# Patient Record
Sex: MALE | Race: BLACK OR AFRICAN AMERICAN | Employment: OTHER | URBAN - METROPOLITAN AREA
[De-identification: names, ages, dates, MRNs, and addresses within clinical notes are randomized per-mention and may not be internally consistent; named-entity substitution may affect disease eponyms.]

---

## 2017-06-14 PROBLEM — G89.4 CHRONIC PAIN SYNDROME: Status: ACTIVE | Noted: 2017-06-14

## 2018-07-11 ENCOUNTER — OFFICE VISIT (OUTPATIENT)
Dept: PULMONOLOGY | Age: 53
End: 2018-07-11
Payer: COMMERCIAL

## 2018-07-11 VITALS
HEIGHT: 73 IN | WEIGHT: 212.8 LBS | SYSTOLIC BLOOD PRESSURE: 142 MMHG | BODY MASS INDEX: 28.2 KG/M2 | TEMPERATURE: 96.7 F | DIASTOLIC BLOOD PRESSURE: 80 MMHG | HEART RATE: 64 BPM | OXYGEN SATURATION: 98 %

## 2018-07-11 DIAGNOSIS — R60.0 LOWER EXTREMITY EDEMA: ICD-10-CM

## 2018-07-11 DIAGNOSIS — R05.9 COUGH: ICD-10-CM

## 2018-07-11 DIAGNOSIS — G47.33 OSA ON CPAP: Primary | ICD-10-CM

## 2018-07-11 DIAGNOSIS — F17.200 SMOKING: ICD-10-CM

## 2018-07-11 DIAGNOSIS — Z99.89 OSA ON CPAP: Primary | ICD-10-CM

## 2018-07-11 PROCEDURE — 4004F PT TOBACCO SCREEN RCVD TLK: CPT | Performed by: INTERNAL MEDICINE

## 2018-07-11 PROCEDURE — 3017F COLORECTAL CA SCREEN DOC REV: CPT | Performed by: INTERNAL MEDICINE

## 2018-07-11 PROCEDURE — G8427 DOCREV CUR MEDS BY ELIG CLIN: HCPCS | Performed by: INTERNAL MEDICINE

## 2018-07-11 PROCEDURE — 99214 OFFICE O/P EST MOD 30 MIN: CPT | Performed by: INTERNAL MEDICINE

## 2018-07-11 PROCEDURE — G8419 CALC BMI OUT NRM PARAM NOF/U: HCPCS | Performed by: INTERNAL MEDICINE

## 2018-07-11 RX ORDER — LOSARTAN POTASSIUM AND HYDROCHLOROTHIAZIDE 25; 100 MG/1; MG/1
TABLET ORAL DAILY
COMMUNITY

## 2018-07-11 NOTE — PROGRESS NOTES
Subjective:     Yessenia Be is a 47 y.o. male who complains today of:     Chief Complaint   Patient presents with    Follow-up     Est care former pt or Dr Esperanza Rubio       HPI  Patient presents for follow up   1- REGINA: supposed to be on CPAP 16 cm H2o on he have not been using it for long time for 3 months, machine is old , noisy and he is unable to use. The mask is not working well and is bothering him   When he uses his CPAP it helps him with improved energy and activity   2- Cough with yellow phlegm for 6 month, no hemoptysis, rare chest pain comes and goes in different spots, none recent , continue to smoke less than a pack per day  Since age 20. + feet edema sometimes   3- GERD no symptoms now and he is on PPI     4- Sickle cell disease no flares fore  3 years or more     Allergies:  Patient has no known allergies. Past Medical History:   Diagnosis Date    Bronchitis     Chronic back pain     Hypertension     Osteoarthritis     Pneumonia      Past Surgical History:   Procedure Laterality Date    BLADDER SURGERY  7/10/2015    Patient states he was urininating blood and had a procedure done.  COLONOSCOPY  3/14/14    JARM    LUMBAR FUSION      UPPER GASTROINTESTINAL ENDOSCOPY  3/14/14    BIOPSY JARM    VAGAL NERVE STIMULATION       Family History   Problem Relation Age of Onset    Cancer Mother         Throat    Other Father         Natural causes    Diabetes Sister     COPD Sister      Social History     Social History    Marital status: Legally      Spouse name: N/A    Number of children: N/A    Years of education: N/A     Occupational History    Not on file. Social History Main Topics    Smoking status: Current Every Day Smoker     Packs/day: 1.00     Types: Cigarettes    Smokeless tobacco: Never Used      Comment: Getting closer to wanting to quit.     Alcohol use No    Drug use: No    Sexual activity: Not on file     Other Topics Concern    Not on file     Social History Narrative    No narrative on file         Review of Systems      ROS: 10 organs review of system is done including general, psychological, ENT, hematological, endocrine, respiratory, cardiovascular, gastrointestinal, musculoskeletal, neurological,  allergy and Immunology is done and is otherwise negative. Current Outpatient Prescriptions   Medication Sig Dispense Refill    losartan-hydrochlorothiazide (HYZAAR) 100-25 MG per tablet Take 1 tablet by mouth      HYDROcodone-acetaminophen (NORCO)  MG per tablet Take 1 tablet by mouth 3 times daily as needed for Pain for up to 30 days. [facility] [place]: [place]18 90 tablet 0    pregabalin (LYRICA) 150 MG capsule Take 1 capsule by mouth three times daily for 30 days. . 90 capsule 0    DULoxetine (CYMBALTA) 60 MG extended release capsule take 1 capsule by mouth once daily 30 capsule 3    naproxen (NAPROXEN) 500 MG EC tablet take 1 tablet by mouth twice a day 60 tablet 1    metFORMIN (GLUCOPHAGE) 500 MG tablet Take 500 mg by mouth 2 times daily (with meals)      DULoxetine (CYMBALTA) 30 MG extended release capsule take 1 capsule by mouth once daily 30 capsule 5    atenolol (TENORMIN) 50 MG tablet Take 50 mg by mouth daily      fenofibrate 160 MG tablet Take by mouth      folic acid (FOLVITE) 1 MG tablet Take 1 mg by mouth      amLODIPine-benazepril (LOTREL) 5-10 MG per capsule       omeprazole (PRILOSEC) 20 MG capsule        No current facility-administered medications for this visit. Objective:     Vitals:    07/11/18 0851   BP: (!) 142/80   Pulse: 64   Temp: 96.7 °F (35.9 °C)   TempSrc: Tympanic   SpO2: 98%   Weight: 212 lb 12.8 oz (96.5 kg)   Height: 6' 1\" (1.854 m)         Physical Exam   Constitutional: He is oriented to person, place, and time. He appears well-developed and well-nourished. HENT:   Head: Normocephalic and atraumatic. Eyes: Conjunctivae are normal. Pupils are equal, round, and reactive to light.  Left eye exhibits no discharge. Neck: Normal range of motion. Neck supple. No JVD present. Cardiovascular: Normal rate, regular rhythm and normal heart sounds. Exam reveals no gallop and no friction rub. No murmur heard. Pulmonary/Chest: Effort normal. No respiratory distress. He has no wheezes. He has rales. He exhibits no tenderness. Abdominal: Soft. Bowel sounds are normal.   Musculoskeletal: He exhibits edema (1+ ). He exhibits no deformity. Neurological: He is alert and oriented to person, place, and time. Skin: Skin is warm and dry. Psychiatric: He has a normal mood and affect. Judgment normal.     Imaging studies reviewed by me CT chest 2014 shows centrilobular emphysema, with dependent atelectasis no mass or nodules  Lab results reviewed in chart  PFT 2016  FEV1 3.20 L 86%, FEV1/FVC 0.87, total lung capacity 73%, and normal diffusion capacity. ECHO: Echo 2014 ejection fraction 65%     Assessment and Plan       Diagnosis Orders   1. REGINA on CPAP  DME Order for CPAP as OP   2. Cough  XR CHEST STANDARD (2 VW)    Spirometry without bronchodilator    tiotropium (SPIRIVA) 18 MCG inhalation capsule    Respiratory Therapy Supplies (FLUTTER) PANCHO   3. Smoking  Spirometry without bronchodilator   4. Lower extremity edema       · Obstructive sleep apnea currently noncompliant due to machine being old and noisy, new CPAP machine is ordered, and patient to adhere.   · Cough likely chronic bronchitis, will obtain chest x-ray, will obtain spirometry, and start Spiriva also patient to start on flutter valve  · Smoking cessation counseling more than 10 minutes is done  · Lower extremity edema is likely due to obstructive sleep apnea with possible underlying diastolic dysfunction expect improvement with adherence to CPAP      Orders Placed This Encounter   Procedures    XR CHEST STANDARD (2 VW)     Standing Status:   Future     Standing Expiration Date:   7/11/2019     Order Specific Question:   Reason for exam:     Answer:

## 2018-07-13 ENCOUNTER — HOSPITAL ENCOUNTER (OUTPATIENT)
Dept: GENERAL RADIOLOGY | Age: 53
Discharge: HOME | End: 2018-07-15
Payer: COMMERCIAL

## 2018-07-13 ENCOUNTER — HOSPITAL ENCOUNTER (OUTPATIENT)
Dept: PULMONOLOGY | Age: 53
Discharge: HOME | End: 2018-07-13
Payer: COMMERCIAL

## 2018-07-13 DIAGNOSIS — F17.200 SMOKING: ICD-10-CM

## 2018-07-13 DIAGNOSIS — R05.9 COUGH: ICD-10-CM

## 2018-07-13 PROCEDURE — 94010 BREATHING CAPACITY TEST: CPT | Performed by: INTERNAL MEDICINE

## 2018-07-13 PROCEDURE — 71046 X-RAY EXAM CHEST 2 VIEWS: CPT

## 2018-07-13 PROCEDURE — 94010 BREATHING CAPACITY TEST: CPT

## 2018-07-23 ENCOUNTER — TELEPHONE (OUTPATIENT)
Dept: PULMONOLOGY | Age: 53
End: 2018-07-23

## 2018-07-23 NOTE — TELEPHONE ENCOUNTER
Pt states the incruse that was sent in for him was too expensive. Pharmacist states that Nada Josely would be the less expensive for him. Can you please prescribe the Tudorza?

## 2018-09-11 ENCOUNTER — OFFICE VISIT (OUTPATIENT)
Dept: PULMONOLOGY | Age: 53
End: 2018-09-11
Payer: COMMERCIAL

## 2018-09-11 VITALS
TEMPERATURE: 97.3 F | DIASTOLIC BLOOD PRESSURE: 82 MMHG | WEIGHT: 219 LBS | BODY MASS INDEX: 29.03 KG/M2 | RESPIRATION RATE: 18 BRPM | OXYGEN SATURATION: 98 % | HEART RATE: 60 BPM | SYSTOLIC BLOOD PRESSURE: 138 MMHG | HEIGHT: 73 IN

## 2018-09-11 DIAGNOSIS — R60.0 EDEMA EXTREMITIES: ICD-10-CM

## 2018-09-11 DIAGNOSIS — R05.9 COUGH: ICD-10-CM

## 2018-09-11 DIAGNOSIS — Z99.89 OSA ON CPAP: Primary | ICD-10-CM

## 2018-09-11 DIAGNOSIS — F17.200 SMOKING: ICD-10-CM

## 2018-09-11 DIAGNOSIS — G47.33 OSA ON CPAP: Primary | ICD-10-CM

## 2018-09-11 PROCEDURE — 99213 OFFICE O/P EST LOW 20 MIN: CPT | Performed by: INTERNAL MEDICINE

## 2018-09-11 PROCEDURE — 4004F PT TOBACCO SCREEN RCVD TLK: CPT | Performed by: INTERNAL MEDICINE

## 2018-09-11 PROCEDURE — G8427 DOCREV CUR MEDS BY ELIG CLIN: HCPCS | Performed by: INTERNAL MEDICINE

## 2018-09-11 PROCEDURE — 3017F COLORECTAL CA SCREEN DOC REV: CPT | Performed by: INTERNAL MEDICINE

## 2018-09-11 PROCEDURE — G8419 CALC BMI OUT NRM PARAM NOF/U: HCPCS | Performed by: INTERNAL MEDICINE

## 2018-09-11 RX ORDER — MEDICAL SUPPLY, MISCELLANEOUS
1 EACH MISCELLANEOUS DAILY
Qty: 1 EACH | Refills: 0 | Status: SHIPPED | OUTPATIENT
Start: 2018-09-11

## 2018-09-11 NOTE — PROGRESS NOTES
negative. Current Outpatient Prescriptions   Medication Sig Dispense Refill    HYDROcodone-acetaminophen (NORCO)  MG per tablet Take 1 tablet by mouth 3 times daily as needed for Pain for up to 30 days. [facility] Date: 9/3/18 90 tablet 0    pregabalin (LYRICA) 150 MG capsule Take 1 capsule by mouth three times daily for 30 days. . 90 capsule 0    NAPROXEN 500 MG EC tablet take 1 tablet by mouth twice a day 60 tablet 1    DULoxetine (CYMBALTA) 30 MG extended release capsule take 1 capsule by mouth once daily 30 capsule 5    aclidinium (TUDORZA PRESSAIR) 400 MCG/ACT AEPB inhaler Inhale 1 puff into the lungs 2 times daily 1 each 5    losartan-hydrochlorothiazide (HYZAAR) 100-25 MG per tablet Take 1 tablet by mouth      Respiratory Therapy Supplies (FLUTTER) PANCHO 1 Device by Does not apply route 4 times daily 1 Device 0    DULoxetine (CYMBALTA) 60 MG extended release capsule take 1 capsule by mouth once daily 30 capsule 3    metFORMIN (GLUCOPHAGE) 500 MG tablet Take 500 mg by mouth 2 times daily (with meals)      atenolol (TENORMIN) 50 MG tablet Take 50 mg by mouth daily      fenofibrate 160 MG tablet Take by mouth      folic acid (FOLVITE) 1 MG tablet Take 1 mg by mouth      amLODIPine-benazepril (LOTREL) 5-10 MG per capsule       omeprazole (PRILOSEC) 20 MG capsule        No current facility-administered medications for this visit. Objective:     Vitals:    09/11/18 1502   BP: 138/82   Site: Right Upper Arm   Position: Sitting   Cuff Size: Medium Adult   Pulse: 60   Resp: 18   Temp: 97.3 °F (36.3 °C)   TempSrc: Tympanic   SpO2: 98%   Weight: 219 lb (99.3 kg)   Height: 6' 1\" (1.854 m)         Physical Exam   Constitutional: He is oriented to person, place, and time. He appears well-developed and well-nourished. HENT:   Head: Normocephalic and atraumatic. Eyes: Pupils are equal, round, and reactive to light. Conjunctivae are normal. Left eye exhibits no discharge.    Neck: Normal range of motion. Neck supple. No JVD present. Cardiovascular: Normal rate, regular rhythm and normal heart sounds. Exam reveals no gallop and no friction rub. No murmur heard. Pulmonary/Chest: Effort normal and breath sounds normal. No respiratory distress. He has no wheezes. He has no rales. He exhibits no tenderness. Abdominal: Soft. Bowel sounds are normal. He exhibits no distension. There is no tenderness. Musculoskeletal: He exhibits edema (1+). He exhibits no deformity. Neurological: He is alert and oriented to person, place, and time. Skin: Skin is warm and dry. Psychiatric: He has a normal mood and affect. Judgment normal.     Imaging studies reviewed by me CXR unremarkable    Lab results reviewed in chart  Spirometry normal   ECHO:  EF 60%      Assessment and Plan       Diagnosis Orders   1. REGINA on CPAP     2. Smoking     3. Cough     4. Edema extremities  Elastic Bandages & Supports (T.E.D. BELOW KNEE/L-REGULAR) MISC     · Patient will start CPAP when able to afford  · Smoking cessation counseling 3-5 minutes done patient is going to try, he does not wish to try Chantix at this time due to suicidal ideation side effect. · Cough is likely chronic bronchitis and improved on current treatment  · Bilateral lower extremity edema likely related to untreated sleep apnea expect improvement with CPAP treatment start will use ZACK hose stocking      No orders of the defined types were placed in this encounter.     Orders Placed This Encounter   Medications    Elastic Bandages & Supports (T.E.D. BELOW KNEE/L-REGULAR) MISC     Si applicator by Does not apply route daily     Dispense:  1 each     Refill:  0     Discussed with patient the importance of exercise and weight control and  overall health and well-being.     Reviewed with the patient: current clinical status, medications, activities and diet.       treatment plan and result expectations have been discussed with the patient who expresses understanding and desires to proceed.           Return in about 4 months (around 1/11/2019).       Reyes Engel MD

## 2019-02-11 PROBLEM — D57.20 SICKLE-CELL/HB-C DISEASE WITHOUT CRISIS (HCC): Status: ACTIVE | Noted: 2019-02-11

## 2019-03-08 PROBLEM — D57.20 SICKLE-CELL/HB-C DISEASE WITHOUT CRISIS (HCC): Status: ACTIVE | Noted: 2019-03-08

## 2019-03-11 DIAGNOSIS — D57.20 SICKLE-CELL/HB-C DISEASE WITHOUT CRISIS (HCC): ICD-10-CM

## 2019-03-11 LAB
ALBUMIN SERPL-MCNC: 4.6 G/DL (ref 3.5–4.6)
ALP BLD-CCNC: 39 U/L (ref 35–104)
ALT SERPL-CCNC: 16 U/L (ref 0–41)
ANION GAP SERPL CALCULATED.3IONS-SCNC: 17 MEQ/L (ref 9–15)
AST SERPL-CCNC: 32 U/L (ref 0–40)
BILIRUB SERPL-MCNC: 0.8 MG/DL (ref 0.2–0.7)
BUN BLDV-MCNC: 20 MG/DL (ref 6–20)
CALCIUM SERPL-MCNC: 9.8 MG/DL (ref 8.5–9.9)
CHLORIDE BLD-SCNC: 99 MEQ/L (ref 95–107)
CO2: 24 MEQ/L (ref 20–31)
CREAT SERPL-MCNC: 1.48 MG/DL (ref 0.7–1.2)
GFR AFRICAN AMERICAN: 59.8
GFR NON-AFRICAN AMERICAN: 49.4
GLOBULIN: 3 G/DL (ref 2.3–3.5)
GLUCOSE BLD-MCNC: 141 MG/DL (ref 70–99)
HCT VFR BLD CALC: 36.9 % (ref 42–52)
POTASSIUM SERPL-SCNC: 4.1 MEQ/L (ref 3.4–4.9)
RETICULOCYTE ABSOLUTE COUNT: 0.13 M/CUMM (ref 0.02–0.11)
RETICULOCYTE COUNT PCT: 2.6 % (ref 0.6–2.2)
SODIUM BLD-SCNC: 140 MEQ/L (ref 135–144)
TOTAL PROTEIN: 7.6 G/DL (ref 6.3–8)

## 2019-03-12 LAB — LACTATE DEHYDROGENASE: 379 U/L (ref 135–225)

## 2019-03-29 ENCOUNTER — INITIAL CONSULT (OUTPATIENT)
Dept: PODIATRY | Facility: CLINIC | Age: 54
End: 2019-03-29

## 2019-03-29 VITALS — TEMPERATURE: 97.1 F | BODY MASS INDEX: 26.82 KG/M2 | HEIGHT: 74 IN | WEIGHT: 209 LBS

## 2019-03-29 DIAGNOSIS — L85.3 XEROSIS OF SKIN: ICD-10-CM

## 2019-03-29 DIAGNOSIS — E11.42 DIABETIC POLYNEUROPATHY ASSOCIATED WITH TYPE 2 DIABETES MELLITUS (HCC): ICD-10-CM

## 2019-03-29 DIAGNOSIS — M21.621 TAILOR'S BUNIONETTE, BILATERAL: ICD-10-CM

## 2019-03-29 DIAGNOSIS — M21.622 TAILOR'S BUNIONETTE, BILATERAL: ICD-10-CM

## 2019-03-29 DIAGNOSIS — M79.671 PAIN IN BOTH FEET: Primary | ICD-10-CM

## 2019-03-29 DIAGNOSIS — M79.672 PAIN IN BOTH FEET: Primary | ICD-10-CM

## 2019-03-29 DIAGNOSIS — B35.1 DERMATOPHYTOSIS OF NAIL: ICD-10-CM

## 2019-03-29 DIAGNOSIS — M20.12 HALLUX ABDUCTO VALGUS, BILATERAL: ICD-10-CM

## 2019-03-29 DIAGNOSIS — M20.11 HALLUX ABDUCTO VALGUS, BILATERAL: ICD-10-CM

## 2019-03-29 RX ORDER — AMMONIUM LACTATE 12 G/100G
CREAM TOPICAL
Qty: 1 BOTTLE | Refills: 5 | Status: SHIPPED | OUTPATIENT
Start: 2019-03-29 | End: 2019-04-28

## 2019-03-29 ASSESSMENT — ENCOUNTER SYMPTOMS
SHORTNESS OF BREATH: 0
DIARRHEA: 0
BACK PAIN: 1
NAUSEA: 0
ROS SKIN COMMENTS: DRY SKIN
CONSTIPATION: 0

## 2019-04-09 ENCOUNTER — OFFICE VISIT (OUTPATIENT)
Dept: PULMONOLOGY | Age: 54
End: 2019-04-09
Payer: COMMERCIAL

## 2019-04-09 VITALS
TEMPERATURE: 97.4 F | BODY MASS INDEX: 26.99 KG/M2 | OXYGEN SATURATION: 99 % | SYSTOLIC BLOOD PRESSURE: 138 MMHG | HEART RATE: 61 BPM | DIASTOLIC BLOOD PRESSURE: 84 MMHG | WEIGHT: 210.2 LBS

## 2019-04-09 DIAGNOSIS — Z99.89 OSA ON CPAP: Primary | ICD-10-CM

## 2019-04-09 DIAGNOSIS — R05.9 COUGH: ICD-10-CM

## 2019-04-09 DIAGNOSIS — F17.200 SMOKING: ICD-10-CM

## 2019-04-09 DIAGNOSIS — G47.33 OSA ON CPAP: Primary | ICD-10-CM

## 2019-04-09 PROCEDURE — 4004F PT TOBACCO SCREEN RCVD TLK: CPT | Performed by: INTERNAL MEDICINE

## 2019-04-09 PROCEDURE — 99213 OFFICE O/P EST LOW 20 MIN: CPT | Performed by: INTERNAL MEDICINE

## 2019-04-09 PROCEDURE — G8419 CALC BMI OUT NRM PARAM NOF/U: HCPCS | Performed by: INTERNAL MEDICINE

## 2019-04-09 PROCEDURE — 3017F COLORECTAL CA SCREEN DOC REV: CPT | Performed by: INTERNAL MEDICINE

## 2019-04-09 PROCEDURE — G8427 DOCREV CUR MEDS BY ELIG CLIN: HCPCS | Performed by: INTERNAL MEDICINE

## 2019-04-09 NOTE — PROGRESS NOTES
Subjective:     Shashi Mobley is a 46 y.o. male whocomplains today of:     Chief Complaint   Patient presents with    Follow-up     [place]       HPI  Patient presents for REGINA       - Cant afford his CPAP co pay of 60 dollars a month, he is using his old machine but having hard time using it because of frequent urination, and poor compliant, no SOB, no chest pain. + cough fells like throat full of phlegm/someting stock there, no nasal congestion, no GERD     Smoker 3/4  Pack per day for almost , used to smoke 1 PPD total 10 years     Allergies:  Patient has no known allergies. Past Medical History:   Diagnosis Date    Bronchitis     Chronic back pain     Diabetes [facility] [address]     Dyslipidemia     Hypertension     Neuropathy in diabetes ([facility] [address]     Osteoarthritis     Pneumonia     Sickle cell anemia (HCC)     Sleep apnea, obstructive      Past Surgical History:   Procedure Laterality Date    BLADDER SURGERY  7/10/2015    Patient states he was urininating blood and had a procedure done.     COLONOSCOPY  3/14/14    JARM    LUMBAR FUSION      OTHER SURGICAL HISTORY Right     tendon repair in (R) forearm because of previous injury    UPPER GASTROINTESTINAL ENDOSCOPY  3/14/14    BIOPSY JARM    VAGAL NERVE STIMULATION       Family History   Problem Relation Age of Onset    Cancer Mother         Throat    Anemia Mother     Other Father         Natural causes    Diabetes Sister     COPD Sister      Social History     Socioeconomic History    Marital status: Legally      Spouse name: Not on file    Number of children: Not on file    Years of education: Not on file    Highest education level: Not on file   Occupational History    Occupation: Disabled - because of back pain - 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 Substance and Sexual Activity    Alcohol use: No     Alcohol/week: 0.0 oz    Drug use: No    Sexual activity: Not on file   Lifestyle    Physical activity:     Days per week: Not on file     Minutes per session: Not on file    Stress: Not on file   Relationships    Social connections:     Talks on phone: Not on file     Gets together: Not on file     Attends Anabaptism service: Not on file     Active member of club or organization: Not on file     Attends meetings of clubs or organizations: Not on file     Relationship status: Not on file    Intimate partner violence:     Fear of current or ex partner: Not on file     Emotionally abused: Not on file     Physically abused: Not on file     Forced sexual activity: Not on file   Other Topics Concern    Not on file   Social History Narrative    Not on file         Review of Systems      ROS: 10 organs review of system is done including general, psychological, ENT, hematological, endocrine, respiratory, cardiovascular, gastrointestinal,musculoskeletal, neurological,  allergy and Immunology is done and is otherwise negative. Current Outpatient Medications   Medication Sig Dispense Refill    ammonium lactate (LAC-HYDRIN) 12 % cream Apply topically twice daily to areas of dry skin, do not apply between the toes. 1 Bottle 5    ciclopirox (PENLAC) 8 % solution Apply topically to the toenails nightly. 1 Bottle 5    HYDROcodone-acetaminophen (NORCO)  MG per tablet Take 1 tablet by mouth 3 times daily as needed for Pain for up to 30 days. 90 tablet 0    pregabalin (LYRICA) 150 MG capsule Take 1 capsule by mouth three times daily for 30 days.  Fill date 1/11/19 90 capsule 0    naproxen (NAPROXEN) 500 MG EC tablet take 1 tablet by mouth twice a day 60 tablet 1    DULoxetine (CYMBALTA) 60 MG extended release capsule Take 1 capsule by mouth daily Takes 90mg total 30 capsule 2    DULoxetine (CYMBALTA) 30 MG extended release capsule Take 1 capsule by mouth daily Take 90mg total 30 capsule 2    Elastic Bandages & Supports (T.E.D. BELOW KNEE/L-REGULAR) MISC 1 applicator by Does not apply route daily 1 each 0    aclidinium (TUDORZA PRESSAIR) 400 MCG/ACT AEPB inhaler Inhale 1 puff into the lungs 2 times daily 1 each 5    losartan-hydrochlorothiazide (HYZAAR) 100-25 MG per tablet Take 1 tablet by mouth      Respiratory Therapy Supplies (FLUTTER) PANCHO 1 Device by Does not apply route 4 times daily 1 Device 0    metFORMIN (GLUCOPHAGE) 500 MG tablet Take 500 mg by mouth 2 times daily (with meals)      atenolol (TENORMIN) 50 MG tablet Take 50 mg by mouth daily      fenofibrate 160 MG tablet Take by mouth      folic acid (FOLVITE) 1 MG tablet Take 1 mg by mouth      amLODIPine-benazepril (LOTREL) 5-10 MG per capsule       omeprazole (PRILOSEC) 20 MG capsule        No current facility-administered medications for this visit. Objective:     Vitals:    04/09/19 1515   BP: 138/84   Site: Right Upper Arm   Pulse: 61   Temp: 97.4 °F (36.3 °C)   TempSrc: Tympanic   SpO2: 99%   Weight: 210 lb 3.2 oz (95.3 kg)         Physical Exam   Constitutional: He is oriented to person, place, and time. He appears well-developed and well-nourished. HENT:   Head: Normocephalic and atraumatic. Eyes: Pupils are equal, round, and reactive to light. Conjunctivae are normal. Left eye exhibits no discharge. Neck: Normal range of motion. Neck supple. No JVD present. Cardiovascular: Normal rate, regular rhythm and normal heart sounds. Exam reveals no gallop and no friction rub. No murmur heard. Pulmonary/Chest: Effort normal and breath sounds normal. No respiratory distress. He has no wheezes. He has no rales.  He exhibits no tenderness. Abdominal: Soft. Bowel sounds are normal.   Musculoskeletal: He exhibits no edema or deformity. Neurological: He is alert and oriented to person, place, and time. Skin: Skin is warm and dry. Psychiatric: He has a normal mood and affect. Judgment normal.       Imaging studies reviewed by me  chest x-rays negative   Lab results reviewed in chart  PFT FEV1 82%    Assessment and Plan       Diagnosis Orders   1. REGINA on CPAP     2. Smoking     3. Cough  Referral To External ENT - Jeanette Marroquin MD     · Patient encouraged to use CPAP daily, he probably would be able to void CPAP machine in the next couple month is. · Smoking cessation counseling done again 5-10 minutes. · Cough with abnormal feeling of phlegm versus tissue stuck to his throat, will refer patient to ENT. Orders Placed This Encounter   Procedures    Referral To External ENT Sarah Marroquin MD     Referral Priority:   Routine     Referral Type:   Eval and Treat     Referral Reason:   Specialty Services Required     Referred to Provider:   Ursula Hdez MD     Requested Specialty:   Otolaryngology     Number of Visits Requested:   1     No orders of the defined types were placed in this encounter. Return in about 6 months (around 10/9/2019).       Jhony Mast MD

## 2019-04-24 ENCOUNTER — HOSPITAL ENCOUNTER (OUTPATIENT)
Dept: CT IMAGING | Age: 54
Discharge: HOME | End: 2019-04-26
Payer: COMMERCIAL

## 2019-04-24 VITALS
WEIGHT: 213 LBS | DIASTOLIC BLOOD PRESSURE: 87 MMHG | HEIGHT: 73 IN | BODY MASS INDEX: 28.23 KG/M2 | SYSTOLIC BLOOD PRESSURE: 138 MMHG

## 2019-04-24 DIAGNOSIS — J38.7 LARYNGEAL MASS: ICD-10-CM

## 2019-04-24 PROCEDURE — 6360000004 HC RX CONTRAST MEDICATION: Performed by: PHYSICIAN ASSISTANT

## 2019-04-24 PROCEDURE — 70491 CT SOFT TISSUE NECK W/DYE: CPT

## 2019-04-24 PROCEDURE — 2580000003 HC RX 258: Performed by: PHYSICIAN ASSISTANT

## 2019-04-24 RX ORDER — SODIUM CHLORIDE 0.9 % (FLUSH) 0.9 %
10 SYRINGE (ML) INJECTION ONCE
Status: COMPLETED | OUTPATIENT
Start: 2019-04-24 | End: 2019-04-24

## 2019-04-24 RX ADMIN — Medication 10 ML: at 15:39

## 2019-04-24 RX ADMIN — IOPAMIDOL 75 ML: 612 INJECTION, SOLUTION INTRAVENOUS at 15:39

## 2019-11-15 ENCOUNTER — HOSPITAL ENCOUNTER (OUTPATIENT)
Dept: ORTHOPEDIC SURGERY | Age: 54
Discharge: HOME | End: 2019-11-17
Payer: COMMERCIAL

## 2019-11-15 DIAGNOSIS — M16.9 ARTHROSIS OF HIP: ICD-10-CM

## 2019-11-15 PROCEDURE — 73502 X-RAY EXAM HIP UNI 2-3 VIEWS: CPT

## 2019-12-26 ENCOUNTER — OFFICE VISIT (OUTPATIENT)
Dept: PULMONOLOGY | Age: 54
End: 2019-12-26
Payer: COMMERCIAL

## 2019-12-26 VITALS
WEIGHT: 208 LBS | TEMPERATURE: 97 F | OXYGEN SATURATION: 98 % | SYSTOLIC BLOOD PRESSURE: 136 MMHG | BODY MASS INDEX: 26.69 KG/M2 | HEIGHT: 74 IN | HEART RATE: 63 BPM | RESPIRATION RATE: 18 BRPM | DIASTOLIC BLOOD PRESSURE: 84 MMHG

## 2019-12-26 DIAGNOSIS — Z99.89 OSA ON CPAP: ICD-10-CM

## 2019-12-26 DIAGNOSIS — R05.9 COUGH: ICD-10-CM

## 2019-12-26 DIAGNOSIS — F17.200 SMOKING: ICD-10-CM

## 2019-12-26 DIAGNOSIS — G47.33 OSA ON CPAP: ICD-10-CM

## 2019-12-26 DIAGNOSIS — F17.218 CIGARETTE NICOTINE DEPENDENCE WITH OTHER NICOTINE-INDUCED DISORDER: Primary | ICD-10-CM

## 2019-12-26 PROCEDURE — G8484 FLU IMMUNIZE NO ADMIN: HCPCS | Performed by: INTERNAL MEDICINE

## 2019-12-26 PROCEDURE — 99214 OFFICE O/P EST MOD 30 MIN: CPT | Performed by: INTERNAL MEDICINE

## 2019-12-26 PROCEDURE — G8419 CALC BMI OUT NRM PARAM NOF/U: HCPCS | Performed by: INTERNAL MEDICINE

## 2019-12-26 PROCEDURE — 4004F PT TOBACCO SCREEN RCVD TLK: CPT | Performed by: INTERNAL MEDICINE

## 2019-12-26 PROCEDURE — 3017F COLORECTAL CA SCREEN DOC REV: CPT | Performed by: INTERNAL MEDICINE

## 2019-12-26 PROCEDURE — G8427 DOCREV CUR MEDS BY ELIG CLIN: HCPCS | Performed by: INTERNAL MEDICINE

## 2019-12-26 RX ORDER — NICOTINE 21 MG/24HR
1 PATCH, TRANSDERMAL 24 HOURS TRANSDERMAL DAILY
Qty: 42 PATCH | Refills: 0 | Status: SHIPPED | OUTPATIENT
Start: 2019-12-26 | End: 2020-02-06

## 2020-01-02 ENCOUNTER — TELEPHONE (OUTPATIENT)
Dept: CASE MANAGEMENT | Age: 55
End: 2020-01-02

## 2020-01-06 ENCOUNTER — HOSPITAL ENCOUNTER (OUTPATIENT)
Dept: CT IMAGING | Age: 55
Discharge: HOME | End: 2020-01-08
Payer: COMMERCIAL

## 2020-01-06 PROCEDURE — G0297 LDCT FOR LUNG CA SCREEN: HCPCS

## 2020-01-07 ENCOUNTER — HOSPITAL ENCOUNTER (OUTPATIENT)
Dept: PREADMISSION TESTING | Age: 55
Discharge: HOME | End: 2020-01-11
Payer: COMMERCIAL

## 2020-01-07 VITALS
BODY MASS INDEX: 27.04 KG/M2 | HEIGHT: 73 IN | HEART RATE: 69 BPM | RESPIRATION RATE: 18 BRPM | SYSTOLIC BLOOD PRESSURE: 159 MMHG | DIASTOLIC BLOOD PRESSURE: 71 MMHG | OXYGEN SATURATION: 98 % | WEIGHT: 204 LBS | TEMPERATURE: 96.8 F

## 2020-01-07 PROBLEM — F17.200 TOBACCO USE DISORDER: Chronic | Status: ACTIVE | Noted: 2020-01-07

## 2020-01-07 PROBLEM — Z96.89 S/P INSERTION OF SPINAL CORD STIMULATOR: Chronic | Status: ACTIVE | Noted: 2020-01-07

## 2020-01-07 PROBLEM — M16.12 DEGENERATIVE JOINT DISEASE OF LEFT HIP: Status: ACTIVE | Noted: 2020-01-07

## 2020-01-07 PROBLEM — G47.33 OSA (OBSTRUCTIVE SLEEP APNEA): Chronic | Status: ACTIVE | Noted: 2020-01-07

## 2020-01-07 LAB
ABO/RH: NORMAL
ANION GAP SERPL CALCULATED.3IONS-SCNC: 11 MEQ/L (ref 9–15)
ANISOCYTOSIS: ABNORMAL
ANTIBODY SCREEN: NORMAL
ATYPICAL LYMPHOCYTE RELATIVE PERCENT: 5 %
BASOPHILS ABSOLUTE: 0.3 K/UL (ref 0–0.2)
BASOPHILS RELATIVE PERCENT: 3 %
BILIRUBIN URINE: NEGATIVE
BLOOD, URINE: NEGATIVE
BUN BLDV-MCNC: 27 MG/DL (ref 6–20)
CALCIUM SERPL-MCNC: 9.4 MG/DL (ref 8.5–9.9)
CHLORIDE BLD-SCNC: 103 MEQ/L (ref 95–107)
CLARITY: CLEAR
CO2: 28 MEQ/L (ref 20–31)
COLOR: YELLOW
CREAT SERPL-MCNC: 1.34 MG/DL (ref 0.7–1.2)
EKG ATRIAL RATE: 59 BPM
EKG P AXIS: 56 DEGREES
EKG P-R INTERVAL: 164 MS
EKG Q-T INTERVAL: 432 MS
EKG QRS DURATION: 94 MS
EKG QTC CALCULATION (BAZETT): 427 MS
EKG R AXIS: -15 DEGREES
EKG T AXIS: 71 DEGREES
EKG VENTRICULAR RATE: 59 BPM
EOSINOPHILS ABSOLUTE: 0.4 K/UL (ref 0–0.7)
EOSINOPHILS RELATIVE PERCENT: 5 %
GFR AFRICAN AMERICAN: >60
GFR NON-AFRICAN AMERICAN: 55.3
GLUCOSE BLD-MCNC: 136 MG/DL (ref 70–99)
GLUCOSE URINE: NEGATIVE MG/DL
HBA1C MFR BLD: 4.3 % (ref 4.8–5.9)
HCT VFR BLD CALC: 35.6 % (ref 42–52)
HEMOGLOBIN: 12 G/DL (ref 14–18)
INR BLD: 1.1
KETONES, URINE: NEGATIVE MG/DL
LEUKOCYTE ESTERASE, URINE: NEGATIVE
LYMPHOCYTES ABSOLUTE: 2.7 K/UL (ref 1–4.8)
LYMPHOCYTES RELATIVE PERCENT: 27 %
MCH RBC QN AUTO: 26.5 PG (ref 27–31.3)
MCHC RBC AUTO-ENTMCNC: 33.6 % (ref 33–37)
MCV RBC AUTO: 79 FL (ref 80–100)
MONOCYTES ABSOLUTE: 0.5 K/UL (ref 0.2–0.8)
MONOCYTES RELATIVE PERCENT: 6.4 %
NEUTROPHILS ABSOLUTE: 4.6 K/UL (ref 1.4–6.5)
NEUTROPHILS RELATIVE PERCENT: 55 %
NITRITE, URINE: NEGATIVE
PDW BLD-RTO: 22 % (ref 11.5–14.5)
PH UA: 5.5 (ref 5–9)
PLATELET # BLD: 152 K/UL (ref 130–400)
PLATELET SLIDE REVIEW: NORMAL
POTASSIUM SERPL-SCNC: 3.4 MEQ/L (ref 3.4–4.9)
PROTEIN UA: NEGATIVE MG/DL
PROTHROMBIN TIME: 15.1 SEC (ref 12.3–14.9)
RBC # BLD: 4.51 M/UL (ref 4.7–6.1)
SLIDE REVIEW: ABNORMAL
SODIUM BLD-SCNC: 142 MEQ/L (ref 135–144)
SPECIFIC GRAVITY UA: 1.01 (ref 1–1.03)
TARGET CELLS: ABNORMAL
URINE REFLEX TO CULTURE: NORMAL
UROBILINOGEN, URINE: 1 E.U./DL
WBC # BLD: 8.4 K/UL (ref 4.8–10.8)

## 2020-01-07 PROCEDURE — 93005 ELECTROCARDIOGRAM TRACING: CPT | Performed by: ORTHOPAEDIC SURGERY

## 2020-01-07 PROCEDURE — 85025 COMPLETE CBC W/AUTO DIFF WBC: CPT

## 2020-01-07 PROCEDURE — 85610 PROTHROMBIN TIME: CPT

## 2020-01-07 PROCEDURE — 81003 URINALYSIS AUTO W/O SCOPE: CPT

## 2020-01-07 PROCEDURE — 86850 RBC ANTIBODY SCREEN: CPT

## 2020-01-07 PROCEDURE — 83036 HEMOGLOBIN GLYCOSYLATED A1C: CPT

## 2020-01-07 PROCEDURE — 86900 BLOOD TYPING SEROLOGIC ABO: CPT

## 2020-01-07 PROCEDURE — 80048 BASIC METABOLIC PNL TOTAL CA: CPT

## 2020-01-07 PROCEDURE — 86901 BLOOD TYPING SEROLOGIC RH(D): CPT

## 2020-01-07 RX ORDER — SODIUM CHLORIDE 0.9 % (FLUSH) 0.9 %
10 SYRINGE (ML) INJECTION EVERY 12 HOURS SCHEDULED
Status: CANCELLED | OUTPATIENT
Start: 2020-01-07

## 2020-01-07 RX ORDER — SODIUM CHLORIDE 0.9 % (FLUSH) 0.9 %
10 SYRINGE (ML) INJECTION PRN
Status: CANCELLED | OUTPATIENT
Start: 2020-01-07

## 2020-01-07 RX ORDER — ATENOLOL AND CHLORTHALIDONE TABLET 50; 25 MG/1; MG/1
TABLET ORAL DAILY
Refills: 0 | COMMUNITY
Start: 2019-11-06

## 2020-01-07 RX ORDER — AMLODIPINE BESYLATE 10 MG/1
TABLET ORAL DAILY
Refills: 0 | COMMUNITY
Start: 2019-11-06 | End: 2020-01-07

## 2020-01-07 RX ORDER — SODIUM CHLORIDE, SODIUM LACTATE, POTASSIUM CHLORIDE, CALCIUM CHLORIDE 600; 310; 30; 20 MG/100ML; MG/100ML; MG/100ML; MG/100ML
INJECTION, SOLUTION INTRAVENOUS CONTINUOUS
Status: CANCELLED | OUTPATIENT
Start: 2020-01-07

## 2020-01-07 RX ORDER — CEFAZOLIN SODIUM 2 G/50ML
2 SOLUTION INTRAVENOUS ONCE
Status: CANCELLED | OUTPATIENT
Start: 2020-01-23

## 2020-01-07 RX ORDER — LIDOCAINE HYDROCHLORIDE 10 MG/ML
1 INJECTION, SOLUTION EPIDURAL; INFILTRATION; INTRACAUDAL; PERINEURAL
Status: CANCELLED | OUTPATIENT
Start: 2020-01-07 | End: 2020-01-07

## 2020-01-07 NOTE — PROGRESS NOTES
Spinal cord stimulator / [employer] / placed 6/24/2013 / [facility] / patient to turn off pre-op. Last pulmonary appt dated 12/26/2019 & on [facility]. Last cardiology appt 2014. CT scan lung dated 1/6/2020 & on [email or web address].

## 2020-01-10 PROCEDURE — 93010 ELECTROCARDIOGRAM REPORT: CPT | Performed by: INTERNAL MEDICINE

## 2020-01-22 ENCOUNTER — ANESTHESIA EVENT (OUTPATIENT)
Dept: OPERATING ROOM | Age: 55
DRG: 470 | End: 2020-01-22
Payer: COMMERCIAL

## 2020-01-22 ASSESSMENT — LIFESTYLE VARIABLES: SMOKING_STATUS: 1

## 2020-01-23 ENCOUNTER — HOSPITAL ENCOUNTER (INPATIENT)
Age: 55
LOS: 2 days | Discharge: HOME HEALTH | DRG: 470 | End: 2020-01-25
Attending: ORTHOPAEDIC SURGERY | Admitting: ORTHOPAEDIC SURGERY
Payer: COMMERCIAL

## 2020-01-23 ENCOUNTER — ANESTHESIA (OUTPATIENT)
Dept: OPERATING ROOM | Age: 55
DRG: 470 | End: 2020-01-23
Payer: COMMERCIAL

## 2020-01-23 ENCOUNTER — APPOINTMENT (OUTPATIENT)
Dept: GENERAL RADIOLOGY | Age: 55
DRG: 470 | End: 2020-01-23
Attending: ORTHOPAEDIC SURGERY
Payer: COMMERCIAL

## 2020-01-23 VITALS — DIASTOLIC BLOOD PRESSURE: 60 MMHG | OXYGEN SATURATION: 99 % | SYSTOLIC BLOOD PRESSURE: 128 MMHG

## 2020-01-23 PROBLEM — Z96.642 STATUS POST TOTAL REPLACEMENT OF LEFT HIP: Status: ACTIVE | Noted: 2020-01-23

## 2020-01-23 LAB
ABO/RH: NORMAL
ANTIBODY SCREEN: NORMAL
GLUCOSE BLD-MCNC: 100 MG/DL (ref 60–115)
GLUCOSE BLD-MCNC: 105 MG/DL (ref 60–115)
GLUCOSE BLD-MCNC: 241 MG/DL (ref 60–115)
PERFORMED ON: ABNORMAL
PERFORMED ON: NORMAL
PERFORMED ON: NORMAL

## 2020-01-23 PROCEDURE — G0378 HOSPITAL OBSERVATION PER HR: HCPCS

## 2020-01-23 PROCEDURE — 3600000014 HC SURGERY LEVEL 4 ADDTL 15MIN: Performed by: ORTHOPAEDIC SURGERY

## 2020-01-23 PROCEDURE — 6370000000 HC RX 637 (ALT 250 FOR IP): Performed by: ORTHOPAEDIC SURGERY

## 2020-01-23 PROCEDURE — 6370000000 HC RX 637 (ALT 250 FOR IP): Performed by: NURSE PRACTITIONER

## 2020-01-23 PROCEDURE — 6360000002 HC RX W HCPCS: Performed by: STUDENT IN AN ORGANIZED HEALTH CARE EDUCATION/TRAINING PROGRAM

## 2020-01-23 PROCEDURE — 73501 X-RAY EXAM HIP UNI 1 VIEW: CPT

## 2020-01-23 PROCEDURE — 2709999900 HC NON-CHARGEABLE SUPPLY: Performed by: ORTHOPAEDIC SURGERY

## 2020-01-23 PROCEDURE — 7100000001 HC PACU RECOVERY - ADDTL 15 MIN: Performed by: ORTHOPAEDIC SURGERY

## 2020-01-23 PROCEDURE — C1776 JOINT DEVICE (IMPLANTABLE): HCPCS | Performed by: ORTHOPAEDIC SURGERY

## 2020-01-23 PROCEDURE — 0SRB04A REPLACEMENT OF LEFT HIP JOINT WITH CERAMIC ON POLYETHYLENE SYNTHETIC SUBSTITUTE, UNCEMENTED, OPEN APPROACH: ICD-10-PCS | Performed by: ORTHOPAEDIC SURGERY

## 2020-01-23 PROCEDURE — 97162 PT EVAL MOD COMPLEX 30 MIN: CPT

## 2020-01-23 PROCEDURE — 96376 TX/PRO/DX INJ SAME DRUG ADON: CPT

## 2020-01-23 PROCEDURE — 97166 OT EVAL MOD COMPLEX 45 MIN: CPT

## 2020-01-23 PROCEDURE — 3600000004 HC SURGERY LEVEL 4 BASE: Performed by: ORTHOPAEDIC SURGERY

## 2020-01-23 PROCEDURE — 2580000003 HC RX 258: Performed by: NURSE PRACTITIONER

## 2020-01-23 PROCEDURE — 2500000003 HC RX 250 WO HCPCS: Performed by: ORTHOPAEDIC SURGERY

## 2020-01-23 PROCEDURE — 6360000002 HC RX W HCPCS: Performed by: ORTHOPAEDIC SURGERY

## 2020-01-23 PROCEDURE — 6360000002 HC RX W HCPCS: Performed by: NURSE PRACTITIONER

## 2020-01-23 PROCEDURE — 86900 BLOOD TYPING SEROLOGIC ABO: CPT

## 2020-01-23 PROCEDURE — 2580000003 HC RX 258: Performed by: ORTHOPAEDIC SURGERY

## 2020-01-23 PROCEDURE — 96375 TX/PRO/DX INJ NEW DRUG ADDON: CPT

## 2020-01-23 PROCEDURE — 3700000001 HC ADD 15 MINUTES (ANESTHESIA): Performed by: ORTHOPAEDIC SURGERY

## 2020-01-23 PROCEDURE — 64450 NJX AA&/STRD OTHER PN/BRANCH: CPT | Performed by: STUDENT IN AN ORGANIZED HEALTH CARE EDUCATION/TRAINING PROGRAM

## 2020-01-23 PROCEDURE — 96365 THER/PROPH/DIAG IV INF INIT: CPT

## 2020-01-23 PROCEDURE — 2580000003 HC RX 258: Performed by: NURSE ANESTHETIST, CERTIFIED REGISTERED

## 2020-01-23 PROCEDURE — 3700000000 HC ANESTHESIA ATTENDED CARE: Performed by: ORTHOPAEDIC SURGERY

## 2020-01-23 PROCEDURE — 2500000003 HC RX 250 WO HCPCS: Performed by: STUDENT IN AN ORGANIZED HEALTH CARE EDUCATION/TRAINING PROGRAM

## 2020-01-23 PROCEDURE — 7100000000 HC PACU RECOVERY - FIRST 15 MIN: Performed by: ORTHOPAEDIC SURGERY

## 2020-01-23 PROCEDURE — 3E0T3BZ INTRODUCTION OF ANESTHETIC AGENT INTO PERIPHERAL NERVES AND PLEXI, PERCUTANEOUS APPROACH: ICD-10-PCS | Performed by: ORTHOPAEDIC SURGERY

## 2020-01-23 PROCEDURE — 1210000000 HC MED SURG R&B

## 2020-01-23 PROCEDURE — 6360000002 HC RX W HCPCS: Performed by: NURSE ANESTHETIST, CERTIFIED REGISTERED

## 2020-01-23 PROCEDURE — 86901 BLOOD TYPING SEROLOGIC RH(D): CPT

## 2020-01-23 PROCEDURE — 86850 RBC ANTIBODY SCREEN: CPT

## 2020-01-23 DEVICE — COMPONENT TOT HIP CAPPED ADV STRYHIPA] STRYKER CORP]: Type: IMPLANTABLE DEVICE | Status: FUNCTIONAL

## 2020-01-23 DEVICE — STEM FEM SZ 4 L105MM NK L35MM 42MM OFFSET 127DEG HIP TI: Type: IMPLANTABLE DEVICE | Site: HIP | Status: FUNCTIONAL

## 2020-01-23 DEVICE — CABLE SURG DIA1.7MM S STL HA CERCLAGE W/ CRMP 29880101S] DEPUY SYNTHES USA]: Type: IMPLANTABLE DEVICE | Site: HIP | Status: FUNCTIONAL

## 2020-01-23 DEVICE — HEAD FEM DIA28MM +0MM OFFSET HIP BIOLOX DELT CERAMIC TAPR: Type: IMPLANTABLE DEVICE | Site: HIP | Status: FUNCTIONAL

## 2020-01-23 DEVICE — SHELL TRIDENT II CLUSTERHOLE HA ACET 52E: Type: IMPLANTABLE DEVICE | Site: HIP | Status: FUNCTIONAL

## 2020-01-23 DEVICE — LINER ACET SZ E ID42MM HIP X3 CO CHROM CEMENTLESS MOD 2: Type: IMPLANTABLE DEVICE | Site: HIP | Status: FUNCTIONAL

## 2020-01-23 DEVICE — INSERT ACET OD48MM ID28MM X3 MOD 2 MOBILITY MDM: Type: IMPLANTABLE DEVICE | Site: HIP | Status: FUNCTIONAL

## 2020-01-23 RX ORDER — CELECOXIB 200 MG/1
400 CAPSULE ORAL ONCE
Status: COMPLETED | OUTPATIENT
Start: 2020-01-23 | End: 2020-01-23

## 2020-01-23 RX ORDER — LISINOPRIL 10 MG/1
10 TABLET ORAL DAILY
Status: DISCONTINUED | OUTPATIENT
Start: 2020-01-24 | End: 2020-01-25 | Stop reason: HOSPADM

## 2020-01-23 RX ORDER — FENOFIBRATE 160 MG/1
160 TABLET ORAL DAILY
Status: DISCONTINUED | OUTPATIENT
Start: 2020-01-23 | End: 2020-01-25 | Stop reason: HOSPADM

## 2020-01-23 RX ORDER — OXYCODONE HCL 10 MG/1
10 TABLET, FILM COATED, EXTENDED RELEASE ORAL ONCE
Status: COMPLETED | OUTPATIENT
Start: 2020-01-23 | End: 2020-01-23

## 2020-01-23 RX ORDER — ROPIVACAINE HYDROCHLORIDE 5 MG/ML
INJECTION, SOLUTION EPIDURAL; INFILTRATION; PERINEURAL
Status: COMPLETED | OUTPATIENT
Start: 2020-01-23 | End: 2020-01-23

## 2020-01-23 RX ORDER — HYDRALAZINE HYDROCHLORIDE 20 MG/ML
10 INJECTION INTRAMUSCULAR; INTRAVENOUS EVERY 4 HOURS PRN
Status: DISCONTINUED | OUTPATIENT
Start: 2020-01-23 | End: 2020-01-25 | Stop reason: HOSPADM

## 2020-01-23 RX ORDER — DEXTROSE MONOHYDRATE 50 MG/ML
100 INJECTION, SOLUTION INTRAVENOUS PRN
Status: DISCONTINUED | OUTPATIENT
Start: 2020-01-23 | End: 2020-01-25 | Stop reason: HOSPADM

## 2020-01-23 RX ORDER — KETOROLAC TROMETHAMINE 15 MG/ML
15 INJECTION, SOLUTION INTRAMUSCULAR; INTRAVENOUS EVERY 6 HOURS
Status: COMPLETED | OUTPATIENT
Start: 2020-01-23 | End: 2020-01-23

## 2020-01-23 RX ORDER — LIDOCAINE HYDROCHLORIDE 10 MG/ML
1 INJECTION, SOLUTION EPIDURAL; INFILTRATION; INTRACAUDAL; PERINEURAL
Status: DISCONTINUED | OUTPATIENT
Start: 2020-01-23 | End: 2020-01-23 | Stop reason: HOSPADM

## 2020-01-23 RX ORDER — FOLIC ACID 1 MG/1
1 TABLET ORAL DAILY
Status: DISCONTINUED | OUTPATIENT
Start: 2020-01-23 | End: 2020-01-25 | Stop reason: HOSPADM

## 2020-01-23 RX ORDER — SODIUM CHLORIDE 0.9 % (FLUSH) 0.9 %
10 SYRINGE (ML) INJECTION EVERY 12 HOURS SCHEDULED
Status: DISCONTINUED | OUTPATIENT
Start: 2020-01-23 | End: 2020-01-23 | Stop reason: HOSPADM

## 2020-01-23 RX ORDER — PROPOFOL 10 MG/ML
INJECTION, EMULSION INTRAVENOUS CONTINUOUS PRN
Status: DISCONTINUED | OUTPATIENT
Start: 2020-01-23 | End: 2020-01-23 | Stop reason: SDUPTHER

## 2020-01-23 RX ORDER — ASPIRIN 81 MG/1
81 TABLET ORAL 2 TIMES DAILY
Status: DISCONTINUED | OUTPATIENT
Start: 2020-01-23 | End: 2020-01-25 | Stop reason: HOSPADM

## 2020-01-23 RX ORDER — SODIUM CHLORIDE 0.9 % (FLUSH) 0.9 %
10 SYRINGE (ML) INJECTION PRN
Status: DISCONTINUED | OUTPATIENT
Start: 2020-01-23 | End: 2020-01-25 | Stop reason: HOSPADM

## 2020-01-23 RX ORDER — SODIUM CHLORIDE, SODIUM LACTATE, POTASSIUM CHLORIDE, CALCIUM CHLORIDE 600; 310; 30; 20 MG/100ML; MG/100ML; MG/100ML; MG/100ML
INJECTION, SOLUTION INTRAVENOUS CONTINUOUS PRN
Status: DISCONTINUED | OUTPATIENT
Start: 2020-01-23 | End: 2020-01-23 | Stop reason: SDUPTHER

## 2020-01-23 RX ORDER — OXYCODONE HYDROCHLORIDE 5 MG/1
5 TABLET ORAL EVERY 4 HOURS PRN
Qty: 50 TABLET | Refills: 0 | Status: SHIPPED | OUTPATIENT
Start: 2020-01-23 | End: 2020-01-30

## 2020-01-23 RX ORDER — DEXTROSE MONOHYDRATE 25 G/50ML
12.5 INJECTION, SOLUTION INTRAVENOUS PRN
Status: DISCONTINUED | OUTPATIENT
Start: 2020-01-23 | End: 2020-01-25 | Stop reason: HOSPADM

## 2020-01-23 RX ORDER — ONDANSETRON 2 MG/ML
4 INJECTION INTRAMUSCULAR; INTRAVENOUS
Status: DISCONTINUED | OUTPATIENT
Start: 2020-01-23 | End: 2020-01-23 | Stop reason: HOSPADM

## 2020-01-23 RX ORDER — AMLODIPINE BESYLATE 5 MG/1
5 TABLET ORAL DAILY
Status: DISCONTINUED | OUTPATIENT
Start: 2020-01-24 | End: 2020-01-25 | Stop reason: HOSPADM

## 2020-01-23 RX ORDER — MORPHINE SULFATE 2 MG/ML
2 INJECTION, SOLUTION INTRAMUSCULAR; INTRAVENOUS
Status: DISCONTINUED | OUTPATIENT
Start: 2020-01-23 | End: 2020-01-25 | Stop reason: HOSPADM

## 2020-01-23 RX ORDER — METOCLOPRAMIDE HYDROCHLORIDE 5 MG/ML
10 INJECTION INTRAMUSCULAR; INTRAVENOUS
Status: DISCONTINUED | OUTPATIENT
Start: 2020-01-23 | End: 2020-01-23 | Stop reason: HOSPADM

## 2020-01-23 RX ORDER — SODIUM CHLORIDE 9 MG/ML
INJECTION, SOLUTION INTRAVENOUS CONTINUOUS
Status: DISCONTINUED | OUTPATIENT
Start: 2020-01-23 | End: 2020-01-25 | Stop reason: HOSPADM

## 2020-01-23 RX ORDER — MORPHINE SULFATE 4 MG/ML
4 INJECTION, SOLUTION INTRAMUSCULAR; INTRAVENOUS
Status: DISCONTINUED | OUTPATIENT
Start: 2020-01-23 | End: 2020-01-25 | Stop reason: HOSPADM

## 2020-01-23 RX ORDER — AMLODIPINE BESYLATE AND BENAZEPRIL HYDROCHLORIDE 5; 10 MG/1; MG/1
1 CAPSULE ORAL DAILY
Status: DISCONTINUED | OUTPATIENT
Start: 2020-01-24 | End: 2020-01-23 | Stop reason: CLARIF

## 2020-01-23 RX ORDER — BUPIVACAINE HYDROCHLORIDE 5 MG/ML
INJECTION, SOLUTION EPIDURAL; INTRACAUDAL PRN
Status: DISCONTINUED | OUTPATIENT
Start: 2020-01-23 | End: 2020-01-23 | Stop reason: SDUPTHER

## 2020-01-23 RX ORDER — NICOTINE POLACRILEX 4 MG
15 LOZENGE BUCCAL PRN
Status: DISCONTINUED | OUTPATIENT
Start: 2020-01-23 | End: 2020-01-25 | Stop reason: HOSPADM

## 2020-01-23 RX ORDER — SODIUM CHLORIDE, SODIUM LACTATE, POTASSIUM CHLORIDE, CALCIUM CHLORIDE 600; 310; 30; 20 MG/100ML; MG/100ML; MG/100ML; MG/100ML
INJECTION, SOLUTION INTRAVENOUS CONTINUOUS
Status: DISCONTINUED | OUTPATIENT
Start: 2020-01-23 | End: 2020-01-23

## 2020-01-23 RX ORDER — FENTANYL CITRATE 50 UG/ML
50 INJECTION, SOLUTION INTRAMUSCULAR; INTRAVENOUS EVERY 10 MIN PRN
Status: DISCONTINUED | OUTPATIENT
Start: 2020-01-23 | End: 2020-01-23 | Stop reason: HOSPADM

## 2020-01-23 RX ORDER — ACETAMINOPHEN 325 MG/1
650 TABLET ORAL EVERY 6 HOURS
Status: DISCONTINUED | OUTPATIENT
Start: 2020-01-23 | End: 2020-01-25 | Stop reason: HOSPADM

## 2020-01-23 RX ORDER — CEFAZOLIN SODIUM 2 G/50ML
2 SOLUTION INTRAVENOUS ONCE
Status: COMPLETED | OUTPATIENT
Start: 2020-01-23 | End: 2020-01-23

## 2020-01-23 RX ORDER — ONDANSETRON 2 MG/ML
4 INJECTION INTRAMUSCULAR; INTRAVENOUS EVERY 6 HOURS PRN
Status: DISCONTINUED | OUTPATIENT
Start: 2020-01-23 | End: 2020-01-25 | Stop reason: HOSPADM

## 2020-01-23 RX ORDER — CEFAZOLIN SODIUM 2 G/50ML
2 SOLUTION INTRAVENOUS EVERY 8 HOURS
Status: COMPLETED | OUTPATIENT
Start: 2020-01-23 | End: 2020-01-24

## 2020-01-23 RX ORDER — TRANEXAMIC ACID 650 1/1
1950 TABLET ORAL ONCE
Status: COMPLETED | OUTPATIENT
Start: 2020-01-23 | End: 2020-01-24

## 2020-01-23 RX ORDER — SENNA AND DOCUSATE SODIUM 50; 8.6 MG/1; MG/1
1 TABLET, FILM COATED ORAL 2 TIMES DAILY
Status: DISCONTINUED | OUTPATIENT
Start: 2020-01-23 | End: 2020-01-25 | Stop reason: HOSPADM

## 2020-01-23 RX ORDER — SODIUM CHLORIDE 0.9 % (FLUSH) 0.9 %
10 SYRINGE (ML) INJECTION PRN
Status: DISCONTINUED | OUTPATIENT
Start: 2020-01-23 | End: 2020-01-23 | Stop reason: HOSPADM

## 2020-01-23 RX ORDER — DIPHENHYDRAMINE HYDROCHLORIDE 50 MG/ML
12.5 INJECTION INTRAMUSCULAR; INTRAVENOUS
Status: DISCONTINUED | OUTPATIENT
Start: 2020-01-23 | End: 2020-01-23 | Stop reason: HOSPADM

## 2020-01-23 RX ORDER — DOCUSATE SODIUM 100 MG/1
100 CAPSULE, LIQUID FILLED ORAL 2 TIMES DAILY
Status: DISCONTINUED | OUTPATIENT
Start: 2020-01-23 | End: 2020-01-25 | Stop reason: HOSPADM

## 2020-01-23 RX ORDER — OXYCODONE HYDROCHLORIDE 5 MG/1
5 TABLET ORAL EVERY 4 HOURS PRN
Status: DISCONTINUED | OUTPATIENT
Start: 2020-01-23 | End: 2020-01-24

## 2020-01-23 RX ORDER — TRANEXAMIC ACID 650 1/1
1950 TABLET ORAL
Status: DISCONTINUED | OUTPATIENT
Start: 2020-01-23 | End: 2020-01-23 | Stop reason: HOSPADM

## 2020-01-23 RX ORDER — LOSARTAN POTASSIUM AND HYDROCHLOROTHIAZIDE 12.5; 5 MG/1; MG/1
2 TABLET ORAL DAILY
Status: DISCONTINUED | OUTPATIENT
Start: 2020-01-23 | End: 2020-01-23

## 2020-01-23 RX ORDER — ACETAMINOPHEN 500 MG
1000 TABLET ORAL ONCE
Status: COMPLETED | OUTPATIENT
Start: 2020-01-23 | End: 2020-01-23

## 2020-01-23 RX ORDER — ASPIRIN 81 MG/1
81 TABLET ORAL 2 TIMES DAILY
Qty: 30 TABLET | Refills: 0 | Status: SHIPPED | OUTPATIENT
Start: 2020-01-23 | End: 2020-02-22

## 2020-01-23 RX ORDER — LOSARTAN POTASSIUM AND HYDROCHLOROTHIAZIDE 12.5; 5 MG/1; MG/1
1 TABLET ORAL DAILY
Status: DISCONTINUED | OUTPATIENT
Start: 2020-01-24 | End: 2020-01-25 | Stop reason: HOSPADM

## 2020-01-23 RX ORDER — HYDROCODONE BITARTRATE AND ACETAMINOPHEN 5; 325 MG/1; MG/1
1 TABLET ORAL PRN
Status: DISCONTINUED | OUTPATIENT
Start: 2020-01-23 | End: 2020-01-23 | Stop reason: HOSPADM

## 2020-01-23 RX ORDER — SENNA AND DOCUSATE SODIUM 50; 8.6 MG/1; MG/1
1 TABLET, FILM COATED ORAL 2 TIMES DAILY
Qty: 60 TABLET | Refills: 0 | Status: SHIPPED | OUTPATIENT
Start: 2020-01-23 | End: 2020-02-22

## 2020-01-23 RX ORDER — HYDROCODONE BITARTRATE AND ACETAMINOPHEN 5; 325 MG/1; MG/1
2 TABLET ORAL PRN
Status: DISCONTINUED | OUTPATIENT
Start: 2020-01-23 | End: 2020-01-23 | Stop reason: HOSPADM

## 2020-01-23 RX ORDER — MEPERIDINE HYDROCHLORIDE 25 MG/ML
12.5 INJECTION INTRAMUSCULAR; INTRAVENOUS; SUBCUTANEOUS EVERY 5 MIN PRN
Status: DISCONTINUED | OUTPATIENT
Start: 2020-01-23 | End: 2020-01-23 | Stop reason: HOSPADM

## 2020-01-23 RX ORDER — DULOXETIN HYDROCHLORIDE 60 MG/1
60 CAPSULE, DELAYED RELEASE ORAL DAILY
Status: DISCONTINUED | OUTPATIENT
Start: 2020-01-23 | End: 2020-01-25 | Stop reason: HOSPADM

## 2020-01-23 RX ORDER — SODIUM CHLORIDE 0.9 % (FLUSH) 0.9 %
10 SYRINGE (ML) INJECTION EVERY 12 HOURS SCHEDULED
Status: DISCONTINUED | OUTPATIENT
Start: 2020-01-23 | End: 2020-01-25 | Stop reason: HOSPADM

## 2020-01-23 RX ORDER — MAGNESIUM HYDROXIDE 1200 MG/15ML
LIQUID ORAL PRN
Status: DISCONTINUED | OUTPATIENT
Start: 2020-01-23 | End: 2020-01-23 | Stop reason: ALTCHOICE

## 2020-01-23 RX ORDER — TRANEXAMIC ACID 650 1/1
1950 TABLET ORAL EVERY 8 HOURS
Status: COMPLETED | OUTPATIENT
Start: 2020-01-23 | End: 2020-01-23

## 2020-01-23 RX ADMIN — ACETAMINOPHEN 650 MG: 325 TABLET ORAL at 17:13

## 2020-01-23 RX ADMIN — INSULIN LISPRO 1 UNITS: 100 INJECTION, SOLUTION INTRAVENOUS; SUBCUTANEOUS at 22:32

## 2020-01-23 RX ADMIN — DOCUSATE SODIUM 100 MG: 100 CAPSULE, LIQUID FILLED ORAL at 21:04

## 2020-01-23 RX ADMIN — PROPOFOL 100 MCG/KG/MIN: 10 INJECTION, EMULSION INTRAVENOUS at 08:20

## 2020-01-23 RX ADMIN — KETOROLAC TROMETHAMINE 15 MG: 15 INJECTION, SOLUTION INTRAMUSCULAR; INTRAVENOUS at 17:13

## 2020-01-23 RX ADMIN — SODIUM CHLORIDE, POTASSIUM CHLORIDE, SODIUM LACTATE AND CALCIUM CHLORIDE: 600; 310; 30; 20 INJECTION, SOLUTION INTRAVENOUS at 09:42

## 2020-01-23 RX ADMIN — CEFAZOLIN SODIUM 2 G: 2 SOLUTION INTRAVENOUS at 16:59

## 2020-01-23 RX ADMIN — ONDANSETRON 4 MG: 2 INJECTION INTRAMUSCULAR; INTRAVENOUS at 12:17

## 2020-01-23 RX ADMIN — BUPIVACAINE HYDROCHLORIDE 10 MG: 5 INJECTION, SOLUTION EPIDURAL; INTRACAUDAL at 08:16

## 2020-01-23 RX ADMIN — CEFAZOLIN SODIUM 2 G: 2 SOLUTION INTRAVENOUS at 08:20

## 2020-01-23 RX ADMIN — SENNOSIDES AND DOCUSATE SODIUM 1 TABLET: 8.6; 5 TABLET ORAL at 21:04

## 2020-01-23 RX ADMIN — OXYCODONE HYDROCHLORIDE 5 MG: 5 TABLET ORAL at 12:15

## 2020-01-23 RX ADMIN — HYDROMORPHONE HYDROCHLORIDE 0.5 MG: 1 INJECTION, SOLUTION INTRAMUSCULAR; INTRAVENOUS; SUBCUTANEOUS at 10:31

## 2020-01-23 RX ADMIN — SODIUM CHLORIDE: 9 INJECTION, SOLUTION INTRAVENOUS at 22:31

## 2020-01-23 RX ADMIN — DULOXETINE HYDROCHLORIDE 60 MG: 60 CAPSULE, DELAYED RELEASE ORAL at 21:09

## 2020-01-23 RX ADMIN — ACETAMINOPHEN 650 MG: 325 TABLET ORAL at 12:14

## 2020-01-23 RX ADMIN — SODIUM CHLORIDE, POTASSIUM CHLORIDE, SODIUM LACTATE AND CALCIUM CHLORIDE: 600; 310; 30; 20 INJECTION, SOLUTION INTRAVENOUS at 08:09

## 2020-01-23 RX ADMIN — ROPIVACAINE HYDROCHLORIDE 15 ML: 5 INJECTION, SOLUTION EPIDURAL; INFILTRATION; PERINEURAL at 08:23

## 2020-01-23 RX ADMIN — HYDROMORPHONE HYDROCHLORIDE 0.5 MG: 1 INJECTION, SOLUTION INTRAMUSCULAR; INTRAVENOUS; SUBCUTANEOUS at 10:47

## 2020-01-23 RX ADMIN — TRANEXAMIC ACID 1950 MG: 650 TABLET ORAL at 06:41

## 2020-01-23 RX ADMIN — ASPIRIN 81 MG: 81 TABLET, COATED ORAL at 21:04

## 2020-01-23 RX ADMIN — KETOROLAC TROMETHAMINE 15 MG: 15 INJECTION, SOLUTION INTRAMUSCULAR; INTRAVENOUS at 12:15

## 2020-01-23 RX ADMIN — OXYCODONE HYDROCHLORIDE 10 MG: 10 TABLET, FILM COATED, EXTENDED RELEASE ORAL at 06:40

## 2020-01-23 RX ADMIN — METFORMIN HYDROCHLORIDE 500 MG: 500 TABLET ORAL at 17:13

## 2020-01-23 RX ADMIN — FOLIC ACID 1 MG: 1 TABLET ORAL at 21:06

## 2020-01-23 RX ADMIN — SODIUM CHLORIDE, POTASSIUM CHLORIDE, SODIUM LACTATE AND CALCIUM CHLORIDE: 600; 310; 30; 20 INJECTION, SOLUTION INTRAVENOUS at 06:52

## 2020-01-23 RX ADMIN — FENOFIBRATE 160 MG: 160 TABLET ORAL at 21:03

## 2020-01-23 RX ADMIN — TRANEXAMIC ACID 1950 MG: 650 TABLET ORAL at 17:00

## 2020-01-23 RX ADMIN — OXYCODONE HYDROCHLORIDE 5 MG: 5 TABLET ORAL at 21:04

## 2020-01-23 RX ADMIN — ACETAMINOPHEN 1000 MG: 500 TABLET ORAL at 06:39

## 2020-01-23 RX ADMIN — CELECOXIB 400 MG: 200 CAPSULE ORAL at 06:40

## 2020-01-23 ASSESSMENT — PULMONARY FUNCTION TESTS
PIF_VALUE: 0
PIF_VALUE: 1
PIF_VALUE: 0

## 2020-01-23 ASSESSMENT — PAIN SCALES - GENERAL
PAINLEVEL_OUTOF10: 4
PAINLEVEL_OUTOF10: 4
PAINLEVEL_OUTOF10: 5
PAINLEVEL_OUTOF10: 5
PAINLEVEL_OUTOF10: 4
PAINLEVEL_OUTOF10: 5
PAINLEVEL_OUTOF10: 4
PAINLEVEL_OUTOF10: 6

## 2020-01-23 ASSESSMENT — PAIN DESCRIPTION - LOCATION: LOCATION: HIP

## 2020-01-23 ASSESSMENT — PAIN DESCRIPTION - ORIENTATION: ORIENTATION: LEFT

## 2020-01-23 ASSESSMENT — PAIN DESCRIPTION - DESCRIPTORS: DESCRIPTORS: ACHING

## 2020-01-23 ASSESSMENT — PAIN DESCRIPTION - PAIN TYPE: TYPE: SURGICAL PAIN

## 2020-01-23 NOTE — ANESTHESIA PROCEDURE NOTES
Peripheral Block    Patient location during procedure: pre-op  Start time: 1/23/2020 8:11 AM  End time: 1/23/2020 8:21 AM  Staffing  Anesthesiologist: Guerda Rodriguez DO  Performed: anesthesiologist   Preanesthetic Checklist  Completed: patient identified, site marked, surgical consent, pre-op evaluation, timeout performed, IV checked, risks and benefits discussed, monitors and equipment checked, anesthesia consent given, oxygen available and patient being monitored  Peripheral Block  Patient position: supine  Prep: ChloraPrep  Patient monitoring: cardiac monitor, continuous pulse ox, frequent blood pressure checks and IV access  Block type: Femoral lateral cutaneous (+ PENG)  Laterality: left  Injection technique: single-shot  Procedures: ultrasound guided  Local infiltration: ropivacaine  Infiltration strength: 0.5 %  Dose: 15 mL  Provider prep: mask and sterile gloves (Sterile probe cover)  Local infiltration: ropivacaine  Needle  Needle type: combined needle/nerve stimulator   Needle gauge: 22 G  Needle length: 10 cm  Needle localization: anatomical landmarks and ultrasound guidance  Assessment  Injection assessment: negative aspiration for heme, no paresthesia on injection and local visualized surrounding nerve on ultrasound  Paresthesia pain: immediately resolved  Slow fractionated injection: yes  Hemodynamics: stable  Additional Notes  Ultrasound image printed and saved in patient chart.     Sterile probe cover used      Ropivacaine 0.5% 15 ml + lidocaine 2% with epi 1:100K 5 ml    PENG 15 ml    Lateral femoral cutaneous 5 ml  Reason for block: post-op pain management and at surgeon's request

## 2020-01-23 NOTE — DISCHARGE INSTR - COC
Continuity of Care Form    Patient Name: Rosana Uribe   :  1965  MRN:  [id number]    Admit date:  2020  Discharge date:  20      Code Status Order: Full Code   Advance Directives:   [facility] Documentation     Date/Time Healthcare Directive Type of Healthcare Directive Copy in [address] Agent's Name Healthcare Agent's Phone Number    [phone]  No, patient does not have an advance directive for healthcare treatment -- -- -- -- --          Admitting Physician:  Camilo Sacks, MD  PCP: Kaitlynn Cabral MD    Discharging Nurse: [facility] Unit/Room#: N221/N221-01  Discharging Unit Phone Number: [phone]    Emergency Contact:   Extended Emergency Contact Information  Primary Emergency Contact: Lisa Hand  Address: PO  [facility], [address] [place] [address] [address] [facility] Phone: [phone]  Mobile Phone: [phone]  Relation: Other    Past Surgical History:  Past Surgical History:   Procedure Laterality Date    BACK SURGERY  2008    Lumbar disc OR. [facility], [address]  7/10/2015    Patient states he was urininating blood and had a procedure done.  COLONOSCOPY  3/14/14    RADHA    ENDOSCOPY, COLON, DIAGNOSTIC      LUMBAR FUSION      OTHER SURGICAL HISTORY Right     tendon repair in (R) forearm because of previous injury as child    TOTAL HIP ARTHROPLASTY Left 2020    LEFT HIP TOTAL HIP ARTHROPLASTY, RICARDO performed by Camilo Sacks, MD at [facility]  3/14/14    BIOPSY JARM   [address]      Dr. De Los Santos [place] office       Immunization History: There is no immunization history on file for this patient.     Active Problems:  Patient Active Problem List   Diagnosis Code    Postlaminectomy syndrome, lumbar region M96.1    L4-5 HERNAITED DISC M51.26    Chronic pain syndrome G89.4    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 ([facility] [address] D57.20    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 ([facility] [address] D57.20    Diabetic polyneuropathy associated with type 2 diabetes mellitus (HCC) E11.42    Dermatophytosis of nail B35.1    Hallux abducto valgus, bilateral M20.11, M20.12    Tailor's bunionette, bilateral M21.621, M21.622    Degenerative joint disease of left hip M16.12    Tobacco use disorder F17.200    REGINA (obstructive sleep apnea) G47.33    S/P insertion of spinal cord stimulator Z98.890    Status post total replacement of left hip Z96.642       Isolation/Infection:   Isolation          No Isolation        Patient Infection Status     None to display          Nurse Assessment:  Last Vital Signs: BP (!) 150/90   Pulse (!) 45   Temp 97.1 °F (36.2 °C) (Temporal)   Resp 13   Ht 6' 0.08\" (1.831 m)   Wt 204 lb (92.5 kg)   SpO2 98%   BMI 27.60 kg/m²     Last documented pain score (0-10 scale): Pain Level: 4  Last Weight:   Wt Readings from Last 1 Encounters:   01/23/20 204 lb (92.5 kg)     Mental Status:  oriented and alert    IV Access:  - None    Nursing Mobility/ADLs:  Walking   Independent  Transfer  Independent  Bathing  Independent  Dressing  Independent  Toileting  Independent  Feeding  [address]  Independent  Med Delivery   whole    Wound Care Documentation and Therapy:        Elimination:  Continence:   · Bowel: Yes  · Bladder: Yes  Urinary Catheter: None   Colostomy/Ileostomy/Ileal Conduit: No       Date of Last BM:     Intake/Output Summary (Last 24 hours) at 1/23/2020 1433  Last data filed at 1/23/2020 1113  Gross per 24 hour   Intake 1200 ml   Output --   Net 1200 ml     No intake/output data recorded. Safety Concerns:     None    Impairments/Disabilities:      None    Nutrition Therapy:  Current Nutrition Therapy:   - Oral Diet:  General    Routes of Feeding: Oral  Liquids:  Thin Liquids  Daily Fluid Restriction: no  Last Modified Barium Swallow with Video (Video Swallowing Test): not done    Treatments at the Time of Hospital Discharge:

## 2020-01-23 NOTE — OP NOTE
below the lesser troches with excellent fixation. It was tensioned and crimped and cut without complication. The head and insert were applied and the hip was again reduced. Stability was assessed and felt to be satisfactory. Joint was irrigated with a copious amount of pulsatile irrigation. The joint capsule and piriformis tendon were repaired using #2 Ethibond through drill holes in the piriformis fossa. The fascia was closed using running locked #1 Vicryl. Orthomix injection was performed along the fascia and soft tissue. Deeper subcutaneous tissues were closed using #1 Vicryl as well. 3-0 Monocryl was used for the underlying subcutaneous tissue and 4-0 Monocryl was used for the skin. A dry sterile bulky dressing was applied and the patient was taken to postanesthesia care unit in good condition without complication. Erika Chanel MD    [facility]  [phone]      Created with [id number], some didactic areas may be identified due to [address] this note has not been proofread.

## 2020-01-23 NOTE — ANESTHESIA POSTPROCEDURE EVALUATION
Department of Anesthesiology  Postprocedure Note    Patient: Eamon Toth  MRN: [id number]  YOB: 1965  Date of evaluation: 1/23/2020  Time:  9:52 AM     Procedure Summary     Date:  01/23/20 Room / Location:  [address]    Anesthesia Start:  7776 Anesthesia Stop:  7106    Procedure:  LEFT HIP TOTAL HIP ARTHROPLASTY, RICARDO (Left ) Diagnosis:  (LEFT HIP DEGENERATIVE JOINT DISEASE)    Surgeon:  Amber Martin MD Responsible Provider:  Ligia Edmondson DO    Anesthesia Type:  spinal, regional ASA Status:  3          Anesthesia Type: spinal, regional    Ivan Phase I: Ivan Score: 10    Ivan Phase II:      Last vitals: Reviewed and per EMR flowsheets.        Anesthesia Post Evaluation    Patient location during evaluation: bedside  Patient participation: complete - patient participated  Level of consciousness: awake and awake and alert  Pain score: 0  Airway patency: patent  Nausea & Vomiting: no nausea and no vomiting  Complications: no  Cardiovascular status: blood pressure returned to baseline and hemodynamically stable  Respiratory status: acceptable  Hydration status: euvolemic

## 2020-01-23 NOTE — PROGRESS NOTES
[facility]ALUATION - ACUTE     NAME: Sarah Easton  : 1965 (53 y.o.)  MRN: [id number]  CODE STATUS: Full Code  Room: [place] [address]    Date of Service: 2020    Patient Diagnosis(es): Status post total replacement of left hip [Z96.642]   No chief complaint on file. 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 ([facility] 2019    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 ([facility] 2019    Chronic pain syndrome 2017    Postlaminectomy syndrome, lumbar region 2015    L4-5 HERNAITED DISC 2015        Past Medical History:   Diagnosis Date    Bronchitis     CAD (coronary artery disease)     past angioplasty > 10 yrs ago    Chronic back pain     Diabetes ([address]     dx > 2 yrs    Dyslipidemia     Hyperlipidemia     meds > 5 yrs    Hypertension     meds > 5 yrs    Neuropathy in diabetes ([address]     Osteoarthritis     Pneumonia     Sickle cell anemia (HCC)     Sleep apnea, obstructive      Past Surgical History:   Procedure Laterality Date    BACK SURGERY      Lumbar disc OR. [address] [facility],  [address]  7/10/2015    Patient states he was urininating blood and had a procedure done.     COLONOSCOPY  3/14/14    [facility]    ENDOSCOPY, COLON, DIAGNOSTIC      LUMBAR FUSION      OTHER SURGICAL HISTORY Right     tendon repair in (R) forearm because of previous injury as child    TOTAL HIP ARTHROPLASTY Left 2020    LEFT HIP TOTAL HIP ARTHROPLASTY, RICARDO performed by Amber Martin MD at [address]  3/14/14    [address]  2013    Dr. Marilyn Echols office        Restrictions  Restrictions/Precautions: Weight Bearing, Fall Risk  Lower Extremity Weight Bearing Restrictions  Left Lower Extremity Weight Bearing: Weight Bearing As Tolerated  Position Activity Restriction  Other position/activity restrictions: no hip precautions     Safety Devices: Safety Devices  Safety Devices in place: Yes    Subjective  Pre Treatment Pain Screening  Pain at present: 4  Scale Used: Numeric Score  Intervention List: Patient able to continue with treatment, Patient declined any intervention  Comments / Details: Provided with ice pack    Pain Reassessment:   Pain Assessment  Patient Currently in Pain: Yes  Pain Assessment: 0-10  Pain Level: 4  Pain Type: Surgical pain  Pain Location: Hip  Pain Orientation: Left  Pain Descriptors: Aching       Prior Level of Function:  Social/Functional History  Lives With: Significant other  Type of Home: House  Home Layout: One level  Home Access: Stairs to enter without rails  Entrance Stairs - Number of Steps: 1 step  Bathroom Shower/Tub: Walk-in shower  ADL Assistance: Independent  Homemaking Assistance: Independent  Ambulation Assistance: Independent(cane most of the time)  Transfer Assistance: Independent  Active : Yes  Additional Comments: S/O home during the day if needed    OBJECTIVE:     Orientation Status:  Orientation  Overall Orientation Status: Within Functional Limits    Observation:  Observation/Palpation  Posture: Good  Observation: Pt. alert and attentive, hip abductor pillow in place.       Cognition Status:  Cognition  Overall Cognitive Status: WFL    Perception Status:  Perception  Overall Perceptual Status: WFL    Sensation Status:  Sensation  Overall Sensation Status: WFL    Vision and Hearing Status:  Vision  Vision: Within Functional Limits  Hearing  Hearing: Within functional limits     ROM:   LUE AROM (degrees)  LUE AROM : WFL  Left Hand AROM (degrees)  Left Hand AROM: WFL  RUE AROM (degrees)  RUE AROM : WFL  Right Hand AROM (degrees)  Right Hand AROM: WFL    Strength:  LUE Strength  Gross LUE Strength: WFL  L Hand General: 4-/5  LUE Strength Comment: 4-/5 all planes  RUE Strength  Gross RUE Strength: WFL  R Hand General: 4-/5  RUE Strength Comment: 4-/5 all planes    Coordination, Tone, Quality of Movement: Tone RUE  RUE Tone: Normotonic  Tone LUE  LUE Tone: Normotonic  Coordination  Movements Are Fluid And Coordinated: Yes    Hand Dominance:  Hand Dominance  Hand Dominance: Right    ADL Status:  ADL  Feeding: Independent  Grooming: Supervision  UE Bathing: Supervision  LE Bathing: Minimal assistance  UE Dressing: Setup  LE Dressing: Moderate assistance  Toileting: Contact guard assistance  Additional Comments: Simulated ADLs as above. Pt. ambulated to bathroom to toilet; does require increased time for mobility and transfers during ADL simulations.    Toilet Transfers  Toilet - Technique: Ambulating  Equipment Used: Grab bars  Toilet Transfer: Contact guard assistance  Toilet Transfers Comments: increased effort; difficulty due to toilet low       Functional Mobility:  Functional Mobility  Functional - Mobility Device: Rolling Walker  Assist Level: Contact guard assistance  Functional Mobility Comments: Increased time for ambulation with verbal cues initially for safety  Transfers  Sit to stand: Contact guard assistance  Stand to sit: Contact guard assistance  Transfer Comments: Increased time and effort    Bed Mobility  Bed mobility  Sit to Supine: Supervision  Scooting: Supervision  Comment: HOB elevated; min increased effort noted    Seated and Standing Balance:  Balance  Sitting Balance: Supervision  Standing Balance: Contact guard assistance    Functional Endurance:  Activity Tolerance  Activity Tolerance: Patient Tolerated treatment

## 2020-01-23 NOTE — PROGRESS NOTES
strength >/= 3+/5  Strength LLE  Strength LLE: WFL    Neuro:  Balance  Posture: Fair  Sitting - Static: Fair  Sitting - Dynamic: Fair  Standing - Static: Fair  Standing - Dynamic: Fair        Sensation  Overall Sensation Status: WFL    Bed mobility  Supine to Sit: Supervision  Sit to Supine: Supervision  Scooting: Supervision    Transfers  Sit to Stand: Stand by assistance  Stand to sit: Stand by assistance  Bed to Chair: Stand by assistance  Comment: Pt able to safely transfer sit>stand with [employer] with supervision keeping L leg extended in front for comfort. Ambulation  Ambulation?: Yes  Ambulation 1  Surface: level tile  Device: Rolling Walker  Assistance: Contact guard assistance  Quality of Gait: Pt ambulates with antalgic step-to pattern on L, decreased step length and speed and requires use of [employer]. Distance: 30ft x2  Comments: Pt able to ambulate 30ft x2 (to bathroom & back) with [employer] and contact guard assist as a precaution for residual anesthesia effects. Activity Tolerance  Activity Tolerance: Patient Tolerated treatment well;Patient limited by pain  Activity Tolerance: Pt seems fatigued throughout treatment. PT Equipment Recommendations  Equipment Needed: Yes  Walker: Rolling          PT Education  PT Education: Weight-bearing Education; Injury Prevention;PT Role;General Safety;Plan of Care;Precautions; Equipment; Functional Mobility Training;Transfer Training    ASSESSMENT:   Body structures, Functions, Activity limitations: Decreased functional mobility ; Decreased posture;Decreased ADL status; Decreased endurance;Decreased strength;Decreased balance; Increased pain  History: High  Exam: High  Clinical Presentation: Medium    Prognosis: Good    DISCHARGE RECOMMENDATIONS:  Discharge Recommendations: Patient would benefit from continued therapy after discharge    Assessment: Pt able to get himself up and ambulate without physical assistance besides pushing IV pole.  Pt still seems to be affected by anesthesia (fatigue/drowsy). Pt will benefit from continued therapy. REQUIRES PT FOLLOW UP: Yes      PLAN OF CARE:  Plan  Times per week: 5-7  Times per day: Twice a day  Current Treatment Recommendations: Strengthening, ADL/Self-care Training, Gait Training, Patient/Caregiver Education & Training, Stair training, Equipment Evaluation, Education, & procurement, Balance Training, Neuromuscular Re-education, Pain Management, Functional Mobility Training, Endurance Training, Home Exercise Program, Positioning, Transfer Training, Safety Education & Training    Goals:  Patient goals : Pt wants to go home. Short term goals  Short term goal 1: Pt able to transfersit>stand independently. Short term goal 2: Pt able to ambulate independently 50ft. Short term goal 3: Pt able to stand independently for 5min without increased Sx. Short term goal 4: Pt able to reciprocally ambulate 15 stair steps independently.      AMPAC (6 CLICK) BASIC MOBILITY  AM-PAC Inpatient Mobility Raw Score : 17     Therapy Time:   Individual   Time In 1340   Time Out 1404   Minutes 24        This therapy session was supervised by Isaías Rowan, PT      Jory Ibrahim, 01/23/20 at 3:00 PM

## 2020-01-23 NOTE — ANESTHESIA PRE PROCEDURE
(LOTREL) 5-10 MG per capsule Take 1 capsule by mouth daily  3/6/14  Yes Historical Provider, MD   naproxen (NAPROXEN) 500 MG EC tablet take 1 tablet by mouth twice a day 12/24/19   Manuelito Nair DO       Current medications:    Current Facility-Administered Medications   Medication Dose Route Frequency Provider Last Rate Last Dose    tranexamic acid (LYSTEDA) tablet 1,950 mg  1,950 mg Oral Once Bertrand Cosmo, APRN - CNP        tranexamic acid (LYSTEDA) tablet 1,950 mg  1,950 mg Oral Q8H Bertrand Crook, APRN - CNP        tranexamic acid (LYSTEDA) tablet 1,950 mg  1,950 mg Oral 90 Min Pre-Op Bertrand Crook, APRN - CNP   1,950 mg at 01/23/20 0641    ceFAZolin (ANCEF) 2 g in dextrose 3 % 50 mL IVPB (duplex)  2 g Intravenous Once Valiant MD Jemima        lactated ringers infusion   Intravenous Continuous Lloyd Dilling, APRN -  mL/hr at 01/23/20 9787      lidocaine PF 1 % injection 1 mL  1 mL Intradermal Once PRN Lloyd Dilling, APRN - CNP        sodium chloride flush 0.9 % injection 10 mL  10 mL Intravenous 2 times per day Lloyd Dilling, APRN - CNP        sodium chloride flush 0.9 % injection 10 mL  10 mL Intravenous PRN Lloyd Dilling, APRN - CNP           Allergies:  No Known Allergies    Problem List:    Patient Active Problem List   Diagnosis Code    Postlaminectomy syndrome, lumbar region M96.1    L4-5 HERNAITED DISC M51.26    Chronic pain syndrome G89.4    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

## 2020-01-23 NOTE — CONSULTS
Consult Note    Reason for Consult: Medical management    Requesting Physician:  Rosemary Felipe MD    HISTORY OF PRESENT ILLNESS:    The patient is a 53 y.o. male primary medical history of coronary artery disease, diabetes,  Hyperlipidemia, possible anemia, obstructive sleep apnea, chronic back pain with nerve stimulator in place admitted to rehab following left hip total arthroplasty for degenerative joint disease. Patient stated he has been having pain in left hip for a while, however more recently pain is worsened and interfered with his ambulation and doing day-to-day activities like sitting, and bending down to  things or wearing shoes. Patient states he managed pain with Vicodin, Lyrica, Cymbalta and naproxen  which he also takes  for his back pain however pain was unimproved and surgery was advised. Patient states surgery was uneventful, he denies any nausea, vomiting, numbness, tingling or decreased sensation in extremities. Past Medical History:   Diagnosis Date    Bronchitis     CAD (coronary artery disease)     past angioplasty > 10 yrs ago    Chronic back pain     Diabetes ([facility]     dx > 2 yrs    Dyslipidemia     Hyperlipidemia     meds > 5 yrs    Hypertension     meds > 5 yrs    Neuropathy in diabetes ([facility] [address] [facility]     Osteoarthritis     Pneumonia     Sickle cell anemia (HCC)     Sleep apnea, obstructive        Past Surgical History:   Procedure Laterality Date    BACK SURGERY  2008    Lumbar disc OR. [address] [facility],  [address]  7/10/2015    Patient states he was urininating blood and had a procedure done.     [facility]  3/14/14    [facility]    ENDOSCOPY, COLON, DIAGNOSTIC      LUMBAR FUSION  2008    OTHER SURGICAL HISTORY Right     tendon repair in (R) forearm because of previous injury as child    TOTAL HIP ARTHROPLASTY Left 1/23/2020    LEFT HIP TOTAL HIP ARTHROPLASTY, RICARDO performed by Rosemary Felipe MD at [address] [facility]  3/14/14    BIOPSY 1 capsule by mouth daily  3/6/14  Yes Historical Provider, MD   naproxen (NAPROXEN) 500 MG EC tablet take 1 tablet by mouth twice a day 12/24/19   Katlin Villanueva DO       Scheduled Meds:   tranexamic acid  1,950 mg Oral Once    tranexamic acid  1,950 mg Oral Q8H    sodium chloride flush  10 mL Intravenous 2 times per day    ceFAZolin (ANCEF) IVPB  2 g Intravenous Q8H    acetaminophen  650 mg Oral Q6H    docusate sodium  100 mg Oral BID    sennosides-docusate sodium  1 tablet Oral BID    aspirin  81 mg Oral BID    ketorolac  15 mg Intravenous Q6H     Continuous Infusions:   sodium chloride 100 mL/hr at 01/23/20 1215     PRN Meds:sodium chloride flush, oxyCODONE, morphine **OR** morphine, magnesium hydroxide, ondansetron    No Known Allergies    Social History     Socioeconomic History    Marital status: Single     Spouse name: Not on file    Number of children: Not on file    Years of education: Not on file    Highest education level: Not on file   Occupational History    Occupation: Disabled - because of back pain - 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    Substance and Sexual Activity    Alcohol use: No     Alcohol/week: 0.0 standard drinks    Drug use: No    Sexual activity: Not on file   Lifestyle    Physical activity:     Days per week: Not on file     Minutes per session: Not on file    Stress: Not on file   Relationships    Social connections:     Talks on phone: Not on file     Gets together: Not on file     Attends Faith service: Not on file     Active member of club or organization: Not on file     Attends meetings of clubs or organizations: Not on file     Relationship status: Not on file    Intimate partner violence:     Fear of current or ex partner: Not on file     Emotionally abused: Not on file     Physically abused: Not on file     Forced sexual activity: Not on file   Other Topics Concern    Not on file   Social History Narrative    Not on file       Family History   Problem Relation Age of Onset    Cancer Mother         Throat & esophagus    Anemia Mother     Other Father         Natural causes    High Blood Pressure Sister     COPD Brother     Heart Disease Brother         Cardiac stent    Diabetes Sister     COPD Sister     Emphysema Sister     No Known Problems Daughter        Review Of Systems:   Constitutional: No fever, chills, weakness, otherwise negative  Eyes: No eye pain or redness, otherwise negative  Ears, nose, mouth, throat, and face: No ear ache, no nose bleed no sore throat otherwise negative  Respiratory: No cough, sob, hemoptysis, otherwise  negative  Cardiovascular: No chest pain, palpitation, otherwise negative  Gastrointestinal: No nausea, vomiting diarrhea otherwise negative  Genitourinary:No hematuria, no dysuria, no frequency otherwise negative  Integument/breast: No pain, discomfort, redness otherwise negative  Hematologic/lymphatic: No bleed, lymph node swelling, petechia otherwise negative  Musculoskeletal: Left hip pain  Neurological: No headache, seizure, loss of consciousness otherwise negative  Behavioral/Psych: No depression, suicidal or homicidal ideation otherwise negative  Endocrine: No thyroid pain, warmth or tenderness.  No wt loss otherwise negative  Allergic/Immunologic: No sneezing, itching or rash otherwise negative    Physical Exam:  Vitals:    01/23/20 1055 01/23/20 1100 01/23/20 1110 01/23/20 1113   BP: (!) 147/85 (!) 150/90     Pulse: 55 (!) 47 (!) 46 (!) post angioplasty patient is symptomatic, will monitor      Diet: DIET GENERAL;    Code Status: Full Code    DVT prophylaxis: Aspirin and ZACK hose per orthopedic surgery    Additional work up or/and treatment plan may be added today or then after based on clinical progression. I am managing a portion of pt care. Some medical issues are handled by other specialists. Additional work up and treatment should be done in out pt setting by pt PCP and other out pt providers. In addition to examining and evaluating pt, I spent additional time explaining care, normal and abnormal findings, and treatment plan. All of pt questions were answered. Counseling, diet and education were  provided. Case will be discussed with nursing staff when appropriate. Family will be updated if and when appropriate.         Electronically signed by DARLENE Conway CNP on 1/23/2020 at 1:28 PM

## 2020-01-23 NOTE — ANESTHESIA PROCEDURE NOTES
Spinal Block    Patient location during procedure: pre-op  Start time: 1/23/2020 8:10 AM  End time: 1/23/2020 8:21 AM  Reason for block: primary anesthetic  Staffing  Anesthesiologist: Harpreet Dempsey DO  Performed: anesthesiologist   Preanesthetic Checklist  Completed: patient identified, site marked, surgical consent, pre-op evaluation, timeout performed, IV checked, risks and benefits discussed, monitors and equipment checked, anesthesia consent given, oxygen available and patient being monitored  Spinal Block  Patient position: sitting  Prep: Betadine  Patient monitoring: cardiac monitor, continuous pulse ox and frequent blood pressure checks  Approach: midline  Location: L4/L5  Provider prep: mask and sterile gloves  Local infiltration: lidocaine  Agent: bupivacaine (Isobaric bupivicain 0.5%)  Dose: 2  Dose: 2  Needle  Needle type: Pencan   Needle gauge: 22 G  Needle length: 3.5 in  Assessment  Sensory level: T6  Events: cerebrospinal fluid  Lysis level: CSF aspirated. CSF: clear  Attempts: 1  Hemodynamics: stable  Additional Notes  Free flowing CSF. Negative heme. Negative paresthesia. Raphael Anderson

## 2020-01-23 NOTE — BRIEF OP NOTE
Brief Postoperative Note  ______________________________________________________________    Patient: Sarah Easton  YOB: 1965  MRN: [id number]  Date of Procedure: 1/23/2020    Pre-Op Diagnosis: LEFT HIP DEGENERATIVE JOINT DISEASE    Post-Op Diagnosis: Same       Procedure(s):  LEFT HIP TOTAL HIP ARTHROPLASTY, RICARDO    Anesthesia: Regional, Spinal    Surgeon(s): Vale Ho MD    Assistant: Prachi Isbell    Estimated Blood Loss (mL): 332     Complications: None    Specimens:   * No specimens in log *    Implants:  Implant Name Type Inv.  Item Serial No.  Lot No. LRB No. Used   IMPL HIP TRIDENT II CLUSTERHOLE HA Hip IMPL HIP TRIDENT II CLUSTERHOLE HA  RICARDO: ORTHOPAEDICS [id number] Left 1   IMPL HIP FEM LINER MDM 42MM Hip IMPL HIP FEM LINER MDM 42MM  RICARDO: ORTHOPAEDICS [id number] Left 1   IMPL HIP STEM ACCOLADE II 127DEG Hip IMPL HIP STEM ACCOLADE II 127DEG  RICARDO: ORTHOPAEDICS [id number] Left 1   IMPL HIP FEM HEAD NECK ALUMINA 0MM Hip IMPL HIP FEM HEAD NECK ALUMINA 0MM  RICARDO: ORTHOPAEDICS [id number] Left 1   IMPL HIP INSRT RESTORATION ADMX3 ID [id number] Hip IMPL HIP INSRT RESTORATION ADMX3 ID [id number]  RICARDO: ORTHOPAEDICS [id number] Left 1   IMPL HIP FEM HEAD NECK ALUMINA 0MM Hip IMPL HIP FEM HEAD NECK ALUMINA 0MM  RICARDO: ORTHOPAEDICS [id number] Left 1   IMPL HIP STEM ACCOLADE II 127DEG Hip IMPL HIP STEM ACCOLADE II 127DEG  RICARDO: ORTHOPAEDICS [id number] Left 1   IMPL HIP CABLE W/CRIMP [id number] SS ST Hip IMPL HIP CABLE W/CRIMP [id number] SS ST  SYNTHES [id number] Left 1         Drains: * No LDAs found *    Findings: 1 cable on the greater troch    Vale Ho MD  Date: 1/23/2020  Time: 9:28 AM

## 2020-01-24 LAB
ANION GAP SERPL CALCULATED.3IONS-SCNC: 9 MEQ/L (ref 9–15)
BUN BLDV-MCNC: 18 MG/DL (ref 6–20)
CALCIUM SERPL-MCNC: 8.6 MG/DL (ref 8.5–9.9)
CHLORIDE BLD-SCNC: 105 MEQ/L (ref 95–107)
CO2: 29 MEQ/L (ref 20–31)
CREAT SERPL-MCNC: 1.22 MG/DL (ref 0.7–1.2)
GFR AFRICAN AMERICAN: >60
GFR NON-AFRICAN AMERICAN: >60
GLUCOSE BLD-MCNC: 107 MG/DL (ref 60–115)
GLUCOSE BLD-MCNC: 127 MG/DL (ref 60–115)
GLUCOSE BLD-MCNC: 228 MG/DL (ref 70–99)
GLUCOSE BLD-MCNC: 248 MG/DL (ref 60–115)
HCT VFR BLD CALC: 31.5 % (ref 42–52)
HEMOGLOBIN: 10.5 G/DL (ref 14–18)
MCH RBC QN AUTO: 26.5 PG (ref 27–31.3)
MCHC RBC AUTO-ENTMCNC: 33.2 % (ref 33–37)
MCV RBC AUTO: 79.6 FL (ref 80–100)
PDW BLD-RTO: 21.3 % (ref 11.5–14.5)
PERFORMED ON: ABNORMAL
PERFORMED ON: ABNORMAL
PERFORMED ON: NORMAL
PLATELET # BLD: 131 K/UL (ref 130–400)
PLATELET SLIDE REVIEW: ADEQUATE
POTASSIUM REFLEX MAGNESIUM: 3.8 MEQ/L (ref 3.4–4.9)
RBC # BLD: 3.96 M/UL (ref 4.7–6.1)
SODIUM BLD-SCNC: 143 MEQ/L (ref 135–144)
WBC # BLD: 12.6 K/UL (ref 4.8–10.8)

## 2020-01-24 PROCEDURE — 6370000000 HC RX 637 (ALT 250 FOR IP): Performed by: ORTHOPAEDIC SURGERY

## 2020-01-24 PROCEDURE — 97110 THERAPEUTIC EXERCISES: CPT

## 2020-01-24 PROCEDURE — 96365 THER/PROPH/DIAG IV INF INIT: CPT

## 2020-01-24 PROCEDURE — 96374 THER/PROPH/DIAG INJ IV PUSH: CPT

## 2020-01-24 PROCEDURE — 2580000003 HC RX 258: Performed by: NURSE PRACTITIONER

## 2020-01-24 PROCEDURE — 6370000000 HC RX 637 (ALT 250 FOR IP): Performed by: NURSE PRACTITIONER

## 2020-01-24 PROCEDURE — 2580000003 HC RX 258: Performed by: ORTHOPAEDIC SURGERY

## 2020-01-24 PROCEDURE — 97116 GAIT TRAINING THERAPY: CPT

## 2020-01-24 PROCEDURE — 6360000002 HC RX W HCPCS: Performed by: NURSE PRACTITIONER

## 2020-01-24 PROCEDURE — G0378 HOSPITAL OBSERVATION PER HR: HCPCS

## 2020-01-24 PROCEDURE — 96361 HYDRATE IV INFUSION ADD-ON: CPT

## 2020-01-24 PROCEDURE — 96366 THER/PROPH/DIAG IV INF ADDON: CPT

## 2020-01-24 PROCEDURE — 6370000000 HC RX 637 (ALT 250 FOR IP): Performed by: INTERNAL MEDICINE

## 2020-01-24 PROCEDURE — 97535 SELF CARE MNGMENT TRAINING: CPT

## 2020-01-24 PROCEDURE — 96376 TX/PRO/DX INJ SAME DRUG ADON: CPT

## 2020-01-24 PROCEDURE — 6360000002 HC RX W HCPCS: Performed by: ORTHOPAEDIC SURGERY

## 2020-01-24 PROCEDURE — 1210000000 HC MED SURG R&B

## 2020-01-24 PROCEDURE — 36415 COLL VENOUS BLD VENIPUNCTURE: CPT

## 2020-01-24 PROCEDURE — 96375 TX/PRO/DX INJ NEW DRUG ADDON: CPT

## 2020-01-24 PROCEDURE — 80048 BASIC METABOLIC PNL TOTAL CA: CPT

## 2020-01-24 PROCEDURE — 85027 COMPLETE CBC AUTOMATED: CPT

## 2020-01-24 RX ORDER — DIAZEPAM 5 MG/1
5 TABLET ORAL EVERY 6 HOURS PRN
Status: DISCONTINUED | OUTPATIENT
Start: 2020-01-24 | End: 2020-01-25 | Stop reason: HOSPADM

## 2020-01-24 RX ORDER — KETOROLAC TROMETHAMINE 15 MG/ML
15 INJECTION, SOLUTION INTRAMUSCULAR; INTRAVENOUS ONCE
Status: COMPLETED | OUTPATIENT
Start: 2020-01-24 | End: 2020-01-24

## 2020-01-24 RX ORDER — OXYCODONE HYDROCHLORIDE 5 MG/1
10 TABLET ORAL EVERY 4 HOURS PRN
Status: DISCONTINUED | OUTPATIENT
Start: 2020-01-24 | End: 2020-01-25 | Stop reason: HOSPADM

## 2020-01-24 RX ORDER — DIAZEPAM 5 MG/1
5 TABLET ORAL EVERY 6 HOURS PRN
Qty: 28 TABLET | Refills: 0 | Status: SHIPPED | OUTPATIENT
Start: 2020-01-24 | End: 2020-01-31

## 2020-01-24 RX ORDER — OXYCODONE HYDROCHLORIDE 5 MG/1
5 TABLET ORAL EVERY 4 HOURS PRN
Status: DISCONTINUED | OUTPATIENT
Start: 2020-01-24 | End: 2020-01-25 | Stop reason: HOSPADM

## 2020-01-24 RX ADMIN — OXYCODONE HYDROCHLORIDE 5 MG: 5 TABLET ORAL at 10:23

## 2020-01-24 RX ADMIN — ASPIRIN 81 MG: 81 TABLET, COATED ORAL at 20:15

## 2020-01-24 RX ADMIN — TRANEXAMIC ACID 1950 MG: 650 TABLET ORAL at 06:14

## 2020-01-24 RX ADMIN — OXYCODONE HYDROCHLORIDE 5 MG: 5 TABLET ORAL at 00:59

## 2020-01-24 RX ADMIN — ACETAMINOPHEN 650 MG: 325 TABLET ORAL at 12:25

## 2020-01-24 RX ADMIN — Medication 10 ML: at 20:17

## 2020-01-24 RX ADMIN — OXYCODONE HYDROCHLORIDE 10 MG: 5 TABLET ORAL at 22:40

## 2020-01-24 RX ADMIN — Medication 10 ML: at 10:23

## 2020-01-24 RX ADMIN — CEFAZOLIN SODIUM 2 G: 2 SOLUTION INTRAVENOUS at 00:58

## 2020-01-24 RX ADMIN — OXYCODONE HYDROCHLORIDE 10 MG: 5 TABLET ORAL at 18:37

## 2020-01-24 RX ADMIN — DOCUSATE SODIUM 100 MG: 100 CAPSULE, LIQUID FILLED ORAL at 08:30

## 2020-01-24 RX ADMIN — DIAZEPAM 5 MG: 5 TABLET ORAL at 14:06

## 2020-01-24 RX ADMIN — ACETAMINOPHEN 650 MG: 325 TABLET ORAL at 00:58

## 2020-01-24 RX ADMIN — FOLIC ACID 1 MG: 1 TABLET ORAL at 08:31

## 2020-01-24 RX ADMIN — ASPIRIN 81 MG: 81 TABLET, COATED ORAL at 08:31

## 2020-01-24 RX ADMIN — LISINOPRIL 10 MG: 10 TABLET ORAL at 08:31

## 2020-01-24 RX ADMIN — OXYCODONE HYDROCHLORIDE 5 MG: 5 TABLET ORAL at 06:14

## 2020-01-24 RX ADMIN — LOSARTAN POTASSIUM AND HYDROCHLOROTHIAZIDE 1 TABLET: 50; 12.5 TABLET, FILM COATED ORAL at 08:29

## 2020-01-24 RX ADMIN — SODIUM CHLORIDE 100 ML/HR: 9 INJECTION, SOLUTION INTRAVENOUS at 10:31

## 2020-01-24 RX ADMIN — OXYCODONE HYDROCHLORIDE 10 MG: 5 TABLET ORAL at 14:40

## 2020-01-24 RX ADMIN — METFORMIN HYDROCHLORIDE 500 MG: 500 TABLET ORAL at 19:02

## 2020-01-24 RX ADMIN — FENOFIBRATE 160 MG: 160 TABLET ORAL at 20:16

## 2020-01-24 RX ADMIN — DOCUSATE SODIUM 100 MG: 100 CAPSULE, LIQUID FILLED ORAL at 20:16

## 2020-01-24 RX ADMIN — SENNOSIDES AND DOCUSATE SODIUM 1 TABLET: 8.6; 5 TABLET ORAL at 20:16

## 2020-01-24 RX ADMIN — AMLODIPINE BESYLATE 5 MG: 5 TABLET ORAL at 08:31

## 2020-01-24 RX ADMIN — ACETAMINOPHEN 650 MG: 325 TABLET ORAL at 18:37

## 2020-01-24 RX ADMIN — DIAZEPAM 5 MG: 5 TABLET ORAL at 20:15

## 2020-01-24 RX ADMIN — ACETAMINOPHEN 650 MG: 325 TABLET ORAL at 06:14

## 2020-01-24 RX ADMIN — OXYCODONE HYDROCHLORIDE 5 MG: 5 TABLET ORAL at 10:35

## 2020-01-24 RX ADMIN — METFORMIN HYDROCHLORIDE 500 MG: 500 TABLET ORAL at 08:30

## 2020-01-24 RX ADMIN — KETOROLAC TROMETHAMINE 15 MG: 15 INJECTION, SOLUTION INTRAMUSCULAR; INTRAVENOUS at 14:06

## 2020-01-24 RX ADMIN — SENNOSIDES AND DOCUSATE SODIUM 1 TABLET: 8.6; 5 TABLET ORAL at 08:30

## 2020-01-24 RX ADMIN — DULOXETINE HYDROCHLORIDE 60 MG: 60 CAPSULE, DELAYED RELEASE ORAL at 08:30

## 2020-01-24 ASSESSMENT — PAIN DESCRIPTION - PAIN TYPE
TYPE: SURGICAL PAIN;ACUTE PAIN
TYPE: SURGICAL PAIN
TYPE: ACUTE PAIN;SURGICAL PAIN
TYPE: SURGICAL PAIN

## 2020-01-24 ASSESSMENT — PAIN DESCRIPTION - ORIENTATION
ORIENTATION: LEFT

## 2020-01-24 ASSESSMENT — PAIN SCALES - GENERAL
PAINLEVEL_OUTOF10: 7
PAINLEVEL_OUTOF10: 4
PAINLEVEL_OUTOF10: 7
PAINLEVEL_OUTOF10: 9
PAINLEVEL_OUTOF10: 6
PAINLEVEL_OUTOF10: 7
PAINLEVEL_OUTOF10: 9
PAINLEVEL_OUTOF10: 0
PAINLEVEL_OUTOF10: 9
PAINLEVEL_OUTOF10: 8
PAINLEVEL_OUTOF10: 9
PAINLEVEL_OUTOF10: 8
PAINLEVEL_OUTOF10: 5
PAINLEVEL_OUTOF10: 8
PAINLEVEL_OUTOF10: 9

## 2020-01-24 ASSESSMENT — PAIN DESCRIPTION - LOCATION
LOCATION: HIP

## 2020-01-24 ASSESSMENT — PAIN DESCRIPTION - FREQUENCY: FREQUENCY: CONTINUOUS

## 2020-01-24 ASSESSMENT — PAIN DESCRIPTION - PROGRESSION: CLINICAL_PROGRESSION: GRADUALLY WORSENING

## 2020-01-24 ASSESSMENT — PAIN DESCRIPTION - DESCRIPTORS: DESCRIPTORS: ACHING;SORE

## 2020-01-24 ASSESSMENT — PAIN DESCRIPTION - ONSET: ONSET: ON-GOING

## 2020-01-24 NOTE — PROGRESS NOTES
Hip surgery    Progress Note    Subjective:     Post-Operative Day: 1 Status Post left Hip Arthroplasty  Systemic or Specific Complaints:No Complaints    Objective:     CURRENT VITALS:  BP (!) 151/75   Pulse 69   Temp 97.3 °F (36.3 °C) (Oral)   Resp 18   Ht 6' 0.08\" (1.831 m)   Wt 204 lb (92.5 kg)   SpO2 94%   BMI 27.60 kg/m²     General: alert, appears stated age and cooperative   Wound: Wound clean and dry no evidence of infection. Motion: Painful range of Motion   DVT Exam: No evidence of DVT seen on physical exam.       NVI in lower extremity. Thigh swollen but soft. Moving foot and ankle. Data Review  Recent Labs     01/24/20  0501   WBC 12.6*   RBC 3.96*   HGB 10.5*   HCT 31.5*   MCV 79.6*   MCH 26.5*   MCHC 33.2   RDW 21.3*          Assessment:     Status Post left Hip Arthroplasty. Doing well postoperatively.  Plan is that if pt progresses through recovery and his pain remains well controlled- since he was on pain meds pre op- to go home with [facility]   rx for walker and pain meds rx on chart    Plan:      1: Continues current post-op course, most likely d/c later today :  2:  Continue Deep venous thrombosis prophylaxis  3:  Continue physical therapy  4:  Continue Pain Control

## 2020-01-24 NOTE — PROGRESS NOTES
Physical Therapy Med Surg Daily Treatment Note  [facility]  Room: 21/N221-       NAME: Victor Hugo Koehler  : 1965 (53 y.o.)  MRN: [id number]  CODE STATUS: Full Code    Date of Service: 2020    Patient Diagnosis(es): Status post total replacement of left hip [Z96.642]   No chief complaint on file. 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 ([address] 2019    Chronic pain syndrome 2017    Postlaminectomy syndrome, lumbar region 2015    L4-5 HERNAITED DISC 2015        Past Medical History:   Diagnosis Date    Bronchitis     CAD (coronary artery disease)     past angioplasty > 10 yrs ago    Chronic back pain     Diabetes ([address]     dx > 2 yrs    Dyslipidemia     Hyperlipidemia     meds > 5 yrs    Hypertension     meds > 5 yrs    Neuropathy in diabetes ([address]     Osteoarthritis     Pneumonia     Sickle cell anemia (HCC)     Sleep apnea, obstructive      Past Surgical History:   Procedure Laterality Date    BACK SURGERY      Lumbar disc OR. [facility],  [address]  7/10/2015    Patient states he was urininating blood and had a procedure done.     [facility]14    [facility]    ENDOSCOPY, COLON, DIAGNOSTIC      LUMBAR FUSION      OTHER SURGICAL HISTORY Right     tendon repair in (R) forearm because of previous injury as child    TOTAL HIP ARTHROPLASTY Left 2020    LEFT

## 2020-01-24 NOTE — CARE COORDINATION
wants to go home    Initial Discharge Plan? (Note: please see concurrent daily documentation for any updates after initial note). From home with girlfriend and he wants to go home with [facility]. Pt will need a walker so one was ordered from [address] and family will  today.       The Patient and/or patient representative: pt was provided with choice of any post-acute providers for care and equipment and agrees with discharge plan  Yes    Electronically signed by ANTONINO Moss on 1/24/2020 at 9:40 AM

## 2020-01-24 NOTE — PROGRESS NOTES
[facility] OCCUPATIONAL THERAPY ORTHOPEDIC TREATMENT NOTE     Date: 2020  Patient Name: Deb Machuca        MRN: [id number]  Account: [de-identified]   : 1965  (53 y.o.)  Room: [place] [address]    Chart Review:  Diagnosis:  The primary encounter diagnosis was Chronic pain syndrome. Diagnoses of Status post total replacement of left hip and Post-op pain were also pertinent to this visit. Restrictions:    Restrictions/Precautions  Restrictions/Precautions: Weight Bearing, Fall Risk  Position Activity Restriction  Other position/activity restrictions: no hip precautions    Subjective:  Patient states: \"It's a little sore. \"    Pain:  Start of tx:  Pre Treatment Pain Screening  Pain at present: 5  Scale Used: Numeric Score  Intervention List: Patient able to continue with treatment, Patient declined any intervention  Comments / Details: Provided with ice pack    End of tx:  Pain Assessment  Patient Currently in Pain: Yes  Pain Assessment: 0-10  Pain Level: 7  Pain Type: Surgical pain  Pain Location: Hip  Pain Orientation: Left  Pain Descriptors: Aching, Sore  Pain Frequency: Continuous  Clinical Progression: Gradually worsening  Response to Pain Intervention: Patient Satisfied    Objective: ADL shower session completed as follows. ADL  ADL  Equipment Provided: Sock aid, Dressing stick  Grooming: Supervision (To perform oral care and hair care while standing at sink )  UE Bathing: Supervision  LE Bathing: Supervision (for safety in standing)  UE Dressing: Unable to assess ([address] only)  LE Dressing: Setup, Supervision, Verbal cueing (Educated pt in use of AE to don/doff bilateral socks. Pt able to return demonstration with Min difficulty and VCs for techniques.)    Shower Transfers  Shower - Transfer From: Mandy May - Transfer Type: To and From  Shower - Transfer To:  Shower seat with back  Shower - Technique: Ambulating  Shower Transfers: Supervision  Shower Transfers Comments: Pt used grab bars ZACK Hose donned:  [x]  Yes  []  No       Cognition:  Cognition  Overall Cognitive Status: WFL    Treatment consisted of:   [x] ADL Training  [] Strengthening   [x] Transfer Training    [] DME Education  [] HEP   [] Manual Therapy   [x] Patient Education  [] Other:      Assessment/Discharge Disposition: Pt tolerated treatment well.    Performance deficits / Impairments: Decreased functional mobility , Decreased ADL status, Decreased balance, Decreased endurance, Decreased strength  Prognosis: Good  Discharge Recommendations: Continue to assess pending progress  History: Pt's medical history is moderately complex  Exam: Pt. has 5 performance deficits  Assistance / Modification: Pt. requires min A    [employer]  AM-PAC Daily Activity Inpatient   How much help for putting on and taking off regular lower body clothing?: A Little  How much help for Bathing?: A Little  How much help for Toileting?: A Little  How much help for putting on and taking off regular upper body clothing?: None  How much help for taking care of personal grooming?: None  How much help for eating meals?: None  AM-[facility] Inpatient Daily Activity Raw Score: 21  AM-PAC Inpatient ADL T-Scale Score : 44.27  ADL Inpatient CMS 0-100% Score: 32.79  ADL Inpatient CMS G-Code Modifier : CJ    Plan:  [x] Continue OT per POC  [] D/C OT  [] Other:     Goals/Plan of care addressed during this session:   [] Improve balance  [] Improve Strength   [x] Improve [place] with functional transfers   [x]  Improve [place] with ADLs     Minutes:  OT Individual Minutes  Time In: 6482  Time Out: 0908  Minutes: 45    ADL trainin minutes    Electronically signed by:    ELI Samuel    2020, 9:15 AM

## 2020-01-24 NOTE — PROGRESS NOTES
equal bilaterally     Labs:   Recent Labs     01/24/20  0501   WBC 12.6*   HGB 10.5*   HCT 31.5*        Recent Labs     01/24/20  0501      K 3.8      CO2 29   BUN 18   CREATININE 1.22*   CALCIUM 8.6     No results for input(s): AST, ALT, BILIDIR, BILITOT, ALKPHOS in the last 72 hours. No results for input(s): INR in the last 72 hours. No results for input(s): Erven Sell in the last 72 hours. Urinalysis:      Lab Results   Component Value Date    NITRU Negative 01/07/2020    BLOODU Negative 01/07/2020    SPECGRAV 1.014 01/07/2020    GLUCOSEU Negative 01/07/2020       Radiology:  XR HIP LEFT (1 VIEW)   Final Result   Impression:  Postoperative left hip as described above without unexpected postoperative complicating features. Assessment/Plan:    #S/p Total hip    - Per primary team    #HTN/HLD/CAD     - Continue home meds    Active Hospital Problems    Diagnosis Date Noted    Status post total replacement of left hip [Z96.642] 01/23/2020      Additional work up or/and treatment plan may be added today or then after based on clinical progression. I am managing a portion of pt care. Some medical issues are handled by other specialists. Additional work up and treatment should be done in out pt setting by pt PCP and other out pt providers. In addition to examining and evaluating pt, I spent additional time explaining care, normal and abnormal findings, and treatment plan. All of pt questions were answered. Counseling, diet and education were  provided. Case will be discussed with nursing staff when appropriate. Family will be updated if and when appropriate.       Diet: DIET GENERAL;    Code Status: Full Code          Electronically signed by Monie Luna MD on 1/24/2020 at 2:59 PM

## 2020-01-24 NOTE — PROGRESS NOTES
Physical Therapy Med Surg Daily Treatment Note  Facility/Department: [facility]  Room: 21N221-01       NAME: Eamon Toth  : 1965 (53 y.o.)  MRN: [id number]  CODE STATUS: Full Code    Date of Service: 2020    Patient Diagnosis(es): Status post total replacement of left hip [Z96.642]   No chief complaint on file. 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 ([facility] [address] 2019    Chronic pain syndrome 2017    Postlaminectomy syndrome, lumbar region 2015    L4-5 HERNAITED DISC 2015        Past Medical History:   Diagnosis Date    Bronchitis     CAD (coronary artery disease)     past angioplasty > 10 yrs ago    Chronic back pain     Diabetes ([address]     dx > 2 yrs    Dyslipidemia     Hyperlipidemia     meds > 5 yrs    Hypertension     meds > 5 yrs    Neuropathy in diabetes ([address]     Osteoarthritis     Pneumonia     Sickle cell anemia (HCC)     Sleep apnea, obstructive      Past Surgical History:   Procedure Laterality Date    BACK SURGERY      Lumbar disc OR. [facility],  [address]  7/10/2015    Patient states he was urininating blood and had a procedure done.     [facility]  [facility]14/14    [facility]    ENDOSCOPY, COLON, DIAGNOSTIC      LUMBAR FUSION      OTHER SURGICAL HISTORY Right     tendon repair in (R) forearm because of previous injury as child    TOTAL HIP ARTHROPLASTY Left 2020    LEFT HIP TOTAL HIP ARTHROPLASTY, RICARDO performed by Melvin Acosta MD at [facility]  3/14/14    BIOPSY [facility]  2013    Dr. Naidu Pump office         Restrictions  Restrictions/Precautions: Weight Bearing; Fall Risk  Lower Extremity Weight Bearing Restrictions  Left Lower Extremity Weight Bearing: Weight Bearing As Tolerated  Position Activity Restriction  Other position/activity restrictions: no hip precautions    SUBJECTIVE   General  Chart Reviewed: Yes  Family / Caregiver Present: No  Subjective  Subjective: \"It it hurting a lot, I did the shower and then got to the bathroom and now it's hurting\"     Pre-Session Pain Report  Pre Treatment Pain Screening  Pain at present: 9  Scale Used: Numeric Score  Intervention List: Patient able to continue with treatment  Pain Screening  Patient Currently in Pain: Yes       Post-Session Pain Report  Pain Assessment  Pain Assessment: 0-10  Pain Level: 9  Patient's Stated Pain Goal: 9  Pain Type: Surgical pain  Pain Location: Hip  Pain Orientation: Left         OBJECTIVE        Bed mobility  Comment: declines. Exercises  Quad Sets: x 10  Heelslides: x 10  Gluteal Sets: x 10  Hip Abduction: x 10  Ankle Pumps: x 10  Comments: pt given written HEP for supine/seated therex, instruction given on technique dosage and frequency of all exercises, pt verbalizes understanding. Activity Tolerance  Activity Tolerance: Patient limited by pain  Activity Tolerance: pt declines out of bed activity d/t just getting back to bed and having increased pain. ASSESSMENT   Assessment: pt limited by pain, declines out of bed activity at this time, agreeable to attempt later. Discharge Recommendations:  Patient would benefit from continued therapy after discharge    Goals  Short term goals  Short term goal 1: Pt able to transfersit>stand independently.    Short term goal 2: Pt able

## 2020-01-25 VITALS
TEMPERATURE: 98.1 F | HEART RATE: 83 BPM | WEIGHT: 204 LBS | RESPIRATION RATE: 16 BRPM | SYSTOLIC BLOOD PRESSURE: 167 MMHG | HEIGHT: 72 IN | BODY MASS INDEX: 27.63 KG/M2 | DIASTOLIC BLOOD PRESSURE: 84 MMHG | OXYGEN SATURATION: 96 %

## 2020-01-25 LAB
GLUCOSE BLD-MCNC: 130 MG/DL (ref 60–115)
GLUCOSE BLD-MCNC: 147 MG/DL (ref 60–115)
HCT VFR BLD CALC: 32.8 % (ref 42–52)
HEMOGLOBIN: 11.2 G/DL (ref 14–18)
MCH RBC QN AUTO: 26.8 PG (ref 27–31.3)
MCHC RBC AUTO-ENTMCNC: 34.1 % (ref 33–37)
MCV RBC AUTO: 78.6 FL (ref 80–100)
PDW BLD-RTO: 21.4 % (ref 11.5–14.5)
PERFORMED ON: ABNORMAL
PERFORMED ON: ABNORMAL
PLATELET # BLD: 125 K/UL (ref 130–400)
PLATELET SLIDE REVIEW: ADEQUATE
RBC # BLD: 4.18 M/UL (ref 4.7–6.1)
WBC # BLD: 16.8 K/UL (ref 4.8–10.8)

## 2020-01-25 PROCEDURE — 97116 GAIT TRAINING THERAPY: CPT

## 2020-01-25 PROCEDURE — G0378 HOSPITAL OBSERVATION PER HR: HCPCS

## 2020-01-25 PROCEDURE — 36415 COLL VENOUS BLD VENIPUNCTURE: CPT

## 2020-01-25 PROCEDURE — 6370000000 HC RX 637 (ALT 250 FOR IP): Performed by: ORTHOPAEDIC SURGERY

## 2020-01-25 PROCEDURE — 85027 COMPLETE CBC AUTOMATED: CPT

## 2020-01-25 PROCEDURE — 97535 SELF CARE MNGMENT TRAINING: CPT

## 2020-01-25 PROCEDURE — 96375 TX/PRO/DX INJ NEW DRUG ADDON: CPT

## 2020-01-25 PROCEDURE — 6370000000 HC RX 637 (ALT 250 FOR IP): Performed by: NURSE PRACTITIONER

## 2020-01-25 PROCEDURE — 6370000000 HC RX 637 (ALT 250 FOR IP): Performed by: INTERNAL MEDICINE

## 2020-01-25 PROCEDURE — 6360000002 HC RX W HCPCS: Performed by: NURSE PRACTITIONER

## 2020-01-25 RX ADMIN — DULOXETINE HYDROCHLORIDE 60 MG: 60 CAPSULE, DELAYED RELEASE ORAL at 08:48

## 2020-01-25 RX ADMIN — DOCUSATE SODIUM 100 MG: 100 CAPSULE, LIQUID FILLED ORAL at 08:48

## 2020-01-25 RX ADMIN — DIAZEPAM 5 MG: 5 TABLET ORAL at 08:48

## 2020-01-25 RX ADMIN — FOLIC ACID 1 MG: 1 TABLET ORAL at 08:48

## 2020-01-25 RX ADMIN — LOSARTAN POTASSIUM AND HYDROCHLOROTHIAZIDE 1 TABLET: 50; 12.5 TABLET, FILM COATED ORAL at 08:48

## 2020-01-25 RX ADMIN — METFORMIN HYDROCHLORIDE 500 MG: 500 TABLET ORAL at 08:48

## 2020-01-25 RX ADMIN — ACETAMINOPHEN 650 MG: 325 TABLET ORAL at 05:28

## 2020-01-25 RX ADMIN — OXYCODONE HYDROCHLORIDE 10 MG: 5 TABLET ORAL at 05:29

## 2020-01-25 RX ADMIN — OXYCODONE HYDROCHLORIDE 10 MG: 5 TABLET ORAL at 10:35

## 2020-01-25 RX ADMIN — ACETAMINOPHEN 650 MG: 325 TABLET ORAL at 00:18

## 2020-01-25 RX ADMIN — LISINOPRIL 10 MG: 10 TABLET ORAL at 08:48

## 2020-01-25 RX ADMIN — AMLODIPINE BESYLATE 5 MG: 5 TABLET ORAL at 08:48

## 2020-01-25 RX ADMIN — ASPIRIN 81 MG: 81 TABLET, COATED ORAL at 08:48

## 2020-01-25 RX ADMIN — SENNOSIDES AND DOCUSATE SODIUM 1 TABLET: 8.6; 5 TABLET ORAL at 08:49

## 2020-01-25 RX ADMIN — HYDRALAZINE HYDROCHLORIDE 10 MG: 20 INJECTION INTRAMUSCULAR; INTRAVENOUS at 00:23

## 2020-01-25 ASSESSMENT — PAIN DESCRIPTION - ORIENTATION
ORIENTATION: LEFT
ORIENTATION: LEFT

## 2020-01-25 ASSESSMENT — PAIN DESCRIPTION - LOCATION
LOCATION: HIP
LOCATION: HIP

## 2020-01-25 ASSESSMENT — PAIN DESCRIPTION - FREQUENCY: FREQUENCY: CONTINUOUS

## 2020-01-25 ASSESSMENT — PAIN SCALES - GENERAL
PAINLEVEL_OUTOF10: 4
PAINLEVEL_OUTOF10: 9
PAINLEVEL_OUTOF10: 8
PAINLEVEL_OUTOF10: 8
PAINLEVEL_OUTOF10: 4

## 2020-01-25 ASSESSMENT — PAIN DESCRIPTION - PAIN TYPE: TYPE: ACUTE PAIN;SURGICAL PAIN

## 2020-01-25 ASSESSMENT — PAIN DESCRIPTION - DESCRIPTORS: DESCRIPTORS: ACHING

## 2020-01-25 NOTE — PROGRESS NOTES
Hospitalist Progress Note      PCP: Joan Schultz MD    Date of Admission: 1/23/2020    Chief Complaint:    No chief complaint on file. Subjective:  Patient denies fevers, chills, sweats, CP, SOB. 12 point ROS negative other than mentioned above     Medications:  Reviewed    Infusion Medications    sodium chloride Stopped (01/24/20 1400)    dextrose       Scheduled Medications    sodium chloride flush  10 mL Intravenous 2 times per day    acetaminophen  650 mg Oral Q6H    docusate sodium  100 mg Oral BID    sennosides-docusate sodium  1 tablet Oral BID    aspirin  81 mg Oral BID    DULoxetine  60 mg Oral Daily    fenofibrate  160 mg Oral Daily    folic acid  1 mg Oral Daily    amLODIPine  5 mg Oral Daily    And    lisinopril  10 mg Oral Daily    metFORMIN  500 mg Oral BID WC    losartan-hydrochlorothiazide  1 tablet Oral Daily    insulin lispro  0-6 Units Subcutaneous TID WC    insulin lispro  0-3 Units Subcutaneous Nightly     PRN Meds: oxyCODONE **OR** oxyCODONE, diazepam, sodium chloride flush, morphine **OR** morphine, magnesium hydroxide, ondansetron, hydrALAZINE, glucose, dextrose, glucagon (rDNA), dextrose      Intake/Output Summary (Last 24 hours) at 1/25/2020 1205  Last data filed at 1/25/2020 1049  Gross per 24 hour   Intake 3200 ml   Output 3800 ml   Net -600 ml       Exam:    BP (!) 167/84 Comment: RN AWARE  Pulse 83   Temp 98.1 °F (36.7 °C) (Oral)   Resp 16   Ht 6' 0.08\" (1.831 m)   Wt 204 lb (92.5 kg)   SpO2 96%   BMI 27.60 kg/m²     General appearance: No apparent distress, appears stated age and cooperative. HEENT: Conjunctivae/corneas clear. Neck: Trachea midline. Respiratory:  Normal respiratory effort. Clear to auscultation  Cardiovascular: Regular rate and rhythm   Abdomen: Soft, non-tender, non-distended with normal bowel sounds. Musculoskeletal: No clubbing, cyanosis or edema bilaterally.     Neuro: Non Focal.  Capillary Refill: Brisk,< 3 seconds   Peripheral Pulses: +2 palpable, equal bilaterally     Labs:   Recent Labs     01/24/20  0501 01/25/20  0516   WBC 12.6* 16.8*   HGB 10.5* 11.2*   HCT 31.5* 32.8*    125*     Recent Labs     01/24/20  0501      K 3.8      CO2 29   BUN 18   CREATININE 1.22*   CALCIUM 8.6     No results for input(s): AST, ALT, BILIDIR, BILITOT, ALKPHOS in the last 72 hours. No results for input(s): INR in the last 72 hours. No results for input(s): Radha Dapper in the last 72 hours. Urinalysis:      Lab Results   Component Value Date    NITRU Negative 01/07/2020    BLOODU Negative 01/07/2020    SPECGRAV 1.014 01/07/2020    GLUCOSEU Negative 01/07/2020     Radiology:  XR HIP LEFT (1 VIEW)   Final Result   Impression:  Postoperative left hip as described above without unexpected postoperative complicating features. Assessment/Plan:    #S/p Total hip    - Per primary team    #HTN/HLD/CAD     - Continue home meds    #Leucocytosis     - Likely reactive    Active Hospital Problems    Diagnosis Date Noted    Status post total replacement of left hip [Z96.642] 01/23/2020      Additional work up or/and treatment plan may be added today or then after based on clinical progression. I am managing a portion of pt care. Some medical issues are handled by other specialists. Additional work up and treatment should be done in out pt setting by pt PCP and other out pt providers. In addition to examining and evaluating pt, I spent additional time explaining care, normal and abnormal findings, and treatment plan. All of pt questions were answered. Counseling, diet and education were  provided. Case will be discussed with nursing staff when appropriate. Family will be updated if and when appropriate.       Diet: DIET GENERAL;    Code Status: Full Code    Electronically signed by Marlin Schroeder MD on 1/25/2020 at 12:05 PM

## 2020-01-25 NOTE — PROGRESS NOTES
Physical Therapy Med Surg Daily Treatment Note  [facility]Department[facility]  Room: BannerN221-       NAME: Savannah Carson  : 1965 (53 y.o.)  MRN: [id number]  CODE STATUS: Full Code    Date of Service: 2020    Patient Diagnosis(es): Status post total replacement of left hip [Z96.642]   No chief complaint on file. 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 ([address] 2019    Chronic pain syndrome 2017    Postlaminectomy syndrome, lumbar region 2015    L4-5 HERNAITED DISC 2015        Past Medical History:   Diagnosis Date    Bronchitis     CAD (coronary artery disease)     past angioplasty > 10 yrs ago    Chronic back pain     Diabetes ([address]     dx > 2 yrs    Dyslipidemia     Hyperlipidemia     meds > 5 yrs    Hypertension     meds > 5 yrs    Neuropathy in diabetes ([address]     Osteoarthritis     Pneumonia     Sickle cell anemia (HCC)     Sleep apnea, obstructive      Past Surgical History:   Procedure Laterality Date    BACK SURGERY      Lumbar disc OR. [address] [facility],  [address]  7/10/2015    Patient states he was urininating blood and had a procedure done.     [facility]    ENDOSCOPY, COLON, DIAGNOSTIC      LUMBAR FUSION      OTHER SURGICAL HISTORY Right     tendon repair in (R) forearm because of previous injury as child    TOTAL HIP ARTHROPLASTY Left 2020    LEFT HIP TOTAL HIP ARTHROPLASTY, RICARDO performed by Radha Dalal MD at [address]  3/14/14    [facility] [address] [facility]  2013    Dr. Lubin Leader office         Restrictions  Restrictions/Precautions: Weight Bearing; Fall Risk  Lower Extremity Weight Bearing Restrictions  Left Lower Extremity Weight Bearing: Weight Bearing As Tolerated  Position Activity Restriction  Other position/activity restrictions: no hip precautions    SUBJECTIVE   General  Chart Reviewed: Yes  Family / Caregiver Present: No  Subjective  Subjective: \"It still hurts but I'm okay. \"   General Comment  Comments: pt very slow moving d/t pain. Pre-Session Pain Report  Pre Treatment Pain Screening  Pain at present: 5  Scale Used: Numeric Score  Intervention List: Patient able to continue with treatment  Pain Screening  Patient Currently in Pain: Yes       Post-Session Pain Report  Pain Assessment  Pain Level: 9  Pain Type: Acute pain;Surgical pain  Pain Location: Hip  Pain Orientation: Left         OBJECTIVE        Bed mobility  Supine to Sit: Stand by assistance  Sit to Supine: Contact guard assistance  Comment: vc's for improved technique and efficiency, increased time and effort to complete. Transfers  Sit to Stand: Stand by assistance  Stand to sit: Stand by assistance  Car Transfer: Stand by assistance  Comment: vc's for hand placement and safety, good carryover. Ambulation  Ambulation?: Yes  Ambulation 1  Surface: level tile  Device: Rolling Walker  Assistance: Stand by assistance  Quality of Gait: Pt ambulates with antalgic step-to pattern on L, decreased step length  Distance: 35' x2     Stairs/Curb  Stairs?: Yes  Stairs  Curbs: 6\"  Device: Rolling walker  Assistance: Stand by assistance  Comment: vc's for sequencing, pt performs well.                 Exercises  Quad Sets: x 10  Gluteal Sets: x 10  Ankle Pumps: x 10  Comments: reviewed written HEP with pt, pt verbalizes understanding of all. Activity Tolerance  Activity Tolerance: Patient Tolerated treatment well          ASSESSMENT   Assessment: pt safe on curb step and car. Discharge Recommendations:  Patient would benefit from continued therapy after discharge    Goals  Short term goals  Short term goal 1: Pt able to transfersit>stand independently. Short term goal 2: Pt able to ambulate independently 50ft. Short term goal 3: Pt able to stand independently for 5min without increased Sx. Short term goal 4: Pt able to reciprocally ambulate 15 stair steps independently. Patient Goals   Patient goals : Pt wants to go home. PLAN    Safety Devices  Type of devices: Call light within reach; Bed alarm in place; All fall risk precautions in place; Left in bed     [facility] (6 CLICK) [address] Inpatient Mobility Raw Score : 18      Therapy Time   Individual   Time In 0837   Time Out 0917   Minutes 40      gait: 23  BM/Trsf: 12  Therex: 5        Farhana Antoine PTA, 01/25/20 at 9:29 AM

## 2020-01-27 NOTE — DISCHARGE SUMMARY
Discharge summary  This patient Ranjith Holland was admitted to the hospital on 1/23/2020  after undergoing Procedure(s) (LRB):  LEFT HIP TOTAL HIP ARTHROPLASTY, RICARDO (Left) without complications that morning. During the postoperative period,while in hospital, patient was medically managed by the hospitalist. Please see medial notes and H&P for patients additional diagnoses. Ortho agrees with all medical diagnoses and treatments while patient in hospital.  No significant or unexpected findings or abnormal ortho imaging were noted during the hospital stay    Hospital course  Patient tolerated surgical procedure well and there was no complications. Patient progressed adequtly through their recovery during hospital stay including PT and rehabilitation. DVT prophylaxis was implemented POD#1  Patient was then D/C on 1/25/2020 to Home  in stable condition. Patient was instructed on the use of pain medications, the signs and symptoms of infection, and was given our number to call should they have any questions or concerns following discharge.

## 2020-01-29 RX ORDER — IBUPROFEN 800 MG/1
400 TABLET ORAL EVERY 6 HOURS PRN
Qty: 20 TABLET | Refills: 0 | Status: SHIPPED | OUTPATIENT
Start: 2020-01-29 | End: 2020-06-19 | Stop reason: SDUPTHER

## 2020-01-29 RX ORDER — BACLOFEN 10 MG/1
10 TABLET ORAL 3 TIMES DAILY PRN
Qty: 20 TABLET | Refills: 0 | Status: SHIPPED | OUTPATIENT
Start: 2020-01-29 | End: 2020-02-08

## 2020-03-25 PROBLEM — D57.20 SICKLE-CELL/HB-C DISEASE WITHOUT CRISIS (HCC): Status: RESOLVED | Noted: 2019-02-11 | Resolved: 2020-03-24

## 2021-06-21 ENCOUNTER — TELEPHONE (OUTPATIENT)
Dept: PAIN MANAGEMENT | Age: 56
End: 2021-06-21

## 2021-06-21 NOTE — TELEPHONE ENCOUNTER
Received call from Matthieu at  [facility]. He states the patient was sent a prescription for oxycodone 5mg 1 ever 4 hours for 3 days #18. It was sent from the hospital. They are asking for the okay to fill this?

## 2021-06-23 ENCOUNTER — OFFICE VISIT (OUTPATIENT)
Dept: PAIN MANAGEMENT | Age: 56
End: 2021-06-23
Payer: COMMERCIAL

## 2021-06-23 VITALS
DIASTOLIC BLOOD PRESSURE: 66 MMHG | BODY MASS INDEX: 27.57 KG/M2 | SYSTOLIC BLOOD PRESSURE: 115 MMHG | TEMPERATURE: 97.3 F | WEIGHT: 208 LBS | HEART RATE: 60 BPM | HEIGHT: 73 IN

## 2021-06-23 DIAGNOSIS — M51.26 DISPLACEMENT OF LUMBAR INTERVERTEBRAL DISC WITHOUT MYELOPATHY: Chronic | ICD-10-CM

## 2021-06-23 DIAGNOSIS — M96.1 POSTLAMINECTOMY SYNDROME, LUMBAR REGION: Chronic | ICD-10-CM

## 2021-06-23 PROBLEM — D57.00: Status: ACTIVE | Noted: 2021-06-14

## 2021-06-23 PROBLEM — J38.7 DISEASE OF LARYNX: Status: ACTIVE | Noted: 2021-06-23

## 2021-06-23 PROCEDURE — 99213 OFFICE O/P EST LOW 20 MIN: CPT | Performed by: NURSE PRACTITIONER

## 2021-06-23 RX ORDER — ATENOLOL 50 MG/1
TABLET ORAL
COMMUNITY

## 2021-06-23 RX ORDER — BACLOFEN 10 MG/1
TABLET ORAL
COMMUNITY
Start: 2021-06-20 | End: 2021-09-14

## 2021-06-23 RX ORDER — AMLODIPINE BESYLATE 10 MG/1
TABLET ORAL
COMMUNITY
Start: 2021-04-14

## 2021-06-23 RX ORDER — PREGABALIN 150 MG/1
150 CAPSULE ORAL 3 TIMES DAILY
Qty: 90 CAPSULE | Refills: 0 | Status: SHIPPED | OUTPATIENT
Start: 2021-06-23 | End: 2021-07-21 | Stop reason: SDUPTHER

## 2021-06-23 RX ORDER — HYDROCODONE BITARTRATE AND ACETAMINOPHEN 10; 325 MG/1; MG/1
1 TABLET ORAL
Qty: 80 TABLET | Refills: 0 | Status: SHIPPED | OUTPATIENT
Start: 2021-06-23 | End: 2021-07-21 | Stop reason: SDUPTHER

## 2021-06-23 ASSESSMENT — ENCOUNTER SYMPTOMS
BACK PAIN: 1
SHORTNESS OF BREATH: 0
NAUSEA: 0
EYES NEGATIVE: 1
CONSTIPATION: 0
DIARRHEA: 0
COUGH: 0
GASTROINTESTINAL NEGATIVE: 1

## 2021-06-23 NOTE — PROGRESS NOTES
Matt Carey  (8/03/1931)    6/23/2021    Subjective:     Matt Carey is 63 y.o. male who complains today of:    Chief Complaint   Patient presents with    Back Pain         Allergies:  Patient has no known allergies. Past Medical History:   Diagnosis Date    Bronchitis     CAD (coronary artery disease)     past angioplasty > 10 yrs ago    Chronic back pain     Diabetes ([facility]     dx > 2 yrs    Dyslipidemia     Hyperlipidemia     meds > 5 yrs    Hypertension     meds > 5 yrs    Neuropathy in diabetes ([facility]     Osteoarthritis     Pneumonia     Sickle cell anemia (HCC)     Sleep apnea, obstructive      Past Surgical History:   Procedure Laterality Date    BACK SURGERY  2008    Lumbar disc OR. [address] [facility] [address]  7/10/2015    Patient states he was urininating blood and had a procedure done.     COLONOSCOPY  3/14/14    [facility]    ENDOSCOPY, COLON, DIAGNOSTIC      LUMBAR FUSION  2008    OTHER SURGICAL HISTORY Right     tendon repair in (R) forearm because of previous injury as child    TOTAL HIP ARTHROPLASTY Left 1/23/2020    LEFT HIP TOTAL HIP ARTHROPLASTY, RICARDO performed by Mahamed Carey MD at [facility] ENDOSCOPY  3/14/14    Arjun Imtiaz  2013    [facility]     Family History   Problem Relation Age of Onset    Cancer Mother         Throat & esophagus    Anemia Mother     Other Father         Natural causes    High Blood Pressure Sister     COPD Brother     Heart Disease Brother         Cardiac stent    Diabetes Sister     COPD Sister     Emphysema Sister     No Known Problems Daughter      Social History     Socioeconomic History    Marital status: Legally      Spouse name: Not on file    Number of children: Not on file    Years of education: Not on file    Highest education level: Not on file   Occupational History    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 Vaping Use    Vaping Use: Former   Substance and Sexual Activity    Alcohol use: No     Alcohol/week: 0.0 standard drinks    Drug use: No    Sexual activity: Not on file   Other Topics Concern    Not on file   Social History Narrative    Not on file     Social Determinants of Health     Financial Resource Strain:     Difficulty of Paying Living Expenses:    Food Insecurity:     Worried About Running Out of Food in the Last Year:     [address] in the Last Year:    Transportation Needs:     Lack of Transportation (Medical):      Lack of Transportation (Non-Medical):    Physical Activity:     Days of Exercise per Week:     Minutes of Exercise per Session:    Stress:     Feeling of Stress :    Social Connections:     Frequency of Communication with Friends and Family:     Frequency of Social Gatherings with Friends and Family:     Attends Islam Services:     Active Member of Clubs or Organizations:     Attends Club or Organization Meetings:     Marital Status:    Intimate Partner Violence:     Fear of Current or Ex-Partner:     Emotionally Abused:     Physically Abused:     Sexually Abused:        Current Outpatient Medications on File Prior to Visit   Medication Sig Dispense Refill    DULoxetine (CYMBALTA) 60 MG extended release capsule Take 1 capsule by mouth daily 30 capsule 2    atenolol-chlorthalidone (TENORETIC) 50-25 MG per tablet daily  0    Elastic Bandages & Supports (T.E.D. BELOW KNEE/L-REGULAR) MISC 1 applicator by Does not apply route daily 1 each 0    losartan-hydrochlorothiazide (HYZAAR) 100-25 MG per tablet Take by mouth daily       Respiratory Therapy Supplies (FLUTTER) PANCHO 1 Device by Does not apply route 4 times daily 1 Device 0    metFORMIN (GLUCOPHAGE) 500 MG tablet Take 500 mg by mouth 2 times daily (with meals)      fenofibrate 160 MG tablet Take by mouth daily       folic acid (FOLVITE) 1 MG tablet Take 1 mg by mouth daily       amLODIPine-benazepril (LOTREL) 5-10 MG per capsule Take 1 capsule by mouth daily       amLODIPine (NORVASC) 10 MG tablet take 1 tablet by mouth once daily      atenolol (TENORMIN) 50 MG tablet Take by mouth      baclofen (LIORESAL) 10 MG tablet       ibuprofen (ADVIL;MOTRIN) 400 MG tablet Take 1 tablet by mouth 2 times daily as needed for Pain 60 tablet 0    aspirin 81 MG EC tablet Take 1 tablet by mouth 2 times daily 30 tablet 0    nicotine (NICODERM CQ) 21 MG/24HR Place 1 patch onto the skin daily 42 patch 0    ciclopirox (PENLAC) 8 % solution Apply topically to the toenails nightly. (Patient not taking: Reported on 6/23/2021) 1 Bottle 5     No current facility-administered medications on file prior to visit. Pt presents today for a f/u of his pain. PCP is Dr. Anali Melendez. Pt last saw this NP for his work related back pain and LE pain. Reduced his norco last OV to 2-3 per day #80 tabs given which was tough as he also had a sickle cell crisis . Recently seen in hospital and given a prescription for 5 mg oxycodone on 6/21/2021 per [facility] by a Dr. Fartun Gresham d/t sickle cell crisis which started in lower back and working up towards chest he reports. Went to [facility] ER where he was admitted from 6/13-6/16/21. Had Covid vaccines. Lt hip replaced on January 23rd with Dr. Bola Simms. He has a history of lumbar fusion, spinal cord stimulator. Had a f/u with hematology and ibuprofen is fine as long as he takes prilosec every day and prescribed it for him. He has sickle cell dz. He goes into crisis if he has stress and high BP. He says he has had some cramping in Lt LE, but says this comes and goes. Denies swelling, temperature change. He tries to stay active and walk. He says he gets pain in his low back, Lt hip and Lt leg. He uses his SCS constantly. History of mild bilateral CTS, DM, sickle cell anema. He quit smoking, but recently restarted. He is not sure how well his cymbalta is working. Denies SI or HI.         He continues to use his Lyrica 150mg TID and Norco 10mg 2-3 per day PRN pain and Cymbalta 60mg daily and baclofen 10mg BID PRN spasm and Ibuprofen PRN. Pt feels pain level with his medication is 4/10, and 6+/10 without medication. Pt feels that recent [address], \"doing things\"/activity makes the pain worse, and medication, rest, SCS makes the pain better. Pt feels his medication helps   him function and improve his quality of life, specifically says allows to be active with less pain. Pt denies change with Lt LE radiating numbness and tingling. Denies recent falls, injuries or trauma. Pt denies new weakness. Pt reports PT has been done in the past.         Review of Systems   Constitutional: Negative for fever. HENT: Negative. Eyes: Negative. Respiratory: Negative for cough and shortness of breath. Cardiovascular: Positive for chest pain. Gastrointestinal: Negative. Negative for constipation, diarrhea and nausea. Endocrine: Negative. Genitourinary: Negative. Musculoskeletal: Positive for arthralgias, back pain and neck pain. Skin: Negative. Neurological: Positive for numbness. Negative for dizziness and weakness. Hematological:        Recent SSC and was admitted - see HPI   Psychiatric/Behavioral: Negative. Objective:     Vitals:  /66 (Site: Right Upper Arm, Position: Sitting, Cuff Size: Large Adult)   Pulse 60   Temp 97.3 °F (36.3 °C) (Infrared)   Ht 6' 1\" (1.854 m)   Wt 208 lb (94.3 kg)   BMI 27.44 kg/m² Pain Score:   6      Physical Exam  Vitals and nursing note reviewed. This is a pleasant male who answers questions appropriately and follows commands.  Pt is alert and oriented x 3.  Recent and remote memory is intact. Appears tired today. Mood and affect, judgement and insight are normal. No signs of distress, no dyspnea or SOB noted. HEENT: PERRL. Neck is supple, trachea midline. No lymphadenopathy noted. Decreased ROM with flexion and extension of low back. Tender with palpation to lumbar spine on Lt. SCS battery pack palpated on Lt low back. Tightness appreciated over Lt lower lumbar paraspinal muscles.  Lt AFO in place. Negative SLR but reproduces low back pain.  Tightness in both hamstrings noted. Cranial nerves II-XII are intact. Assessment:      Diagnosis Orders   1. Postlaminectomy syndrome, lumbar region  HYDROcodone-acetaminophen (NORCO)  MG per tablet    pregabalin (LYRICA) 150 MG capsule   2. L4-5 HERNAITED DISC  HYDROcodone-acetaminophen (NORCO)  MG per tablet    pregabalin (LYRICA) 150 MG capsule       Plan:     Periodic Controlled Substance Monitoring: Possible medication side effects, risk of tolerance/dependence & alternative treatments discussed., No signs of potential drug abuse or diversion identified. , Assessed functional status., Obtaining appropriate analgesic effect of treatment. (Elma Merlos, APRN - CNP)    Orders Placed This Encounter   Medications    HYDROcodone-acetaminophen (NORCO)  MG per tablet     Sig: Take 1 tablet by mouth every 8-12 hours as needed for Pain for up to 30 days. #80 tabs for 30 days     Dispense:  80 tablet     Refill:  0     Reduce doses taken as pain becomes manageable    pregabalin (LYRICA) 150 MG capsule     Sig: Take 1 capsule by mouth 3 times daily for 30 days. Dispense:  90 capsule     Refill:  0       No orders of the defined types were placed in this encounter. Discussed options with the patient today. Anatomic model pathology was shown and reviewed with pt. F/U with PCP d/t recent [address]. He needs to f/u with pulmonology, hematology as well. Will continue current dose of norco and lyrica. All questions were answered.

## 2021-07-21 ENCOUNTER — OFFICE VISIT (OUTPATIENT)
Dept: PAIN MANAGEMENT | Age: 56
End: 2021-07-21
Payer: COMMERCIAL

## 2021-07-21 VITALS
HEIGHT: 73 IN | TEMPERATURE: 96.9 F | SYSTOLIC BLOOD PRESSURE: 126 MMHG | DIASTOLIC BLOOD PRESSURE: 76 MMHG | BODY MASS INDEX: 27.7 KG/M2 | WEIGHT: 209 LBS

## 2021-07-21 DIAGNOSIS — M96.1 POSTLAMINECTOMY SYNDROME, LUMBAR REGION: Chronic | ICD-10-CM

## 2021-07-21 DIAGNOSIS — M51.26 DISPLACEMENT OF LUMBAR INTERVERTEBRAL DISC WITHOUT MYELOPATHY: Chronic | ICD-10-CM

## 2021-07-21 PROCEDURE — 99213 OFFICE O/P EST LOW 20 MIN: CPT | Performed by: PAIN MEDICINE

## 2021-07-21 RX ORDER — PREGABALIN 150 MG/1
150 CAPSULE ORAL 3 TIMES DAILY
Qty: 90 CAPSULE | Refills: 0 | Status: SHIPPED | OUTPATIENT
Start: 2021-07-21 | End: 2021-08-19 | Stop reason: SDUPTHER

## 2021-07-21 RX ORDER — HYDROCODONE BITARTRATE AND ACETAMINOPHEN 10; 325 MG/1; MG/1
1 TABLET ORAL
Qty: 80 TABLET | Refills: 0 | Status: SHIPPED | OUTPATIENT
Start: 2021-07-21 | End: 2021-08-19 | Stop reason: SDUPTHER

## 2021-07-21 ASSESSMENT — ENCOUNTER SYMPTOMS
BACK PAIN: 0
CONSTIPATION: 0
NAUSEA: 0
DIARRHEA: 0
SHORTNESS OF BREATH: 0

## 2021-07-21 NOTE — PROGRESS NOTES
[facility] ([facility]) Physicians  Neurosurgery and Pain [facility]  [address] [place] [address].., [address] [facility], [address]: [phone]  F: [phone]        Jahaira Choudhury  (1965)    7/21/2021    Subjective:     Jahaira Choudhury is 63 y.o. male who complains today of:     Chief Complaint   Patient presents with    Back Pain     Patient is here today for follow-up. He has chronic pain, history of lumbar fusion, work-related injury. Pain in low back worse with bending, activity. He is trying to walk a lot. Pain medicines help him function, do his activity living, chores around the house. No aberrant behavior, no side effects. Pain can be achy sharp and varies. Sleep can be impaired. No problems with his stomach. Rest helps, overdoing it bothers him. Plan: I renewed his [address] for his nerve pain. Continue home excise program.  He does not need injections. See him 1 month. Chart was reviewed, questions answered. Allergies:  Patient has no known allergies. Past Medical History:   Diagnosis Date    Bronchitis     CAD (coronary artery disease)     past angioplasty > 10 yrs ago    Chronic back pain     Diabetes ([address]     dx > 2 yrs    Dyslipidemia     Hyperlipidemia     meds > 5 yrs    Hypertension     meds > 5 yrs    Neuropathy in diabetes ([address]     Osteoarthritis     Pneumonia     Sickle cell anemia (HCC)     Sleep apnea, obstructive      Past Surgical History:   Procedure Laterality Date    BACK SURGERY  2008    Lumbar disc OR. [address] [facility]  [address]  7/10/2015    Patient states he was urininating blood and had a procedure done.     [facility]  [facility]    ENDOSCOPY, COLON, DIAGNOSTIC      LUMBAR FUSION  2008    OTHER SURGICAL HISTORY Right     tendon repair in (R) forearm because of previous injury as child    TOTAL HIP ARTHROPLASTY Left 1/23/2020    LEFT HIP TOTAL HIP ARTHROPLASTY, RICARDO performed by Breanne Walker MD at [address] [place] [address] UPPER GASTROINTESTINAL ENDOSCOPY  3/14/14    BIOPSY JARM    VAGAL NERVE STIMULATION  2013    Dr. Sonia Arceo office     Family History   Problem Relation Age of Onset    Cancer Mother         Throat & esophagus    Anemia Mother     Other Father         Natural causes    High Blood Pressure Sister     COPD Brother     Heart Disease Brother         Cardiac stent    Diabetes Sister     COPD Sister     Emphysema Sister     No Known Problems Daughter      Social History     Socioeconomic History    Marital status: Legally      Spouse name: Not on file    Number of children: Not on file    Years of education: Not on file    Highest education level: Not on file   Occupational History    Occupation: Disabled - because of back pain - 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 Vaping Use    Vaping Use: Former   Substance and Sexual Activity    Alcohol use: No     Alcohol/week: 0.0 standard drinks    Drug use: No    Sexual activity: Not on file   Other Topics Concern    Not on file   Social History Narrative    Not on file     Social Determinants of Health     Financial Resource Strain:     Difficulty of Paying Living Expenses:    Food Insecurity:     Worried About Running Out of Food in the Last Year:     [address] in the Last Year:    Transportation Needs:     Lack of Transportation (Medical):      Lack of Transportation (Non-Medical):    Physical Activity:     Days of Exercise per Week:     Minutes of Exercise per Session:    Stress:     Feeling of Stress :    Social Connections:     Frequency of Communication with Friends and Family:     Frequency of Social Gatherings with Friends and Family:     Attends Advent Services:     Active Member of Clubs or Organizations:     Attends Club or Organization Meetings:     Marital Status:    Intimate Partner Violence:     Fear of Current or Ex-Partner:     Emotionally Abused:     Physically Abused:     Sexually Abused:        Current Outpatient Medications on File Prior to Visit   Medication Sig Dispense Refill    amLODIPine (NORVASC) 10 MG tablet take 1 tablet by mouth once daily      atenolol (TENORMIN) 50 MG tablet Take by mouth      baclofen (LIORESAL) 10 MG tablet       DULoxetine (CYMBALTA) 60 MG extended release capsule Take 1 capsule by mouth daily 30 capsule 2    ibuprofen (ADVIL;MOTRIN) 400 MG tablet Take 1 tablet by mouth 2 times daily as needed for Pain 60 tablet 0    aspirin 81 MG EC tablet Take 1 tablet by mouth 2 times daily 30 tablet 0    atenolol-chlorthalidone (TENORETIC) 50-25 MG per tablet daily  0    nicotine (NICODERM CQ) 21 MG/24HR Place 1 patch onto the skin daily 42 patch 0    ciclopirox (PENLAC) 8 % solution Apply topically to the toenails nightly. (Patient not taking: Reported on 6/23/2021) 1 Bottle 5    Elastic Bandages & Supports (T.E.D. BELOW KNEE/L-REGULAR) MISC 1 applicator by Does not apply route daily 1 each 0    losartan-hydrochlorothiazide (HYZAAR) 100-25 MG per tablet Take by mouth daily       Respiratory Therapy Supplies (FLUTTER) PANCHO 1 Device by Does not apply route 4 times daily 1 Device 0    metFORMIN (GLUCOPHAGE) 500 MG tablet Take 500 mg by mouth 2 times daily (with meals)      fenofibrate 160 MG tablet Take by mouth daily       folic acid (FOLVITE) 1 MG tablet Take 1 mg by mouth daily       amLODIPine-benazepril (LOTREL) 5-10 MG per capsule Take 1 capsule by mouth daily        No current facility-administered medications on file prior to visit. HPI    Review of Systems   Constitutional: Negative for fever. HENT: Negative for hearing loss. Respiratory: Negative for shortness of breath. Gastrointestinal: Negative for constipation, diarrhea and nausea. Genitourinary: Negative for difficulty urinating. Musculoskeletal: Negative for back pain and neck pain. Skin: Negative for rash. Neurological: Negative for headaches. Hematological: Does not bruise/bleed easily. Psychiatric/Behavioral: Negative for sleep disturbance. Objective:     Vitals:  /76   Temp 96.9 °F (36.1 °C)   Ht 6' 1\" (1.854 m)   Wt 209 lb (94.8 kg)   BMI 27.57 kg/m² Pain Score:   4      Physical Exam  Patient alert and oriented times three, recent and remote memory intact, mood and affect normal, judgement and insight normal. Strength functional for ambulation. Balance and coordinational functional. Visualized skin intact. No visualized cyanosis, pulses intact. Cranial nerves 2-12 grossly intact. No obvious upper motor neuron signs seen. Sensation intact to light touch. Lumbar scar, decreased range of motion lumbar spine. Assessment:      Diagnosis Orders   1. Postlaminectomy syndrome, lumbar region  HYDROcodone-acetaminophen (NORCO)  MG per tablet    pregabalin (LYRICA) 150 MG capsule   2.  L4-5 HERNAITED DISC  HYDROcodone-acetaminophen (NORCO)  MG per tablet    pregabalin (LYRICA) 150 MG capsule       Plan:

## 2021-08-19 ENCOUNTER — OFFICE VISIT (OUTPATIENT)
Dept: PAIN MANAGEMENT | Age: 56
End: 2021-08-19
Payer: COMMERCIAL

## 2021-08-19 VITALS
SYSTOLIC BLOOD PRESSURE: 156 MMHG | DIASTOLIC BLOOD PRESSURE: 83 MMHG | BODY MASS INDEX: 28.1 KG/M2 | WEIGHT: 212 LBS | TEMPERATURE: 98.6 F | HEIGHT: 73 IN

## 2021-08-19 DIAGNOSIS — M51.26 DISPLACEMENT OF LUMBAR INTERVERTEBRAL DISC WITHOUT MYELOPATHY: Chronic | ICD-10-CM

## 2021-08-19 DIAGNOSIS — M96.1 POSTLAMINECTOMY SYNDROME, LUMBAR REGION: Chronic | ICD-10-CM

## 2021-08-19 PROCEDURE — 99214 OFFICE O/P EST MOD 30 MIN: CPT | Performed by: NURSE PRACTITIONER

## 2021-08-19 RX ORDER — DULOXETIN HYDROCHLORIDE 60 MG/1
60 CAPSULE, DELAYED RELEASE ORAL DAILY
Qty: 30 CAPSULE | Refills: 2 | Status: SHIPPED | OUTPATIENT
Start: 2021-08-19 | End: 2021-11-16 | Stop reason: SDUPTHER

## 2021-08-19 RX ORDER — PREGABALIN 150 MG/1
150 CAPSULE ORAL 3 TIMES DAILY
Qty: 90 CAPSULE | Refills: 0 | Status: SHIPPED | OUTPATIENT
Start: 2021-08-22 | End: 2021-09-14 | Stop reason: SDUPTHER

## 2021-08-19 RX ORDER — HYDROCODONE BITARTRATE AND ACETAMINOPHEN 10; 325 MG/1; MG/1
1 TABLET ORAL
Qty: 80 TABLET | Refills: 0 | Status: SHIPPED | OUTPATIENT
Start: 2021-08-22 | End: 2021-09-14 | Stop reason: SDUPTHER

## 2021-08-19 ASSESSMENT — ENCOUNTER SYMPTOMS
BACK PAIN: 1
SHORTNESS OF BREATH: 0
BLOOD IN STOOL: 0

## 2021-09-14 ENCOUNTER — OFFICE VISIT (OUTPATIENT)
Dept: PAIN MANAGEMENT | Age: 56
End: 2021-09-14
Payer: COMMERCIAL

## 2021-09-14 VITALS — WEIGHT: 210 LBS | TEMPERATURE: 96 F | HEIGHT: 73 IN | BODY MASS INDEX: 27.83 KG/M2

## 2021-09-14 DIAGNOSIS — M51.26 DISPLACEMENT OF LUMBAR INTERVERTEBRAL DISC WITHOUT MYELOPATHY: Chronic | ICD-10-CM

## 2021-09-14 DIAGNOSIS — M96.1 POSTLAMINECTOMY SYNDROME, LUMBAR REGION: Chronic | ICD-10-CM

## 2021-09-14 PROCEDURE — 99214 OFFICE O/P EST MOD 30 MIN: CPT | Performed by: NURSE PRACTITIONER

## 2021-09-14 RX ORDER — BACLOFEN 10 MG/1
10 TABLET ORAL DAILY PRN
Qty: 60 TABLET | Refills: 0 | Status: SHIPPED | OUTPATIENT
Start: 2021-09-14 | End: 2021-11-16 | Stop reason: SDUPTHER

## 2021-09-14 RX ORDER — HYDROCODONE BITARTRATE AND ACETAMINOPHEN 10; 325 MG/1; MG/1
1 TABLET ORAL
Qty: 80 TABLET | Refills: 0 | Status: SHIPPED | OUTPATIENT
Start: 2021-09-21 | End: 2021-10-14 | Stop reason: SDUPTHER

## 2021-09-14 RX ORDER — IBUPROFEN 400 MG/1
400 TABLET ORAL DAILY PRN
Qty: 60 TABLET | Refills: 0 | Status: SHIPPED | OUTPATIENT
Start: 2021-09-14 | End: 2021-11-16 | Stop reason: SDUPTHER

## 2021-09-14 RX ORDER — PREGABALIN 150 MG/1
150 CAPSULE ORAL 3 TIMES DAILY
Qty: 90 CAPSULE | Refills: 0 | Status: SHIPPED | OUTPATIENT
Start: 2021-09-21 | End: 2021-10-14 | Stop reason: SDUPTHER

## 2021-09-14 ASSESSMENT — ENCOUNTER SYMPTOMS
SHORTNESS OF BREATH: 0
BLOOD IN STOOL: 0
BACK PAIN: 1

## 2021-09-14 NOTE — PROGRESS NOTES
Todd Whitten  ([birth year])    9/14/2021    Subjective:     Todd Whitten is 61 y.o. male who complains today of:    Chief Complaint   Patient presents with    Back Pain     lower         Allergies:  Patient has no known allergies. Past Medical History:   Diagnosis Date    Bronchitis     CAD (coronary artery disease)     past angioplasty > 10 yrs ago    Chronic back pain     Diabetes ([facility] [address]     dx > 2 yrs    Dyslipidemia     Hyperlipidemia     meds > 5 yrs    Hypertension     meds > 5 yrs    Neuropathy in diabetes ([facility] [address]     Osteoarthritis     Pneumonia     Sickle cell anemia (HCC)     Sleep apnea, obstructive      Past Surgical History:   Procedure Laterality Date    BACK SURGERY  2008    Lumbar disc OR. [facility],  [address]  7/10/2015    Patient states he was urininating blood and had a procedure done.     COLONOSCOPY  3/14/14    [facility]    ENDOSCOPY, COLON, DIAGNOSTIC      LUMBAR FUSION  2008    OTHER SURGICAL HISTORY Right     tendon repair in (R) forearm because of previous injury as child    TOTAL HIP ARTHROPLASTY Left 1/23/2020    LEFT HIP TOTAL HIP ARTHROPLASTY, RICARDO performed by Bernard Mckenzie MD at [facility] ENDOSCOPY  3/14/14    Smitha Lopez  2013    Dr. Danielle Clark office     Family History   Problem Relation Age of Onset    Cancer Mother         Throat & esophagus    Anemia Mother     Other Father         Natural causes    High Blood Pressure Sister     COPD Brother     Heart Disease Brother         Cardiac stent    Diabetes Sister     COPD Sister     Emphysema Sister     No Known Problems Daughter      Social History     Socioeconomic History    Marital status: Legally      Spouse name: Not on file    Number of children: Not on file    Years of education: Not on file    Highest education level: Not on file   Occupational History    Occupation: Disabled - because of back pain - 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 Vaping Use    Vaping Use: Former   Substance and Sexual Activity    Alcohol use: No     Alcohol/week: 0.0 standard drinks    Drug use: No    Sexual activity: Not on file   Other Topics Concern    Not on file   Social History Narrative    Not on file     Social Determinants of Health     Financial Resource Strain:     Difficulty of Paying Living Expenses:    Food Insecurity:     Worried About Running Out of Food in the Last Year:     [address] in the Last Year:    Transportation Needs:     Lack of Transportation (Medical):      Lack of Transportation (Non-Medical):    Physical Activity:     Days of Exercise per Week:     Minutes of Exercise per Session:    Stress:     Feeling of Stress :    Social Connections:     Frequency of Communication with Friends and Family:     Frequency of Social Gatherings with Friends and Family:     Attends Baptism Services:     Active Member of Clubs or Organizations:     Attends Club or Organization Meetings:     Marital Status:    Intimate Partner Violence:     Fear of Current or Ex-Partner:     Emotionally Abused:     Physically Abused:     Sexually Abused:        Current Outpatient Medications on File Prior to Visit   Medication Sig Dispense Refill    DULoxetine (CYMBALTA) 60 MG extended release capsule Take 1 capsule by mouth daily 30 capsule 2    amLODIPine (NORVASC) 10 MG tablet take 1 tablet by mouth once daily      atenolol (TENORMIN) 50 MG tablet Take by mouth      aspirin 81 MG EC tablet Take 1 tablet by mouth 2 times daily 30 tablet 0    atenolol-chlorthalidone (TENORETIC) 50-25 MG per tablet daily  0    nicotine (NICODERM CQ) 21 MG/24HR Place 1 patch onto the skin daily 42 patch 0    ciclopirox (PENLAC) 8 % solution Apply topically to the toenails nightly. (Patient not taking: Reported on 6/23/2021) 1 Bottle 5    Elastic Bandages & Supports (T.E.D. BELOW KNEE/L-REGULAR) MISC 1 applicator by Does not apply route daily 1 each 0    losartan-hydrochlorothiazide (HYZAAR) 100-25 MG per tablet Take by mouth daily       Respiratory Therapy Supplies (FLUTTER) PANCHO 1 Device by Does not apply route 4 times daily 1 Device 0    metFORMIN (GLUCOPHAGE) 500 MG tablet Take 500 mg by mouth 2 times daily (with meals)      fenofibrate 160 MG tablet Take by mouth daily       folic acid (FOLVITE) 1 MG tablet Take 1 mg by mouth daily       amLODIPine-benazepril (LOTREL) 5-10 MG per capsule Take 1 capsule by mouth daily        No current facility-administered medications on file prior to visit. Pt presents today for a f/u of chronic low back pain from [facility] in early 2000s pain. Pt feels pain level and functioning improves with prescribed medications and is able to perform ADLs. Pt feels that prolonged sitting aggravates the pain. Pt denies radiating numbness and tingling. History of mild bilateral CTS, DM, sickle cell. Last flare of sickle cell crisis in June. He was told by sickle cell hematologist to take multivitamin. He has a history of lumbar fusion, spinal cord stimulator, L hip replacement 1/23/21 with Dr. Mary Has. He goes into crisis if he has stress and high BP. He uses his SCS constantly.  Francis Esquivel continues to use his Lyrica 150mg TID and Norco 10mg 2-3 per day PRN pain and Cymbalta 60mg daily and baclofen 10mg BID PRN spasm and Ibuprofen PRN.            Back Pain  Pertinent negatives include no fever or headaches. Review of Systems   Constitutional: Negative for appetite change and fever. HENT: Negative for hearing loss. Eyes: Negative for visual disturbance. Respiratory: Negative for shortness of breath.     Gastrointestinal: Negative for blood in stool.   Genitourinary: Negative for difficulty urinating and hematuria. Musculoskeletal: Positive for arthralgias and back pain. Skin: Negative for rash. Neurological: Negative for facial asymmetry and headaches. Hematological: Negative for adenopathy. Psychiatric/Behavioral: Negative for self-injury. All other systems reviewed and are negative. Objective:     Vitals:  Temp 96 °F (35.6 °C)   Ht 6' 1\" (1.854 m)   Wt 210 lb (95.3 kg)   BMI 27.71 kg/m² Pain Score:   4      Physical Exam  Vitals and nursing note reviewed. Pt is alert and oriented x 3. Recent and remote memory is intact. Mood, judgement and affect are normal.  No signs of distress or SOB noted. Visualized skin intact. Sensation intact to light touch. Decreased ROM with flexion and extension of low back. Tender with palpation to bilateral lumbar spine with positive provacative maneuvers noted. Negative SLR. Pt is able to briefly heel walk and toe walk. Strength, balance, and coordination are functional for ambulation. Left lumbar SCS. Assessment:      Diagnosis Orders   1. Postlaminectomy syndrome, lumbar region  HYDROcodone-acetaminophen (NORCO)  MG per tablet    pregabalin (LYRICA) 150 MG capsule    baclofen (LIORESAL) 10 MG tablet    ibuprofen (ADVIL;MOTRIN) 400 MG tablet   2. L4-5 HERNAITED DISC  HYDROcodone-acetaminophen (NORCO)  MG per tablet    pregabalin (LYRICA) 150 MG capsule    baclofen (LIORESAL) 10 MG tablet    ibuprofen (ADVIL;MOTRIN) 400 MG tablet       Plan:     Periodic Controlled Substance Monitoring: Possible medication side effects, risk of tolerance/dependence & alternative treatments discussed., No signs of potential drug abuse or diversion identified. , Assessed functional status., Obtaining appropriate analgesic effect of treatment.  Viktoria Castro, APRN - CNP)    Orders Placed This Encounter   Medications    HYDROcodone-acetaminophen (NORCO)  MG per tablet     Sig: Take 1 tablet by mouth every 8-12 hours as needed for Pain for up to 30 days. #80 tabs for 30 days     Dispense:  80 tablet     Refill:  0     Reduce doses taken as pain becomes manageable    pregabalin (LYRICA) 150 MG capsule     Sig: Take 1 capsule by mouth 3 times daily for 30 days. Fill 9/21/21     Dispense:  90 capsule     Refill:  0    baclofen (LIORESAL) 10 MG tablet     Sig: Take 1 tablet by mouth daily as needed (pain)     Dispense:  60 tablet     Refill:  0    ibuprofen (ADVIL;MOTRIN) 400 MG tablet     Sig: Take 1 tablet by mouth daily as needed for Pain     Dispense:  60 tablet     Refill:  0       No orders of the defined types were placed in this encounter. Discussed options with the patient today. Continue Cymbalta, Lyrica and Norco. Will refill baclofen and Ibuprofen and reduce both to daily PRN per patient reported use. No changes of chronic pain. No recent flare of sickle cell crisis. Continue to follow-up with other providers. SCS continues working well and helping. All questions were answered. Pt verbalized understanding and agrees with above plan. Utox reviewed and appropriate from 8/19/21 (+ Norco, - Lyrica, consider retest). Narcan sent 4/21/2021.     Will continue medications for chronic pain as they help pt function with ADL and improve quality of life.  Discussed possible risks of opiate medication with pt, including but not limited to, constipation, nausea or vomiting, sedation, urinary retention, dependence and possible addiction. Pt agrees to use medication as directed. Pt advised to not use opiates while driving or operating heavy equipment, or in situations where pt may harm him/herself or others.  Pt is aware that while on narcotics, pt needs to be seen monthly to reassess pain and need for continued medication. NDP reviewed. OARRS was reviewed. This NP saw pt under direct supervision of Dr. Armen Muñoz.      Follow up:  Return in about 4 weeks (around 10/12/2021) for review meds and reassess pain.     Ortega Pichardo, APRN - CNP

## 2021-10-14 ENCOUNTER — OFFICE VISIT (OUTPATIENT)
Dept: PAIN MANAGEMENT | Age: 56
End: 2021-10-14
Payer: COMMERCIAL

## 2021-10-14 VITALS — HEIGHT: 73 IN | BODY MASS INDEX: 26.51 KG/M2 | WEIGHT: 200 LBS | TEMPERATURE: 93.7 F

## 2021-10-14 DIAGNOSIS — M96.1 POSTLAMINECTOMY SYNDROME, LUMBAR REGION: Primary | Chronic | ICD-10-CM

## 2021-10-14 DIAGNOSIS — M51.26 DISPLACEMENT OF LUMBAR INTERVERTEBRAL DISC WITHOUT MYELOPATHY: Chronic | ICD-10-CM

## 2021-10-14 PROCEDURE — 99213 OFFICE O/P EST LOW 20 MIN: CPT | Performed by: NURSE PRACTITIONER

## 2021-10-14 RX ORDER — HYDROCODONE BITARTRATE AND ACETAMINOPHEN 10; 325 MG/1; MG/1
1 TABLET ORAL
Qty: 80 TABLET | Refills: 0 | Status: SHIPPED | OUTPATIENT
Start: 2021-10-21 | End: 2021-11-16 | Stop reason: SDUPTHER

## 2021-10-14 RX ORDER — PREGABALIN 150 MG/1
150 CAPSULE ORAL 3 TIMES DAILY
Qty: 90 CAPSULE | Refills: 0 | Status: SHIPPED | OUTPATIENT
Start: 2021-10-21 | End: 2021-11-16 | Stop reason: SDUPTHER

## 2021-10-14 ASSESSMENT — ENCOUNTER SYMPTOMS
SHORTNESS OF BREATH: 0
BLOOD IN STOOL: 0
BACK PAIN: 1

## 2021-10-14 NOTE — PROGRESS NOTES
Mitesh Padilla  ([birth year])    10/14/2021    Subjective:     Mitesh Padilla is 61 y.o. male who complains today of:    Chief Complaint   Patient presents with    Back Pain         Allergies:  Patient has no known allergies. Past Medical History:   Diagnosis Date    Bronchitis     CAD (coronary artery disease)     past angioplasty > 10 yrs ago    Chronic back pain     Diabetes ([facility] [address]     dx > 2 yrs    Dyslipidemia     Hyperlipidemia     meds > 5 yrs    Hypertension     meds > 5 yrs    Neuropathy in diabetes ([facility] [address]     Osteoarthritis     Pneumonia     Sickle cell anemia (HCC)     Sleep apnea, obstructive      Past Surgical History:   Procedure Laterality Date    BACK SURGERY  2008    Lumbar disc OR. [address] [facility],  [address]  7/10/2015    Patient states he was urininating blood and had a procedure done.     [facility]  3/14/14    [facility]    ENDOSCOPY, COLON, DIAGNOSTIC      LUMBAR FUSION  2008    OTHER SURGICAL HISTORY Right     tendon repair in (R) forearm because of previous injury as child    TOTAL HIP ARTHROPLASTY Left 1/23/2020    LEFT HIP TOTAL HIP ARTHROPLASTY, RICARDO performed by Hermes Bryant MD at [facility] ENDOSCOPY  3/14/14    Chloe Sender  2013    Dr. Norah Ellis office     Family History   Problem Relation Age of Onset    Cancer Mother         Throat & esophagus    Anemia Mother     Other Father         Natural causes    High Blood Pressure Sister     COPD Brother     Heart Disease Brother         Cardiac stent    Diabetes Sister     COPD Sister     Emphysema Sister     No Known Problems Daughter      Social History     Socioeconomic History    Marital status: Legally      Spouse name: Not on file    Number of children: Not on file    Years of education: Not on file    Highest education level: Not on file   Occupational History    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 Vaping Use    Vaping Use: Former   Substance and Sexual Activity    Alcohol use: No     Alcohol/week: 0.0 standard drinks    Drug use: No    Sexual activity: Not on file   Other Topics Concern    Not on file   Social History Narrative    Not on file     Social Determinants of Health     Financial Resource Strain:     Difficulty of Paying Living Expenses:    Food Insecurity:     Worried About Running Out of Food in the Last Year:     [address] in the Last Year:    Transportation Needs:     Lack of Transportation (Medical):      Lack of Transportation (Non-Medical):    Physical Activity:     Days of Exercise per Week:     Minutes of Exercise per Session:    Stress:     Feeling of Stress :    Social Connections:     Frequency of Communication with Friends and Family:     Frequency of Social Gatherings with Friends and Family:     Attends Advent Services:     Active Member of Clubs or Organizations:     Attends Club or Organization Meetings:     Marital Status:    Intimate Partner Violence:     Fear of Current or Ex-Partner:     Emotionally Abused:     Physically Abused:     Sexually Abused:        Current Outpatient Medications on File Prior to Visit   Medication Sig Dispense Refill    baclofen (LIORESAL) 10 MG tablet Take 1 tablet by mouth daily as needed (pain) 60 tablet 0    ibuprofen (ADVIL;MOTRIN) 400 MG tablet Take 1 tablet by mouth daily as needed for Pain 60 tablet 0    DULoxetine (CYMBALTA) 60 MG extended release capsule Take 1 capsule by mouth daily 30 capsule 2    amLODIPine (NORVASC) 10 MG tablet take 1 tablet by mouth once daily      atenolol (TENORMIN) 50 MG tablet Take by mouth      aspirin 81 MG EC tablet Take 1 tablet by mouth 2 times daily 30 tablet 0    atenolol-chlorthalidone (TENORETIC) 50-25 MG per tablet daily  0    nicotine (NICODERM CQ) 21 MG/24HR Place 1 patch onto the skin daily 42 patch 0    ciclopirox (PENLAC) 8 % solution Apply topically to the toenails nightly. (Patient not taking: Reported on 6/23/2021) 1 Bottle 5    Elastic Bandages & Supports (T.E.D. BELOW KNEE/L-REGULAR) MISC 1 applicator by Does not apply route daily 1 each 0    losartan-hydrochlorothiazide (HYZAAR) 100-25 MG per tablet Take by mouth daily       Respiratory Therapy Supplies (FLUTTER) PANCHO 1 Device by Does not apply route 4 times daily 1 Device 0    metFORMIN (GLUCOPHAGE) 500 MG tablet Take 500 mg by mouth 2 times daily (with meals)      fenofibrate 160 MG tablet Take by mouth daily       folic acid (FOLVITE) 1 MG tablet Take 1 mg by mouth daily       amLODIPine-benazepril (LOTREL) 5-10 MG per capsule Take 1 capsule by mouth daily        No current facility-administered medications on file prior to visit. Pt presents today for a f/u of chronic low back pain from [facility] in early 2000s pain. He will be having some upcoming dental work. Pt feels pain level and functioning improves with prescribed medications and is able to perform ADLs. Pt feels that prolonged sitting aggravates the pain. Pt denies radiating numbness and tingling. History of mild bilateral CTS, DM, sickle cell. Last flare of sickle cell crisis in June. He was told by sickle cell hematologist to take multivitamin. He has a history of lumbar fusion, spinal cord stimulator, L hip replacement 1/23/21 with Dr. Jerome Boyd. He goes into crisis if he has stress and high BP. He uses his SCS constantly.  Zadie Bernheim continues to use his Lyrica 150mg TID and Norco 10mg 2-3 per day PRN pain and Cymbalta 60mg daily and baclofen 10mg BID PRN spasm and Ibuprofen PRN.            Back Pain  Pertinent negatives include no fever or headaches.        Review of Systems   Constitutional: Negative for appetite change and fever. HENT: Negative for hearing loss. Eyes: Negative for visual disturbance. Respiratory: Negative for shortness of breath. Gastrointestinal: Negative for blood in stool. Genitourinary: Negative for difficulty urinating and hematuria. Musculoskeletal: Positive for arthralgias and back pain. Skin: Negative for rash. Neurological: Negative for facial asymmetry and headaches. Hematological: Negative for adenopathy. Psychiatric/Behavioral: Negative for self-injury. All other systems reviewed and are negative. Objective:     Vitals:  Temp 93.7 °F (34.3 °C)   Ht 6' 1\" (1.854 m)   Wt 200 lb (90.7 kg)   BMI 26.39 kg/m² Pain Score:   5      Physical Exam  Vitals and nursing note reviewed. Pt is alert and oriented x 3.  Recent and remote memory is intact.  Mood, judgement and affect are normal.  No signs of distress or SOB noted.  Visualized skin intact.  Sensation intact to light touch. Decreased ROM with flexion and extension of low back.  Tender with palpation to bilateral lumbar spine with positive provacative maneuvers noted.  Negative SLR.   Pt is able to briefly heel walk and toe walk.   Strength, balance, and coordination are functional for ambulation. Left lumbar SCS. Assessment:      Diagnosis Orders   1. Postlaminectomy syndrome, lumbar region  HYDROcodone-acetaminophen (NORCO)  MG per tablet    pregabalin (LYRICA) 150 MG capsule   2. L4-5 HERNAITED DISC  HYDROcodone-acetaminophen (NORCO)  MG per tablet    pregabalin (LYRICA) 150 MG capsule       Plan:     Periodic Controlled Substance Monitoring: Possible medication side effects, risk of tolerance/dependence & alternative treatments discussed., No signs of potential drug abuse or diversion identified. , Assessed functional status., Obtaining appropriate analgesic effect of treatment.  (Jacey Cordova, APRN - CNP)    Orders Placed This Encounter Medications    HYDROcodone-acetaminophen (NORCO)  MG per tablet     Sig: Take 1 tablet by mouth every 8-12 hours as needed for Pain for up to 30 days. #80 tabs for 30 days     Dispense:  80 tablet     Refill:  0     Reduce doses taken as pain becomes manageable    pregabalin (LYRICA) 150 MG capsule     Sig: Take 1 capsule by mouth 3 times daily for 30 days. Fill 10/21/21     Dispense:  90 capsule     Refill:  0       No orders of the defined types were placed in this encounter. Discussed options with the patient today. Continue Cymbalta, Lyrica and Norco. Has enough Baclofen and Ibuprofen. No changes of chronic pain.  No recent flare of sickle cell crisis.  Continue to follow-up with other providers.  SCS continues working well and helping.  All questions were answered.  Pt verbalized understanding and agrees with above plan. Utox reviewed and appropriate from 8/19/21 (+ Norco, - Lyrica, consider retest). Narcan sent 4/21/2021.     Will continue medications for chronic pain as they help pt function with ADL and improve quality of life.  Discussed possible risks of opiate medication with pt, including but not limited to, constipation, nausea or vomiting, sedation, urinary retention, dependence and possible addiction. Pt agrees to use medication as directed. Pt advised to not use opiates while driving or operating heavy equipment, or in situations where pt may harm him/herself or others.  Pt is aware that while on narcotics, pt needs to be seen monthly to reassess pain and need for continued medication.  NDP reviewed. Bhardwaj Loss was reviewed.  This NP saw pt under direct supervision of Dr. Kessler Favor    Follow up:  Return in about 5 weeks (around 11/18/2021) for review meds and reassess pain.     Daralyn Aase, APRN - CNP

## 2021-11-16 ENCOUNTER — OFFICE VISIT (OUTPATIENT)
Dept: PAIN MANAGEMENT | Age: 56
End: 2021-11-16
Payer: COMMERCIAL

## 2021-11-16 VITALS
BODY MASS INDEX: 28.23 KG/M2 | SYSTOLIC BLOOD PRESSURE: 148 MMHG | HEIGHT: 73 IN | DIASTOLIC BLOOD PRESSURE: 76 MMHG | TEMPERATURE: 96.8 F | WEIGHT: 213 LBS

## 2021-11-16 DIAGNOSIS — M96.1 POSTLAMINECTOMY SYNDROME, LUMBAR REGION: Chronic | ICD-10-CM

## 2021-11-16 DIAGNOSIS — M51.26 DISPLACEMENT OF LUMBAR INTERVERTEBRAL DISC WITHOUT MYELOPATHY: Chronic | ICD-10-CM

## 2021-11-16 PROCEDURE — 99213 OFFICE O/P EST LOW 20 MIN: CPT | Performed by: NURSE PRACTITIONER

## 2021-11-16 RX ORDER — HYDROCODONE BITARTRATE AND ACETAMINOPHEN 10; 325 MG/1; MG/1
1 TABLET ORAL
Qty: 80 TABLET | Refills: 0 | Status: SHIPPED | OUTPATIENT
Start: 2021-11-20 | End: 2021-12-16 | Stop reason: SDUPTHER

## 2021-11-16 RX ORDER — BACLOFEN 10 MG/1
10 TABLET ORAL DAILY PRN
Qty: 30 TABLET | Refills: 0 | Status: SHIPPED | OUTPATIENT
Start: 2021-11-16 | End: 2022-02-14 | Stop reason: SDUPTHER

## 2021-11-16 RX ORDER — IBUPROFEN 400 MG/1
400 TABLET ORAL DAILY PRN
Qty: 30 TABLET | Refills: 0 | Status: SHIPPED | OUTPATIENT
Start: 2021-11-16 | End: 2022-02-14 | Stop reason: SDUPTHER

## 2021-11-16 RX ORDER — DULOXETIN HYDROCHLORIDE 60 MG/1
60 CAPSULE, DELAYED RELEASE ORAL DAILY
Qty: 30 CAPSULE | Refills: 0 | Status: SHIPPED | OUTPATIENT
Start: 2021-11-16 | End: 2021-12-16 | Stop reason: SDUPTHER

## 2021-11-16 RX ORDER — PREGABALIN 150 MG/1
150 CAPSULE ORAL 3 TIMES DAILY
Qty: 90 CAPSULE | Refills: 0 | Status: SHIPPED | OUTPATIENT
Start: 2021-11-20 | End: 2021-12-16 | Stop reason: SDUPTHER

## 2021-11-16 ASSESSMENT — ENCOUNTER SYMPTOMS
SHORTNESS OF BREATH: 0
BACK PAIN: 1
BLOOD IN STOOL: 0

## 2021-11-16 NOTE — PROGRESS NOTES
Zuri Guy  ([birth year])    11/16/2021    Subjective:     Zuri Guy is 61 y.o. male who complains today of:    Chief Complaint   Patient presents with    Back Pain         Allergies:  Patient has no known allergies. Past Medical History:   Diagnosis Date    Bronchitis     CAD (coronary artery disease)     past angioplasty > 10 yrs ago    Chronic back pain     Diabetes ([facility]     dx > 2 yrs    Dyslipidemia     Hyperlipidemia     meds > 5 yrs    Hypertension     meds > 5 yrs    Neuropathy in diabetes ([facility]     Osteoarthritis     Pneumonia     Sickle cell anemia (HCC)     Sleep apnea, obstructive      Past Surgical History:   Procedure Laterality Date    BACK SURGERY  2008    Lumbar disc OR. [address] [facility] [address]  7/10/2015    Patient states he was urininating blood and had a procedure done.     COL[facility]  3/14/14    [facility]    ENDOSCOPY, COLON, DIAGNOSTIC      LUMBAR FUSION  2008    OTHER SURGICAL HISTORY Right     tendon repair in (R) forearm because of previous injury as child    TOTAL HIP ARTHROPLASTY Left 1/23/2020    LEFT HIP TOTAL HIP ARTHROPLASTY, RICARDO performed by Marta Armando MD at [facility] ENDOSCOPY  3/14/14    Alton Johnson  2013    Dr. Hannah Allan office     Family History   Problem Relation Age of Onset    Cancer Mother         Throat & esophagus    Anemia Mother     Other Father         Natural causes    High Blood Pressure Sister     COPD Brother     Heart Disease Brother         Cardiac stent    Diabetes Sister     COPD Sister     Emphysema Sister     No Known Problems Daughter      Social History     Socioeconomic History    Marital status: Legally      Spouse name: Not on file    Number of children: Not on file    Years of education: Not on file    Highest education level: Not on file   Occupational History    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 (Patient not taking: Reported on 6/23/2021) 1 Bottle 5    Elastic Bandages & Supports (T.E.D. BELOW KNEE/L-REGULAR) MISC 1 applicator by Does not apply route daily 1 each 0    losartan-hydrochlorothiazide (HYZAAR) 100-25 MG per tablet Take by mouth daily       Respiratory Therapy Supplies (FLUTTER) PANCHO 1 Device by Does not apply route 4 times daily 1 Device 0    metFORMIN (GLUCOPHAGE) 500 MG tablet Take 500 mg by mouth 2 times daily (with meals)      fenofibrate 160 MG tablet Take by mouth daily       folic acid (FOLVITE) 1 MG tablet Take 1 mg by mouth daily       amLODIPine-benazepril (LOTREL) 5-10 MG per capsule Take 1 capsule by mouth daily        No current facility-administered medications on file prior to visit. Pt presents today for a f/u of chronic low back pain from [facility] in early 2000s pain. He saw opthalmology for DM routine exam.  Pt feels pain level and functioning improves with prescribed medications and is able to perform ADLs. Pt feels that prolonged sitting aggravates the pain. Pt c/o LLE radiating numbness and tingling around the shin and calf which is intermittent. History of mild bilateral CTS, DM, sickle cell. Last flare of sickle cell crisis in June. He had check up with hemetology last month. He has a history of lumbar fusion, spinal cord stimulator, L hip replacement 1/23/21 with Dr. Jerome Boyd. He goes into crisis if he has stress and high BP.  He uses his SCS constantly.  [facility] continues to use his Lyrica 150mg TID and Norco 10mg 2-3 per day PRN pain and Cymbalta 60mg daily and baclofen 10mg BID PRN spasm and Ibuprofen PRN.            Back Pain  Pertinent negatives include no fever or headaches. Review of Systems   Constitutional: Negative for appetite change and fever. HENT: Negative for hearing loss. Eyes: Negative for visual disturbance. Respiratory: Negative for shortness of breath. Gastrointestinal: Negative for blood in stool. Genitourinary: Negative for difficulty urinating and hematuria. Musculoskeletal: Positive for arthralgias and back pain. Skin: Negative for rash. Neurological: Negative for facial asymmetry and headaches. Hematological: Negative for adenopathy. Psychiatric/Behavioral: Negative for self-injury. All other systems reviewed and are negative. Objective:     Vitals:  BP (!) 148/76   Temp 96.8 °F (36 °C)   Ht 6' 1\" (1.854 m)   Wt 213 lb (96.6 kg)   BMI 28.10 kg/m² Pain Score:   5      Physical Exam  Vitals and nursing note reviewed. Pt is alert and oriented x 3.  Recent and remote memory is intact.  Mood, judgement and affect are normal.  No signs of distress or SOB noted.  Visualized skin intact.  Sensation intact to light touch. Decreased ROM with flexion and extension of low back.  Tender with palpation to bilateral lumbar spine with positive provacative maneuvers noted.  Negative SLR.   Pt is able to briefly heel walk and toe walk.   Strength, balance, and coordination are functional for ambulation. Left lumbar SCS. Assessment:      Diagnosis Orders   1. Postlaminectomy syndrome, lumbar region  HYDROcodone-acetaminophen (NORCO)  MG per tablet    pregabalin (LYRICA) 150 MG capsule    ibuprofen (ADVIL;MOTRIN) 400 MG tablet    DULoxetine (CYMBALTA) 60 MG extended release capsule    baclofen (LIORESAL) 10 MG tablet   2.  L4-5 HERNAITED DISC  HYDROcodone-acetaminophen (NORCO)  MG per tablet    pregabalin (LYRICA) 150 MG capsule    ibuprofen (ADVIL;MOTRIN) 400 MG tablet    DULoxetine (CYMBALTA) 60 MG extended release capsule baclofen (LIORESAL) 10 MG tablet       Plan:     Periodic Controlled Substance Monitoring: Possible medication side effects, risk of tolerance/dependence & alternative treatments discussed., No signs of potential drug abuse or diversion identified. , Assessed functional status., Obtaining appropriate analgesic effect of treatment. Breonna Castro, APRN - CNP)    Orders Placed This Encounter   Medications    HYDROcodone-acetaminophen (NORCO)  MG per tablet     Sig: Take 1 tablet by mouth every 8-12 hours as needed for Pain for up to 30 days. #80 tabs for 30 days     Dispense:  80 tablet     Refill:  0     Reduce doses taken as pain becomes manageable    pregabalin (LYRICA) 150 MG capsule     Sig: Take 1 capsule by mouth 3 times daily for 30 days. Fill 11/20/21     Dispense:  90 capsule     Refill:  0    ibuprofen (ADVIL;MOTRIN) 400 MG tablet     Sig: Take 1 tablet by mouth daily as needed for Pain     Dispense:  30 tablet     Refill:  0    DULoxetine (CYMBALTA) 60 MG extended release capsule     Sig: Take 1 capsule by mouth daily     Dispense:  30 capsule     Refill:  0    baclofen (LIORESAL) 10 MG tablet     Sig: Take 1 tablet by mouth daily as needed (pain)     Dispense:  30 tablet     Refill:  0       No orders of the defined types were placed in this encounter. Discussed options with the patient today. Continue Cymbalta, Lyrica and Norco, Baclofen and Ibuprofen. No changes of chronic pain. Does not want 90 day RXs.  No recent flare of sickle cell crisis.  Continue to follow-up with other providers.  SCS continues working well and helping.  All questions were answered.  Pt verbalized understanding and agrees with above plan.  Utox reviewed and appropriate from 8/19/21 (+ Norco, - Lyrica, consider retest). Narcan sent 4/21/2021.     Will continue medications for chronic pain as they help pt function with ADL and improve quality of life.  Discussed possible risks of opiate medication with pt, including

## 2021-12-16 ENCOUNTER — OFFICE VISIT (OUTPATIENT)
Dept: PAIN MANAGEMENT | Age: 56
End: 2021-12-16
Payer: COMMERCIAL

## 2021-12-16 VITALS — WEIGHT: 214 LBS | TEMPERATURE: 97.3 F | BODY MASS INDEX: 28.36 KG/M2 | HEIGHT: 73 IN

## 2021-12-16 DIAGNOSIS — M51.26 DISPLACEMENT OF LUMBAR INTERVERTEBRAL DISC WITHOUT MYELOPATHY: Chronic | ICD-10-CM

## 2021-12-16 DIAGNOSIS — M96.1 POSTLAMINECTOMY SYNDROME, LUMBAR REGION: Chronic | ICD-10-CM

## 2021-12-16 PROCEDURE — 99213 OFFICE O/P EST LOW 20 MIN: CPT | Performed by: NURSE PRACTITIONER

## 2021-12-16 RX ORDER — DULOXETIN HYDROCHLORIDE 60 MG/1
60 CAPSULE, DELAYED RELEASE ORAL DAILY
Qty: 30 CAPSULE | Refills: 0 | Status: SHIPPED | OUTPATIENT
Start: 2021-12-16 | End: 2022-01-13

## 2021-12-16 RX ORDER — PREGABALIN 150 MG/1
150 CAPSULE ORAL 3 TIMES DAILY
Qty: 90 CAPSULE | Refills: 0 | Status: SHIPPED | OUTPATIENT
Start: 2021-12-20 | End: 2022-02-14 | Stop reason: SDUPTHER

## 2021-12-16 RX ORDER — HYDROCODONE BITARTRATE AND ACETAMINOPHEN 10; 325 MG/1; MG/1
1 TABLET ORAL
Qty: 80 TABLET | Refills: 0 | Status: SHIPPED | OUTPATIENT
Start: 2021-12-20 | End: 2022-01-13 | Stop reason: SDUPTHER

## 2021-12-16 RX ORDER — OLMESARTAN MEDOXOMIL 40 MG/1
TABLET ORAL
COMMUNITY
Start: 2021-11-08

## 2021-12-16 ASSESSMENT — ENCOUNTER SYMPTOMS
BACK PAIN: 1
BLOOD IN STOOL: 0
SHORTNESS OF BREATH: 0

## 2021-12-16 NOTE — PROGRESS NOTES
Celestine Clinton  ([birth year])    12/16/2021    Subjective:     Celestine Clinton is 61 y.o. male who complains today of:    Chief Complaint   Patient presents with    Back Pain         Allergies:  Patient has no known allergies. Past Medical History:   Diagnosis Date    Bronchitis     CAD (coronary artery disease)     past angioplasty > 10 yrs ago    Chronic back pain     Diabetes ([facility] [address]     dx > 2 yrs    Dyslipidemia     Hyperlipidemia     meds > 5 yrs    Hypertension     meds > 5 yrs    Neuropathy in diabetes ([facility] [address]     Osteoarthritis     Pneumonia     Sickle cell anemia (HCC)     Sleep apnea, obstructive      Past Surgical History:   Procedure Laterality Date    BACK SURGERY  2008    Lumbar disc OR. [address] [facility]  [address]  7/10/2015    Patient states he was urininating blood and had a procedure done.     COLON[facility]  3/14/14    [facility]    ENDOSCOPY, COLON, DIAGNOSTIC      LUMBAR FUSION  2008    OTHER SURGICAL HISTORY Right     tendon repair in (R) forearm because of previous injury as child    TOTAL HIP ARTHROPLASTY Left 1/23/2020    LEFT HIP TOTAL HIP ARTHROPLASTY, RICARDO performed by Xi Erickson MD at [facility] ENDOSCOPY  3/14/14    Pixley Akosua  2013    Dr. Cesar Bull office     Family History   Problem Relation Age of Onset    Cancer Mother         Throat & esophagus    Anemia Mother     Other Father         Natural causes    High Blood Pressure Sister     COPD Brother     Heart Disease Brother         Cardiac stent    Diabetes Sister     COPD Sister     Emphysema Sister     No Known Problems Daughter      Social History     Socioeconomic History    Marital status: Legally      Spouse name: Not on file    Number of children: Not on file    Years of education: Not on file    Highest education level: Not on file   Occupational History    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      ibuprofen (ADVIL;MOTRIN) 400 MG tablet Take 1 tablet by mouth daily as needed for Pain 30 tablet 0    baclofen (LIORESAL) 10 MG tablet Take 1 tablet by mouth daily as needed (pain) 30 tablet 0    amLODIPine (NORVASC) 10 MG tablet take 1 tablet by mouth once daily      atenolol (TENORMIN) 50 MG tablet Take by mouth      aspirin 81 MG EC tablet Take 1 tablet by mouth 2 times daily 30 tablet 0    atenolol-chlorthalidone (TENORETIC) 50-25 MG per tablet daily  0    nicotine (NICODERM CQ) 21 MG/24HR Place 1 patch onto the skin daily 42 patch 0    ciclopirox (PENLAC) 8 % solution Apply topically to the toenails nightly. (Patient not taking: Reported on 6/23/2021) 1 Bottle 5    Elastic Bandages & Supports (T.E.D. BELOW KNEE/L-REGULAR) MISC 1 applicator by Does not apply route daily 1 each 0    losartan-hydrochlorothiazide (HYZAAR) 100-25 MG per tablet Take by mouth daily       Respiratory Therapy Supplies (FLUTTER) PANCHO 1 Device by Does not apply route 4 times daily 1 Device 0    metFORMIN (GLUCOPHAGE) 500 MG tablet Take 500 mg by mouth 2 times daily (with meals)      fenofibrate 160 MG tablet Take by mouth daily       folic acid (FOLVITE) 1 MG tablet Take 1 mg by mouth daily       amLODIPine-benazepril (LOTREL) 5-10 MG per capsule Take 1 capsule by mouth daily        No current facility-administered medications on file prior to visit. Pt presents today for a f/u of chronic low back pain from [facility] in early 2000s pain. Blood pressure medication was recently increased by PCP. Pt feels pain level and functioning improves with prescribed medications and is able to perform ADLs. Pt feels that prolonged sitting aggravates the pain. Pt c/o LLE radiating numbness and tingling around the shin and calf which is intermittent. History of mild bilateral CTS, DM, sickle cell. Last flare of sickle cell crisis in June.  He has a history of lumbar fusion, spinal cord stimulator, L hip replacement 1/23/21 with Dr. Vasquez Perera. He goes into crisis if he has stress and high BP. He uses his SCS constantly.  Angeles Carvajal continues to use his Lyrica 150mg TID and Norco 10mg 2-3 per day PRN pain and Cymbalta 60mg daily and baclofen 10mg BID PRN spasm and Ibuprofen PRN. He is on disability.         Back Pain  Pertinent negatives include no fever or headaches. Review of Systems   Constitutional: Negative for appetite change and fever. HENT: Negative for hearing loss. Eyes: Negative for visual disturbance. Respiratory: Negative for shortness of breath. Gastrointestinal: Negative for blood in stool. Genitourinary: Negative for difficulty urinating and hematuria. Musculoskeletal: Positive for arthralgias and back pain. Skin: Negative for rash. Neurological: Negative for facial asymmetry and headaches. Hematological: Negative for adenopathy. Psychiatric/Behavioral: Negative for self-injury. All other systems reviewed and are negative. Objective:     Vitals:  Temp 97.3 °F (36.3 °C) (Infrared)   Ht 6' 1\" (1.854 m)   Wt 214 lb (97.1 kg)   BMI 28.23 kg/m²        Physical Exam  Vitals and nursing note reviewed. Pt is alert and oriented x 3.  Recent and remote memory is intact.  Mood, judgement and affect are normal.  No signs of distress or SOB noted.  Visualized skin intact.  Sensation intact to light touch. Decreased ROM with flexion and extension of low back.  Tender with palpation to bilateral lumbar spine with positive provacative maneuvers noted.  Negative SLR.   Pt is able to briefly heel walk and toe walk.   Strength, balance, and coordination are functional for ambulation. Left lumbar SCS. Assessment:      Diagnosis Orders   1. Postlaminectomy syndrome, lumbar region  DULoxetine (CYMBALTA) 60 MG extended release capsule    HYDROcodone-acetaminophen (NORCO)  MG per tablet    pregabalin (LYRICA) 150 MG capsule   2.  L4-5 HERNAITED DISC  DULoxetine (CYMBALTA) 60 MG extended release capsule    HYDROcodone-acetaminophen (NORCO)  MG per tablet    pregabalin (LYRICA) 150 MG capsule       Plan:     Periodic Controlled Substance Monitoring: Possible medication side effects, risk of tolerance/dependence & alternative treatments discussed., No signs of potential drug abuse or diversion identified. , Assessed functional status., Obtaining appropriate analgesic effect of treatment. Nitojoselito Castro, APRN - CNP)    Orders Placed This Encounter   Medications    DULoxetine (CYMBALTA) 60 MG extended release capsule     Sig: Take 1 capsule by mouth daily     Dispense:  30 capsule     Refill:  0    HYDROcodone-acetaminophen (NORCO)  MG per tablet     Sig: Take 1 tablet by mouth every 8-12 hours as needed for Pain for up to 30 days. #80 tabs for 30 days     Dispense:  80 tablet     Refill:  0     Reduce doses taken as pain becomes manageable    pregabalin (LYRICA) 150 MG capsule     Sig: Take 1 capsule by mouth 3 times daily for 30 days. Fill 12/20/21     Dispense:  90 capsule     Refill:  0       No orders of the defined types were placed in this encounter. Discussed options with the patient today. Continue Cymbalta, Lyrica and Norco, Baclofen and Ibuprofen. He has to switch insurance and not sure what he'll have next month. No changes of chronic pain. Does not want 90 day RXs.  No recent flare of sickle cell crisis.  Continue to follow-up with other providers.  SCS continues working well and helping.  All questions were answered.  Pt verbalized understanding and agrees with above plan.  Utox reviewed and appropriate from 8/19/21 (+ Norco, - Lyrica, consider retest). Narcan sent 4/21/2021.     Will continue medications for chronic pain as they help pt function with ADL and improve quality of life.  Discussed possible risks of opiate medication with pt, including but not limited to, constipation, nausea or vomiting, sedation, urinary retention, dependence and possible addiction. Pt agrees to use medication as directed. Pt advised to not use opiates while driving or operating heavy equipment, or in situations where pt may harm him/herself or others.  Pt is aware that while on narcotics, pt needs to be seen monthly to reassess pain and need for continued medication.  NDP reviewed. Yury Santacruz was reviewed.  This NP saw pt under direct supervision of Dr. Jaime Elliott    Follow up:  Return in about 4 weeks (around 1/13/2022) for review meds and reassess pain.     Yemi Silverman, APRN - CNP

## 2022-01-13 ENCOUNTER — OFFICE VISIT (OUTPATIENT)
Dept: PAIN MANAGEMENT | Age: 57
End: 2022-01-13
Payer: COMMERCIAL

## 2022-01-13 VITALS
TEMPERATURE: 96.5 F | HEIGHT: 73 IN | DIASTOLIC BLOOD PRESSURE: 76 MMHG | SYSTOLIC BLOOD PRESSURE: 126 MMHG | BODY MASS INDEX: 27.7 KG/M2 | WEIGHT: 209 LBS

## 2022-01-13 DIAGNOSIS — M51.26 DISPLACEMENT OF LUMBAR INTERVERTEBRAL DISC WITHOUT MYELOPATHY: Chronic | ICD-10-CM

## 2022-01-13 DIAGNOSIS — M51.26 DISPLACEMENT OF LUMBAR INTERVERTEBRAL DISC WITHOUT MYELOPATHY: Primary | ICD-10-CM

## 2022-01-13 DIAGNOSIS — G89.4 CHRONIC PAIN SYNDROME: ICD-10-CM

## 2022-01-13 DIAGNOSIS — M96.1 POSTLAMINECTOMY SYNDROME, LUMBAR REGION: ICD-10-CM

## 2022-01-13 DIAGNOSIS — M96.1 POSTLAMINECTOMY SYNDROME, LUMBAR REGION: Chronic | ICD-10-CM

## 2022-01-13 PROCEDURE — 99213 OFFICE O/P EST LOW 20 MIN: CPT | Performed by: PAIN MEDICINE

## 2022-01-13 RX ORDER — HYDROCODONE BITARTRATE AND ACETAMINOPHEN 10; 325 MG/1; MG/1
1 TABLET ORAL
Qty: 80 TABLET | Refills: 0 | Status: SHIPPED | OUTPATIENT
Start: 2022-01-13 | End: 2022-02-14 | Stop reason: SDUPTHER

## 2022-01-13 RX ORDER — DULOXETIN HYDROCHLORIDE 60 MG/1
CAPSULE, DELAYED RELEASE ORAL
Qty: 30 CAPSULE | Refills: 0 | Status: SHIPPED | OUTPATIENT
Start: 2022-01-13 | End: 2022-02-14 | Stop reason: SDUPTHER

## 2022-01-13 ASSESSMENT — ENCOUNTER SYMPTOMS
DIARRHEA: 0
NAUSEA: 0
SHORTNESS OF BREATH: 0
CONSTIPATION: 0
BACK PAIN: 0

## 2022-01-13 NOTE — PROGRESS NOTES
[facility] ([facility]) Physicians  Neurosurgery and Pain [facility]  [address]: [phone]  F: [phone]        Zenobia Rodriguez  (1965)    1/13/2022    Subjective:     Zenobia Rodriguez is 61 y.o. male who complains today of:     Chief Complaint   Patient presents with    Back Pain     Patient seen today in follow-up. He has a history of chronic pain, work-related injury, lumbar surgery, spinal cord stimulator. Pain worse with standing walking. Pain is low back down his left leg. The stimulator helps. [address] helps him function, do his activities of daily living, chores around the house. No side effects no aberrant behavior. Rest helps. Plan: Continue Norco for his chronic pain. Continue home excise program as tolerated. He does not need injections. We will see him in 1 month for follow-up. Questions answered chart was reviewed. Allergies:  Patient has no known allergies. Past Medical History:   Diagnosis Date    Bronchitis     CAD (coronary artery disease)     past angioplasty > 10 yrs ago    Chronic back pain     Diabetes ([address]     dx > 2 yrs    Dyslipidemia     Hyperlipidemia     meds > 5 yrs    Hypertension     meds > 5 yrs    Neuropathy in diabetes ([address]     Osteoarthritis     Pneumonia     Sickle cell anemia (HCC)     Sleep apnea, obstructive      Past Surgical History:   Procedure Laterality Date    BACK SURGERY  2008    Lumbar disc OR. [address] [facility] [address] [facility]  7/10/2015    Patient states he was urininating blood and had a procedure done.     [facility]14[facility]14    [facility]    ENDOSCOPY, COLON, DIAGNOSTIC      LUMBAR FUSION  2008    OTHER SURGICAL HISTORY Right     tendon repair in (R) forearm because of previous injury as child    TOTAL HIP ARTHROPLASTY Left 1/23/2020    LEFT HIP TOTAL HIP ARTHROPLASTY, RICARDO performed by Sera Bacon MD at [address] [facility]  3/14/14    BIOPSY Kristy Razo  2013    Dr. Justo Norman office     Family History   Problem Relation Age of Onset    Cancer Mother         Throat & esophagus    Anemia Mother     Other Father         Natural causes    High Blood Pressure Sister     COPD Brother     Heart Disease Brother         Cardiac stent    Diabetes Sister     COPD Sister     Emphysema Sister     No Known Problems Daughter      Social History     Socioeconomic History    Marital status: Legally      Spouse name: Not on file    Number of children: Not on file    Years of education: Not on file    Highest education level: Not on file   Occupational History    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 Vaping Use    Vaping Use: Former   Substance and Sexual Activity    Alcohol use: No     Alcohol/week: 0.0 standard drinks    Drug use: No    Sexual activity: Not on file   Other Topics Concern    Not on file   Social History Narrative    Not on file     Social Determinants of Health     Financial Resource Strain:     Difficulty of Paying Living Expenses: Not on file   Food Insecurity:     Worried About Running Out of Food in the Last Year: Not on file    Saad of Food in the Last Year: Not on file   Transportation Needs:     Lack of Transportation (Medical): Not on file    Lack of Transportation (Non-Medical):  Not on file   Physical Activity:     Days of Exercise per Week: Not on file    Minutes of Exercise per Session: Not on file   Stress:     Feeling of Stress : Not on file   Social Connections:     Frequency of Communication with Friends and Family: Not on file    Frequency of Social Gatherings times daily (with meals)      fenofibrate 160 MG tablet Take by mouth daily       folic acid (FOLVITE) 1 MG tablet Take 1 mg by mouth daily       amLODIPine-benazepril (LOTREL) 5-10 MG per capsule Take 1 capsule by mouth daily        No current facility-administered medications on file prior to visit. HPI    Review of Systems   Constitutional: Negative for fever. HENT: Negative for hearing loss. Respiratory: Negative for shortness of breath. Gastrointestinal: Negative for constipation, diarrhea and nausea. Genitourinary: Negative for difficulty urinating. Musculoskeletal: Negative for back pain and neck pain. Skin: Negative for rash. Neurological: Negative for headaches. Hematological: Does not bruise/bleed easily. Psychiatric/Behavioral: Negative for sleep disturbance. Objective:     Vitals:  /76   Temp 96.5 °F (35.8 °C)   Ht 6' 1\" (1.854 m)   Wt 209 lb (94.8 kg)   BMI 27.57 kg/m² Pain Score:   4      Physical Exam  Patient alert and oriented times three, recent and remote memory intact, mood and affect normal, judgement and insight normal. Strength functional for ambulation. Balance and coordinational functional. Visualized skin intact. No visualized cyanosis, pulses intact. Cranial nerves 2-12 grossly intact. No obvious upper motor neuron signs seen. Sensation intact to light touch. Lumbar scar decreased range of motion lumbar spine, antalgic gait. Assessment:      Diagnosis Orders   1. L4-5 HERNAITED DISC  HYDROcodone-acetaminophen (NORCO)  MG per tablet   2. Postlaminectomy syndrome, lumbar region  HYDROcodone-acetaminophen (NORCO)  MG per tablet   3.  Chronic pain syndrome         Plan:

## 2022-01-19 ENCOUNTER — TELEPHONE (OUTPATIENT)
Dept: PAIN MANAGEMENT | Age: 57
End: 2022-01-19

## 2022-01-19 ENCOUNTER — TELEPHONE (OUTPATIENT)
Dept: PULMONOLOGY | Age: 57
End: 2022-01-19

## 2022-01-19 NOTE — TELEPHONE ENCOUNTER
Patient called office stating he was summoned for jury duty and is requesting a letter from Dr. Ashley Treviño stating why patient would be unable to sit for extended period of time.

## 2022-01-19 NOTE — TELEPHONE ENCOUNTER
We received fax from [facility] stating Hydrocodone (Norco) 10-325mg tablet needs prior authorization. I submitted auth request online ([employer]) to [address] [place] [address], message came back stating the authorization request needs to be sent to [address] [place] [address]. Hydrocodone (Norco) 10-325mg tablet prior authorization request submitted online ([email or web address]), auth pending.        Key: [id number]

## 2022-01-19 NOTE — TELEPHONE ENCOUNTER
Pt called stating he was called to do jury duty and will need a letter from Dr. Omar Knight stating why he is unable to sit for it. He say he is unable due to health issues and unable to stay awake long enough to participate. He will need this letter no later than 1/24/2022.  Thanks    12/26/2019  FU  2/23/2022

## 2022-01-20 NOTE — TELEPHONE ENCOUNTER
LVM for patient to call his PCP to write letter for Cornelio Haddad Duty due to patient hasn't been seen in a couple of years

## 2022-02-14 ENCOUNTER — OFFICE VISIT (OUTPATIENT)
Dept: PAIN MANAGEMENT | Age: 57
End: 2022-02-14
Payer: COMMERCIAL

## 2022-02-14 VITALS
SYSTOLIC BLOOD PRESSURE: 128 MMHG | BODY MASS INDEX: 28.36 KG/M2 | TEMPERATURE: 97.6 F | HEIGHT: 73 IN | DIASTOLIC BLOOD PRESSURE: 74 MMHG | WEIGHT: 214 LBS

## 2022-02-14 DIAGNOSIS — M96.1 POSTLAMINECTOMY SYNDROME, LUMBAR REGION: Chronic | ICD-10-CM

## 2022-02-14 DIAGNOSIS — M51.26 DISPLACEMENT OF LUMBAR INTERVERTEBRAL DISC WITHOUT MYELOPATHY: Chronic | ICD-10-CM

## 2022-02-14 PROCEDURE — 99213 OFFICE O/P EST LOW 20 MIN: CPT | Performed by: NURSE PRACTITIONER

## 2022-02-14 RX ORDER — BACLOFEN 10 MG/1
TABLET ORAL
COMMUNITY
End: 2022-02-14

## 2022-02-14 RX ORDER — PREGABALIN 150 MG/1
150 CAPSULE ORAL 3 TIMES DAILY
Qty: 90 CAPSULE | Refills: 0 | Status: SHIPPED | OUTPATIENT
Start: 2022-02-14 | End: 2022-03-14 | Stop reason: SDUPTHER

## 2022-02-14 RX ORDER — IBUPROFEN 400 MG/1
400 TABLET ORAL DAILY PRN
Qty: 30 TABLET | Refills: 0 | Status: SHIPPED | OUTPATIENT
Start: 2022-02-14 | End: 2022-06-23 | Stop reason: SDUPTHER

## 2022-02-14 RX ORDER — BACLOFEN 10 MG/1
10 TABLET ORAL DAILY PRN
Qty: 30 TABLET | Refills: 0 | Status: SHIPPED | OUTPATIENT
Start: 2022-02-14 | End: 2022-03-14 | Stop reason: SDUPTHER

## 2022-02-14 RX ORDER — DULOXETIN HYDROCHLORIDE 60 MG/1
60 CAPSULE, DELAYED RELEASE ORAL DAILY
Qty: 30 CAPSULE | Refills: 0 | Status: SHIPPED | OUTPATIENT
Start: 2022-02-14 | End: 2022-03-14 | Stop reason: SDUPTHER

## 2022-02-14 RX ORDER — HYDROCODONE BITARTRATE AND ACETAMINOPHEN 10; 325 MG/1; MG/1
1 TABLET ORAL
Qty: 80 TABLET | Refills: 0 | Status: SHIPPED | OUTPATIENT
Start: 2022-02-18 | End: 2022-03-14 | Stop reason: SDUPTHER

## 2022-02-14 ASSESSMENT — ENCOUNTER SYMPTOMS
COUGH: 0
SHORTNESS OF BREATH: 0
DIARRHEA: 0
CONSTIPATION: 0
GASTROINTESTINAL NEGATIVE: 1
BACK PAIN: 1
NAUSEA: 0
EYES NEGATIVE: 1

## 2022-02-14 NOTE — PROGRESS NOTES
Laura Mistry  ([birth year])    2/14/2022    Subjective:     Laura Mistry is 61 y.o. male who complains today of:    Chief Complaint   Patient presents with    Back Pain         Allergies:  Patient has no known allergies. Past Medical History:   Diagnosis Date    Bronchitis     CAD (coronary artery disease)     past angioplasty > 10 yrs ago    Chronic back pain     Diabetes ([facility]     dx > 2 yrs    Dyslipidemia     Hyperlipidemia     meds > 5 yrs    Hypertension     meds > 5 yrs    Neuropathy in diabetes ([facility]     Osteoarthritis     Pneumonia     Sickle cell anemia (HCC)     Sleep apnea, obstructive      Past Surgical History:   Procedure Laterality Date    BACK SURGERY  2008    Lumbar disc OR. [address] [facility] [address]  7/10/2015    Patient states he was urininating blood and had a procedure done.     COLONOS[facility]  3/14/14    [facility]    ENDOSCOPY, COLON, DIAGNOSTIC      LUMBAR FUSION  2008    OTHER SURGICAL HISTORY Right     tendon repair in (R) forearm because of previous injury as child    TOTAL HIP ARTHROPLASTY Left 1/23/2020    LEFT HIP TOTAL HIP ARTHROPLASTY, RICARDO performed by Radha Huerta MD at [facility] ENDOSCOPY  3/14/14    Jamaal Cote  2013    Dr. Mabel Jamison office     Family History   Problem Relation Age of Onset    Cancer Mother         Throat & esophagus    Anemia Mother     Other Father         Natural causes    High Blood Pressure Sister     COPD Brother     Heart Disease Brother         Cardiac stent    Diabetes Sister     COPD Sister     Emphysema Sister     No Known Problems Daughter      Social History     Socioeconomic History    Marital status: Legally      Spouse name: Not on file    Number of children: Not on file    Years of education: Not on file    Highest education level: Not on file   Occupational History    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 Vaping Use    Vaping Use: Former   Substance and Sexual Activity    Alcohol use: No     Alcohol/week: 0.0 standard drinks    Drug use: No    Sexual activity: Not on file   Other Topics Concern    Not on file   Social History Narrative    Not on file     Social Determinants of Health     Financial Resource Strain:     Difficulty of Paying Living Expenses: Not on file   Food Insecurity:     Worried About Running Out of Food in the Last Year: Not on file    Saad of Food in the Last Year: Not on file   Transportation Needs:     Lack of Transportation (Medical): Not on file    Lack of Transportation (Non-Medical):  Not on file   Physical Activity:     Days of Exercise per Week: Not on file    Minutes of Exercise per Session: Not on file   Stress:     Feeling of Stress : Not on file   Social Connections:     Frequency of Communication with Friends and Family: Not on file    Frequency of Social Gatherings with Friends and Family: Not on file    Attends Jain Services: Not on file    Active Member of [address] [place] [address] [employer] or Organizations: Not on file    Attends Club or Organization Meetings: Not on file    Marital Status: Not on file   Intimate Partner Violence:     Fear of Current or Ex-Partner: Not on file    Emotionally Abused: Not on file    Physically Abused: Not on file    Sexually Abused: Not on file   Housing Stability:     Unable to Pay for Housing in the Last Year: Not on file    Number of [place] in the Last Year: Not on file    Unstable Housing in the Last Year: Not on file       Current Outpatient Medications on File Prior to Visit   Medication Sig Dispense Refill    olmesartan (BENICAR) 40 MG tablet take 1 tablet by medication is 4/10, and 7/10 without medication. Pt feels that bending, too much activity makes the pain worse, and SCS, medication, change of position makes the pain better. Pt feels his medication helps   him function and improve his quality of life, specifically says allows to do things around the house. Pt denies change with Lt LE radiating numbness and tingling. Denies recent falls, injuries or trauma. Pt denies new weakness. Pt reports PT has been done in the past.         Review of Systems   Constitutional: Negative for fever. HENT: Negative. Eyes: Negative. Respiratory: Negative for cough and shortness of breath. Cardiovascular: Negative for chest pain. Gastrointestinal: Negative. Negative for constipation, diarrhea and nausea. Endocrine: Negative. Genitourinary: Negative. Musculoskeletal: Positive for arthralgias and back pain. Negative for neck pain. Skin: Negative. Neurological: Negative for dizziness and weakness. Psychiatric/Behavioral: Negative. Objective:     Vitals:  /74   Temp 97.6 °F (36.4 °C) (Infrared)   Ht 6' 1\" (1.854 m)   Wt 214 lb (97.1 kg)   BMI 28.23 kg/m² Pain Score:   4      Physical Exam  Vitals and nursing note reviewed. This is a pleasant male who answers questions appropriately and follows commands. Wearing glasses today.  Pt is alert and oriented x 3. Recent and remote memory is intact. Mood and affect, judgement and insight are normal. No signs of distress, no dyspnea or SOB noted. HEENT: PERRL. Neck is supple, trachea midline. No lymphadenopathy noted. Decreased ROM with flexion and extension of low back. Mild tenderness with palpation to lumbar spine on Lt. SCS battery pack palpated on Lt low back. Tightness appreciated over Lt lower lumbar paraspinal muscles.   Negative SLR but reproduces low back pain.  Tightness in both hamstrings noted. Cranial nerves II-XII are intact. Assessment:      Diagnosis Orders   1. Postlaminectomy syndrome, lumbar region  DULoxetine (CYMBALTA) 60 MG extended release capsule    pregabalin (LYRICA) 150 MG capsule    HYDROcodone-acetaminophen (NORCO)  MG per tablet    baclofen (LIORESAL) 10 MG tablet    ibuprofen (ADVIL;MOTRIN) 400 MG tablet   2. L4-5 HERNAITED DISC  DULoxetine (CYMBALTA) 60 MG extended release capsule    pregabalin (LYRICA) 150 MG capsule    HYDROcodone-acetaminophen (NORCO)  MG per tablet    baclofen (LIORESAL) 10 MG tablet    ibuprofen (ADVIL;MOTRIN) 400 MG tablet       Plan:     Periodic Controlled Substance Monitoring: Possible medication side effects, risk of tolerance/dependence & alternative treatments discussed. ,No signs of potential drug abuse or diversion identified. ,Assessed functional status. ,Obtaining appropriate analgesic effect of treatment. (Elma Merlos, APRN - CNP)    Orders Placed This Encounter   Medications    DULoxetine (CYMBALTA) 60 MG extended release capsule     Sig: Take 1 capsule by mouth daily     Dispense:  30 capsule     Refill:  0    pregabalin (LYRICA) 150 MG capsule     Sig: Take 1 capsule by mouth 3 times daily for 30 days. Fill 12/20/21     Dispense:  90 capsule     Refill:  0    HYDROcodone-acetaminophen (NORCO)  MG per tablet     Sig: Take 1 tablet by mouth every 8-12 hours as needed for Pain for up to 30 days. #80 tabs for 30 days     Dispense:  80 tablet     Refill:  0     Reduce doses taken as pain becomes manageable    baclofen (LIORESAL) 10 MG tablet     Sig: Take 1 tablet by mouth daily as needed (pain)     Dispense:  30 tablet     Refill:  0    ibuprofen (ADVIL;MOTRIN) 400 MG tablet     Sig: Take 1 tablet by mouth daily as needed for Pain     Dispense:  30 tablet     Refill:  0       No orders of the defined types were placed in this encounter. Discussed options with the patient today. Anatomic model pathology was shown and reviewed with pt. Advised patient to discuss ibuprofen with PCP.   We will refill this today he uses it very sparingly last time was refilled was in November. Continue baclofen 10 mg as needed spasms again he also uses this sparingly. He does state he is using his Lyrica 150 mg 3 a day, Cymbalta 60 mg daily and will continue Norco 10 mg 2-3 a day as needed pain # 80 tablets for 30 days as it does help him function. Hold injections. Encouraged him to follow-up with PCP to also discussed depression and possibly change Cymbalta to a different medication. We will continue Cymbalta the current dose at this time as discussed. Hold injections. All questions were answered. Discussed home exercise program and  smoking cessation. Relevant imaging and pain generators reviewed. Pt verbalized understanding and agrees with above plan. Pt has chronic pain. Utox reviewed and appropriate from August 2021 - Positive norco, but negative Lyrica at that time. ORT score 1 - low risk. Narcan Rx sent in April 2021. Will continue medications for chronic pain that has been previously directed as they do help pt function with ADL and improve quality of life. Pt is aware goal is to use the least amount of medication possible to allow pt to function and help with quality of life as discussed in detail. Ideal to keep MME below 50 which it is. Have discussed proper disposal of narcotic medication. Advised to have medications in safe place and locked up/in lock box. Discussed possible risks of opiate medication with pt, including, but not limited to possible constipation, nausea or vomiting, sedation, urinary retention, dependence and possible addiction. Pt agrees to use medication as prescribed/as directed. Pt advised to not use opiates while driving or operating heavy equipment, or in situations where pt may harm him/herself or others. Pt is aware that while on narcotics, pt needs to be seen to reassess pain and reassess need for continued medication at that time. NDP reviewed. OARRS was reviewed.   This NP saw pt under direct supervision of Dr. Jackson Sender. Follow up:  Return in about 4 weeks (around 3/14/2022) for review meds and reassess pain.     Millie Ness, DARLENE - CNP

## 2022-03-09 DIAGNOSIS — M96.1 POSTLAMINECTOMY SYNDROME, LUMBAR REGION: Chronic | ICD-10-CM

## 2022-03-09 DIAGNOSIS — M51.26 DISPLACEMENT OF LUMBAR INTERVERTEBRAL DISC WITHOUT MYELOPATHY: Chronic | ICD-10-CM

## 2022-03-09 RX ORDER — DULOXETIN HYDROCHLORIDE 60 MG/1
CAPSULE, DELAYED RELEASE ORAL
Qty: 30 CAPSULE | Refills: 0 | OUTPATIENT
Start: 2022-03-09

## 2022-03-14 ENCOUNTER — OFFICE VISIT (OUTPATIENT)
Dept: PAIN MANAGEMENT | Age: 57
End: 2022-03-14
Payer: COMMERCIAL

## 2022-03-14 VITALS
WEIGHT: 217 LBS | DIASTOLIC BLOOD PRESSURE: 84 MMHG | BODY MASS INDEX: 28.76 KG/M2 | SYSTOLIC BLOOD PRESSURE: 138 MMHG | HEIGHT: 73 IN | TEMPERATURE: 97.1 F

## 2022-03-14 DIAGNOSIS — M51.26 DISPLACEMENT OF LUMBAR INTERVERTEBRAL DISC WITHOUT MYELOPATHY: Chronic | ICD-10-CM

## 2022-03-14 DIAGNOSIS — M96.1 POSTLAMINECTOMY SYNDROME, LUMBAR REGION: Chronic | ICD-10-CM

## 2022-03-14 PROCEDURE — 99213 OFFICE O/P EST LOW 20 MIN: CPT | Performed by: NURSE PRACTITIONER

## 2022-03-14 RX ORDER — BACLOFEN 10 MG/1
10 TABLET ORAL DAILY PRN
Qty: 30 TABLET | Refills: 2 | Status: SHIPPED | OUTPATIENT
Start: 2022-03-16 | End: 2022-06-23 | Stop reason: SDUPTHER

## 2022-03-14 RX ORDER — HYDROCODONE BITARTRATE AND ACETAMINOPHEN 10; 325 MG/1; MG/1
1 TABLET ORAL
Qty: 80 TABLET | Refills: 0 | Status: SHIPPED | OUTPATIENT
Start: 2022-03-20 | End: 2022-04-14 | Stop reason: SDUPTHER

## 2022-03-14 RX ORDER — DULOXETIN HYDROCHLORIDE 60 MG/1
60 CAPSULE, DELAYED RELEASE ORAL DAILY
Qty: 30 CAPSULE | Refills: 2 | Status: SHIPPED | OUTPATIENT
Start: 2022-03-14 | End: 2022-06-23 | Stop reason: SDUPTHER

## 2022-03-14 RX ORDER — PREGABALIN 150 MG/1
150 CAPSULE ORAL 3 TIMES DAILY
Qty: 90 CAPSULE | Refills: 2 | Status: SHIPPED | OUTPATIENT
Start: 2022-03-16 | End: 2022-06-23 | Stop reason: SDUPTHER

## 2022-03-14 ASSESSMENT — ENCOUNTER SYMPTOMS
NAUSEA: 0
DIARRHEA: 0
SHORTNESS OF BREATH: 0
CONSTIPATION: 0
BACK PAIN: 1
EYES NEGATIVE: 1
GASTROINTESTINAL NEGATIVE: 1
COUGH: 0

## 2022-03-14 NOTE — PROGRESS NOTES
Deedee Olivia  ([birth year])    3/14/2022    Subjective:     Deedee Olivia is 61 y.o. male who complains today of:    Chief Complaint   Patient presents with    Back Pain         Allergies:  Patient has no known allergies. Past Medical History:   Diagnosis Date    Bronchitis     CAD (coronary artery disease)     past angioplasty > 10 yrs ago    Chronic back pain     Diabetes ([facility]     dx > 2 yrs    Dyslipidemia     Hyperlipidemia     meds > 5 yrs    Hypertension     meds > 5 yrs    Neuropathy in diabetes ([facility]     Osteoarthritis     Pneumonia     Sickle cell anemia (HCC)     Sleep apnea, obstructive      Past Surgical History:   Procedure Laterality Date    BACK SURGERY  2008    Lumbar disc OR. [address] [facility] [address]  7/10/2015    Patient states he was urininating blood and had a procedure done.     [facility]14/[facility]    ENDOSCOPY, COLON, DIAGNOSTIC      LUMBAR FUSION  2008    OTHER SURGICAL HISTORY Right     tendon repair in (R) forearm because of previous injury as child    TOTAL HIP ARTHROPLASTY Left 1/23/2020    LEFT HIP TOTAL HIP ARTHROPLASTY, RICARDO performed by Hodan Garcia MD at [facility] ENDOSCOPY  3/14/14    [place]  2013    Dr. Marinelli Plaster office     Family History   Problem Relation Age of Onset    Cancer Mother         Throat & esophagus    Anemia Mother     Other Father         Natural causes    High Blood Pressure Sister     COPD Brother     Heart Disease Brother         Cardiac stent    Diabetes Sister     COPD Sister     Emphysema Sister     No Known Problems Daughter      Social History     Socioeconomic History    Marital status: Legally      Spouse name: Not on file    Number of children: Not on file    Years of education: Not on file    Highest education level: Not on file   Occupational History    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 Vaping Use    Vaping Use: Former   Substance and Sexual Activity    Alcohol use: No     Alcohol/week: 0.0 standard drinks    Drug use: No    Sexual activity: Not on file   Other Topics Concern    Not on file   Social History Narrative    Not on file     Social Determinants of Health     Financial Resource Strain:     Difficulty of Paying Living Expenses: Not on file   Food Insecurity:     Worried About Running Out of Food in the Last Year: Not on file    Saad of Food in the Last Year: Not on file   Transportation Needs:     Lack of Transportation (Medical): Not on file    Lack of Transportation (Non-Medical):  Not on file   Physical Activity:     Days of Exercise per Week: Not on file    Minutes of Exercise per Session: Not on file   Stress:     Feeling of Stress : Not on file   Social Connections:     Frequency of Communication with Friends and Family: Not on file    Frequency of Social Gatherings with Friends and Family: Not on file    Attends Judaism Services: Not on file    Active Member of [address] [place] [address] [employer] or Organizations: Not on file    Attends Club or Organization Meetings: Not on file    Marital Status: Not on file   Intimate Partner Violence:     Fear of Current or Ex-Partner: Not on file    Emotionally Abused: Not on file    Physically Abused: Not on file    Sexually Abused: Not on file   Housing Stability:     Unable to Pay for Housing in the Last Year: Not on file    Number of [place] in the Last Year: Not on file    Unstable Housing in the Last Year: Not on file       Current Outpatient Medications on File Prior to Visit   Medication Sig Dispense Refill    DULoxetine (CYMBALTA) 60 MG extended release capsule Take 1 capsule by mouth daily 30 capsule 0    pregabalin (LYRICA) 150 MG capsule Take 1 capsule by mouth 3 times daily for 30 days. Fill 12/20/21 90 capsule 0    HYDROcodone-acetaminophen (NORCO)  MG per tablet Take 1 tablet by mouth every 8-12 hours as needed for Pain for up to 30 days. #80 tabs for 30 days 80 tablet 0    baclofen (LIORESAL) 10 MG tablet Take 1 tablet by mouth daily as needed (pain) 30 tablet 0    ibuprofen (ADVIL;MOTRIN) 400 MG tablet Take 1 tablet by mouth daily as needed for Pain 30 tablet 0    olmesartan (BENICAR) 40 MG tablet take 1 tablet by mouth every morning      amLODIPine (NORVASC) 10 MG tablet take 1 tablet by mouth once daily      atenolol (TENORMIN) 50 MG tablet Take by mouth      atenolol-chlorthalidone (TENORETIC) 50-25 MG per tablet daily  0    Elastic Bandages & Supports (T.E.D. BELOW KNEE/L-REGULAR) MISC 1 applicator by Does not apply route daily 1 each 0    losartan-hydrochlorothiazide (HYZAAR) 100-25 MG per tablet Take by mouth daily       Respiratory Therapy Supplies (FLUTTER) PANCHO 1 Device by Does not apply route 4 times daily 1 Device 0    metFORMIN (GLUCOPHAGE) 500 MG tablet Take 500 mg by mouth 2 times daily (with meals)      fenofibrate 160 MG tablet Take by mouth daily       folic acid (FOLVITE) 1 MG tablet Take 1 mg by mouth daily       amLODIPine-benazepril (LOTREL) 5-10 MG per capsule Take 1 capsule by mouth daily       aspirin 81 MG EC tablet Take 1 tablet by mouth 2 times daily 30 tablet 0    nicotine (NICODERM CQ) 21 MG/24HR Place 1 patch onto the skin daily 42 patch 0    ciclopirox (PENLAC) 8 % solution Apply topically to the toenails nightly. (Patient not taking: Reported on 6/23/2021) 1 Bottle 5     No current facility-administered medications on file prior to visit. Pt presents today for a f/u of his pain. PCP is Dr. Ana Blankenship. Pt last saw Dr. Cata Rosario for his chronic low back pain from work related injury in early 2000s pain.  He has yet to see PCP to discuss ibuprofen. Says uses this fairly. He says he needs colonoscopy. He continues to have LT LE pain that he uses his SCS. He says he is trying to limit his stress. History of mild bilateral CTS, DM, sickle cell. Last flare of sickle cell crisis in June. He has a history of lumbar fusion, spinal cord stimulator, Lt hip replacement 1/23/21 with Dr. Rohit Vance. He goes into crisis if he has stress and high BP. He uses his SCS constantly.        He continues to use his Lyrica 150mg TID and Norco 10mg 2-3 per day PRN pain and Cymbalta 60mg daily and baclofen 10mg BID PRN spasm - says took cymbalta, baclofen, norco and lyrica today and Ibuprofen PRN. He is on disability. Pt feels pain level with his medication is 4/10, and 8/10 without medication. Pt feels that activity, \"move\", being on feet makes the pain worse, and laying around, medication, laying down, SCS makes the pain better. Pt feels his medication helps   him function and improve his quality of life, specifically says allows to do ADL's. Pt denies radiating numbness and tingling. Denies recent falls, injuries or trauma. Pt denies new weakness. Pt reports PT has been done. Review of Systems   Constitutional: Negative for fever. HENT: Negative. Eyes: Negative. Respiratory: Negative for cough and shortness of breath. Cardiovascular: Negative for chest pain. Gastrointestinal: Negative. Negative for constipation, diarrhea and nausea. Endocrine: Negative. Genitourinary: Negative. Musculoskeletal: Positive for arthralgias, back pain and neck pain. Skin: Negative. Neurological: Negative for dizziness and weakness. Psychiatric/Behavioral: Negative.           Objective:     Vitals:  /84 (Site: Left Upper Arm, Position: Sitting, Cuff Size: Large Adult)   Temp 97.1 °F (36.2 °C) (Infrared)   Ht 6' 1\" (1.854 m)   Wt 217 lb (98.4 kg)   BMI 28.63 kg/m² Pain Score:   4      Physical Exam  Vitals and nursing note reviewed. This is a pleasant male who answers questions appropriately and follows commands. Wearing glasses.  Pt is alert and oriented x 3. Recent and remote memory is intact.  Mood and affect, judgement and insight are normal. No signs of distress, no dyspnea or SOB noted. HEENT: PERRL. Neck is supple, trachea midline. No lymphadenopathy noted. Decreased ROM with flexion and extension of low back. Mild tenderness with palpation to lumbar spine on Lt. SCS battery pack palpated on Lt low back. Tightness appreciated over Lt lower lumbar paraspinal muscles.   Negative SLR but reproduces low back pain.  Tightness in both hamstrings noted. Cranial nerves II-XII are intact. Assessment:      Diagnosis Orders   1. Postlaminectomy syndrome, lumbar region     2. L4-5 HERNAITED DISC         Plan:     Periodic Controlled Substance Monitoring: Possible medication side effects, risk of tolerance/dependence & alternative treatments discussed. ,No signs of potential drug abuse or diversion identified. ,Assessed functional status. ,Obtaining appropriate analgesic effect of treatment. (Elma Merlos, APRN - CNP)    No orders of the defined types were placed in this encounter. No orders of the defined types were placed in this encounter. Discussed options with the patient today. Anatomic model pathology was shown and reviewed with pt. Continue baclofen 10 mg as needed spasms with refill. He does state he is using his Lyrica 150 mg 3 a day with refills, Cymbalta 60 mg daily with refills and will continue Norco 10 mg 2-3 a day as needed pain # 80 tablets for 30 days as it does help him function. Hold injections. Encouraged him to follow-up with PCP. We will continue Cymbalta the current dose at this time as discussed. Hold injections. All questions were answered. Discussed home exercise program.  Relevant imaging and pain generators reviewed. Pt verbalized understanding and agrees with above plan. Pt has chronic pain. Utox reviewed and appropriate from August 2021 - Positive norco, but negative Lyrica at that time. Will check Utox and f/u with report - last took norco and lyrica took today. ORT score 1 - low risk. Narcan Rx sent in April 2021. Will continue medications for chronic pain that has been previously directed as they do help pt function with ADL and improve quality of life. Pt is aware goal is to use the least amount of medication possible to allow pt to function and help with quality of life as discussed in detail. Ideal to keep MME below 50 which it is. Have discussed proper disposal of narcotic medication. Advised to have medications in safe place and locked up/in lock box. Discussed possible risks of opiate medication with pt, including, but not limited to possible constipation, nausea or vomiting, sedation, urinary retention, dependence and possible addiction. Pt agrees to use medication as prescribed/as directed. Pt advised to not use opiates while driving or operating heavy equipment, or in situations where pt may harm him/herself or others. Pt is aware that while on narcotics, pt needs to be seen to reassess pain and reassess need for continued medication at that time. NDP reviewed. OARRS was reviewed. This NP saw pt under direct supervision of Dr. Myra Kaba. Follow up:  Return in about 5 weeks (around 4/18/2022) for review meds and reassess pain.     Emile Ness, DARLENE - CNP

## 2022-03-16 LAB
6-ACETYLMORPHINE, UR: NORMAL
AMPHETAMINE SCREEN, URINE: NORMAL
BARBITURATE SCREEN, URINE: NORMAL
BENZODIAZEPINE SCREEN, URINE: NORMAL
CANNABINOID SCREEN URINE: NORMAL
COCAINE METABOLITE, URINE: NORMAL
CREATININE URINE: NORMAL
EDDP, URINE: NORMAL
ETHANOL URINE: NORMAL
MDMA URINE: NORMAL
METHADONE SCREEN, URINE: NORMAL
METHAMPHETAMINE, URINE: NORMAL
OPIATES, URINE: NORMAL
OXYCODONE: NORMAL
PCP: NORMAL
PH, URINE: NORMAL
PHENCYCLIDINE, URINE: NORMAL
PROPOXYPHENE, URINE: NORMAL
TRICYCLIC ANTIDEPRESSANTS, UR: NORMAL

## 2022-04-14 ENCOUNTER — OFFICE VISIT (OUTPATIENT)
Dept: PAIN MANAGEMENT | Age: 57
End: 2022-04-14
Payer: COMMERCIAL

## 2022-04-14 VITALS — TEMPERATURE: 97.5 F | HEIGHT: 73 IN | BODY MASS INDEX: 28.89 KG/M2 | WEIGHT: 218 LBS

## 2022-04-14 DIAGNOSIS — M96.1 POSTLAMINECTOMY SYNDROME, LUMBAR REGION: Chronic | ICD-10-CM

## 2022-04-14 DIAGNOSIS — M51.26 DISPLACEMENT OF LUMBAR INTERVERTEBRAL DISC WITHOUT MYELOPATHY: Chronic | ICD-10-CM

## 2022-04-14 PROCEDURE — 99214 OFFICE O/P EST MOD 30 MIN: CPT | Performed by: NURSE PRACTITIONER

## 2022-04-14 RX ORDER — HYDROCODONE BITARTRATE AND ACETAMINOPHEN 10; 325 MG/1; MG/1
1 TABLET ORAL
Qty: 80 TABLET | Refills: 0 | Status: SHIPPED | OUTPATIENT
Start: 2022-04-19 | End: 2022-05-17 | Stop reason: SDUPTHER

## 2022-04-14 ASSESSMENT — ENCOUNTER SYMPTOMS
BLOOD IN STOOL: 0
SHORTNESS OF BREATH: 0
BACK PAIN: 1

## 2022-04-14 NOTE — PROGRESS NOTES
Jaret Savage  ([birth year])    4/14/2022    Subjective:     Jaret Savage is 61 y.o. male who complains today of:    Chief Complaint   Patient presents with    Back Pain         Allergies:  Patient has no known allergies. Past Medical History:   Diagnosis Date    Bronchitis     CAD (coronary artery disease)     past angioplasty > 10 yrs ago    Chronic back pain     Diabetes ([facility] [address]     dx > 2 yrs    Dyslipidemia     Hyperlipidemia     meds > 5 yrs    Hypertension     meds > 5 yrs    Neuropathy in diabetes ([facility] [address]     Osteoarthritis     Pneumonia     Sickle cell anemia (HCC)     Sleep apnea, obstructive      Past Surgical History:   Procedure Laterality Date    BACK SURGERY  2008    Lumbar disc OR. [address] [facility],  [address]  7/10/2015    Patient states he was urininating blood and had a procedure done.     COLONOS[facility]  3/14/14    [facility]    ENDOSCOPY, COLON, DIAGNOSTIC      LUMBAR FUSION  2008    OTHER SURGICAL HISTORY Right     tendon repair in (R) forearm because of previous injury as child    TOTAL HIP ARTHROPLASTY Left 1/23/2020    LEFT HIP TOTAL HIP ARTHROPLASTY, RICARDO performed by Ashley Gant MD at [facility] ENDOSCOPY  3/14/14    Maria C Quick  2013    Dr. Angella Peña office     Family History   Problem Relation Age of Onset    Cancer Mother         Throat & esophagus    Anemia Mother     Other Father         Natural causes    High Blood Pressure Sister     COPD Brother     Heart Disease Brother         Cardiac stent    Diabetes Sister     COPD Sister     Emphysema Sister     No Known Problems Daughter      Social History     Socioeconomic History    Marital status: Legally      Spouse name: Not on file    Number of children: Not on file    Years of education: Not on file    Highest education level: Not on file   Occupational History    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 Vaping Use    Vaping Use: Former   Substance and Sexual Activity    Alcohol use: No     Alcohol/week: 0.0 standard drinks    Drug use: No    Sexual activity: Not on file   Other Topics Concern    Not on file   Social History Narrative    Not on file     Social Determinants of Health     Financial Resource Strain:     Difficulty of Paying Living Expenses: Not on file   Food Insecurity:     Worried About Running Out of Food in the Last Year: Not on file    Saad of Food in the Last Year: Not on file   Transportation Needs:     Lack of Transportation (Medical): Not on file    Lack of Transportation (Non-Medical):  Not on file   Physical Activity:     Days of Exercise per Week: Not on file    Minutes of Exercise per Session: Not on file   Stress:     Feeling of Stress : Not on file   Social Connections:     Frequency of Communication with Friends and Family: Not on file    Frequency of Social Gatherings with Friends and Family: Not on file    Attends Mandaeism Services: Not on file    Active Member of [address] [place] [address] [employer] or Organizations: Not on file    Attends Club or Organization Meetings: Not on file    Marital Status: Not on file   Intimate Partner Violence:     Fear of Current or Ex-Partner: Not on file    Emotionally Abused: Not on file    Physically Abused: Not on file    Sexually Abused: Not on file   Housing Stability:     Unable to Pay for Housing in the Last Year: Not on file    Number of [place] in the Last Year: Not on file    Unstable Housing in the Last Year: Not on file       Current Outpatient Medications on File Prior to Visit   Medication Sig Dispense Refill    DULoxetine (CYMBALTA) 60 MG extended release capsule Take 1 capsule by mouth daily 30 capsule 2    pregabalin (LYRICA) 150 MG capsule Take 1 capsule by mouth 3 times daily for 90 days. Fill date 3/16/22 90 capsule 2    baclofen (LIORESAL) 10 MG tablet Take 1 tablet by mouth daily as needed (pain) 30 tablet 2    ibuprofen (ADVIL;MOTRIN) 400 MG tablet Take 1 tablet by mouth daily as needed for Pain 30 tablet 0    olmesartan (BENICAR) 40 MG tablet take 1 tablet by mouth every morning      amLODIPine (NORVASC) 10 MG tablet take 1 tablet by mouth once daily      atenolol (TENORMIN) 50 MG tablet Take by mouth      aspirin 81 MG EC tablet Take 1 tablet by mouth 2 times daily 30 tablet 0    atenolol-chlorthalidone (TENORETIC) 50-25 MG per tablet daily  0    nicotine (NICODERM CQ) 21 MG/24HR Place 1 patch onto the skin daily 42 patch 0    ciclopirox (PENLAC) 8 % solution Apply topically to the toenails nightly. (Patient not taking: Reported on 6/23/2021) 1 Bottle 5    Elastic Bandages & Supports (T.E.D. BELOW KNEE/L-REGULAR) MISC 1 applicator by Does not apply route daily 1 each 0    losartan-hydrochlorothiazide (HYZAAR) 100-25 MG per tablet Take by mouth daily       Respiratory Therapy Supplies (FLUTTER) PANCHO 1 Device by Does not apply route 4 times daily 1 Device 0    metFORMIN (GLUCOPHAGE) 500 MG tablet Take 500 mg by mouth 2 times daily (with meals)      fenofibrate 160 MG tablet Take by mouth daily       folic acid (FOLVITE) 1 MG tablet Take 1 mg by mouth daily       amLODIPine-benazepril (LOTREL) 5-10 MG per capsule Take 1 capsule by mouth daily        No current facility-administered medications on file prior to visit. Pt presents today for a f/u of chronic low back pain from [facility] in early 2000s pain. No changes in chronic pain. Pt feels pain level and functioning improves with prescribed medications and is able to perform ADLs. Pt feels that prolonged sitting aggravates the pain.   Pt c/o LLE radiating numbness and tingling around the shin and calf which is intermittent. History of mild bilateral CTS, DM, sickle cell. Last flare of sickle cell crisis in June 2021. He has a history of lumbar fusion, spinal cord stimulator, L hip replacement 1/23/21 with Dr. Tima Ghosh. He goes into crisis if he has stress and high BP. He uses his SCS constantly.  Salud Solorio continues to use his Lyrica 150mg TID and Norco 10mg 2-3 per day PRN pain and Cymbalta 60mg daily and baclofen 10mg BID PRN spasm and Ibuprofen PRN. He is on disability. Review of Systems   Constitutional: Negative for appetite change and fever. HENT: Negative for hearing loss. Eyes: Negative for visual disturbance. Respiratory: Negative for shortness of breath. Gastrointestinal: Negative for blood in stool. Genitourinary: Negative for difficulty urinating and hematuria. Musculoskeletal: Positive for arthralgias and back pain. Skin: Negative for rash. Neurological: Negative for facial asymmetry. Hematological: Negative for adenopathy. Psychiatric/Behavioral: Negative for self-injury. All other systems reviewed and are negative. Objective:     Vitals:  Temp 97.5 °F (36.4 °C)   Ht 6' 1\" (1.854 m)   Wt 218 lb (98.9 kg)   BMI 28.76 kg/m² Pain Score:   4      Physical Exam  Vitals and nursing note reviewed. Pt is alert and oriented x 3.  Recent and remote memory is intact.  Mood, judgement and affect are normal.  No signs of distress or SOB noted.  Visualized skin intact.  Sensation intact to light touch. Decreased ROM with flexion and extension of low back.  Tender with palpation to bilateral lumbar spine with positive provacative maneuvers noted.  Negative SLR.   Pt is able to briefly heel walk and toe walk.   Strength, balance, and coordination are functional for ambulation. Left lumbar SCS. Assessment:      Diagnosis Orders   1. Postlaminectomy syndrome, lumbar region  HYDROcodone-acetaminophen (NORCO)  MG per tablet   2.  L4-5 HERNAITED DISC  HYDROcodone-acetaminophen (NORCO)  MG per tablet       Plan:     Periodic Controlled Substance Monitoring: Possible medication side effects, risk of tolerance/dependence & alternative treatments discussed. ,No signs of potential drug abuse or diversion identified. ,Assessed functional status. ,Obtaining appropriate analgesic effect of treatment. DARLENE Main - CNP)    Orders Placed This Encounter   Medications    HYDROcodone-acetaminophen (NORCO)  MG per tablet     Sig: Take 1 tablet by mouth every 8-12 hours as needed for Pain for up to 30 days. #80 tabs for 30 days     Dispense:  80 tablet     Refill:  0     Reduce doses taken as pain becomes manageable       No orders of the defined types were placed in this encounter. Discussed options with the patient today. Continue Cymbalta, Lyrica and Norco, Baclofen and Ibuprofen. No changes of chronic pain.  No recent flare of sickle cell crisis.  Continue to follow-up with other providers.  SCS continues working well and helping.  All questions were answered.  Pt verbalized understanding and agrees with above plan. Utox reviewed and appropriate from 8/19/21 (+ Norco, - Lyrica, consider retest). Utox 3/9/22 appropriately positive for medication. Also positive for alcohol. Advised patient to avoid alcohol use. Narcan sent 4/21/2021.       Will continue medications for chronic pain as they help pt function with ADL and improve quality of life.  Discussed possible risks of opiate medication with pt, including but not limited to, constipation, nausea or vomiting, sedation, urinary retention, dependence and possible addiction. Pt agrees to use medication as directed. Pt advised to not use opiates while driving or operating heavy equipment, or in situations where pt may harm him/herself or others.  Pt is aware that while on narcotics, pt needs to be seen monthly to reassess pain and need for continued medication.  NDP reviewed. Karma Donohue was reviewed.  This NP saw pt under direct supervision of Dr. Rohit Perla    Follow up:  Return in about 4 weeks (around 5/12/2022) for review meds and reassess pain.     Joseph Fonseca, DARLENE - CNP

## 2022-05-17 ENCOUNTER — OFFICE VISIT (OUTPATIENT)
Dept: PAIN MANAGEMENT | Age: 57
End: 2022-05-17
Payer: COMMERCIAL

## 2022-05-17 VITALS — TEMPERATURE: 97.7 F | HEIGHT: 73 IN | WEIGHT: 216 LBS | BODY MASS INDEX: 28.63 KG/M2

## 2022-05-17 DIAGNOSIS — M96.1 POSTLAMINECTOMY SYNDROME, LUMBAR REGION: Chronic | ICD-10-CM

## 2022-05-17 DIAGNOSIS — M51.26 DISPLACEMENT OF LUMBAR INTERVERTEBRAL DISC WITHOUT MYELOPATHY: Chronic | ICD-10-CM

## 2022-05-17 PROCEDURE — 99214 OFFICE O/P EST MOD 30 MIN: CPT | Performed by: NURSE PRACTITIONER

## 2022-05-17 RX ORDER — HYDROCODONE BITARTRATE AND ACETAMINOPHEN 10; 325 MG/1; MG/1
1 TABLET ORAL
Qty: 80 TABLET | Refills: 0 | Status: SHIPPED | OUTPATIENT
Start: 2022-05-19 | End: 2022-06-23 | Stop reason: SDUPTHER

## 2022-05-17 ASSESSMENT — ENCOUNTER SYMPTOMS
BACK PAIN: 1
SHORTNESS OF BREATH: 0
BLOOD IN STOOL: 0

## 2022-05-17 NOTE — PROGRESS NOTES
Todd Whitten  ([birth year])    5/17/2022    Subjective:     Todd Whitten is 61 y.o. male who complains today of:    Chief Complaint   Patient presents with    Back Pain         Allergies:  Patient has no known allergies. Past Medical History:   Diagnosis Date    Bronchitis     CAD (coronary artery disease)     past angioplasty > 10 yrs ago    Chronic back pain     Diabetes ([facility] [address]     dx > 2 yrs    Dyslipidemia     Hyperlipidemia     meds > 5 yrs    Hypertension     meds > 5 yrs    Neuropathy in diabetes ([facility] [address]     Osteoarthritis     Pneumonia     Sickle cell anemia (HCC)     Sleep apnea, obstructive      Past Surgical History:   Procedure Laterality Date    BACK SURGERY  2008    Lumbar disc OR. [address] [facility],  [address]  7/10/2015    Patient states he was urininating blood and had a procedure done.     [facility]  [facility]/14/14    [facility]    ENDOSCOPY, COLON, DIAGNOSTIC      LUMBAR FUSION  2008    OTHER SURGICAL HISTORY Right     tendon repair in (R) forearm because of previous injury as child    TOTAL HIP ARTHROPLASTY Left 1/23/2020    LEFT HIP TOTAL HIP ARTHROPLASTY, RICARDO performed by Bernard Mckenzie MD at [facility] ENDOSCOPY  3/14/14    Smitha Lopez  2013    Dr. Danielle Clark office     Family History   Problem Relation Age of Onset    Cancer Mother         Throat & esophagus    Anemia Mother     Other Father         Natural causes    High Blood Pressure Sister     COPD Brother     Heart Disease Brother         Cardiac stent    Diabetes Sister     COPD Sister     Emphysema Sister     No Known Problems Daughter      Social History     Socioeconomic History    Marital status: Legally      Spouse name: Not on file    Number of children: Not on file    Years of education: Not on file    Highest education level: Not on file   Occupational History    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 Vaping Use    Vaping Use: Former   Substance and Sexual Activity    Alcohol use: No     Alcohol/week: 0.0 standard drinks    Drug use: No    Sexual activity: Not on file   Other Topics Concern    Not on file   Social History Narrative    Not on file     Social Determinants of Health     Financial Resource Strain:     Difficulty of Paying Living Expenses: Not on file   Food Insecurity:     Worried About Running Out of Food in the Last Year: Not on file    Saad of Food in the Last Year: Not on file   Transportation Needs:     Lack of Transportation (Medical): Not on file    Lack of Transportation (Non-Medical):  Not on file   Physical Activity:     Days of Exercise per Week: Not on file    Minutes of Exercise per Session: Not on file   Stress:     Feeling of Stress : Not on file   Social Connections:     Frequency of Communication with Friends and Family: Not on file    Frequency of Social Gatherings with Friends and Family: Not on file    Attends Mor[employer] Services: Not on file    Active Member of [address] [place] [address] [employer] or Organizations: Not on file    Attends Club or Organization Meetings: Not on file    Marital Status: Not on file   Intimate Partner Violence:     Fear of Current or Ex-Partner: Not on file    Emotionally Abused: Not on file    Physically Abused: Not on file    Sexually Abused: Not on file   Housing Stability:     Unable to Pay for Housing in the Last Year: Not on file    Number of [place] in the Last Year: Not on file    Unstable Housing in the Last Year: Not on file       Current Outpatient Medications on File Prior to Visit   Medication Sig Dispense Refill    DULoxetine (CYMBALTA) 60 MG extended release capsule Take 1 capsule by mouth daily 30 capsule 2    pregabalin (LYRICA) 150 MG capsule Take 1 capsule by mouth 3 times daily for 90 days. Fill date 3/16/22 90 capsule 2    baclofen (LIORESAL) 10 MG tablet Take 1 tablet by mouth daily as needed (pain) 30 tablet 2    ibuprofen (ADVIL;MOTRIN) 400 MG tablet Take 1 tablet by mouth daily as needed for Pain 30 tablet 0    olmesartan (BENICAR) 40 MG tablet take 1 tablet by mouth every morning      amLODIPine (NORVASC) 10 MG tablet take 1 tablet by mouth once daily      atenolol (TENORMIN) 50 MG tablet Take by mouth      aspirin 81 MG EC tablet Take 1 tablet by mouth 2 times daily 30 tablet 0    atenolol-chlorthalidone (TENORETIC) 50-25 MG per tablet daily  0    nicotine (NICODERM CQ) 21 MG/24HR Place 1 patch onto the skin daily 42 patch 0    ciclopirox (PENLAC) 8 % solution Apply topically to the toenails nightly. (Patient not taking: Reported on 6/23/2021) 1 Bottle 5    Elastic Bandages & Supports (T.E.D. BELOW KNEE/L-REGULAR) MISC 1 applicator by Does not apply route daily 1 each 0    losartan-hydrochlorothiazide (HYZAAR) 100-25 MG per tablet Take by mouth daily       Respiratory Therapy Supplies (FLUTTER) PANCHO 1 Device by Does not apply route 4 times daily 1 Device 0    metFORMIN (GLUCOPHAGE) 500 MG tablet Take 500 mg by mouth 2 times daily (with meals)      fenofibrate 160 MG tablet Take by mouth daily       folic acid (FOLVITE) 1 MG tablet Take 1 mg by mouth daily       amLODIPine-benazepril (LOTREL) 5-10 MG per capsule Take 1 capsule by mouth daily        No current facility-administered medications on file prior to visit. Pt presents today for a f/u of chronic low back pain from [facility] in early 2000s pain. Trying to do more around the house with the warm weather which has increased his pain. Saw PCP, reports no changes. Pt feels pain level and functioning improves with prescribed medications and is able to perform ADLs.  Pt feels that prolonged sitting aggravates the pain. Pt c/o LLE radiating numbness and tingling around the shin and calf which is intermittent. History of mild bilateral CTS, DM, sickle cell. Last flare of sickle cell crisis in June 2021. He has a history of lumbar fusion, spinal cord stimulator, L hip replacement 1/23/21 with Dr. Addison Barbosa. He goes into crisis if he has stress and high BP. He uses his SCS constantly.  Chichi Simons continues to use his Lyrica 150mg TID and Norco 10mg 2-3 per day PRN pain and Cymbalta 60mg daily and baclofen 10mg BID PRN spasm and Ibuprofen PRN. He is on disability. Review of Systems   Constitutional: Negative for appetite change and fever. HENT: Negative for hearing loss. Eyes: Negative for visual disturbance. Respiratory: Negative for shortness of breath. Gastrointestinal: Negative for blood in stool. Genitourinary: Negative for difficulty urinating and hematuria. Musculoskeletal: Positive for arthralgias and back pain. Skin: Negative for rash. Neurological: Negative for facial asymmetry. Hematological: Negative for adenopathy. Psychiatric/Behavioral: Negative for self-injury. All other systems reviewed and are negative. Objective:     Vitals:  Temp 97.7 °F (36.5 °C)   Ht 6' 1\" (1.854 m)   Wt 216 lb (98 kg)   BMI 28.50 kg/m² Pain Score:   4      Physical Exam  Vitals and nursing note reviewed. Pt is alert and oriented x 3.  Recent and remote memory is intact.  Mood, judgement and affect are normal.  No signs of distress or SOB noted.  Visualized skin intact.  Sensation intact to light touch. Decreased ROM with flexion and extension of low back.  Tender with palpation to bilateral lumbar spine with positive provacative maneuvers noted.  Negative SLR.   Pt is able to briefly heel walk and toe walk.   Strength, balance, and coordination are functional for ambulation. Left lumbar SCS. Assessment:      Diagnosis Orders   1.  Postlaminectomy syndrome, lumbar region  HYDROcodone-acetaminophen (NORCO)  MG per tablet   2. L4-5 HERNAITED DISC  HYDROcodone-acetaminophen (NORCO)  MG per tablet       Plan:     Periodic Controlled Substance Monitoring: Possible medication side effects, risk of tolerance/dependence & alternative treatments discussed. ,No signs of potential drug abuse or diversion identified. ,Assessed functional status. ,Obtaining appropriate analgesic effect of treatment. Shraddha Castro, APRN - CNP)    Orders Placed This Encounter   Medications    HYDROcodone-acetaminophen (NORCO)  MG per tablet     Sig: Take 1 tablet by mouth every 8-12 hours as needed for Pain for up to 30 days. #80 tabs for 30 days     Dispense:  80 tablet     Refill:  0     Reduce doses taken as pain becomes manageable       No orders of the defined types were placed in this encounter.       Discussed options with the patient today. Continue Cymbalta, Lyrica and Norco, Baclofen and Ibuprofen. No changes of chronic pain.  No recent flare of sickle cell crisis. He doesn't feel the Cymbalta is working for his depression anymore, strongly encouraged to discuss with PCP.  Continue to follow-up with other providers.  SCS continues working well and helping. Onel [place] questions were answered.  Pt verbalized understanding and agrees with above plan. Utox reviewed and appropriate from 8/19/21 (+ Norco, - Lyrica, consider retest). Utox 3/9/22 appropriately positive for medication. Also positive for alcohol. Advised patient to avoid alcohol use. Narcan sent 4/21/2021.        Will continue medications for chronic pain as they help pt function with ADL and improve quality of life.  Discussed possible risks of opiate medication with pt, including but not limited to, constipation, nausea or vomiting, sedation, urinary retention, dependence and possible addiction. Pt agrees to use medication as directed.  Pt advised to not use opiates while driving or operating heavy equipment, or in situations where pt may harm him/herself or others.  Pt is aware that while on narcotics, pt needs to be seen monthly to reassess pain and need for continued medication.  NDP reviewed. Lisa Metzger was reviewed.  This NP saw pt under direct supervision of Dr. Vashti Cheng    Follow up:  Return in about 4 weeks (around 6/14/2022) for review meds and reassess pain.     Yang Christianson, DARLENE - CNP

## 2022-06-07 DIAGNOSIS — M96.1 POSTLAMINECTOMY SYNDROME, LUMBAR REGION: Chronic | ICD-10-CM

## 2022-06-07 DIAGNOSIS — M51.26 DISPLACEMENT OF LUMBAR INTERVERTEBRAL DISC WITHOUT MYELOPATHY: Chronic | ICD-10-CM

## 2022-06-07 RX ORDER — DULOXETIN HYDROCHLORIDE 60 MG/1
CAPSULE, DELAYED RELEASE ORAL
Qty: 90 CAPSULE | OUTPATIENT
Start: 2022-06-07

## 2022-06-23 ENCOUNTER — OFFICE VISIT (OUTPATIENT)
Dept: PAIN MANAGEMENT | Age: 57
End: 2022-06-23
Payer: COMMERCIAL

## 2022-06-23 VITALS — WEIGHT: 213 LBS | TEMPERATURE: 97.5 F | BODY MASS INDEX: 28.23 KG/M2 | HEIGHT: 73 IN

## 2022-06-23 DIAGNOSIS — M51.26 DISPLACEMENT OF LUMBAR INTERVERTEBRAL DISC WITHOUT MYELOPATHY: Chronic | ICD-10-CM

## 2022-06-23 DIAGNOSIS — M96.1 POSTLAMINECTOMY SYNDROME, LUMBAR REGION: Chronic | ICD-10-CM

## 2022-06-23 PROCEDURE — 99214 OFFICE O/P EST MOD 30 MIN: CPT | Performed by: NURSE PRACTITIONER

## 2022-06-23 RX ORDER — DULOXETIN HYDROCHLORIDE 60 MG/1
60 CAPSULE, DELAYED RELEASE ORAL DAILY
Qty: 30 CAPSULE | Refills: 2 | Status: SHIPPED | OUTPATIENT
Start: 2022-06-23 | End: 2022-09-15 | Stop reason: SDUPTHER

## 2022-06-23 RX ORDER — PREGABALIN 150 MG/1
150 CAPSULE ORAL 3 TIMES DAILY
Qty: 90 CAPSULE | Refills: 0 | Status: SHIPPED | OUTPATIENT
Start: 2022-06-23 | End: 2022-07-20 | Stop reason: SDUPTHER

## 2022-06-23 RX ORDER — BACLOFEN 10 MG/1
10 TABLET ORAL DAILY PRN
Qty: 30 TABLET | Refills: 2 | Status: SHIPPED | OUTPATIENT
Start: 2022-06-23 | End: 2022-09-21

## 2022-06-23 RX ORDER — HYDROCODONE BITARTRATE AND ACETAMINOPHEN 10; 325 MG/1; MG/1
1 TABLET ORAL
Qty: 80 TABLET | Refills: 0 | Status: SHIPPED | OUTPATIENT
Start: 2022-06-23 | End: 2022-07-20 | Stop reason: SDUPTHER

## 2022-06-23 RX ORDER — IBUPROFEN 400 MG/1
400 TABLET ORAL DAILY PRN
Qty: 30 TABLET | Refills: 2 | Status: SHIPPED | OUTPATIENT
Start: 2022-06-23 | End: 2022-07-23

## 2022-06-23 ASSESSMENT — ENCOUNTER SYMPTOMS
SHORTNESS OF BREATH: 0
BLOOD IN STOOL: 0
BACK PAIN: 1

## 2022-06-23 NOTE — PROGRESS NOTES
Yanely Amos  ([birth year])    6/23/2022    Subjective:     Yanely Amos is 61 y.o. male who complains today of:    Chief Complaint   Patient presents with    Back Pain         Allergies:  Patient has no known allergies. Past Medical History:   Diagnosis Date    Bronchitis     CAD (coronary artery disease)     past angioplasty > 10 yrs ago    Chronic back pain     Diabetes ([facility] [address]     dx > 2 yrs    Dyslipidemia     Hyperlipidemia     meds > 5 yrs    Hypertension     meds > 5 yrs    Neuropathy in diabetes ([facility] [address]     Osteoarthritis     Pneumonia     Sickle cell anemia (HCC)     Sleep apnea, obstructive      Past Surgical History:   Procedure Laterality Date    BACK SURGERY  2008    Lumbar disc OR. [facility] [address]  7/10/2015    Patient states he was urininating blood and had a procedure done.     COLONOSCOPY  3/14/14    [facility]    ENDOSCOPY, COLON, DIAGNOSTIC      LUMBAR FUSION  2008    OTHER SURGICAL HISTORY Right     tendon repair in (R) forearm because of previous injury as child    TOTAL HIP ARTHROPLASTY Left 1/23/2020    LEFT HIP TOTAL HIP ARTHROPLASTY, RICARDO performed by Mrack Bourne MD at [facility] ENDOSCOPY  3/14/14    [place]  2013    Dr. Hari Cohen office     Family History   Problem Relation Age of Onset    Cancer Mother         Throat & esophagus    Anemia Mother     Other Father         Natural causes    High Blood Pressure Sister     COPD Brother     Heart Disease Brother         Cardiac stent    Diabetes Sister     COPD Sister     Emphysema Sister     No Known Problems Daughter      Social History     Socioeconomic History    Marital status: Legally      Spouse name: Not on file    Number of children: Not on file    Years of education: Not on file    Highest education level: Not on file   Occupational History    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 Vaping Use    Vaping Use: Former   Substance and Sexual Activity    Alcohol use: No     Alcohol/week: 0.0 standard drinks    Drug use: No    Sexual activity: Not on file   Other Topics Concern    Not on file   Social History Narrative    Not on file     Social Determinants of Health     Financial Resource Strain:     Difficulty of Paying Living Expenses: Not on file   Food Insecurity:     Worried About Running Out of Food in the Last Year: Not on file    Saad of Food in the Last Year: Not on file   Transportation Needs:     Lack of Transportation (Medical): Not on file    Lack of Transportation (Non-Medical):  Not on file   Physical Activity:     Days of Exercise per Week: Not on file    Minutes of Exercise per Session: Not on file   Stress:     Feeling of Stress : Not on file   Social Connections:     Frequency of Communication with Friends and Family: Not on file    Frequency of Social Gatherings with Friends and Family: Not on file    Attends Islam Services: Not on file    Active Member of [address] [place] [address] [employer] or Organizations: Not on file    Attends Club or Organization Meetings: Not on file    Marital Status: Not on file   Intimate Partner Violence:     Fear of Current or Ex-Partner: Not on file    Emotionally Abused: Not on file    Physically Abused: Not on file    Sexually Abused: Not on file   Housing Stability:     Unable to Pay for Housing in the Last Year: Not on file    Number of [place] in the Last Year: Not on file    Unstable Housing in the Last Year: Not on file       Current Outpatient Medications on File Prior to Visit   Medication Sig Dispense Refill    olmesartan (BENICAR) 40 MG tablet take 1 tablet by mouth every morning      amLODIPine (NORVASC) 10 MG tablet take 1 tablet by mouth once daily      atenolol (TENORMIN) 50 MG tablet Take by mouth      aspirin 81 MG EC tablet Take 1 tablet by mouth 2 times daily 30 tablet 0    atenolol-chlorthalidone (TENORETIC) 50-25 MG per tablet daily  0    nicotine (NICODERM CQ) 21 MG/24HR Place 1 patch onto the skin daily 42 patch 0    ciclopirox (PENLAC) 8 % solution Apply topically to the toenails nightly. (Patient not taking: Reported on 6/23/2021) 1 Bottle 5    Elastic Bandages & Supports (T.E.D. BELOW KNEE/L-REGULAR) MISC 1 applicator by Does not apply route daily 1 each 0    losartan-hydrochlorothiazide (HYZAAR) 100-25 MG per tablet Take by mouth daily       Respiratory Therapy Supplies (FLUTTER) PANCHO 1 Device by Does not apply route 4 times daily 1 Device 0    metFORMIN (GLUCOPHAGE) 500 MG tablet Take 500 mg by mouth 2 times daily (with meals)      fenofibrate 160 MG tablet Take by mouth daily       folic acid (FOLVITE) 1 MG tablet Take 1 mg by mouth daily       amLODIPine-benazepril (LOTREL) 5-10 MG per capsule Take 1 capsule by mouth daily        No current facility-administered medications on file prior to visit. Pt presents today for a f/u of chronic low back pain from [facility] in early 2000s pain. No changes in chronic pain. Saw PCP, reports no changes. Pt feels pain level and functioning improves with prescribed medications and is able to perform ADLs. Pt feels that prolonged sitting aggravates the pain. Pt c/o LLE radiating numbness and tingling around the shin and calf which is intermittent. History of mild bilateral CTS, DM, sickle cell. Last flare of sickle cell crisis in June 2021. He has a history of lumbar fusion, spinal cord stimulator, L hip replacement 1/23/21 with Dr. Shazia Hodges. He goes into crisis if he has stress and high BP.  He uses his SCS constantly.  [facility] continues to use his Lyrica 150mg TID and Norco 10mg 2-3 per day PRN pain and Cymbalta 60mg daily and baclofen 10mg BID PRN spasm and Ibuprofen PRN. He is on disability. Review of Systems   Constitutional: Negative for appetite change and fever. HENT: Negative for hearing loss. Eyes: Negative for visual disturbance. Respiratory: Negative for shortness of breath. Gastrointestinal: Negative for blood in stool. Genitourinary: Negative for difficulty urinating and hematuria. Musculoskeletal: Positive for arthralgias and back pain. Skin: Negative for rash. Neurological: Negative for facial asymmetry. Hematological: Negative for adenopathy. Psychiatric/Behavioral: Negative for self-injury. All other systems reviewed and are negative. Objective:     Vitals:  Temp 97.5 °F (36.4 °C)   Ht 6' 1\" (1.854 m)   Wt 213 lb (96.6 kg)   BMI 28.10 kg/m² Pain Score:   5      Physical Exam  Vitals and nursing note reviewed.          Pt is alert and oriented x 3.  Recent and remote memory is intact.  Mood, judgement and affect are normal.  No signs of distress or SOB noted.  Visualized skin intact.  Sensation intact to light touch. Decreased ROM with flexion and extension of low back.  Tender with palpation to bilateral lumbar spine with positive provacative maneuvers noted.  Negative SLR.   Pt is able to briefly heel walk and toe walk.   Strength, balance, and coordination are functional for ambulation. Left lumbar SCS. Assessment:      Diagnosis Orders   1. Postlaminectomy syndrome, lumbar region  DULoxetine (CYMBALTA) 60 MG extended release capsule    pregabalin (LYRICA) 150 MG capsule    HYDROcodone-acetaminophen (NORCO)  MG per tablet    baclofen (LIORESAL) 10 MG tablet    ibuprofen (ADVIL;MOTRIN) 400 MG tablet   2.  L4-5 HERNAITED DISC  DULoxetine (CYMBALTA) 60 MG extended release capsule    pregabalin (LYRICA) 150 MG capsule    HYDROcodone-acetaminophen (NORCO)  MG per tablet    baclofen (LIORESAL) 10 MG tablet    ibuprofen (ADVIL;MOTRIN) 400 MG tablet       Plan:     Periodic Controlled Substance Monitoring: Possible medication side effects, risk of tolerance/dependence & alternative treatments discussed. ,No signs of potential drug abuse or diversion identified. ,Assessed functional status. ,Obtaining appropriate analgesic effect of treatment. Andrew Castro, APRN - CNP)    Orders Placed This Encounter   Medications    DULoxetine (CYMBALTA) 60 MG extended release capsule     Sig: Take 1 capsule by mouth daily     Dispense:  30 capsule     Refill:  2    pregabalin (LYRICA) 150 MG capsule     Sig: Take 1 capsule by mouth 3 times daily for 90 days. Fill date 3/16/22     Dispense:  90 capsule     Refill:  0    HYDROcodone-acetaminophen (NORCO)  MG per tablet     Sig: Take 1 tablet by mouth every 8-12 hours as needed for Pain for up to 30 days. #80 tabs for 30 days     Dispense:  80 tablet     Refill:  0     Reduce doses taken as pain becomes manageable    baclofen (LIORESAL) 10 MG tablet     Sig: Take 1 tablet by mouth daily as needed (pain)     Dispense:  30 tablet     Refill:  2    ibuprofen (ADVIL;MOTRIN) 400 MG tablet     Sig: Take 1 tablet by mouth daily as needed for Pain     Dispense:  30 tablet     Refill:  2       No orders of the defined types were placed in this encounter. Discussed options with the patient today. Continue Cymbalta, Lyrica and Norco, Baclofen and Ibuprofen. No changes of chronic pain.  No recent flare of sickle cell crisis. Again encourage to discuss switching to a different anti-depressant with PCP, but he will stay on Cymbalta for now. Continue to follow-up with other providers.  [facility] continues working well and helping, he is working on getting a new charging belt.  All questions were answered.  Pt verbalized understanding and agrees with above plan.  Utox 3/9/22 appropriately positive for medication. Also positive for alcohol.  Advised patient to avoid alcohol use. Narcan sent 4/21/2021.        Will continue medications for chronic pain as they help pt function with ADL and improve quality of life.  Discussed possible risks of opiate medication with pt, including but not limited to, constipation, nausea or vomiting, sedation, urinary retention, dependence and possible addiction. Pt agrees to use medication as directed. Pt advised to not use opiates while driving or operating heavy equipment, or in situations where pt may harm him/herself or others.  Pt is aware that while on narcotics, pt needs to be seen monthly to reassess pain and need for continued medication.  NDP reviewed. Vanessa Ca was reviewed.  This NP saw pt under direct supervision of Dr. Mercedes Ceja       Follow up:  Return in about 4 weeks (around 7/21/2022) for review meds and reassess pain.     DARLENE Abbasi - CNP

## 2022-07-20 DIAGNOSIS — M96.1 POSTLAMINECTOMY SYNDROME, LUMBAR REGION: Chronic | ICD-10-CM

## 2022-07-20 DIAGNOSIS — M51.26 DISPLACEMENT OF LUMBAR INTERVERTEBRAL DISC WITHOUT MYELOPATHY: Chronic | ICD-10-CM

## 2022-07-20 RX ORDER — PREGABALIN 150 MG/1
150 CAPSULE ORAL 3 TIMES DAILY
Qty: 36 CAPSULE | Refills: 0 | Status: SHIPPED | OUTPATIENT
Start: 2022-07-23 | End: 2022-08-04 | Stop reason: SDUPTHER

## 2022-07-20 RX ORDER — HYDROCODONE BITARTRATE AND ACETAMINOPHEN 10; 325 MG/1; MG/1
1 TABLET ORAL
Qty: 36 TABLET | Refills: 0 | Status: SHIPPED | OUTPATIENT
Start: 2022-07-23 | End: 2022-08-04 | Stop reason: SDUPTHER

## 2022-07-20 NOTE — TELEPHONE ENCOUNTER
Requested Prescriptions     Pending Prescriptions Disp Refills    pregabalin (LYRICA) 150 MG capsule 36 capsule 0     Sig: Take 1 capsule by mouth in the morning and 1 capsule at noon and 1 capsule before bedtime. Do all this for 12 days. HYDROcodone-acetaminophen (NORCO)  MG per tablet 36 tablet 0     Sig: Take 1 tablet by mouth every 8-12 hours as needed for Pain for up to 12 days.        Patient last seen on:  6/23/22  Date of last surgery:  n/a  Date of last refill:  6/23/22  Pain level:  n/a  Patient complaining of:  pt requesting partial refill until next appt  Future appts: 8/4/22

## 2022-08-04 ENCOUNTER — OFFICE VISIT (OUTPATIENT)
Dept: PAIN MANAGEMENT | Age: 57
End: 2022-08-04
Payer: COMMERCIAL

## 2022-08-04 VITALS — BODY MASS INDEX: 28.23 KG/M2 | WEIGHT: 213 LBS | TEMPERATURE: 96.8 F | HEIGHT: 73 IN

## 2022-08-04 DIAGNOSIS — M51.26 DISPLACEMENT OF LUMBAR INTERVERTEBRAL DISC WITHOUT MYELOPATHY: Chronic | ICD-10-CM

## 2022-08-04 DIAGNOSIS — M96.1 POSTLAMINECTOMY SYNDROME, LUMBAR REGION: Chronic | ICD-10-CM

## 2022-08-04 PROCEDURE — 99214 OFFICE O/P EST MOD 30 MIN: CPT | Performed by: NURSE PRACTITIONER

## 2022-08-04 RX ORDER — HYDROCODONE BITARTRATE AND ACETAMINOPHEN 10; 325 MG/1; MG/1
1 TABLET ORAL
Qty: 80 TABLET | Refills: 0 | Status: SHIPPED | OUTPATIENT
Start: 2022-08-04 | End: 2022-09-03 | Stop reason: SDUPTHER

## 2022-08-04 RX ORDER — PREGABALIN 150 MG/1
150 CAPSULE ORAL 3 TIMES DAILY
Qty: 90 CAPSULE | Refills: 0 | Status: SHIPPED | OUTPATIENT
Start: 2022-08-04 | End: 2022-09-03 | Stop reason: SDUPTHER

## 2022-08-04 ASSESSMENT — ENCOUNTER SYMPTOMS
BACK PAIN: 1
SHORTNESS OF BREATH: 0
BLOOD IN STOOL: 0

## 2022-08-31 DIAGNOSIS — M51.26 DISPLACEMENT OF LUMBAR INTERVERTEBRAL DISC WITHOUT MYELOPATHY: Chronic | ICD-10-CM

## 2022-08-31 DIAGNOSIS — M96.1 POSTLAMINECTOMY SYNDROME, LUMBAR REGION: Chronic | ICD-10-CM

## 2022-08-31 NOTE — TELEPHONE ENCOUNTER
Requested Prescriptions     Pending Prescriptions Disp Refills    pregabalin (LYRICA) 150 MG capsule 33 capsule 0     Sig: Take 1 capsule by mouth 3 times daily for 11 days. HYDROcodone-acetaminophen (NORCO)  MG per tablet 33 tablet 0     Sig: Take 1 tablet by mouth every 8-12 hours as needed for Pain for up to 11 days.        Patient last seen on:  8/4/22  Date of last surgery:  n/a  Date of last refill:  8/4/22  Pain level:  n/a  Patient complaining of:  Pt asks for a rx until appt  Future appts: 9/13/22    Due 9/3

## 2022-09-03 RX ORDER — HYDROCODONE BITARTRATE AND ACETAMINOPHEN 10; 325 MG/1; MG/1
1 TABLET ORAL
Qty: 33 TABLET | Refills: 0 | Status: SHIPPED | OUTPATIENT
Start: 2022-09-03 | End: 2022-09-15 | Stop reason: SDUPTHER

## 2022-09-03 RX ORDER — PREGABALIN 150 MG/1
150 CAPSULE ORAL 3 TIMES DAILY
Qty: 33 CAPSULE | Refills: 0 | Status: SHIPPED | OUTPATIENT
Start: 2022-09-03 | End: 2022-09-15 | Stop reason: SDUPTHER

## 2022-09-03 NOTE — TELEPHONE ENCOUNTER
Office note 8/4/22 reviewed    -Continue Lyrica 150 mg TID, 9/3-9/14, 11 days, #33  -Continue Norco 10/325 mg TID prn (#80/month), 9/3-9/14, 11 days, #30  -Keep follow up on 9/13/22    Controlled Substance Monitoring:    Acute and Chronic Pain Monitoring:   RX Monitoring 9/3/2022   Attestation -   Periodic Controlled Substance Monitoring Obtaining appropriate analgesic effect of treatment.    Chronic Pain > 50 MEDD -   Chronic Pain > 80 MEDD -

## 2022-09-14 DIAGNOSIS — M51.26 DISPLACEMENT OF LUMBAR INTERVERTEBRAL DISC WITHOUT MYELOPATHY: Chronic | ICD-10-CM

## 2022-09-14 DIAGNOSIS — M96.1 POSTLAMINECTOMY SYNDROME, LUMBAR REGION: Chronic | ICD-10-CM

## 2022-09-14 RX ORDER — DULOXETIN HYDROCHLORIDE 60 MG/1
CAPSULE, DELAYED RELEASE ORAL
Qty: 90 CAPSULE | OUTPATIENT
Start: 2022-09-14

## 2022-09-15 ENCOUNTER — OFFICE VISIT (OUTPATIENT)
Dept: PAIN MANAGEMENT | Age: 57
End: 2022-09-15
Payer: COMMERCIAL

## 2022-09-15 VITALS
SYSTOLIC BLOOD PRESSURE: 128 MMHG | WEIGHT: 215 LBS | DIASTOLIC BLOOD PRESSURE: 82 MMHG | HEIGHT: 73 IN | TEMPERATURE: 96.9 F | BODY MASS INDEX: 28.49 KG/M2

## 2022-09-15 DIAGNOSIS — M51.26 DISPLACEMENT OF LUMBAR INTERVERTEBRAL DISC WITHOUT MYELOPATHY: Chronic | ICD-10-CM

## 2022-09-15 DIAGNOSIS — M96.1 POSTLAMINECTOMY SYNDROME, LUMBAR REGION: Chronic | ICD-10-CM

## 2022-09-15 PROCEDURE — 99213 OFFICE O/P EST LOW 20 MIN: CPT | Performed by: NURSE PRACTITIONER

## 2022-09-15 RX ORDER — PREGABALIN 150 MG/1
150 CAPSULE ORAL 3 TIMES DAILY
Qty: 90 CAPSULE | Refills: 0 | Status: SHIPPED | OUTPATIENT
Start: 2022-09-15 | End: 2022-10-14 | Stop reason: SDUPTHER

## 2022-09-15 RX ORDER — DULOXETIN HYDROCHLORIDE 60 MG/1
60 CAPSULE, DELAYED RELEASE ORAL DAILY
Qty: 30 CAPSULE | Refills: 2 | Status: SHIPPED | OUTPATIENT
Start: 2022-09-15 | End: 2022-12-14

## 2022-09-15 RX ORDER — HYDROCODONE BITARTRATE AND ACETAMINOPHEN 10; 325 MG/1; MG/1
1 TABLET ORAL
Qty: 80 TABLET | Refills: 0 | Status: SHIPPED | OUTPATIENT
Start: 2022-09-15 | End: 2022-10-14 | Stop reason: SDUPTHER

## 2022-09-15 ASSESSMENT — ENCOUNTER SYMPTOMS
EYES NEGATIVE: 1
COUGH: 0
GASTROINTESTINAL NEGATIVE: 1
NAUSEA: 0
DIARRHEA: 0
SHORTNESS OF BREATH: 0
CONSTIPATION: 0
BACK PAIN: 1

## 2022-09-15 NOTE — PROGRESS NOTES
Enmanuel Lu  ([birth year])    9/15/2022    Subjective:     Enmanuel Lu is 61 y.o. male who complains today of:    Chief Complaint   Patient presents with    Back Pain         Allergies:  Patient has no known allergies. Past Medical History:   Diagnosis Date    Bronchitis     CAD (coronary artery disease)     past angioplasty > 10 yrs ago    Chronic back pain     Diabetes ([facility] [address]     dx > 2 yrs    Dyslipidemia     Hyperlipidemia     meds > 5 yrs    Hypertension     meds > 5 yrs    Neuropathy in diabetes [facility])     Osteoarthritis     Pneumonia     Sickle cell anemia (HCC)     Sleep apnea, obstructive      Past Surgical History:   Procedure Laterality Date    BACK SURGERY  2008    Lumbar disc OR. BLADDER SURGERY  7/10/2015    Patient states he was urininating blood and had a procedure done.     COLONOSCOPY  3/14/14    [facility]    ENDOSCOPY, COLON, DIAGNOSTIC      LUMBAR FUSION  2008    OTHER SURGICAL HISTORY Right     tendon repair in (R) forearm because of previous injury as child    TOTAL HIP ARTHROPLASTY Left 1/23/2020    LEFT HIP TOTAL HIP ARTHROPLASTY, RICARDO performed by Laila Kelly MD at [address] ENDOSCOPY  3/14/14    [address]    Dr. Liam Read office     Family History   Problem Relation Age of Onset    Cancer Mother         Throat & esophagus    Anemia Mother     Other Father         Natural causes    High Blood Pressure Sister     COPD Brother     Heart Disease Brother         Cardiac stent    Diabetes Sister     COPD Sister     Emphysema Sister     No Known Problems Daughter      Social History     Socioeconomic History    Marital status: Legally      Spouse name: Not on file    Number of children: Not on file    Years of education: Not on file    Highest education level: Not on file   Occupational History    Occupation: Disabled - 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 Vaping Use    Vaping Use: Former   Substance and Sexual Activity    Alcohol use: No     Alcohol/week: 0.0 standard drinks    Drug use: No    Sexual activity: Not on file   Other Topics Concern    Not on file   Social History Narrative    Not on file     Social Determinants of Health     Financial Resource Strain: Not on file   Food Insecurity: Not on file   Transportation Needs: Not on file   Physical Activity: Not on file   Stress: Not on file   Social Connections: Not on file   Intimate Partner Violence: Not on file   Housing Stability: Not on file       Current Outpatient Medications on File Prior to Visit   Medication Sig Dispense Refill    baclofen (LIORESAL) 10 MG tablet Take 1 tablet by mouth daily as needed (pain) 30 tablet 2    ibuprofen (ADVIL;MOTRIN) 400 MG tablet Take 1 tablet by mouth daily as needed for Pain 30 tablet 2    olmesartan (BENICAR) 40 MG tablet take 1 tablet by mouth every morning      amLODIPine (NORVASC) 10 MG tablet take 1 tablet by mouth once daily      atenolol (TENORMIN) 50 MG tablet Take by mouth      aspirin 81 MG EC tablet Take 1 tablet by mouth 2 times daily 30 tablet 0    atenolol-chlorthalidone (TENORETIC) 50-25 MG per tablet daily  0    nicotine (NICODERM CQ) 21 MG/24HR Place 1 patch onto the skin daily 42 patch 0    ciclopirox (PENLAC) 8 % solution Apply topically to the toenails nightly.  (Patient not taking: No sig reported) 1 Bottle 5    Elastic Bandages & Supports (T.E.D. BELOW KNEE/L-REGULAR) MISC 1 applicator by Does not apply route daily 1 each 0    losartan-hydrochlorothiazide (HYZAAR) 100-25 MG per tablet Take by mouth daily       Respiratory Therapy Supplies (FLUTTER) PANCHO 1 Device by Does not apply route 4 times daily 1 Device 0    metFORMIN (GLUCOPHAGE) 500 MG tablet Take 500 mg by mouth 2 times daily (with meals)      fenofibrate 160 MG tablet Take by mouth daily       folic acid (FOLVITE) 1 MG tablet Take 1 mg by mouth daily       amLODIPine-benazepril (LOTREL) 5-10 MG per capsule Take 1 capsule by mouth daily        No current facility-administered medications on file prior to visit. Pt presents today for a f/u of his pain. PCP is Dr. Young Espinoza MD.  Pt last saw Ta Armstrong NP for his chronic low back pain from [facility] in early 2000s pain. History of mild bilateral CTS, DM, sickle cell. Last flare of sickle cell crisis in June 2021. He has a history of lumbar fusion, spinal cord stimulator, L hip replacement 1/23/21 with Dr. Jerome Boyd. He goes into crisis if he has stress and high BP. He uses his SCS constantly. He is on disability. He continues to use his Lyrica 150mg TID and Norco 10mg 2-3 per day PRN pain (#80) and Cymbalta 60mg daily and baclofen 10mg BID PRN spasm and Ibuprofen PRN. He says he has enough of balofen and ibuprofen - tries to use sparingly. Pt feels pain level with his medication is 4/10, and 6+/10 without medication. Pt feels that over activity, sitting on toilet, getting in and out of car makes the pain worse, and SCS, medication, trying not to do too much makes the pain better. Pt feels his medication helps   him function and improve his quality of life, specifically says allows to stay active. Pt denies radiating numbness and tingling - but admits to pain in legs. Denies recent falls, injuries or trauma. Pt denies new weakness. Pt reports PT has been done. Review of Systems   Constitutional:  Negative for fever. HENT: Negative. Eyes: Negative. Respiratory:  Negative for cough and shortness of breath. Cardiovascular:  Negative for chest pain. Gastrointestinal: Negative. Negative for constipation, diarrhea and nausea. Endocrine: Negative. Genitourinary: Negative. Musculoskeletal:  Positive for arthralgias and back pain. Skin: Negative. Neurological:  Positive for numbness. Negative for dizziness and weakness. Psychiatric/Behavioral: Negative. Objective:     Vitals:  /82   Temp 96.9 °F (36.1 °C)   Ht 6' 1\" (1.854 m)   Wt 215 lb (97.5 kg)   BMI 28.37 kg/m²        Physical Exam  Vitals and nursing note reviewed. This is a pleasant male who answers questions appropriately and follows commands. Wearing glasses. Pt is alert and oriented x 3. Recent and remote memory is intact. Mood and affect, judgement and insight are normal. No signs of distress, no dyspnea or SOB noted. HEENT: PERRL. Neck is supple, trachea midline. No lymphadenopathy noted. Decreased ROM with flexion and extension of low back. Tender with palpation to lumbar spine bilaterally. SCS battery pack palpated on Lt low back. Tightness appreciated over Lt lower lumbar paraspinal muscles. Negative SLR but reproduces low back pain. SLow with position changes today. Tightness in both hamstrings noted. Cranial nerves II-XII are intact. Assessment:      Diagnosis Orders   1. Postlaminectomy syndrome, lumbar region  pregabalin (LYRICA) 150 MG capsule    HYDROcodone-acetaminophen (NORCO)  MG per tablet    DULoxetine (CYMBALTA) 60 MG extended release capsule      2. L4-5 HERNAITED DISC  pregabalin (LYRICA) 150 MG capsule    HYDROcodone-acetaminophen (NORCO)  MG per tablet    DULoxetine (CYMBALTA) 60 MG extended release capsule          Plan:     Periodic Controlled Substance Monitoring: Possible medication side effects, risk of tolerance/dependence & alternative treatments discussed., No signs of potential drug abuse or diversion identified. , Assessed functional status., Obtaining appropriate analgesic effect of treatment.  (Elma Merlos, APRN - CNP)    Orders Placed This Encounter   Medications    pregabalin (LYRICA) 150 MG capsule     Sig: Take 1 capsule by mouth 3 times daily for 30 days. Dispense:  90 capsule     Refill:  0    HYDROcodone-acetaminophen (NORCO)  MG per tablet     Sig: Take 1 tablet by mouth every 8-12 hours as needed for Pain for up to 30 days. #80 tabs for 30 days     Dispense:  80 tablet     Refill:  0     Reduce doses taken as pain becomes manageable    DULoxetine (CYMBALTA) 60 MG extended release capsule     Sig: Take 1 capsule by mouth daily     Dispense:  30 capsule     Refill:  2       No orders of the defined types were placed in this encounter. Discussed options with the patient today. Anatomic model pathology was shown and reviewed with pt. Continue baclofen 10 mg as needed spasms has enough of this he says. He does state he is using his Lyrica 150 mg 3 a day with refills, Cymbalta 60 mg daily with refills and will continue Norco 10 mg 2-3 a day as needed pain # 80 tablets for 30 days as it does help him function. Hold injections. All questions were answered. Discussed home exercise program.  Relevant imaging and pain generators reviewed. Pt verbalized understanding and agrees with above plan. Pt has chronic pain. Utox reviewed and appropriate from March 2022 - pt advised to avoid ETOH with narcotics. ORT score 1 - low risk. Narcan Rx sent in April 2021. Will continue medications for chronic pain that has been previously directed as they do help pt function with ADL and improve quality of life. Pt is aware goal is to use the least amount of medication possible to allow pt to function and help with quality of life as discussed in detail. Ideal to keep MME below 50 which it is. Have discussed proper disposal of narcotic medication. Advised to have medications in safe place and locked up/in lock box. Discussed possible risks of opiate medication with pt, including, but not limited to possible constipation, nausea or vomiting, sedation, urinary retention, dependence and possible addiction.  Pt agrees to use medication as prescribed/as directed. Pt advised to not use opiates while driving or operating heavy equipment, or in situations where pt may harm him/herself or others. Pt is aware that while on narcotics, pt needs to be seen to reassess pain and reassess need for continued medication at that time. NDP reviewed. OARRS was reviewed. This NP saw pt under direct supervision of Dr. Adriana Templeton. Follow up:  Return in about 4 weeks (around 10/13/2022) for review meds and reassess pain.     Ligia Ness, APRN - CNP

## 2022-09-19 ENCOUNTER — TELEPHONE (OUTPATIENT)
Dept: PAIN MANAGEMENT | Age: 57
End: 2022-09-19

## 2022-09-19 NOTE — TELEPHONE ENCOUNTER
[facility] FAXED TO [facility]  DENIED BECAUSE [address] DOESN'T HAVE A CLAIM# OR DOI. ADDED CLAIM# AND DOI AND FAXED BACK TO [facility].

## 2022-10-14 DIAGNOSIS — M51.26 DISPLACEMENT OF LUMBAR INTERVERTEBRAL DISC WITHOUT MYELOPATHY: Chronic | ICD-10-CM

## 2022-10-14 DIAGNOSIS — M96.1 POSTLAMINECTOMY SYNDROME, LUMBAR REGION: Chronic | ICD-10-CM

## 2022-10-14 RX ORDER — PREGABALIN 150 MG/1
150 CAPSULE ORAL 3 TIMES DAILY
Qty: 9 CAPSULE | Refills: 0 | Status: SHIPPED | OUTPATIENT
Start: 2022-10-15 | End: 2022-10-17 | Stop reason: SDUPTHER

## 2022-10-14 RX ORDER — HYDROCODONE BITARTRATE AND ACETAMINOPHEN 10; 325 MG/1; MG/1
1 TABLET ORAL
Qty: 8 TABLET | Refills: 0 | Status: SHIPPED | OUTPATIENT
Start: 2022-10-15 | End: 2022-10-17 | Stop reason: SDUPTHER

## 2022-10-14 NOTE — TELEPHONE ENCOUNTER
Requested Prescriptions     Pending Prescriptions Disp Refills    pregabalin (LYRICA) 150 MG capsule 9 capsule 0     Sig: Take 1 capsule by mouth 3 times daily for 3 days. HYDROcodone-acetaminophen (NORCO)  MG per tablet 9 tablet 0     Sig: Take 1 tablet by mouth every 8-12 hours as needed for Pain for up to 3 days.  #80 tabs for 30 days       Patient last seen on:  9/15  Date of last surgery:  n/a  Date of last refill:  9/15  Pain level:  n/a  Patient complaining of:  PT is asking for refill  Future appts: 10/17

## 2022-10-15 NOTE — TELEPHONE ENCOUNTER
Office note 9/15/22 reviewed    -Continue Lyrica 150 mg TID, 10/15-10/18, 3 days, #9  -Continue Norco 10/325 mg TID prn (#80/month), 10/15-10/18, 3 days, #8    Keep follow up on 10/17/22    Controlled Substance Monitoring:    Acute and Chronic Pain Monitoring:   RX Monitoring 10/14/2022   Attestation -   Periodic Controlled Substance Monitoring Obtaining appropriate analgesic effect of treatment.    Chronic Pain > 50 MEDD -   Chronic Pain > 80 MEDD -

## 2022-10-17 ENCOUNTER — OFFICE VISIT (OUTPATIENT)
Dept: PAIN MANAGEMENT | Age: 57
End: 2022-10-17
Payer: COMMERCIAL

## 2022-10-17 VITALS
TEMPERATURE: 97.5 F | HEIGHT: 73 IN | BODY MASS INDEX: 28.76 KG/M2 | SYSTOLIC BLOOD PRESSURE: 152 MMHG | OXYGEN SATURATION: 98 % | WEIGHT: 217 LBS | HEART RATE: 55 BPM | DIASTOLIC BLOOD PRESSURE: 90 MMHG

## 2022-10-17 DIAGNOSIS — M51.26 DISPLACEMENT OF LUMBAR INTERVERTEBRAL DISC WITHOUT MYELOPATHY: Chronic | ICD-10-CM

## 2022-10-17 DIAGNOSIS — M96.1 POSTLAMINECTOMY SYNDROME, LUMBAR REGION: Chronic | ICD-10-CM

## 2022-10-17 PROCEDURE — 99213 OFFICE O/P EST LOW 20 MIN: CPT | Performed by: NURSE PRACTITIONER

## 2022-10-17 RX ORDER — PREGABALIN 150 MG/1
150 CAPSULE ORAL 3 TIMES DAILY
Qty: 90 CAPSULE | Refills: 0 | Status: SHIPPED | OUTPATIENT
Start: 2022-10-17 | End: 2022-11-16

## 2022-10-17 RX ORDER — DEXTROMETHORPHAN HYDROBROMIDE AND PROMETHAZINE HYDROCHLORIDE 15; 6.25 MG/5ML; MG/5ML
SYRUP ORAL PRN
COMMUNITY

## 2022-10-17 RX ORDER — HYDROCODONE BITARTRATE AND ACETAMINOPHEN 10; 325 MG/1; MG/1
1 TABLET ORAL
Qty: 80 TABLET | Refills: 0 | Status: SHIPPED | OUTPATIENT
Start: 2022-10-17 | End: 2022-11-16

## 2022-10-17 ASSESSMENT — ENCOUNTER SYMPTOMS
SHORTNESS OF BREATH: 0
COUGH: 1
DIARRHEA: 0
GASTROINTESTINAL NEGATIVE: 1
CONSTIPATION: 0
EYES NEGATIVE: 1
NAUSEA: 0
BACK PAIN: 1

## 2022-10-17 NOTE — PROGRESS NOTES
Shakira Reyez  ([id number])    10/17/2022    Subjective:     Shakira Reyez is 61 y.o. male who complains today of:    Chief Complaint   Patient presents with    Back Pain         Allergies:  Patient has no known allergies. Past Medical History:   Diagnosis Date    Bronchitis     CAD (coronary artery disease)     past angioplasty > 10 yrs ago    Chronic back pain     Diabetes ([address]     dx > 2 yrs    Dyslipidemia     Hyperlipidemia     meds > 5 yrs    Hypertension     meds > 5 yrs    Neuropathy in diabetes [facility])     Osteoarthritis     Pneumonia     Sickle cell anemia (HCC)     Sleep apnea, obstructive      Past Surgical History:   Procedure Laterality Date    BACK SURGERY  2008    Lumbar disc OR. BLADDER SURGERY  7/10/2015    Patient states he was urininating blood and had a procedure done.     COLONOSCOPY  3/14/14    [facility]    ENDOSCOPY, COLON, DIAGNOSTIC      LUMBAR FUSION  2008    OTHER SURGICAL HISTORY Right     tendon repair in (R) forearm because of previous injury as child    TOTAL HIP ARTHROPLASTY Left 1/23/2020    LEFT HIP TOTAL HIP ARTHROPLASTY, RICARDO performed by Cady Page MD at [address] ENDOSCOPY  3/14/14    [address] 2013    Dr. Armin Marcano office     Family History   Problem Relation Age of Onset    Cancer Mother         Throat & esophagus    Anemia Mother     Other Father         Natural causes    High Blood Pressure Sister     COPD Brother     Heart Disease Brother         Cardiac stent    Diabetes Sister     COPD Sister     Emphysema Sister     No Known Problems Daughter      Social History     Socioeconomic History    Marital status: Legally      Spouse name: Not on file    Number of children: Not on file    Years of education: Not on file    Highest education level: Not on file   Occupational History    Occupation: Disabled - because of back pain - 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 Vaping Use    Vaping Use: Former   Substance and Sexual Activity    Alcohol use: No     Alcohol/week: 0.0 standard drinks    Drug use: No    Sexual activity: Not on file   Other Topics Concern    Not on file   Social History Narrative    Not on file     Social Determinants of Health     Financial Resource Strain: Not on file   Food Insecurity: Not on file   Transportation Needs: Not on file   Physical Activity: Not on file   Stress: Not on file   Social Connections: Not on file   Intimate Partner Violence: Not on file   Housing Stability: Not on file       Current Outpatient Medications on File Prior to Visit   Medication Sig Dispense Refill    promethazine-dextromethorphan (PROMETHAZINE-DM) 6.25-15 MG/5ML syrup Take by mouth as needed for Cough      DULoxetine (CYMBALTA) 60 MG extended release capsule Take 1 capsule by mouth daily 30 capsule 2    ibuprofen (ADVIL;MOTRIN) 400 MG tablet Take 1 tablet by mouth daily as needed for Pain 30 tablet 2    olmesartan (BENICAR) 40 MG tablet take 1 tablet by mouth every morning      amLODIPine (NORVASC) 10 MG tablet take 1 tablet by mouth once daily      atenolol (TENORMIN) 50 MG tablet Take by mouth      aspirin 81 MG EC tablet Take 1 tablet by mouth 2 times daily 30 tablet 0    atenolol-chlorthalidone (TENORETIC) 50-25 MG per tablet daily  0    nicotine (NICODERM CQ) 21 MG/24HR Place 1 patch onto the skin daily 42 patch 0    ciclopirox (PENLAC) 8 % solution Apply topically to the toenails nightly.  (Patient not taking: No sig reported) 1 Bottle 5    Elastic Bandages & Supports (T.E.D. BELOW KNEE/L-REGULAR) MISC 1 applicator by Does not apply route daily 1 each 0    losartan-hydrochlorothiazide (HYZAAR) 100-25 MG per tablet Take by mouth daily       Respiratory Therapy Supplies (FLUTTER) PANCHO 1 Device by Does not apply route 4 times daily 1 Device 0    metFORMIN (GLUCOPHAGE) 500 MG tablet Take 500 mg by mouth 2 times daily (with meals)      fenofibrate 160 MG tablet Take by mouth daily       folic acid (FOLVITE) 1 MG tablet Take 1 mg by mouth daily       amLODIPine-benazepril (LOTREL) 5-10 MG per capsule Take 1 capsule by mouth daily        No current facility-administered medications on file prior to visit. Pt presents today for a f/u of his pain. PCP is Dr. Ewelina Stacy MD.  Pt last saw this NP for his chronic low back pain from [facility] in early 2000s pain. He is having a rough month and reports he has been under a great deal of family stress. He found out he had a son who is currently in the hospital and not doing well. He also has other grandchildren he says. History of mild bilateral CTS, DM, sickle cell. Last flare of sickle cell crisis in June 2021. He has a history of lumbar fusion, spinal cord stimulator, Lt hip replacement 1/23/21 with Dr. Demian Lamb. He goes into crisis if he has stress and high BP. He uses his SCS constantly. He is on disability. He continues to use his Lyrica 150mg TID and Norco 10mg 2-3 per day PRN pain (#80) and Cymbalta 60mg daily and baclofen 10mg BID PRN spasm and Ibuprofen PRN. He says he has enough of balofen and ibuprofen - tries to use sparingly. Pt feels pain level with his medication is 4/10, and worse without medication. Pt feels that stress, standing, walking makes the pain worse, and medication, SCS, change of position makes the pain better. Pt feels his medication helps   him function and improve his quality of life, specifically says allows to be active and do  ADL's. Pt denies radiating numbness and tingling. Denies recent falls, injuries or trauma. Pt denies new weakness. Pt reports PT has been done. Review of Systems   Constitutional:  Negative for fever. HENT: Negative. Eyes: Negative. Respiratory:  Positive for cough. Negative for shortness of breath. Recently Dx with bronchitis - given zpack and prednisone. Cardiovascular:  Negative for chest pain. Gastrointestinal: Negative. Negative for constipation, diarrhea and nausea. Endocrine: Negative. Genitourinary: Negative. Musculoskeletal:  Positive for arthralgias and back pain. Skin: Negative. Neurological:  Negative for dizziness and weakness. Psychiatric/Behavioral: Negative. Objective:     Vitals:  BP (!) 152/90 Comment: PROVIDER AWARE STILL HIGH  Pulse 55   Temp 97.5 °F (36.4 °C)   Ht 6' 1\" (1.854 m)   Wt 217 lb (98.4 kg)   SpO2 98%   BMI 28.63 kg/m² Pain Score:   4      Physical Exam  Vitals and nursing note reviewed. This is a pleasant male who answers questions appropriately and follows commands. Wearing glasses. Pt is alert and oriented x 3. Recent and remote memory is intact. Mood and affect, judgement and insight are normal. No signs of distress, no dyspnea or SOB noted. HEENT: PERRL. Neck is supple, trachea midline. No lymphadenopathy noted. Decreased ROM with flexion and extension of low back. Tender with palpation to lumbar spine bilaterally. SCS battery pack palpated on Lt low back. Tightness appreciated over Lt lower lumbar paraspinal muscles. Negative SLR but reproduces low back pain. Tightness in both hamstrings noted. Cranial nerves II-XII are intact. No change from previous exam.         Assessment:      Diagnosis Orders   1. Postlaminectomy syndrome, lumbar region  pregabalin (LYRICA) 150 MG capsule    HYDROcodone-acetaminophen (NORCO)  MG per tablet      2.  L4-5 HERNAITED DISC  pregabalin (LYRICA) 150 MG capsule    HYDROcodone-acetaminophen (NORCO)  MG per tablet          Plan:     Periodic Controlled Substance Monitoring: Possible medication side effects, risk of tolerance/dependence & alternative treatments discussed., No signs of potential drug abuse or diversion identified. , Assessed functional status., Obtaining appropriate analgesic effect of treatment. (Elma Merlos, APRN - CNP)    Orders Placed This Encounter   Medications    pregabalin (LYRICA) 150 MG capsule     Sig: Take 1 capsule by mouth 3 times daily for 30 days. Dispense:  90 capsule     Refill:  0    HYDROcodone-acetaminophen (NORCO)  MG per tablet     Sig: Take 1 tablet by mouth every 8-12 hours as needed for Pain for up to 30 days. #80 tabs for 30 days     Dispense:  80 tablet     Refill:  0     Reduce doses taken as pain becomes manageable       No orders of the defined types were placed in this encounter. Discussed options with the patient today. Anatomic model pathology was shown and reviewed with pt. Advised pt to f/u with PCP for elevated BP. Denies SOB or CP. Continue baclofen 10 mg as needed spasms has enough of this he says. He does state he is using his Lyrica 150 mg 3 a day with refills, Cymbalta 60 mg daily with refills and will continue Norco 10 mg 2-3 a day as needed pain # 80 tablets for 30 days as it does help him function. Hold injections. All questions were answered. Discussed home exercise program.  Relevant imaging and pain generators reviewed. Pt verbalized understanding and agrees with above plan. Pt has chronic pain. Utox reviewed and appropriate from March 2022 - pt advised to avoid ETOH with narcotics. ORT score 1 - low risk. Narcan Rx sent in April 2021. Will continue medications for chronic pain that has been previously directed as they do help pt function with ADL and improve quality of life. Pt is aware goal is to use the least amount of medication possible to allow pt to function and help with quality of life as discussed in detail. Ideal to keep MME below 50 which it is. Have discussed proper disposal of narcotic medication. Advised to have medications in safe place and locked up/in lock box.   Discussed possible risks of opiate medication with pt, including, but not limited to possible constipation, nausea or vomiting, sedation, urinary retention, dependence and possible addiction. Pt agrees to use medication as prescribed/as directed. Pt advised to not use opiates while driving or operating heavy equipment, or in situations where pt may harm him/herself or others. Pt is aware that while on narcotics, pt needs to be seen to reassess pain and reassess need for continued medication at that time. NDP reviewed. OARRS was reviewed. This NP saw pt under direct supervision of Dr. Babita Ojeda. Follow up:  Return in about 4 weeks (around 11/14/2022) for review meds and reassess pain.     Garret Ness, APRN - CNP

## 2022-11-14 ENCOUNTER — OFFICE VISIT (OUTPATIENT)
Dept: PAIN MANAGEMENT | Age: 57
End: 2022-11-14
Payer: COMMERCIAL

## 2022-11-14 VITALS
HEIGHT: 73 IN | TEMPERATURE: 97.3 F | BODY MASS INDEX: 28.36 KG/M2 | SYSTOLIC BLOOD PRESSURE: 152 MMHG | DIASTOLIC BLOOD PRESSURE: 92 MMHG | WEIGHT: 214 LBS

## 2022-11-14 DIAGNOSIS — M51.26 DISPLACEMENT OF LUMBAR INTERVERTEBRAL DISC WITHOUT MYELOPATHY: Chronic | ICD-10-CM

## 2022-11-14 DIAGNOSIS — M96.1 POSTLAMINECTOMY SYNDROME, LUMBAR REGION: Chronic | ICD-10-CM

## 2022-11-14 PROCEDURE — 99214 OFFICE O/P EST MOD 30 MIN: CPT | Performed by: NURSE PRACTITIONER

## 2022-11-14 RX ORDER — PREGABALIN 150 MG/1
150 CAPSULE ORAL 3 TIMES DAILY
Qty: 90 CAPSULE | Refills: 0 | Status: SHIPPED | OUTPATIENT
Start: 2022-11-16 | End: 2022-12-16

## 2022-11-14 RX ORDER — IBUPROFEN 800 MG/1
800 TABLET ORAL EVERY 6 HOURS PRN
COMMUNITY

## 2022-11-14 RX ORDER — HYDROCODONE BITARTRATE AND ACETAMINOPHEN 10; 325 MG/1; MG/1
1 TABLET ORAL
Qty: 80 TABLET | Refills: 0 | Status: SHIPPED | OUTPATIENT
Start: 2022-11-16 | End: 2022-12-16

## 2022-11-14 ASSESSMENT — ENCOUNTER SYMPTOMS
EYES NEGATIVE: 1
SHORTNESS OF BREATH: 0
GASTROINTESTINAL NEGATIVE: 1
COUGH: 0
BACK PAIN: 1
CONSTIPATION: 0
DIARRHEA: 0
NAUSEA: 0

## 2022-11-14 NOTE — PROGRESS NOTES
Hoa Soares  ([birth year])    11/14/2022    Subjective:     Hoa Soares is 61 y.o. male who complains today of:    Chief Complaint   Patient presents with    1 Month Follow-Up    Back Pain         Allergies:  Patient has no known allergies. Past Medical History:   Diagnosis Date    Bronchitis     CAD (coronary artery disease)     past angioplasty > 10 yrs ago    Chronic back pain     Diabetes ([address]     dx > 2 yrs    Dyslipidemia     Hyperlipidemia     meds > 5 yrs    Hypertension     meds > 5 yrs    Neuropathy in diabetes [facility])     Osteoarthritis     Pneumonia     Sickle cell anemia (HCC)     Sleep apnea, obstructive      Past Surgical History:   Procedure Laterality Date    BACK SURGERY  2008    Lumbar disc OR. BLADDER SURGERY  7/10/2015    Patient states he was urininating blood and had a procedure done.     COLONOSCOPY  3/14/14    [facility]    ENDOSCOPY, COLON, DIAGNOSTIC      LUMBAR FUSION  2008    OTHER SURGICAL HISTORY Right     tendon repair in (R) forearm because of previous injury as child    TOTAL HIP ARTHROPLASTY Left 1/23/2020    LEFT HIP TOTAL HIP ARTHROPLASTY, RICARDO performed by Rachid Willis MD at [address] ENDOSCOPY  3/14/14    [address] 2013    Dr. Abraham Garcia office     Family History   Problem Relation Age of Onset    Cancer Mother         Throat & esophagus    Anemia Mother     Other Father         Natural causes    High Blood Pressure Sister     COPD Brother     Heart Disease Brother         Cardiac stent    Diabetes Sister     COPD Sister     Emphysema Sister     No Known Problems Daughter      Social History     Socioeconomic History    Marital status: Legally      Spouse name: Not on file    Number of children: Not on file    Years of education: Not on file    Highest education level: Not on file   Occupational History    Occupation: Disabled - because of back pain - 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    Vaping Use    Vaping Use: Former   Substance and Sexual Activity    Alcohol use: No     Alcohol/week: 0.0 standard drinks    Drug use: No    Sexual activity: Not on file   Other Topics Concern    Not on file   Social History Narrative    Not on file     Social Determinants of Health     Financial Resource Strain: Not on file   Food Insecurity: Not on file   Transportation Needs: Not on file   Physical Activity: Not on file   Stress: Not on file   Social Connections: Not on file   Intimate Partner Violence: Not on file   Housing Stability: Not on file       Current Outpatient Medications on File Prior to Visit   Medication Sig Dispense Refill    ibuprofen (ADVIL;MOTRIN) 800 MG tablet Take 800 mg by mouth every 6 hours as needed for Pain      promethazine-dextromethorphan (PROMETHAZINE-DM) 6.25-15 MG/5ML syrup Take by mouth as needed for Cough      DULoxetine (CYMBALTA) 60 MG extended release capsule Take 1 capsule by mouth daily 30 capsule 2    olmesartan (BENICAR) 40 MG tablet take 1 tablet by mouth every morning      amLODIPine (NORVASC) 10 MG tablet take 1 tablet by mouth once daily      atenolol (TENORMIN) 50 MG tablet Take by mouth      atenolol-chlorthalidone (TENORETIC) 50-25 MG per tablet daily  0    Elastic Bandages & Supports (T.E.D. BELOW KNEE/L-REGULAR) MISC 1 applicator by Does not apply route daily 1 each 0    losartan-hydrochlorothiazide (HYZAAR) 100-25 MG per tablet Take by mouth daily       Respiratory Therapy Supplies (FLUTTER) PANCHO 1 Device by Does not apply route 4 times daily 1 Device 0    metFORMIN (GLUCOPHAGE) 500 MG tablet Take 500 mg by mouth 2 times daily (with meals)      fenofibrate 160 MG tablet Take by mouth daily       folic acid (FOLVITE) 1 MG tablet Take 1 mg by mouth daily       amLODIPine-benazepril (LOTREL) 5-10 MG per capsule Take 1 capsule by mouth daily       ibuprofen (ADVIL;MOTRIN) 400 MG tablet Take 1 tablet by mouth daily as needed for Pain 30 tablet 2    aspirin 81 MG EC tablet Take 1 tablet by mouth 2 times daily 30 tablet 0    nicotine (NICODERM CQ) 21 MG/24HR Place 1 patch onto the skin daily 42 patch 0     No current facility-administered medications on file prior to visit. Pt presents today for a f/u of his pain. PCP is Dr. Kaveh George MD. [facility] recently changed ibuprofen to 800mg from him. He was also given some prednisone last week. He was having worse Rt leg pain and r/o clot. Says pain is better now. Pt last saw this NP for his chronic low back pain from [facility] in early 2000s pain. Says having trouble with SCS. History of mild bilateral CTS, DM, sickle cell. Last flare of sickle cell crisis in June 2021. He has a history of lumbar fusion, spinal cord stimulator, Lt hip replacement 1/23/21 with Dr. Hardin. He goes into crisis if he has stress and high BP. He uses his SCS constantly. He is on disability. He continues to use his Lyrica 150mg TID and Norco 10mg 2-3 per day PRN pain (#80) and Cymbalta 60mg daily and baclofen 10mg BID PRN spasm and Ibuprofen PRN (getting ibuprofen from PCP). He says he has enough of balofen and ibuprofen - tries to use sparingly. Pt feels pain level with his medication is 6/10, and worse without medication. Pt feels that not being able to use SCS, weather change makes the pain worse, and medication, SCS when working makes the pain better. Pt feels his medication helps   him function and improve his quality of life, specifically says allows to do ADL's and stay active. Pt admits to LE radiating numbness and tingling. Denies recent falls, injuries or trauma. Pt denies new weakness. Pt reports PT has been done. Review of Systems   Constitutional:  Negative for fever. HENT: Negative. Eyes: Negative. Respiratory:  Negative for cough and shortness of breath. Cardiovascular:  Negative for chest pain. Gastrointestinal: Negative. Negative for constipation, diarrhea and nausea. Endocrine: Negative. Genitourinary: Negative. Musculoskeletal:  Positive for arthralgias and back pain. Skin: Negative. Neurological:  Negative for dizziness and weakness. Psychiatric/Behavioral: Negative. Objective:     Vitals:  BP (!) 152/92 (Site: Right Upper Arm)   Temp 97.3 °F (36.3 °C) (Temporal)   Ht 6' 1\" (1.854 m)   Wt 214 lb (97.1 kg)   BMI 28.23 kg/m² Pain Score:   6      Physical Exam  Vitals and nursing note reviewed. This is a pleasant male who answers questions appropriately and follows commands. Wearing glasses. Pt is alert and oriented x 3. Recent and remote memory is intact. Mood and affect, judgement and insight are normal. No signs of distress, no dyspnea or SOB noted. HEENT: PERRL. Neck is supple, trachea midline. No lymphadenopathy noted. Decreased ROM with flexion and extension of low back. Tender with palpation to lumbar spine bilaterally. SCS battery pack palpated on Lt low back. Tightness appreciated over Lt lower lumbar paraspinal muscles. SLR reproduces low back pain. Tightness in both hamstrings noted. Cranial nerves II-XII are intact. Assessment:      Diagnosis Orders   1. Postlaminectomy syndrome, lumbar region  pregabalin (LYRICA) 150 MG capsule    HYDROcodone-acetaminophen (NORCO)  MG per tablet      2. L4-5 HERNAITED DISC  pregabalin (LYRICA) 150 MG capsule    HYDROcodone-acetaminophen (NORCO)  MG per tablet          Plan:     Periodic Controlled Substance Monitoring: Possible medication side effects, risk of tolerance/dependence & alternative treatments discussed., No signs of potential drug abuse or diversion identified. , Assessed functional status., Obtaining appropriate analgesic effect of treatment. (Elma Merlos, APRN - CNP)    Orders Placed This Encounter   Medications    pregabalin (LYRICA) 150 MG capsule     Sig: Take 1 capsule by mouth 3 times daily for 30 days. Dispense:  90 capsule     Refill:  0    HYDROcodone-acetaminophen (NORCO)  MG per tablet     Sig: Take 1 tablet by mouth every 8-12 hours as needed for Pain for up to 30 days. #80 tabs for 30 days     Dispense:  80 tablet     Refill:  0     Reduce doses taken as pain becomes manageable         No orders of the defined types were placed in this encounter. Discussed options with the patient today. Anatomic model pathology was shown and reviewed with pt. He needs to contact [employer] for SCS. Will continue Norco 10mg 2-3 daily PRN pain and lyrica 150mg TID. Has enough cymbalta 60mg. Hold injections. He gets ibuprofen from PCP. All questions were answered. Discussed home exercise program.  Relevant imaging and pain generators reviewed. Pt verbalized understanding and agrees with above plan. Pt has chronic pain. Utox reviewed and appropriate from March 2022 - pt advised to avoid ETOH with narcotics. ORT score 1 - low risk. Narcan Rx sent in April 2021    Will continue medications for chronic pain that has been previously directed as they do help pt function with ADL and improve quality of life. Pt is aware goal is to use the least amount of medication possible to allow pt to function and help with quality of life as discussed in detail. Ideal to keep MME below 50 which it is. Have discussed proper disposal of narcotic medication. Advised to have medications in safe place and locked up/in lock box. Discussed possible risks of opiate medication with pt, including, but not limited to possible constipation, nausea or vomiting, sedation, urinary retention, dependence and possible addiction. Pt agrees to use medication as prescribed/as directed.  Pt advised to not use opiates while driving or operating heavy equipment, or in situations where pt may harm him/herself or others. Pt is aware that while on narcotics, pt needs to be seen to reassess pain and reassess need for continued medication at that time. NDP reviewed. OARRS was reviewed. This NP saw pt under direct supervision of Dr. Radha Schmid. Follow up:  Return in about 4 weeks (around 12/12/2022) for review meds and reassess pain.     Naga Ness, APRN - CNP

## 2022-12-05 DIAGNOSIS — M96.1 POSTLAMINECTOMY SYNDROME, LUMBAR REGION: Chronic | ICD-10-CM

## 2022-12-05 DIAGNOSIS — M51.26 DISPLACEMENT OF LUMBAR INTERVERTEBRAL DISC WITHOUT MYELOPATHY: Chronic | ICD-10-CM

## 2022-12-05 RX ORDER — DULOXETIN HYDROCHLORIDE 60 MG/1
CAPSULE, DELAYED RELEASE ORAL
Qty: 90 CAPSULE | OUTPATIENT
Start: 2022-12-05

## 2022-12-14 ENCOUNTER — OFFICE VISIT (OUTPATIENT)
Dept: PAIN MANAGEMENT | Age: 57
End: 2022-12-14

## 2022-12-14 VITALS
HEIGHT: 73 IN | BODY MASS INDEX: 29.29 KG/M2 | TEMPERATURE: 97.9 F | DIASTOLIC BLOOD PRESSURE: 90 MMHG | WEIGHT: 221 LBS | SYSTOLIC BLOOD PRESSURE: 142 MMHG

## 2022-12-14 DIAGNOSIS — M51.26 DISPLACEMENT OF LUMBAR INTERVERTEBRAL DISC WITHOUT MYELOPATHY: Chronic | ICD-10-CM

## 2022-12-14 DIAGNOSIS — M96.1 POSTLAMINECTOMY SYNDROME, LUMBAR REGION: Chronic | ICD-10-CM

## 2022-12-14 RX ORDER — HYDROCODONE BITARTRATE AND ACETAMINOPHEN 10; 325 MG/1; MG/1
1 TABLET ORAL
Qty: 80 TABLET | Refills: 0 | Status: SHIPPED | OUTPATIENT
Start: 2022-12-16 | End: 2023-01-15

## 2022-12-14 RX ORDER — DULOXETIN HYDROCHLORIDE 60 MG/1
60 CAPSULE, DELAYED RELEASE ORAL DAILY
Qty: 30 CAPSULE | Refills: 2 | Status: SHIPPED | OUTPATIENT
Start: 2022-12-14 | End: 2023-03-14

## 2022-12-14 RX ORDER — PREGABALIN 150 MG/1
150 CAPSULE ORAL 3 TIMES DAILY
Qty: 90 CAPSULE | Refills: 0 | Status: SHIPPED | OUTPATIENT
Start: 2022-12-16 | End: 2023-01-15

## 2022-12-14 ASSESSMENT — ENCOUNTER SYMPTOMS
NAUSEA: 0
CONSTIPATION: 0
SHORTNESS OF BREATH: 0
COUGH: 0
BACK PAIN: 1
EYES NEGATIVE: 1
DIARRHEA: 0
GASTROINTESTINAL NEGATIVE: 1

## 2022-12-14 NOTE — PROGRESS NOTES
Dalton Saleem  ([id number])    12/14/2022    Subjective:     Dalton Saleem is 61 y.o. male who complains today of:    Chief Complaint   Patient presents with    1 Month Follow-Up    Back Pain    Leg Pain     Some pain going down left leg          Allergies:  Patient has no known allergies. Past Medical History:   Diagnosis Date    Bronchitis     CAD (coronary artery disease)     past angioplasty > 10 yrs ago    Chronic back pain     Diabetes ([address]     dx > 2 yrs    Dyslipidemia     Hyperlipidemia     meds > 5 yrs    Hypertension     meds > 5 yrs    Neuropathy in diabetes [facility])     Osteoarthritis     Pneumonia     Sickle cell anemia (HCC)     Sleep apnea, obstructive      Past Surgical History:   Procedure Laterality Date    BACK SURGERY  2008    Lumbar disc OR. BLADDER SURGERY  7/10/2015    Patient states he was urininating blood and had a procedure done.     COLONOSCOPY  3/14/14    [facility]    ENDOSCOPY, COLON, DIAGNOSTIC      LUMBAR FUSION  2008    OTHER SURGICAL HISTORY Right     tendon repair in (R) forearm because of previous injury as child    TOTAL HIP ARTHROPLASTY Left 1/23/2020    LEFT HIP TOTAL HIP ARTHROPLASTY, RICARDO performed by Mane Tellez MD at [address] ENDOSCOPY  3/14/14    [address] 2013    Dr. Sohail Ang office     Family History   Problem Relation Age of Onset    Cancer Mother         Throat & esophagus    Anemia Mother     Other Father         Natural causes    High Blood Pressure Sister     COPD Brother     Heart Disease Brother         Cardiac stent    Diabetes Sister     COPD Sister     Emphysema Sister     No Known Problems Daughter      Social History     Socioeconomic History    Marital status: Legally      Spouse name: Not on file    Number of children: Not on file    Years of education: Not on file    Highest education level: Not on file   Occupational History    Occupation: Disabled - because of back pain - 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    Vaping Use    Vaping Use: Former   Substance and Sexual Activity    Alcohol use: No     Alcohol/week: 0.0 standard drinks    Drug use: No    Sexual activity: Not on file   Other Topics Concern    Not on file   Social History Narrative    Not on file     Social Determinants of Health     Financial Resource Strain: Not on file   Food Insecurity: Not on file   Transportation Needs: Not on file   Physical Activity: Not on file   Stress: Not on file   Social Connections: Not on file   Intimate Partner Violence: Not on file   Housing Stability: Not on file       Current Outpatient Medications on File Prior to Visit   Medication Sig Dispense Refill    ibuprofen (ADVIL;MOTRIN) 800 MG tablet Take 800 mg by mouth every 6 hours as needed for Pain      promethazine-dextromethorphan (PROMETHAZINE-DM) 6.25-15 MG/5ML syrup Take by mouth as needed for Cough      olmesartan (BENICAR) 40 MG tablet take 1 tablet by mouth every morning      amLODIPine (NORVASC) 10 MG tablet take 1 tablet by mouth once daily      atenolol (TENORMIN) 50 MG tablet Take by mouth      atenolol-chlorthalidone (TENORETIC) 50-25 MG per tablet daily  0    Elastic Bandages & Supports (T.E.D. BELOW KNEE/L-REGULAR) MISC 1 applicator by Does not apply route daily 1 each 0    losartan-hydrochlorothiazide (HYZAAR) 100-25 MG per tablet Take by mouth daily       Respiratory Therapy Supplies (FLUTTER) PANCHO 1 Device by Does not apply route 4 times daily 1 Device 0    metFORMIN (GLUCOPHAGE) 500 MG tablet Take 500 mg by mouth 2 times daily (with meals)      fenofibrate 160 MG tablet Take by mouth daily       folic acid (FOLVITE) 1 MG tablet Take 1 mg by mouth daily amLODIPine-benazepril (LOTREL) 5-10 MG per capsule Take 1 capsule by mouth daily       aspirin 81 MG EC tablet Take 1 tablet by mouth 2 times daily 30 tablet 0    nicotine (NICODERM CQ) 21 MG/24HR Place 1 patch onto the skin daily 42 patch 0     No current facility-administered medications on file prior to visit. Pt presents today for a f/u of his pain. PCP is Dr. Jt Hernandez MD. Pt last saw this NP for his chronic low back pain from [facility] in early 2000s pain. Says having trouble with SCS. He says it is no longer working and says it has reached his \"end of life\" and would like to consider having this replaced. Since he hasn't been able to use SCS he is having more Lt LE pain and numbness. He is having more trouble with walking any distance. He wonders about a handicap placard d/t this. History of mild bilateral CTS, DM, sickle cell. Last flare of sickle cell crisis in June 2021. He has a history of lumbar fusion, spinal cord stimulator, Lt hip replacement 1/23/21 with Dr. Omar Montalvo. He goes into crisis if he has stress and high BP. He uses his SCS constantly. He is on disability. He continues to use his Lyrica 150mg TID and Norco 10mg 2-3 per day PRN pain (#80) and Cymbalta 60mg daily and baclofen 10mg BID PRN spasm and Ibuprofen PRN (getting ibuprofen from PCP). He says he has enough of balofen and ibuprofen - tries to use sparingly. Pt feels pain level with his medication is 5/10, and worse without medication. Pt feels that activity, walking, sitting down low then trying to get up makes the pain worse, and medication, relaxing and avoiding activity makes the pain better. Pt feels his medication helps   him function and improve his quality of life, specifically says allows to do ADL's and move. Pt admits to Lt LE radiating numbness and tingling. Denies recent falls, injuries or trauma. Pt denies new weakness. Pt reports PT has been done.          Review of Systems   Constitutional: Negative for fever. HENT: Negative. Eyes: Negative. Respiratory:  Negative for cough and shortness of breath. Cardiovascular:  Negative for chest pain. Gastrointestinal: Negative. Negative for constipation, diarrhea and nausea. Endocrine: Negative. Genitourinary: Negative. Musculoskeletal:  Positive for arthralgias and back pain. Skin: Negative. Neurological:  Positive for numbness. Negative for dizziness and weakness. Psychiatric/Behavioral: Negative. Objective:     Vitals:  BP (!) 142/90 (Site: Left Upper Arm)   Temp 97.9 °F (36.6 °C) (Temporal)   Ht 6' 1\" (1.854 m)   Wt 221 lb (100.2 kg)   BMI 29.16 kg/m² Pain Score:   5      Physical Exam  Vitals and nursing note reviewed. This is a pleasant male who answers questions appropriately and follows commands. Wearing glasses. Pt is alert and oriented x 3. Recent and remote memory is intact. Mood and affect, judgement and insight are normal. No signs of distress, no dyspnea or SOB noted. HEENT: PERRL. Neck is supple, trachea midline. No lymphadenopathy noted. Decreased ROM with flexion and extension of low back. Tender with palpation to lumbar spine bilaterally. SCS battery pack palpated on Lt low back. Tightness appreciated over Lt lower lumbar paraspinal muscles. SLR reproduces low back pain. Tightness in both hamstrings noted. Cranial nerves II-XII are intact. Assessment:      Diagnosis Orders   1. Postlaminectomy syndrome, lumbar region  pregabalin (LYRICA) 150 MG capsule    HYDROcodone-acetaminophen (NORCO)  MG per tablet    DULoxetine (CYMBALTA) 60 MG extended release capsule      2.  L4-5 HERNAITED DISC  pregabalin (LYRICA) 150 MG capsule    HYDROcodone-acetaminophen (NORCO)  MG per tablet    DULoxetine (CYMBALTA) 60 MG extended release capsule          Plan:     Periodic Controlled Substance Monitoring: Possible medication side effects, risk of tolerance/dependence & alternative treatments discussed., No signs of potential drug abuse or diversion identified. , Assessed functional status., Obtaining appropriate analgesic effect of treatment. (Elma Merlos, APRN - CNP)    Orders Placed This Encounter   Medications    pregabalin (LYRICA) 150 MG capsule     Sig: Take 1 capsule by mouth 3 times daily for 30 days. Dispense:  90 capsule     Refill:  0    HYDROcodone-acetaminophen (NORCO)  MG per tablet     Sig: Take 1 tablet by mouth every 8-12 hours as needed for Pain for up to 30 days. #80 tabs for 30 days     Dispense:  80 tablet     Refill:  0     Reduce doses taken as pain becomes manageable    DULoxetine (CYMBALTA) 60 MG extended release capsule     Sig: Take 1 capsule by mouth daily     Dispense:  30 capsule     Refill:  2    Handicap Placard MISC     Sig: by Does not apply route Good through 12/14/25. Has difficulty walking distance without rest     Dispense:  1 each     Refill:  0       No orders of the defined types were placed in this encounter. Discussed options with the patient today. Anatomic model pathology was shown and reviewed with pt. Will continue Norco 10mg 2-3 daily PRN pain and lyrica 150mg TID. Refilled cymbalta 60mg. Hold injections. He gets ibuprofen from PCP. All questions were answered. Discussed home exercise program.  Relevant imaging and pain generators reviewed. Pt verbalized understanding and agrees with above plan. Pt has chronic pain. Discussed with colleague, Dr. Vita Giron, and says he is willing to meet with pt to discuss replacing SCS. If he is interested in pursuing replacement, then C9 can be placed after that appointment. Utox reviewed and appropriate from March 2022 - pt advised to avoid ETOH with narcotics. ORT score 1 - low risk. Narcan Rx sent in April 2021    Will continue medications for chronic pain that has been previously directed as they do help pt function with ADL and improve quality of life.  Pt is aware goal is to use the least amount of medication possible to allow pt to function and help with quality of life as discussed in detail. Ideal to keep MME below 50 which it is. Have discussed proper disposal of narcotic medication. Advised to have medications in safe place and locked up/in lock box. Discussed possible risks of opiate medication with pt, including, but not limited to possible constipation, nausea or vomiting, sedation, urinary retention, dependence and possible addiction. Pt agrees to use medication as prescribed/as directed. Pt advised to not use opiates while driving or operating heavy equipment, or in situations where pt may harm him/herself or others. Pt is aware that while on narcotics, pt needs to be seen to reassess pain and reassess need for continued medication at that time. NDP reviewed. OARRS was reviewed. This NP saw pt under direct supervision of Dr. Babita Ojeda. Follow up:  Return in about 4 weeks (around 1/11/2023) for review meds and reassess pain.     Garret Ness, APRN - CNP

## 2022-12-16 ENCOUNTER — TELEPHONE (OUTPATIENT)
Dept: PAIN MANAGEMENT | Age: 57
End: 2022-12-16

## 2022-12-21 NOTE — TELEPHONE ENCOUNTER
AUTHORIZATION:    INSURANCE: Moustapha Luevano [facility] VIA: FAX     Rito Ngo #: NO AUTH#      DATE RANGE: 12/23/22 TO 2/15/23    TELEPHONE CALL ROUTED TO MA TO SCHEDULE. OK to schedule procedure approved as above. Please note sides/levels approved and date range.    (If applicable, sides/levels approved may differ from those ordered)     TO BE SCHEDULED WITH DR Chelsey Armendariz

## 2023-01-06 ENCOUNTER — INITIAL CONSULT (OUTPATIENT)
Dept: PAIN MANAGEMENT | Age: 58
End: 2023-01-06

## 2023-01-06 VITALS
HEART RATE: 67 BPM | DIASTOLIC BLOOD PRESSURE: 84 MMHG | BODY MASS INDEX: 28.23 KG/M2 | SYSTOLIC BLOOD PRESSURE: 136 MMHG | HEIGHT: 73 IN | OXYGEN SATURATION: 92 % | WEIGHT: 213 LBS

## 2023-01-06 DIAGNOSIS — M51.26 DISPLACEMENT OF LUMBAR INTERVERTEBRAL DISC WITHOUT MYELOPATHY: ICD-10-CM

## 2023-01-06 DIAGNOSIS — M96.1 POSTLAMINECTOMY SYNDROME, LUMBAR REGION: Primary | ICD-10-CM

## 2023-01-06 DIAGNOSIS — G89.4 CHRONIC PAIN SYNDROME: ICD-10-CM

## 2023-01-06 ASSESSMENT — ENCOUNTER SYMPTOMS
EYES NEGATIVE: 1
GASTROINTESTINAL NEGATIVE: 1
ALLERGIC/IMMUNOLOGIC NEGATIVE: 1
RESPIRATORY NEGATIVE: 1

## 2023-01-06 NOTE — PROGRESS NOTES
History of Present Illness     Patient Identification  Rashid Gannon is a 61 y.o. male. Patient information was obtained from patient. Chief Complaint   Chief Complaint   Patient presents with    Back Pain       Pt presents today for a f/u of his pain. PCP is Dr. Kevon Anand MD. hISTORY chronic low back pain from [facility] in early 2000s pain. Radiating to left leg, Has a stimulator  that was working good past.  He says it is no longer working and says it has reached his \"end of life\" and would like to consider having this replaced. Since he hasn't been able to use SCS he is having more Lt LE pain and numbness. He is having more trouble with walking any distance. He wonders about a handicap placard d/t this. History of mild bilateral CTS, DM, sickle cell. Last flare of sickle cell crisis in June 2021. He has a history of lumbar fusion, spinal cord stimulator, Lt hip replacement 1/23/21 with Dr. Rafita Kaplan. He goes into crisis if he has stress and high BP. He uses his SCS constantly. He is on disability. He continues to use his Lyrica 150mg TID and Norco 10mg 2-3 per day PRN pain (#80) and Cymbalta 60mg daily and baclofen 10mg BID PRN spasm and Ibuprofen PRN (getting ibuprofen from PCP). He says he has enough of balofen and ibuprofen - tries to use sparingly. Pt feels pain level with his medication is 5/10, and worse without medication. Pt feels that activity, walking, sitting down low then trying to get up makes the pain worse, and medication, relaxing and avoiding activity makes the pain better. Pt feels his medication helps   him function and improve his quality of life, specifically says allows to do ADL's and move. Pt admits to Lt LE radiating numbness and tingling. Denies recent falls, injuries or trauma. Pt denies new weakness. Pt reports PT has been done.         Past Medical History:   Diagnosis Date    Bronchitis     CAD (coronary artery disease)     past angioplasty > 10 yrs ago    Chronic back pain     Diabetes ([facility] [address]     dx > 2 yrs    Dyslipidemia     Hyperlipidemia     meds > 5 yrs    Hypertension     meds > 5 yrs    Neuropathy in diabetes (HCC)     Osteoarthritis     Pneumonia     Sickle cell anemia (HCC)     Sleep apnea, obstructive      Family History   Problem Relation Age of Onset    Cancer Mother         Throat & esophagus    Anemia Mother     Other Father         Natural causes    High Blood Pressure Sister     COPD Brother     Heart Disease Brother         Cardiac stent    Diabetes Sister     COPD Sister     Emphysema Sister     No Known Problems Daughter      Current Outpatient Medications   Medication Sig Dispense Refill    pregabalin (LYRICA) 150 MG capsule Take 1 capsule by mouth 3 times daily for 30 days. 90 capsule 0    HYDROcodone-acetaminophen (NORCO)  MG per tablet Take 1 tablet by mouth every 8-12 hours as needed for Pain for up to 30 days. #80 tabs for 30 days 80 tablet 0    DULoxetine (CYMBALTA) 60 MG extended release capsule Take 1 capsule by mouth daily 30 capsule 2    Handicap Placard MISC by Does not apply route Good through 12/14/25.  Has difficulty walking distance without rest 1 each 0    ibuprofen (ADVIL;MOTRIN) 800 MG tablet Take 800 mg by mouth every 6 hours as needed for Pain      promethazine-dextromethorphan (PROMETHAZINE-DM) 6.25-15 MG/5ML syrup Take by mouth as needed for Cough      olmesartan (BENICAR) 40 MG tablet take 1 tablet by mouth every morning      amLODIPine (NORVASC) 10 MG tablet take 1 tablet by mouth once daily      atenolol (TENORMIN) 50 MG tablet Take by mouth      aspirin 81 MG EC tablet Take 1 tablet by mouth 2 times daily 30 tablet 0    atenolol-chlorthalidone (TENORETIC) 50-25 MG per tablet daily  0    nicotine (NICODERM CQ) 21 MG/24HR Place 1 patch onto the skin daily 42 patch 0    Elastic Bandages & Supports (T.E.D. BELOW KNEE/L-REGULAR) MISC 1 applicator by Does not apply route daily 1 each 0    losartan-hydrochlorothiazide (HYZAAR) 100-25 MG per tablet Take by mouth daily       Respiratory Therapy Supplies (FLUTTER) PANCHO 1 Device by Does not apply route 4 times daily 1 Device 0    metFORMIN (GLUCOPHAGE) 500 MG tablet Take 500 mg by mouth 2 times daily (with meals)      fenofibrate 160 MG tablet Take by mouth daily       folic acid (FOLVITE) 1 MG tablet Take 1 mg by mouth daily       amLODIPine-benazepril (LOTREL) 5-10 MG per capsule Take 1 capsule by mouth daily        No current facility-administered medications for this visit. No Known Allergies    Review of Systems  Review of Systems   Constitutional: Negative. HENT: Negative. Eyes: Negative. Respiratory: Negative. Cardiovascular: Negative. Gastrointestinal: Negative. Endocrine: Negative. Diabetes   Genitourinary: Negative. Musculoskeletal: Negative. Skin: Negative. Allergic/Immunologic: Negative. Neurological: Negative. Hematological: Negative. Sickle cell disease   Psychiatric/Behavioral: Negative. All other systems reviewed and are negative. Physical Exam     /84   Pulse 67   Ht 6' 1\" (1.854 m)   Wt 213 lb (96.6 kg)   SpO2 92%   BMI 28.10 kg/m²   Physical Exam  Vitals and nursing note reviewed. Constitutional:       Appearance: Normal appearance. HENT:      Head: Normocephalic. Right Ear: Ear canal normal.      Left Ear: Ear canal normal.      Nose: Nose normal.      Mouth/Throat:      Mouth: Mucous membranes are moist.   Eyes:      Extraocular Movements: Extraocular movements intact. Conjunctiva/sclera: Conjunctivae normal.      Pupils: Pupils are equal, round, and reactive to light. Cardiovascular:      Rate and Rhythm: Normal rate and regular rhythm. Pulses: Normal pulses. Heart sounds: Normal heart sounds. Pulmonary:      Effort: Pulmonary effort is normal.      Breath sounds: Normal breath sounds. Abdominal:      General: Abdomen is flat.  Bowel sounds are normal.      Palpations: Abdomen is soft. Musculoskeletal:         General: Normal range of motion. Cervical back: Normal range of motion and neck supple. Comments: Good gait able to walk on Toes and Heels, Mild tenderness low back, Pain on Left SLR less on Rt SLR. Skin:     General: Skin is warm. Capillary Refill: Capillary refill takes less than 2 seconds. Neurological:      General: No focal deficit present. Mental Status: He is alert and oriented to person, place, and time. Mental status is at baseline. Psychiatric:         Mood and Affect: Mood normal.         Behavior: Behavior normal.         Thought Content: Thought content normal.         Judgment: Judgment normal.         Plan     Plan on Stim replacement.

## 2023-01-18 ENCOUNTER — OFFICE VISIT (OUTPATIENT)
Dept: PAIN MANAGEMENT | Age: 58
End: 2023-01-18

## 2023-01-18 VITALS
WEIGHT: 215 LBS | SYSTOLIC BLOOD PRESSURE: 138 MMHG | TEMPERATURE: 96.9 F | HEIGHT: 73 IN | BODY MASS INDEX: 28.49 KG/M2 | DIASTOLIC BLOOD PRESSURE: 82 MMHG

## 2023-01-18 DIAGNOSIS — M96.1 POSTLAMINECTOMY SYNDROME, LUMBAR REGION: Chronic | ICD-10-CM

## 2023-01-18 DIAGNOSIS — M51.26 DISPLACEMENT OF LUMBAR INTERVERTEBRAL DISC WITHOUT MYELOPATHY: Chronic | ICD-10-CM

## 2023-01-18 RX ORDER — PREGABALIN 150 MG/1
150 CAPSULE ORAL 3 TIMES DAILY
Qty: 90 CAPSULE | Refills: 0 | Status: SHIPPED | OUTPATIENT
Start: 2023-01-18 | End: 2023-02-17

## 2023-01-18 RX ORDER — HYDROCODONE BITARTRATE AND ACETAMINOPHEN 10; 325 MG/1; MG/1
1 TABLET ORAL
Qty: 80 TABLET | Refills: 0 | Status: SHIPPED | OUTPATIENT
Start: 2023-01-18 | End: 2023-02-17

## 2023-01-18 ASSESSMENT — ENCOUNTER SYMPTOMS
COUGH: 0
DIARRHEA: 0
SHORTNESS OF BREATH: 0
CONSTIPATION: 0
EYES NEGATIVE: 1
BACK PAIN: 1
GASTROINTESTINAL NEGATIVE: 1
NAUSEA: 0

## 2023-02-16 ENCOUNTER — OFFICE VISIT (OUTPATIENT)
Dept: PAIN MANAGEMENT | Age: 58
End: 2023-02-16

## 2023-02-16 VITALS
BODY MASS INDEX: 28.49 KG/M2 | HEIGHT: 73 IN | SYSTOLIC BLOOD PRESSURE: 138 MMHG | WEIGHT: 215 LBS | DIASTOLIC BLOOD PRESSURE: 82 MMHG | TEMPERATURE: 97.1 F

## 2023-02-16 DIAGNOSIS — M51.26 DISPLACEMENT OF LUMBAR INTERVERTEBRAL DISC WITHOUT MYELOPATHY: Chronic | ICD-10-CM

## 2023-02-16 DIAGNOSIS — M96.1 POSTLAMINECTOMY SYNDROME, LUMBAR REGION: Chronic | ICD-10-CM

## 2023-02-16 RX ORDER — PREGABALIN 150 MG/1
150 CAPSULE ORAL 3 TIMES DAILY
Qty: 90 CAPSULE | Refills: 0 | Status: SHIPPED | OUTPATIENT
Start: 2023-02-17 | End: 2023-03-19

## 2023-02-16 RX ORDER — HYDROCODONE BITARTRATE AND ACETAMINOPHEN 10; 325 MG/1; MG/1
1 TABLET ORAL
Qty: 80 TABLET | Refills: 0 | Status: SHIPPED | OUTPATIENT
Start: 2023-02-17 | End: 2023-03-19

## 2023-02-16 ASSESSMENT — ENCOUNTER SYMPTOMS
EYES NEGATIVE: 1
BACK PAIN: 1
DIARRHEA: 0
CONSTIPATION: 0
COUGH: 0
NAUSEA: 0
SHORTNESS OF BREATH: 0
GASTROINTESTINAL NEGATIVE: 1

## 2023-02-16 NOTE — PROGRESS NOTES
Volodymyr Calderon  ([birth year])    2/16/2023    Subjective:     Volodymyr Calderon is 61 y.o. male who complains today of:    Chief Complaint   Patient presents with    Follow-up     Postlaminectomy syndrome, lumbar region    Back Pain    Medication Refill         Allergies:  Patient has no known allergies. Past Medical History:   Diagnosis Date    Bronchitis     CAD (coronary artery disease)     past angioplasty > 10 yrs ago    Chronic back pain     Diabetes ([address]     dx > 2 yrs    Dyslipidemia     Hyperlipidemia     meds > 5 yrs    Hypertension     meds > 5 yrs    Neuropathy in diabetes [facility])     Osteoarthritis     Pneumonia     Sickle cell anemia (HCC)     Sleep apnea, obstructive      Past Surgical History:   Procedure Laterality Date    BACK SURGERY  2008    Lumbar disc OR. BLADDER SURGERY  7/10/2015    Patient states he was urininating blood and had a procedure done.     COLONOSCOPY  3/14/14    [facility]    ENDOSCOPY, COLON, DIAGNOSTIC      LUMBAR FUSION  2008    OTHER SURGICAL HISTORY Right     tendon repair in (R) forearm because of previous injury as child    TOTAL HIP ARTHROPLASTY Left 1/23/2020    LEFT HIP TOTAL HIP ARTHROPLASTY, RICARDO performed by Eric Sultana MD at [place] [address] ENDOSCOPY  3/14/14    [address] 2013    Dr. Inman Spore office     Family History   Problem Relation Age of Onset    Cancer Mother         Throat & esophagus    Anemia Mother     Other Father         Natural causes    High Blood Pressure Sister     COPD Brother     Heart Disease Brother         Cardiac stent    Diabetes Sister     COPD Sister     Emphysema Sister     No Known Problems Daughter      Social History     Socioeconomic History    Marital status: Legally      Spouse name: Not on file    Number of children: Not on file    Years of education: Not on file    Highest education level: Not on file   Occupational History    Occupation: Disabled - because of back pain - 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 [place] Use    Smoking status: Every Day     Packs/day: 1.00     Years: 34.00     Pack years: 34.00     Types: Cigarettes     Start date: 1985    Smokeless tobacco: Never    Tobacco comments:     Getting closer to wanting to quit. Vaping Use    Vaping Use: Former   Substance and Sexual Activity    Alcohol use: No     Alcohol/week: 0.0 standard drinks    Drug use: No    Sexual activity: Not on file   Other Topics Concern    Not on file   Social History Narrative    Not on file     Social Determinants of Health     Financial Resource Strain: Not on file   Food Insecurity: Not on file   Transportation Needs: Not on file   Physical Activity: Not on file   Stress: Not on file   Social Connections: Not on file   Intimate Partner Violence: Not on file   Housing Stability: Not on file       Current Outpatient Medications on File Prior to Visit   Medication Sig Dispense Refill    DULoxetine (CYMBALTA) 60 MG extended release capsule Take 1 capsule by mouth daily 30 capsule 2    Handicap Placard MISC by Does not apply route Good through 12/14/25.  Has difficulty walking distance without rest 1 each 0    ibuprofen (ADVIL;MOTRIN) 800 MG tablet Take 800 mg by mouth every 6 hours as needed for Pain      promethazine-dextromethorphan (PROMETHAZINE-DM) 6.25-15 MG/5ML syrup Take by mouth as needed for Cough      olmesartan (BENICAR) 40 MG tablet take 1 tablet by mouth every morning      amLODIPine (NORVASC) 10 MG tablet take 1 tablet by mouth once daily      atenolol (TENORMIN) 50 MG tablet Take by mouth      atenolol-chlorthalidone (TENORETIC) 50-25 MG per tablet daily  0    Elastic Bandages & Supports (T.E.D. BELOW KNEE/L-REGULAR) MISC 1 applicator by Does not apply route daily 1 each 0    losartan-hydrochlorothiazide (HYZAAR) 100-25 MG per tablet Take by mouth daily       Respiratory Therapy Supplies (FLUTTER) PANCHO 1 Device by Does not apply route 4 times daily 1 Device 0    metFORMIN (GLUCOPHAGE) 500 MG tablet Take 500 mg by mouth 2 times daily (with meals)      fenofibrate 160 MG tablet Take by mouth daily       folic acid (FOLVITE) 1 MG tablet Take 1 mg by mouth daily       amLODIPine-benazepril (LOTREL) 5-10 MG per capsule Take 1 capsule by mouth daily       aspirin 81 MG EC tablet Take 1 tablet by mouth 2 times daily 30 tablet 0    nicotine (NICODERM CQ) 21 MG/24HR Place 1 patch onto the skin daily 42 patch 0     No current facility-administered medications on file prior to visit. Pt presents today for a f/u of his pain. PCP is Dr. Yumi Elmore MD. Pt last saw this NP for his chronic low back pain from [facility] in early 2000s pain. He has yet to hear about any decision on SCS from [facility]. Had consult with Dr. Jackie Delcid concerning his SCS that was not working. Plan is for stem replacement. He is hoping only need battery replacement. Says his SCS is currently off as it won't turn on. Since he hasn't been able to use SCS he is having more Lt LE pain and numbness. He is having more trouble with walking any distance. He wonders about a handicap placard d/t this. History of mild bilateral CTS, DM, sickle cell. Last flare of sickle cell crisis in June 2021. He has a history of lumbar fusion, spinal cord stimulator, Lt hip replacement 1/23/21 with Dr. Verenice Seth. He goes into crisis if he has stress and high BP. He uses his SCS constantly. He is on disability. He continues to use his Lyrica 150mg TID and Norco 10mg 2-3 per day PRN pain (#80) and Cymbalta 60mg daily and baclofen 10mg BID PRN spasm and Ibuprofen PRN (getting ibuprofen from PCP). Pt feels pain level with his medication is 4/10, and 7/10 without medication. Pt feels that not having SCS, too much activity, being on feet too long makes the pain worse, and medication, change of position makes the pain better.    Pt feels his medication helps   him function and improve his quality of life, specifically says allows to do ADL's. Pt Lt LE radiating numbness and tingling. He also admits to radiating pain in Lt leg. Denies recent falls, injuries or trauma. Pt denies new weakness. Pt reports PT has been done. Review of Systems   Constitutional:  Negative for fever. HENT: Negative. Eyes: Negative. Respiratory:  Negative for cough and shortness of breath. Cardiovascular:  Negative for chest pain. Gastrointestinal: Negative. Negative for constipation, diarrhea and nausea. Endocrine: Negative. Genitourinary: Negative. Musculoskeletal:  Positive for arthralgias and back pain. Skin: Negative. Neurological:  Positive for numbness. Negative for dizziness and weakness. Psychiatric/Behavioral: Negative. Objective:     Vitals:  /82 (Site: Left Upper Arm, Position: Sitting)   Temp 97.1 °F (36.2 °C) (Temporal)   Ht 6' 1\" (1.854 m)   Wt 215 lb (97.5 kg)   BMI 28.37 kg/m² Pain Score:   4      Physical Exam  Vitals and nursing note reviewed. This is a pleasant male who answers questions appropriately and follows commands. Wearing glasses. Pt is alert and oriented x 3. Recent and remote memory is intact. Mood and affect, judgement and insight are normal. No signs of distress, no dyspnea or SOB noted. HEENT: PERRL. Neck is supple, trachea midline. No lymphadenopathy noted. Decreased ROM with flexion and extension of low back. Tender with palpation to lumbar spine bilaterally. SCS battery pack palpated on Lt low back. Tightness appreciated over Lt lower lumbar paraspinal muscles. SLR reproduces low back pain. Tightness in both hamstrings noted. Cranial nerves II-XII are intact. Assessment:      Diagnosis Orders   1. Postlaminectomy syndrome, lumbar region  pregabalin (LYRICA) 150 MG capsule    HYDROcodone-acetaminophen (NORCO)  MG per tablet      2.  L4-5 HERNAITED DISC  pregabalin (LYRICA) 150 MG capsule    HYDROcodone-acetaminophen (NORCO)  MG per tablet          Plan:     Periodic Controlled Substance Monitoring: Possible medication side effects, risk of tolerance/dependence & alternative treatments discussed., No signs of potential drug abuse or diversion identified. , Assessed functional status., Obtaining appropriate analgesic effect of treatment. (Elma Merlos, APRN - CNP)    Orders Placed This Encounter   Medications    pregabalin (LYRICA) 150 MG capsule     Sig: Take 1 capsule by mouth 3 times daily for 30 days. Dispense:  90 capsule     Refill:  0    HYDROcodone-acetaminophen (NORCO)  MG per tablet     Sig: Take 1 tablet by mouth every 8-12 hours as needed for Pain for up to 30 days. #80 tabs for 30 days     Dispense:  80 tablet     Refill:  0     Reduce doses taken as pain becomes manageable       No orders of the defined types were placed in this encounter. Discussed options with the patient today. Anatomic model pathology was shown and reviewed with pt. Will check on SCS if approved. It has been very beneficial for him when it was working. Will continue Norco 10mg 2-3 daily PRN pain and lyrica 150mg TID. has enough cymbalta 60mg. Hold injections. He gets ibuprofen from PCP. Beth Epperson All questions were answered. Discussed home exercise program.  Relevant imaging and pain generators reviewed. Pt verbalized understanding and agrees with above plan. Pt has chronic pain. Utox reviewed and appropriate from March 2022 - pt advised to avoid ETOH with narcotics. ORT score 1 - low risk. Narcan Rx sent in April 2021    Will continue medications for chronic pain that has been previously directed as they do help pt function with ADL and improve quality of life. Pt is aware goal is to use the least amount of medication possible to allow pt to function and help with quality of life as discussed in detail. Ideal to keep MME below 50 which it is. Have discussed proper disposal of narcotic medication.  Advised to have medications in safe place and locked up/in lock box. Discussed possible risks of opiate medication with pt, including, but not limited to possible constipation, nausea or vomiting, sedation, urinary retention, dependence and possible addiction. Pt agrees to use medication as prescribed/as directed. Pt advised to not use opiates while driving or operating heavy equipment, or in situations where pt may harm him/herself or others. Pt is aware that while on narcotics, pt needs to be seen to reassess pain and reassess need for continued medication at that time. NDP reviewed. OARRS was reviewed. This NP saw pt under direct supervision of Dr. Kizzy Dunn. Follow up:  Return in about 4 weeks (around 3/16/2023) for review meds and reassess pain.     Musa Ness, DARLENE - CNP

## 2023-03-12 DIAGNOSIS — M51.26 DISPLACEMENT OF LUMBAR INTERVERTEBRAL DISC WITHOUT MYELOPATHY: Chronic | ICD-10-CM

## 2023-03-12 DIAGNOSIS — M96.1 POSTLAMINECTOMY SYNDROME, LUMBAR REGION: Chronic | ICD-10-CM

## 2023-03-13 RX ORDER — DULOXETIN HYDROCHLORIDE 60 MG/1
CAPSULE, DELAYED RELEASE ORAL
Qty: 90 CAPSULE | OUTPATIENT
Start: 2023-03-13

## 2023-03-16 ENCOUNTER — OFFICE VISIT (OUTPATIENT)
Dept: PAIN MANAGEMENT | Age: 58
End: 2023-03-16

## 2023-03-16 VITALS
SYSTOLIC BLOOD PRESSURE: 132 MMHG | WEIGHT: 213 LBS | DIASTOLIC BLOOD PRESSURE: 82 MMHG | HEIGHT: 73 IN | TEMPERATURE: 97.2 F | BODY MASS INDEX: 28.23 KG/M2

## 2023-03-16 DIAGNOSIS — M51.26 DISPLACEMENT OF LUMBAR INTERVERTEBRAL DISC WITHOUT MYELOPATHY: Chronic | ICD-10-CM

## 2023-03-16 DIAGNOSIS — M96.1 POSTLAMINECTOMY SYNDROME, LUMBAR REGION: Chronic | ICD-10-CM

## 2023-03-16 RX ORDER — HYDROCODONE BITARTRATE AND ACETAMINOPHEN 10; 325 MG/1; MG/1
1 TABLET ORAL
Qty: 80 TABLET | Refills: 0 | Status: SHIPPED | OUTPATIENT
Start: 2023-03-19 | End: 2023-04-18

## 2023-03-16 RX ORDER — BACLOFEN 10 MG/1
10 TABLET ORAL 2 TIMES DAILY
Qty: 45 TABLET | Refills: 2 | Status: SHIPPED | OUTPATIENT
Start: 2023-03-16 | End: 2023-06-14

## 2023-03-16 RX ORDER — PREGABALIN 150 MG/1
150 CAPSULE ORAL 3 TIMES DAILY
Qty: 90 CAPSULE | Refills: 0 | Status: SHIPPED | OUTPATIENT
Start: 2023-03-19 | End: 2023-04-18

## 2023-03-16 RX ORDER — DULOXETIN HYDROCHLORIDE 60 MG/1
60 CAPSULE, DELAYED RELEASE ORAL DAILY
Qty: 30 CAPSULE | Refills: 2 | Status: SHIPPED | OUTPATIENT
Start: 2023-03-16 | End: 2023-06-14

## 2023-03-16 ASSESSMENT — ENCOUNTER SYMPTOMS
COUGH: 0
GASTROINTESTINAL NEGATIVE: 1
SHORTNESS OF BREATH: 0
NAUSEA: 0
DIARRHEA: 0
EYES NEGATIVE: 1
BACK PAIN: 1
CONSTIPATION: 0

## 2023-03-16 NOTE — PROGRESS NOTES
Yoel Bruno  ([birth year])    3/16/2023    Subjective:     Yoel Bruno is 61 y.o. male who complains today of:    Chief Complaint   Patient presents with    Back Pain         Allergies:  Patient has no known allergies. Past Medical History:   Diagnosis Date    Bronchitis     CAD (coronary artery disease)     past angioplasty > 10 yrs ago    Chronic back pain     Diabetes ([facility] [address]     dx > 2 yrs    Dyslipidemia     Hyperlipidemia     meds > 5 yrs    Hypertension     meds > 5 yrs    Neuropathy in diabetes [facility])     Osteoarthritis     Pneumonia     Sickle cell anemia (HCC)     Sleep apnea, obstructive      Past Surgical History:   Procedure Laterality Date    BACK SURGERY  2008    Lumbar disc OR. BLADDER SURGERY  7/10/2015    Patient states he was urininating blood and had a procedure done.     COLONOSCOPY  3/14/14    [facility]    ENDOSCOPY, COLON, DIAGNOSTIC      LUMBAR FUSION  2008    OTHER SURGICAL HISTORY Right     tendon repair in (R) forearm because of previous injury as child    TOTAL HIP ARTHROPLASTY Left 1/23/2020    LEFT HIP TOTAL HIP ARTHROPLASTY, RICARDO performed by Seng Newton MD at [place] [address] ENDOSCOPY  3/14/14    [address] 2013    Dr. Laveda Fothergill office     Family History   Problem Relation Age of Onset    Cancer Mother         Throat & esophagus    Anemia Mother     Other Father         Natural causes    High Blood Pressure Sister     COPD Brother     Heart Disease Brother         Cardiac stent    Diabetes Sister     COPD Sister     Emphysema Sister     No Known Problems Daughter      Social History     Socioeconomic History    Marital status: Legally      Spouse name: Not on file    Number of children: Not on file    Years of education: Not on file    Highest education level: Not on file   Occupational History    Occupation: Disabled - 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 Vaping Use    Vaping Use: Former   Substance and Sexual Activity    Alcohol use: No     Alcohol/week: 0.0 standard drinks    Drug use: No    Sexual activity: Not on file   Other Topics Concern    Not on file   Social History Narrative    Not on file     Social Determinants of Health     Financial Resource Strain: Not on file   Food Insecurity: Not on file   Transportation Needs: Not on file   Physical Activity: Not on file   Stress: Not on file   Social Connections: Not on file   Intimate Partner Violence: Not on file   Housing Stability: Not on file       Current Outpatient Medications on File Prior to Visit   Medication Sig Dispense Refill    Handicap Placard MISC by Does not apply route Good through 12/14/25.  Has difficulty walking distance without rest 1 each 0    ibuprofen (ADVIL;MOTRIN) 800 MG tablet Take 800 mg by mouth every 6 hours as needed for Pain      promethazine-dextromethorphan (PROMETHAZINE-DM) 6.25-15 MG/5ML syrup Take by mouth as needed for Cough      olmesartan (BENICAR) 40 MG tablet take 1 tablet by mouth every morning      amLODIPine (NORVASC) 10 MG tablet take 1 tablet by mouth once daily      atenolol (TENORMIN) 50 MG tablet Take by mouth      atenolol-chlorthalidone (TENORETIC) 50-25 MG per tablet daily  0    Elastic Bandages & Supports (T.E.D. BELOW KNEE/L-REGULAR) MISC 1 applicator by Does not apply route daily 1 each 0    losartan-hydrochlorothiazide (HYZAAR) 100-25 MG per tablet Take by mouth daily       Respiratory Therapy Supplies (FLUTTER) PANCHO 1 Device by Does not apply route 4 times daily 1 Device 0    metFORMIN (GLUCOPHAGE) 500 MG tablet Take 500 mg by mouth 2 times daily (with meals)      fenofibrate 160 MG tablet Take by mouth daily       folic acid (FOLVITE) 1 MG tablet Take 1 mg by mouth daily       amLODIPine-benazepril (LOTREL) 5-10 MG per capsule Take 1 capsule by mouth daily       aspirin 81 MG EC tablet Take 1 tablet by mouth 2 times daily 30 tablet 0    nicotine (NICODERM CQ) 21 MG/24HR Place 1 patch onto the skin daily 42 patch 0     No current facility-administered medications on file prior to visit. Pt presents today for a f/u of his pain. PCP is Dr. Kaveh George MD. Pt last saw this NP for his chronic low back pain from [facility] in early 2000s pain. He says he needs to schedule SCS with Dr. Ruth Alvares for next month. Had consult with Dr. Ruth Alvares concerning his SCS that was not working. Plan is for stem replacement. He is hoping only need battery replacement. His pain ok this month. Trying to stay active at home     Says his SCS is currently off as it won't turn on. Since he hasn't been able to use SCS he is having more Lt LE pain and numbness. He is having more trouble with walking any distance. He wonders about a handicap placard d/t this. History of mild bilateral CTS, DM, sickle cell. Last flare of sickle cell crisis in June 2021. He has a history of lumbar fusion, spinal cord stimulator, Lt hip replacement 1/23/21 with Dr. Jay Guerrier. He goes into crisis if he has stress and high BP. He used to use his SCS constantly. He is on disability. He continues to use his Lyrica 150mg TID and Norco 10mg 2-3 per day PRN pain (#80) and Cymbalta 60mg daily and baclofen 10mg 1-2 daily PRN spasm and Ibuprofen PRN (getting ibuprofen from PCP). He needs refills for all his medications    Pt feels pain level with his medication is 5/10, and worse without medication. Pt feels that SCS not working, too much activity, weather change makes the pain worse, and medication, change of position makes the pain better. Pt feels his medication helps   him function and improve his quality of life, specifically says allows to do ADL's.  Pt admits to Lt LE radiating numbness and tingling. Denies recent falls, injuries or trauma. Pt denies new weakness. Pt reports PT has been done. Review of Systems   Constitutional:  Negative for fever. HENT: Negative. Eyes: Negative. Respiratory:  Negative for cough and shortness of breath. Cardiovascular:  Negative for chest pain. Gastrointestinal: Negative. Negative for constipation, diarrhea and nausea. Endocrine: Negative. Genitourinary: Negative. Musculoskeletal:  Positive for arthralgias and back pain. Skin: Negative. Neurological:  Negative for dizziness and weakness. Psychiatric/Behavioral: Negative. Objective:     Vitals:  /82   Temp 97.2 °F (36.2 °C)   Ht 6' 1\" (1.854 m)   Wt 213 lb (96.6 kg)   BMI 28.10 kg/m² Pain Score:   5      Physical Exam  Vitals and nursing note reviewed. This is a pleasant male who answers questions appropriately and follows commands. Wearing glasses. Pt is alert and oriented x 3. Recent and remote memory is intact. Mood and affect, judgement and insight are normal. No signs of distress, no dyspnea or SOB noted. HEENT: PERRL. Neck is supple, trachea midline. No lymphadenopathy noted. Decreased ROM with flexion and extension of low back. Tender with palpation to lumbar spine bilaterally. SCS battery pack palpated on Lt low back. Tightness appreciated over Lt lower lumbar paraspinal muscles. Mildly positive Lt SLR  today and reproduces low back pain. Tightness in both hamstrings noted. Cranial nerves II-XII are intact. Assessment:      Diagnosis Orders   1. Postlaminectomy syndrome, lumbar region  pregabalin (LYRICA) 150 MG capsule    HYDROcodone-acetaminophen (NORCO)  MG per tablet    DULoxetine (CYMBALTA) 60 MG extended release capsule    baclofen (LIORESAL) 10 MG tablet    Urine Drug Screen      2.  L4-5 HERNAITED DISC  pregabalin (LYRICA) 150 MG capsule    HYDROcodone-acetaminophen (NORCO)  MG per tablet    DULoxetine (CYMBALTA) 60 MG extended release capsule    baclofen (LIORESAL) 10 MG tablet    Urine Drug Screen          Plan:     Periodic Controlled Substance Monitoring: Possible medication side effects, risk of tolerance/dependence & alternative treatments discussed., No signs of potential drug abuse or diversion identified. , Assessed functional status., Obtaining appropriate analgesic effect of treatment. (Elma Merlos, APRN - CNP)    Orders Placed This Encounter   Medications    pregabalin (LYRICA) 150 MG capsule     Sig: Take 1 capsule by mouth 3 times daily for 30 days. Do not fill until 3/19/23     Dispense:  90 capsule     Refill:  0    HYDROcodone-acetaminophen (NORCO)  MG per tablet     Sig: Take 1 tablet by mouth every 8-12 hours as needed for Pain for up to 30 days. Do not fill until 3/19/23 - #80 tabs for 30 days     Dispense:  80 tablet     Refill:  0     Reduce doses taken as pain becomes manageable    DULoxetine (CYMBALTA) 60 MG extended release capsule     Sig: Take 1 capsule by mouth daily     Dispense:  30 capsule     Refill:  2    baclofen (LIORESAL) 10 MG tablet     Sig: Take 1 tablet by mouth 2 times daily     Dispense:  45 tablet     Refill:  2         Orders Placed This Encounter   Procedures    Urine Drug Screen     Chronic pain/illicits       Discussed options with the patient today. Anatomic model pathology was shown and reviewed with pt. Will continue Norco 10mg 2-3 daily PRN pain and lyrica 150mg TID. Refilled cymbalta 60mg daily and baclofen 10mg 1-2 daily (#45 tabs). Hold injections. He gets ibuprofen from PCP. All questions were answered. Discussed home exercise program  Relevant imaging and pain generators reviewed. Pt verbalized understanding and agrees with above plan. Pt has chronic pain. Utox reviewed and appropriate from March 2022 - pt advised to avoid ETOH with narcotics. ORT score 1 - low risk. Narcan Rx sent in April 2021.   Will check Utox and f/u with report - last took norco and lyrica last night. Will continue medications for chronic pain that has been previously directed as they do help pt function with ADL and improve quality of life. Pt is aware goal is to use the least amount of medication possible to allow pt to function and help with quality of life as discussed in detail. Ideal to keep MME below 50. Have discussed proper disposal of narcotic medication. Advised to have medications in safe place and locked up/in lock box. Discussed possible risks of opiate medication with pt, including, but not limited to possible constipation, nausea or vomiting, sedation, urinary retention, dependence and possible addiction. Pt agrees to use medication as prescribed/as directed. Pt advised to not use opiates while driving or operating heavy equipment, or in situations where pt may harm him/herself or others. Pt is aware that while on narcotics, pt needs to be seen to reassess pain and reassess need for continued medication at that time. NDP reviewed. OARRS was reviewed. This NP saw pt under direct supervision of Dr. Robson Burgos. Follow up:  Return in about 4 weeks (around 4/13/2023) for review meds and reassess pain.     Vonda Ness, APRN - CNP

## 2023-03-22 ENCOUNTER — PREP FOR PROCEDURE (OUTPATIENT)
Dept: NEUROSURGERY | Age: 58
End: 2023-03-22

## 2023-04-17 ENCOUNTER — OFFICE VISIT (OUTPATIENT)
Dept: PAIN MANAGEMENT | Age: 58
End: 2023-04-17

## 2023-04-17 VITALS
BODY MASS INDEX: 28.34 KG/M2 | HEIGHT: 73 IN | SYSTOLIC BLOOD PRESSURE: 136 MMHG | WEIGHT: 213.8 LBS | TEMPERATURE: 96.9 F | DIASTOLIC BLOOD PRESSURE: 88 MMHG

## 2023-04-17 DIAGNOSIS — M96.1 POSTLAMINECTOMY SYNDROME, LUMBAR REGION: Chronic | ICD-10-CM

## 2023-04-17 DIAGNOSIS — M51.26 DISPLACEMENT OF LUMBAR INTERVERTEBRAL DISC WITHOUT MYELOPATHY: Chronic | ICD-10-CM

## 2023-04-17 RX ORDER — HYDROCODONE BITARTRATE AND ACETAMINOPHEN 10; 325 MG/1; MG/1
1 TABLET ORAL
Qty: 80 TABLET | Refills: 0 | Status: SHIPPED | OUTPATIENT
Start: 2023-04-20 | End: 2023-05-20

## 2023-04-17 RX ORDER — PREGABALIN 150 MG/1
150 CAPSULE ORAL 3 TIMES DAILY
Qty: 90 CAPSULE | Refills: 0 | Status: SHIPPED | OUTPATIENT
Start: 2023-04-18 | End: 2023-05-18

## 2023-04-17 RX ORDER — ACETAMINOPHEN 325 MG/1
1000 TABLET ORAL ONCE
Status: CANCELLED | OUTPATIENT
Start: 2023-04-17 | End: 2023-04-17

## 2023-04-17 ASSESSMENT — ENCOUNTER SYMPTOMS
DIARRHEA: 0
EYES NEGATIVE: 1
NAUSEA: 0
SHORTNESS OF BREATH: 0
CONSTIPATION: 0
BACK PAIN: 1
COUGH: 0
GASTROINTESTINAL NEGATIVE: 1

## 2023-04-17 NOTE — PROGRESS NOTES
Miracle Bunn  (0/25/1850)    4/17/2023    Subjective:     Miracle Bunn is 61 y.o. male who complains today of:    Chief Complaint   Patient presents with    Back Pain     Middle      Leg Pain     Left         Allergies:  Patient has no known allergies. Past Medical History:   Diagnosis Date    Bronchitis     CAD (coronary artery disease)     past angioplasty > 10 yrs ago    Chronic back pain     Diabetes ([address]     dx > 2 yrs    Dyslipidemia     Hyperlipidemia     meds > 5 yrs    Hypertension     meds > 5 yrs    Neuropathy in diabetes [facility])     Osteoarthritis     Pneumonia     Sickle cell anemia (HCC)     Sleep apnea, obstructive      Past Surgical History:   Procedure Laterality Date    BACK SURGERY  2008    Lumbar disc OR. BLADDER SURGERY  7/10/2015    Patient states he was urininating blood and had a procedure done.     COLONOSCOPY  3/14/14    [facility]    ENDOSCOPY, COLON, DIAGNOSTIC      LUMBAR FUSION  2008    OTHER SURGICAL HISTORY Right     tendon repair in (R) forearm because of previous injury as child    TOTAL HIP ARTHROPLASTY Left 1/23/2020    LEFT HIP TOTAL HIP ARTHROPLASTY, RICARDO performed by Storm Amador MD at [place] [address] ENDOSCOPY  3/14/14    [address] 2013    Dr. 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

## 2023-04-18 ENCOUNTER — HOSPITAL ENCOUNTER (OUTPATIENT)
Dept: PREADMISSION TESTING | Age: 58
Discharge: HOME | End: 2023-04-22
Payer: COMMERCIAL

## 2023-04-18 VITALS
SYSTOLIC BLOOD PRESSURE: 142 MMHG | RESPIRATION RATE: 16 BRPM | HEART RATE: 62 BPM | DIASTOLIC BLOOD PRESSURE: 76 MMHG | HEIGHT: 73 IN | BODY MASS INDEX: 27.57 KG/M2 | TEMPERATURE: 97.2 F | WEIGHT: 208 LBS | OXYGEN SATURATION: 96 %

## 2023-04-18 LAB
ANION GAP SERPL CALCULATED.3IONS-SCNC: 9 MEQ/L (ref 9–15)
BUN SERPL-MCNC: 23 MG/DL (ref 6–20)
CALCIUM SERPL-MCNC: 10 MG/DL (ref 8.5–9.9)
CHLORIDE SERPL-SCNC: 104 MEQ/L (ref 95–107)
CO2 SERPL-SCNC: 29 MEQ/L (ref 20–31)
CREAT SERPL-MCNC: 1.28 MG/DL (ref 0.7–1.2)
ERYTHROCYTE [DISTWIDTH] IN BLOOD BY AUTOMATED COUNT: 20.3 % (ref 11.5–14.5)
GLUCOSE SERPL-MCNC: 117 MG/DL (ref 70–99)
HBA1C MFR BLD: 4.9 % (ref 4.8–5.9)
HCT VFR BLD AUTO: 39.8 % (ref 42–52)
HGB BLD-MCNC: 13.5 G/DL (ref 14–18)
MCH RBC QN AUTO: 28 PG (ref 27–31.3)
MCHC RBC AUTO-ENTMCNC: 33.9 % (ref 33–37)
MCV RBC AUTO: 82.5 FL (ref 79–92.2)
PLATELET # BLD AUTO: 149 K/UL (ref 130–400)
POTASSIUM SERPL-SCNC: 3.3 MEQ/L (ref 3.4–4.9)
RBC # BLD AUTO: 4.82 M/UL (ref 4.7–6.1)
SODIUM SERPL-SCNC: 142 MEQ/L (ref 135–144)
WBC # BLD AUTO: 5.5 K/UL (ref 4.8–10.8)

## 2023-04-18 PROCEDURE — 85027 COMPLETE CBC AUTOMATED: CPT

## 2023-04-18 PROCEDURE — 93005 ELECTROCARDIOGRAM TRACING: CPT | Performed by: FAMILY MEDICINE

## 2023-04-18 PROCEDURE — 83036 HEMOGLOBIN GLYCOSYLATED A1C: CPT

## 2023-04-18 PROCEDURE — 80048 BASIC METABOLIC PNL TOTAL CA: CPT

## 2023-04-18 RX ORDER — SODIUM CHLORIDE 0.9 % (FLUSH) 0.9 %
5-40 SYRINGE (ML) INJECTION EVERY 12 HOURS SCHEDULED
Status: CANCELLED | OUTPATIENT
Start: 2023-04-25

## 2023-04-18 RX ORDER — SODIUM CHLORIDE 0.9 % (FLUSH) 0.9 %
5-40 SYRINGE (ML) INJECTION PRN
Status: CANCELLED | OUTPATIENT
Start: 2023-04-25

## 2023-04-18 RX ORDER — SODIUM CHLORIDE 9 MG/ML
INJECTION, SOLUTION INTRAVENOUS PRN
Status: CANCELLED | OUTPATIENT
Start: 2023-04-25

## 2023-04-18 RX ORDER — ATENOLOL 50 MG/1
50 TABLET ORAL DAILY
COMMUNITY

## 2023-04-18 RX ORDER — CEFAZOLIN SODIUM IN 0.9 % NACL 2 G/100 ML
2000 PLASTIC BAG, INJECTION (ML) INTRAVENOUS
Status: CANCELLED | OUTPATIENT
Start: 2023-04-25 | End: 2023-04-25

## 2023-04-18 ASSESSMENT — ENCOUNTER SYMPTOMS
VOMITING: 0
SORE THROAT: 0
APNEA: 1
SINUS PAIN: 0
NAUSEA: 0
TROUBLE SWALLOWING: 0
EYE REDNESS: 0
WHEEZING: 0
COUGH: 0
CONSTIPATION: 0
EYE ITCHING: 0
EYE DISCHARGE: 0
ABDOMINAL PAIN: 0
RHINORRHEA: 0
BACK PAIN: 1
CHEST TIGHTNESS: 0
ABDOMINAL DISTENTION: 0
FACIAL SWELLING: 0
CHOKING: 0
DIARRHEA: 0
SHORTNESS OF BREATH: 0
EYE PAIN: 0
SINUS PRESSURE: 0
PHOTOPHOBIA: 0

## 2023-04-18 NOTE — H&P
of left hip    Tobacco use disorder    REGINA (obstructive sleep apnea)    S/P insertion of spinal cord stimulator    Status post total replacement of left hip    Disease of larynx    Hb-SS disease with crisis, unspecified ([facility] [address]         Plan:  Preoperative workup as follows: PAT, EKG, CBC, BMP, Hgb A1C  Instructed to stop ASA, NSAIDs 7 days prior (starting today). Instructed to take Atenolol day of surgery  3.    Scheduled for: Spinal Cord Stimulator Replacement    SIGNATURE: DARLENE Briscoe - CNP  DATE: April 18, 2023

## 2023-04-19 LAB
EKG ATRIAL RATE: 59 BPM
EKG P AXIS: 47 DEGREES
EKG P-R INTERVAL: 160 MS
EKG Q-T INTERVAL: 426 MS
EKG QRS DURATION: 92 MS
EKG QTC CALCULATION (BAZETT): 421 MS
EKG R AXIS: -7 DEGREES
EKG T AXIS: 105 DEGREES
EKG VENTRICULAR RATE: 59 BPM

## 2023-04-25 ENCOUNTER — ANESTHESIA (OUTPATIENT)
Dept: OPERATING ROOM | Age: 58
End: 2023-04-25
Payer: COMMERCIAL

## 2023-04-25 ENCOUNTER — APPOINTMENT (OUTPATIENT)
Dept: GENERAL RADIOLOGY | Age: 58
End: 2023-04-25
Attending: PAIN MEDICINE
Payer: COMMERCIAL

## 2023-04-25 ENCOUNTER — HOSPITAL ENCOUNTER (OUTPATIENT)
Age: 58
Setting detail: OUTPATIENT SURGERY
Discharge: HOME | End: 2023-04-25
Attending: PAIN MEDICINE | Admitting: PAIN MEDICINE
Payer: COMMERCIAL

## 2023-04-25 ENCOUNTER — ANESTHESIA EVENT (OUTPATIENT)
Dept: OPERATING ROOM | Age: 58
End: 2023-04-25
Payer: COMMERCIAL

## 2023-04-25 VITALS
DIASTOLIC BLOOD PRESSURE: 76 MMHG | TEMPERATURE: 97 F | BODY MASS INDEX: 27.57 KG/M2 | SYSTOLIC BLOOD PRESSURE: 159 MMHG | HEIGHT: 73 IN | WEIGHT: 208 LBS | HEART RATE: 77 BPM | RESPIRATION RATE: 18 BRPM | OXYGEN SATURATION: 99 %

## 2023-04-25 PROCEDURE — 3700000001 HC ADD 15 MINUTES (ANESTHESIA): Performed by: PAIN MEDICINE

## 2023-04-25 PROCEDURE — 2720000010 HC SURG SUPPLY STERILE: Performed by: PAIN MEDICINE

## 2023-04-25 PROCEDURE — 63685 INS/RPLC SPI NPG/RCVR POCKET: CPT | Performed by: PAIN MEDICINE

## 2023-04-25 PROCEDURE — 6360000002 HC RX W HCPCS: Performed by: NURSE ANESTHETIST, CERTIFIED REGISTERED

## 2023-04-25 PROCEDURE — 2500000003 HC RX 250 WO HCPCS: Performed by: PAIN MEDICINE

## 2023-04-25 PROCEDURE — 3600000004 HC SURGERY LEVEL 4 BASE: Performed by: PAIN MEDICINE

## 2023-04-25 PROCEDURE — A4217 STERILE WATER/SALINE, 500 ML: HCPCS | Performed by: PAIN MEDICINE

## 2023-04-25 PROCEDURE — 7100000010 HC PHASE II RECOVERY - FIRST 15 MIN: Performed by: PAIN MEDICINE

## 2023-04-25 PROCEDURE — 3600000014 HC SURGERY LEVEL 4 ADDTL 15MIN: Performed by: PAIN MEDICINE

## 2023-04-25 PROCEDURE — 7100000011 HC PHASE II RECOVERY - ADDTL 15 MIN: Performed by: PAIN MEDICINE

## 2023-04-25 PROCEDURE — 2709999900 HC NON-CHARGEABLE SUPPLY: Performed by: PAIN MEDICINE

## 2023-04-25 PROCEDURE — 3700000000 HC ANESTHESIA ATTENDED CARE: Performed by: PAIN MEDICINE

## 2023-04-25 PROCEDURE — 2500000003 HC RX 250 WO HCPCS: Performed by: NURSE ANESTHETIST, CERTIFIED REGISTERED

## 2023-04-25 PROCEDURE — 6370000000 HC RX 637 (ALT 250 FOR IP): Performed by: ANESTHESIOLOGY

## 2023-04-25 PROCEDURE — 2580000003 HC RX 258: Performed by: PAIN MEDICINE

## 2023-04-25 PROCEDURE — 6360000002 HC RX W HCPCS

## 2023-04-25 PROCEDURE — C1889 IMPLANT/INSERT DEVICE, NOC: HCPCS | Performed by: PAIN MEDICINE

## 2023-04-25 PROCEDURE — 6360000002 HC RX W HCPCS: Performed by: PAIN MEDICINE

## 2023-04-25 DEVICE — ENVELOPE PLSE GENRTR M W2.7XL2.5IN NEURO ANTIBACT ABSRB: Type: IMPLANTABLE DEVICE | Site: BACK | Status: FUNCTIONAL

## 2023-04-25 RX ORDER — LIDOCAINE HYDROCHLORIDE 20 MG/ML
INJECTION, SOLUTION INTRAVENOUS PRN
Status: DISCONTINUED | OUTPATIENT
Start: 2023-04-25 | End: 2023-04-25 | Stop reason: SDUPTHER

## 2023-04-25 RX ORDER — CEFAZOLIN SODIUM IN 0.9 % NACL 2 G/100 ML
2000 PLASTIC BAG, INJECTION (ML) INTRAVENOUS
Status: COMPLETED | OUTPATIENT
Start: 2023-04-25 | End: 2023-04-25

## 2023-04-25 RX ORDER — MEPERIDINE HYDROCHLORIDE 25 MG/ML
12.5 INJECTION INTRAMUSCULAR; INTRAVENOUS; SUBCUTANEOUS
Status: CANCELLED | OUTPATIENT
Start: 2023-04-25 | End: 2023-04-26

## 2023-04-25 RX ORDER — SODIUM CHLORIDE 9 MG/ML
INJECTION, SOLUTION INTRAVENOUS PRN
Status: DISCONTINUED | OUTPATIENT
Start: 2023-04-25 | End: 2023-04-25 | Stop reason: HOSPADM

## 2023-04-25 RX ORDER — LIDOCAINE HYDROCHLORIDE 20 MG/ML
INJECTION, SOLUTION EPIDURAL; INFILTRATION; INTRACAUDAL; PERINEURAL PRN
Status: DISCONTINUED | OUTPATIENT
Start: 2023-04-25 | End: 2023-04-25 | Stop reason: ALTCHOICE

## 2023-04-25 RX ORDER — OXYCODONE HYDROCHLORIDE 5 MG/1
5 TABLET ORAL
Status: COMPLETED | OUTPATIENT
Start: 2023-04-25 | End: 2023-04-25

## 2023-04-25 RX ORDER — ONDANSETRON 2 MG/ML
4 INJECTION INTRAMUSCULAR; INTRAVENOUS
Status: CANCELLED | OUTPATIENT
Start: 2023-04-25 | End: 2023-04-26

## 2023-04-25 RX ORDER — MAGNESIUM HYDROXIDE 1200 MG/15ML
LIQUID ORAL CONTINUOUS PRN
Status: COMPLETED | OUTPATIENT
Start: 2023-04-25 | End: 2023-04-25

## 2023-04-25 RX ORDER — DIPHENHYDRAMINE HYDROCHLORIDE 50 MG/ML
12.5 INJECTION INTRAMUSCULAR; INTRAVENOUS
Status: CANCELLED | OUTPATIENT
Start: 2023-04-25 | End: 2023-04-26

## 2023-04-25 RX ORDER — METOCLOPRAMIDE HYDROCHLORIDE 5 MG/ML
10 INJECTION INTRAMUSCULAR; INTRAVENOUS
Status: CANCELLED | OUTPATIENT
Start: 2023-04-25 | End: 2023-04-26

## 2023-04-25 RX ORDER — LABETALOL 20 MG/4 ML (5 MG/ML) INTRAVENOUS SYRINGE
PRN
Status: DISCONTINUED | OUTPATIENT
Start: 2023-04-25 | End: 2023-04-25 | Stop reason: SDUPTHER

## 2023-04-25 RX ORDER — HYDRALAZINE HYDROCHLORIDE 20 MG/ML
INJECTION INTRAMUSCULAR; INTRAVENOUS PRN
Status: DISCONTINUED | OUTPATIENT
Start: 2023-04-25 | End: 2023-04-25

## 2023-04-25 RX ORDER — SODIUM CHLORIDE 9 MG/ML
25 INJECTION, SOLUTION INTRAVENOUS PRN
Status: CANCELLED | OUTPATIENT
Start: 2023-04-25

## 2023-04-25 RX ORDER — FENTANYL CITRATE 0.05 MG/ML
50 INJECTION, SOLUTION INTRAMUSCULAR; INTRAVENOUS EVERY 10 MIN PRN
Status: CANCELLED | OUTPATIENT
Start: 2023-04-25

## 2023-04-25 RX ORDER — MIDAZOLAM HYDROCHLORIDE 1 MG/ML
INJECTION INTRAMUSCULAR; INTRAVENOUS PRN
Status: DISCONTINUED | OUTPATIENT
Start: 2023-04-25 | End: 2023-04-25 | Stop reason: SDUPTHER

## 2023-04-25 RX ORDER — SODIUM CHLORIDE 0.9 % (FLUSH) 0.9 %
5-40 SYRINGE (ML) INJECTION PRN
Status: DISCONTINUED | OUTPATIENT
Start: 2023-04-25 | End: 2023-04-25 | Stop reason: HOSPADM

## 2023-04-25 RX ORDER — PROPOFOL 10 MG/ML
INJECTION, EMULSION INTRAVENOUS PRN
Status: DISCONTINUED | OUTPATIENT
Start: 2023-04-25 | End: 2023-04-25 | Stop reason: SDUPTHER

## 2023-04-25 RX ORDER — SODIUM CHLORIDE 0.9 % (FLUSH) 0.9 %
5-40 SYRINGE (ML) INJECTION EVERY 12 HOURS SCHEDULED
Status: DISCONTINUED | OUTPATIENT
Start: 2023-04-25 | End: 2023-04-25 | Stop reason: HOSPADM

## 2023-04-25 RX ORDER — HYDRALAZINE HYDROCHLORIDE 20 MG/ML
INJECTION INTRAMUSCULAR; INTRAVENOUS PRN
Status: DISCONTINUED | OUTPATIENT
Start: 2023-04-25 | End: 2023-04-25 | Stop reason: SDUPTHER

## 2023-04-25 RX ORDER — SODIUM CHLORIDE 0.9 % (FLUSH) 0.9 %
5-40 SYRINGE (ML) INJECTION EVERY 12 HOURS SCHEDULED
Status: CANCELLED | OUTPATIENT
Start: 2023-04-25

## 2023-04-25 RX ORDER — SODIUM CHLORIDE 0.9 % (FLUSH) 0.9 %
5-40 SYRINGE (ML) INJECTION PRN
Status: CANCELLED | OUTPATIENT
Start: 2023-04-25

## 2023-04-25 RX ADMIN — Medication 2000 MG: at 11:16

## 2023-04-25 RX ADMIN — PROPOFOL 100 MG: 10 INJECTION, EMULSION INTRAVENOUS at 11:24

## 2023-04-25 RX ADMIN — LIDOCAINE HYDROCHLORIDE 40 MG: 20 INJECTION, SOLUTION INTRAVENOUS at 11:24

## 2023-04-25 RX ADMIN — MIDAZOLAM HYDROCHLORIDE 2 MG: 1 INJECTION, SOLUTION INTRAMUSCULAR; INTRAVENOUS at 11:10

## 2023-04-25 RX ADMIN — HYDRALAZINE HYDROCHLORIDE 5 MG: 20 INJECTION INTRAMUSCULAR; INTRAVENOUS at 11:38

## 2023-04-25 RX ADMIN — OXYCODONE 5 MG: 5 TABLET ORAL at 12:22

## 2023-04-25 RX ADMIN — HYDRALAZINE HYDROCHLORIDE 5 MG: 20 INJECTION INTRAMUSCULAR; INTRAVENOUS at 11:46

## 2023-04-25 RX ADMIN — PROPOFOL 20 MG: 10 INJECTION, EMULSION INTRAVENOUS at 11:48

## 2023-04-25 RX ADMIN — LABETALOL HYDROCHLORIDE 5 MG: 5 INJECTION INTRAVENOUS at 11:38

## 2023-04-25 RX ADMIN — PROPOFOL 60 MG: 10 INJECTION, EMULSION INTRAVENOUS at 11:32

## 2023-04-25 RX ADMIN — PROPOFOL 30 MG: 10 INJECTION, EMULSION INTRAVENOUS at 11:47

## 2023-04-25 RX ADMIN — SODIUM CHLORIDE: 9 INJECTION, SOLUTION INTRAVENOUS at 09:02

## 2023-04-25 RX ADMIN — LABETALOL HYDROCHLORIDE 5 MG: 5 INJECTION INTRAVENOUS at 11:46

## 2023-04-25 RX ADMIN — PROPOFOL 40 MG: 10 INJECTION, EMULSION INTRAVENOUS at 11:36

## 2023-04-25 ASSESSMENT — PAIN DESCRIPTION - LOCATION
LOCATION: BACK

## 2023-04-25 ASSESSMENT — PAIN DESCRIPTION - ORIENTATION: ORIENTATION: MID

## 2023-04-25 ASSESSMENT — PAIN - FUNCTIONAL ASSESSMENT: PAIN_FUNCTIONAL_ASSESSMENT: ACTIVITIES ARE NOT PREVENTED

## 2023-04-25 ASSESSMENT — PAIN SCALES - GENERAL
PAINLEVEL_OUTOF10: 5
PAINLEVEL_OUTOF10: 4
PAINLEVEL_OUTOF10: 5

## 2023-04-25 ASSESSMENT — PAIN DESCRIPTION - DESCRIPTORS: DESCRIPTORS: ACHING

## 2023-04-25 NOTE — INTERVAL H&P NOTE
Update History & Physical    The patient's History and Physical of April 25, Pt history was reviewed with the patient and I examined the patient. There was no change. The surgical site was confirmed by the patient and me. Plan: The risks, benefits, expected outcome, and alternative to the recommended procedure have been discussed with the patient. Patient understands and wants to proceed with the procedure.      Electronically signed by Ebenezer Rush MD on 4/25/2023 at 10:14 AM

## 2023-04-25 NOTE — DISCHARGE INSTRUCTIONS
Diet, activity, and Meds as before, Follow up in 1 week at my office, Make appointment, May take showers but do not scrub the incision,

## 2023-04-25 NOTE — OP NOTE
Operative Note      Patient: Stephy Delcid  YOB: 1965  MRN: [id number]    Date of Procedure: 4/25/2023    Pre-Op Diagnosis Codes:     * Postlaminectomy syndrome, lumbar region [M96.1]    Post-Op Diagnosis: Same       Procedure(s):  SPINAL STIMULATOR REPLACEMENT    Surgeon(s):  Sharonda Caceres MD    Assistant:   Physician Assistant: Michael Matthews PA-C    Anesthesia: General    Estimated Blood Loss (mL): Minimal    Complications: None    Specimens:   * No specimens in log *    Implants:  Implant Name Type Inv. Item Serial No.  Lot No. LRB No. Used Action   ENVELOPE PLSE Mansi Diaz [id number] Cardiac ICD/CRT-D ENVELOPE PLSE Lisbeth Nyhan [place] Joellen Stacks  [employer] [employer] [id number] N/A 1 Implanted         Drains: * No LDAs found *    Findings: The patient is a 61 y.o. male with significant past medical history of DM, HTN, REGINA, Sickle Cell Anemia, CAD who presents for a Spinal Cord Stimulator Replacement on 4/25/23 at [facility] [place]. Patient was explained of procedure, Risks and complications Discussed, questions and concerns addressed, To OR Placed in Prone pojistion, Kefsol 2Gm antibiotic for prophylaxis, Sedated by anesthesia team, Skin over previous generator Incised and Incision deepened to expose the previous Generator, This was milked out of the incision, Disconnected from the leads, A fresh medtronic generator was now hooked to the leads, Impedence checked before securing the screws, The Generator was now covered with a sleeve and reinserted into the pocket, The pocket was closed with 3.0 vicryl for deeper tissues, 4.0 vicryl for subcutanious tissues, and skin sealed with Dermabond.           Electronically signed by Sharonda Caceres MD on 4/25/2023 at 11:35 AM

## 2023-04-25 NOTE — ANESTHESIA PRE PROCEDURE
COVID19        Anesthesia Evaluation  Patient summary reviewed and Nursing notes reviewed no history of anesthetic complications:   Airway: Mallampati: II  TM distance: >3 FB   Neck ROM: full  Mouth opening: > = 3 FB   Dental: normal exam         Pulmonary:normal exam    (+) pneumonia:  sleep apnea:                             Cardiovascular:    (+) hypertension:, CAD:,                   Neuro/Psych:   (+) TIA,             GI/Hepatic/Renal:             Endo/Other:    (+) Diabetes, . Abdominal:             Vascular: Other Findings:           Anesthesia Plan      MAC     ASA 3       Induction: intravenous. Anesthetic plan and risks discussed with patient. Plan discussed with CRNA.     Attending anesthesiologist reviewed and agrees with Preprocedure content                Lalito Sanchez MD   4/25/2023

## 2023-04-25 NOTE — ANESTHESIA POSTPROCEDURE EVALUATION
Department of Anesthesiology  Postprocedure Note    Patient: Josue Frederick  MRN: [id number]  YOB: 1965  Date of evaluation: 4/25/2023      Procedure Summary     Date: 04/25/23 Room / Location: [address]    Anesthesia Start: 1110 Anesthesia Stop: 3180    Procedure: SPINAL STIMULATOR REPLACEMENT (Back) Diagnosis:       Postlaminectomy syndrome, lumbar region      (Postlaminectomy syndrome, lumbar region [M96.1])    Surgeons: Christina Aguilar MD Responsible Provider: Shawn Helm MD    Anesthesia Type: MAC ASA Status: 3          Anesthesia Type: No value filed.     Ivan Phase I: Ivan Score: 10    Ivan Phase II: Ivan Score: 10      Anesthesia Post Evaluation    Patient location during evaluation: bedside  Patient participation: complete - patient participated  Level of consciousness: awake and awake and alert  Airway patency: patent  Nausea & Vomiting: no nausea and no vomiting  Complications: no  Cardiovascular status: blood pressure returned to baseline and hemodynamically stable  Respiratory status: acceptable  Hydration status: euvolemic

## 2023-05-01 ENCOUNTER — OFFICE VISIT (OUTPATIENT)
Dept: PAIN MANAGEMENT | Age: 58
End: 2023-05-01

## 2023-05-01 VITALS
WEIGHT: 211 LBS | BODY MASS INDEX: 27.96 KG/M2 | HEIGHT: 73 IN | TEMPERATURE: 97.9 F | SYSTOLIC BLOOD PRESSURE: 136 MMHG | DIASTOLIC BLOOD PRESSURE: 86 MMHG

## 2023-05-01 DIAGNOSIS — M96.1 POSTLAMINECTOMY SYNDROME, LUMBAR REGION: Primary | ICD-10-CM

## 2023-05-01 PROCEDURE — 99024 POSTOP FOLLOW-UP VISIT: CPT | Performed by: PAIN MEDICINE

## 2023-05-01 NOTE — PROGRESS NOTES
Pt for followup of Stim Generator replacement 1 week ago, Good coverage, Good wound healing. Will send him back to his regular pain doc.

## 2023-05-15 ENCOUNTER — OFFICE VISIT (OUTPATIENT)
Dept: PAIN MANAGEMENT | Age: 58
End: 2023-05-15

## 2023-05-15 VITALS
TEMPERATURE: 98.4 F | BODY MASS INDEX: 28.63 KG/M2 | WEIGHT: 216 LBS | HEIGHT: 73 IN | SYSTOLIC BLOOD PRESSURE: 132 MMHG | DIASTOLIC BLOOD PRESSURE: 82 MMHG

## 2023-05-15 DIAGNOSIS — M51.26 DISPLACEMENT OF LUMBAR INTERVERTEBRAL DISC WITHOUT MYELOPATHY: Chronic | ICD-10-CM

## 2023-05-15 DIAGNOSIS — M96.1 POSTLAMINECTOMY SYNDROME, LUMBAR REGION: Chronic | ICD-10-CM

## 2023-05-15 RX ORDER — HYDROCODONE BITARTRATE AND ACETAMINOPHEN 10; 325 MG/1; MG/1
1 TABLET ORAL
Qty: 80 TABLET | Refills: 0 | Status: SHIPPED | OUTPATIENT
Start: 2023-05-20 | End: 2023-06-19

## 2023-05-15 RX ORDER — PREGABALIN 150 MG/1
150 CAPSULE ORAL 3 TIMES DAILY
Qty: 90 CAPSULE | Refills: 0 | Status: SHIPPED | OUTPATIENT
Start: 2023-05-18 | End: 2023-06-17

## 2023-05-15 RX ORDER — LOSARTAN POTASSIUM 100 MG/1
100 TABLET ORAL EVERY MORNING
COMMUNITY
Start: 2023-05-10

## 2023-05-15 ASSESSMENT — ENCOUNTER SYMPTOMS
NAUSEA: 0
GASTROINTESTINAL NEGATIVE: 1
CONSTIPATION: 0
EYES NEGATIVE: 1
BACK PAIN: 1
SHORTNESS OF BREATH: 0
DIARRHEA: 0
COUGH: 0

## 2023-05-15 NOTE — PROGRESS NOTES
Edyta Cruz  ([birth year])    5/15/2023    Subjective:     Edyta Cruz is 61 y.o. male who complains today of:    Chief Complaint   Patient presents with    1 Month Follow-Up    Back Pain    Leg Pain         Allergies:  Patient has no known allergies. Past Medical History:   Diagnosis Date    Bronchitis     CAD (coronary artery disease)     past angioplasty > 10 yrs ago    Chronic back pain     Diabetes ([address]     dx > 2 yrs    Dyslipidemia     Hyperlipidemia     meds > 5 yrs    Hypertension     meds > 5 yrs    Neuropathy in diabetes [facility])     Osteoarthritis     Pneumonia     Sickle cell anemia (HCC)     Sleep apnea, obstructive      Past Surgical History:   Procedure Laterality Date    BACK SURGERY  2008    Lumbar disc OR. BLADDER SURGERY  07/10/2015    Patient states he was urininating blood and had a procedure done.     COLONOSCOPY  03/14/2014    [facility]    ENDOSCOPY, COLON, DIAGNOSTIC      LUMBAR FUSION  2008    OTHER SURGICAL HISTORY Right     tendon repair in (R) forearm because of previous injury as child    STIMULATOR SURGERY N/A 4/25/2023    SPINAL STIMULATOR REPLACEMENT performed by Farnaz Loza MD at [address] [facility] Left 01/23/2020    LEFT HIP TOTAL HIP ARTHROPLASTY, RICARDO performed by Eliseo Wolf MD at [place] [address] ENDOSCOPY  03/14/2014    BIOPSY Jessica Ceja  2013    Dr. Fede Mccyo office     Family History   Problem Relation Age of Onset    Cancer Mother         Throat & esophagus    Anemia Mother     Other Father         Natural causes    High Blood Pressure Sister     COPD Brother     Heart Disease Brother         Cardiac stent    Diabetes Sister     COPD Sister     Emphysema Sister     No Known Problems Daughter      Social History     Socioeconomic History    Marital status: Single     Spouse name: Not on file    Number of children: Not on file    Years of education: Not on file    Highest education level: Not on file

## 2023-06-12 DIAGNOSIS — M96.1 POSTLAMINECTOMY SYNDROME, LUMBAR REGION: Chronic | ICD-10-CM

## 2023-06-12 DIAGNOSIS — M51.26 DISPLACEMENT OF LUMBAR INTERVERTEBRAL DISC WITHOUT MYELOPATHY: Chronic | ICD-10-CM

## 2023-06-12 RX ORDER — DULOXETIN HYDROCHLORIDE 60 MG/1
CAPSULE, DELAYED RELEASE ORAL
Qty: 30 CAPSULE | Refills: 2 | OUTPATIENT
Start: 2023-06-12

## 2023-07-18 ENCOUNTER — OFFICE VISIT (OUTPATIENT)
Dept: PAIN MANAGEMENT | Age: 58
End: 2023-07-18

## 2023-07-18 VITALS
WEIGHT: 215 LBS | DIASTOLIC BLOOD PRESSURE: 80 MMHG | HEIGHT: 73 IN | BODY MASS INDEX: 28.49 KG/M2 | TEMPERATURE: 96.8 F | SYSTOLIC BLOOD PRESSURE: 138 MMHG

## 2023-07-18 DIAGNOSIS — M51.26 DISPLACEMENT OF LUMBAR INTERVERTEBRAL DISC WITHOUT MYELOPATHY: Chronic | ICD-10-CM

## 2023-07-18 DIAGNOSIS — M96.1 POSTLAMINECTOMY SYNDROME, LUMBAR REGION: Chronic | ICD-10-CM

## 2023-07-18 RX ORDER — HYDROCODONE BITARTRATE AND ACETAMINOPHEN 10; 325 MG/1; MG/1
1 TABLET ORAL
Qty: 80 TABLET | Refills: 0 | Status: SHIPPED | OUTPATIENT
Start: 2023-07-19 | End: 2023-08-18

## 2023-07-18 RX ORDER — PREGABALIN 150 MG/1
150 CAPSULE ORAL 3 TIMES DAILY
Qty: 90 CAPSULE | Refills: 0 | Status: SHIPPED | OUTPATIENT
Start: 2023-07-18 | End: 2023-08-17

## 2023-07-18 ASSESSMENT — ENCOUNTER SYMPTOMS
EYES NEGATIVE: 1
DIARRHEA: 0
SHORTNESS OF BREATH: 0
CONSTIPATION: 0
COUGH: 0
NAUSEA: 0
BACK PAIN: 1
GASTROINTESTINAL NEGATIVE: 1

## 2023-07-18 NOTE — PROGRESS NOTES
tablet    Ambulatory referral to Physical Therapy          Plan:     Periodic Controlled Substance Monitoring: Possible medication side effects, risk of tolerance/dependence & alternative treatments discussed., No signs of potential drug abuse or diversion identified. , Assessed functional status., Obtaining appropriate analgesic effect of treatment. (Elma Merlos, APRN - CNP)    Orders Placed This Encounter   Medications    pregabalin (LYRICA) 150 MG capsule     Sig: Take 1 capsule by mouth 3 times daily for 30 days. Dispense:  90 capsule     Refill:  0    HYDROcodone-acetaminophen (NORCO)  MG per tablet     Sig: Take 1 tablet by mouth every 8-12 hours as needed for Pain for up to 30 days. Do not fill until 7/19/23 - #80 tabs for 30 days     Dispense:  80 tablet     Refill:  0     Reduce doses taken as pain becomes manageable       Orders Placed This Encounter   Procedures    Ambulatory referral to Physical Therapy     Referral Priority:   Routine     Referral Type:   Eval and Treat     Referral Reason:   Specialty Services Required     Requested Specialty:   Physical Therapist     Number of Visits Requested:   1     Discussed options with the patient today. Anatomic model pathology was shown and reviewed with pt. He is doing well s/p SCS and feels this is helpful. Will continue Norco 10mg 2-3 daily PRN pain and lyrica 150mg TID. Refilled cymbalta 60mg daily and baclofen 10mg 1-2 daily (#45 tabs). He has baclofen if needed. He uses sparingly. He has enough cymbalta. Hold injections. He gets ibuprofen from PCP. Re-requested C9 for PT and requested aqua therapy at this time. All questions were answered. Discussed home exercise program.  Relevant imaging and pain generators reviewed. Pt verbalized understanding and agrees with above plan. Pt has chronic pain. Utox reviewed and appropriate from March 2023. ORT score 1 - low risk. Narcan Rx sent in April 2021.     Will continue medications for

## 2023-08-16 ENCOUNTER — OFFICE VISIT (OUTPATIENT)
Dept: PAIN MANAGEMENT | Age: 58
End: 2023-08-16

## 2023-08-16 VITALS
TEMPERATURE: 97.6 F | HEART RATE: 55 BPM | BODY MASS INDEX: 28.49 KG/M2 | HEIGHT: 73 IN | WEIGHT: 215 LBS | OXYGEN SATURATION: 91 %

## 2023-08-16 DIAGNOSIS — M51.26 DISPLACEMENT OF LUMBAR INTERVERTEBRAL DISC WITHOUT MYELOPATHY: Chronic | ICD-10-CM

## 2023-08-16 DIAGNOSIS — M96.1 POSTLAMINECTOMY SYNDROME, LUMBAR REGION: Chronic | ICD-10-CM

## 2023-08-16 RX ORDER — PREGABALIN 150 MG/1
150 CAPSULE ORAL 3 TIMES DAILY
Qty: 90 CAPSULE | Refills: 0 | Status: SHIPPED | OUTPATIENT
Start: 2023-08-17 | End: 2023-09-16

## 2023-08-16 RX ORDER — HYDROCODONE BITARTRATE AND ACETAMINOPHEN 10; 325 MG/1; MG/1
1 TABLET ORAL
Qty: 80 TABLET | Refills: 0 | Status: SHIPPED | OUTPATIENT
Start: 2023-08-18 | End: 2023-09-17

## 2023-08-16 ASSESSMENT — ENCOUNTER SYMPTOMS
BACK PAIN: 1
COUGH: 0
EYES NEGATIVE: 1
DIARRHEA: 0
NAUSEA: 0
CONSTIPATION: 0
GASTROINTESTINAL NEGATIVE: 1
SHORTNESS OF BREATH: 0

## 2023-08-16 NOTE — PROGRESS NOTES
Cliff Salazar  ([birth year])    8/16/2023    Subjective:     Cliff Salazar is 61 y.o. male who complains today of:    Chief Complaint   Patient presents with    Back Pain         Allergies:  Patient has no known allergies. Past Medical History:   Diagnosis Date    Bronchitis     CAD (coronary artery disease)     past angioplasty > 10 yrs ago    Chronic back pain     Diabetes ([address])     dx > 2 yrs    Dyslipidemia     Hyperlipidemia     meds > 5 yrs    Hypertension     meds > 5 yrs    Neuropathy in diabetes [facility])     Osteoarthritis     Pneumonia     Sickle cell anemia (HCC)     Sleep apnea, obstructive      Past Surgical History:   Procedure Laterality Date    BACK SURGERY  2008    Lumbar disc OR. BLADDER SURGERY  07/10/2015    Patient states he was urininating blood and had a procedure done.     COLONOSCOPY  03/14/2014    [facility]    ENDOSCOPY, COLON, DIAGNOSTIC      LUMBAR FUSION  2008    OTHER SURGICAL HISTORY Right     tendon repair in (R) forearm because of previous injury as child    STIMULATOR SURGERY N/A 4/25/2023    SPINAL STIMULATOR REPLACEMENT performed by Mary Kay Araiza MD at [address] Left 01/23/2020    LEFT HIP TOTAL HIP ARTHROPLASTY, RICARDO performed by Pushpa Hensley MD at [facility] ENDOSCOPY  03/14/2014    BIOPSY Guera Gutierrez  2013    Dr. Kwabena Willis office     Family History   Problem Relation Age of Onset    Cancer Mother         Throat & esophagus    Anemia Mother     Other Father         Natural causes    High Blood Pressure Sister     COPD Brother     Heart Disease Brother         Cardiac stent    Diabetes Sister     COPD Sister     Emphysema Sister     No Known Problems Daughter      Social History     Socioeconomic History    Marital status: Single     Spouse name: Not on file    Number of children: Not on file    Years of education: Not on file    Highest education level: Not on file   Occupational History    Occupation:

## 2023-09-11 DIAGNOSIS — M51.26 DISPLACEMENT OF LUMBAR INTERVERTEBRAL DISC WITHOUT MYELOPATHY: Chronic | ICD-10-CM

## 2023-09-11 DIAGNOSIS — M96.1 POSTLAMINECTOMY SYNDROME, LUMBAR REGION: Chronic | ICD-10-CM

## 2023-09-11 RX ORDER — DULOXETIN HYDROCHLORIDE 60 MG/1
60 CAPSULE, DELAYED RELEASE ORAL DAILY
Qty: 90 CAPSULE | Refills: 0 | Status: SHIPPED | OUTPATIENT
Start: 2023-09-11 | End: 2023-12-10

## 2023-09-11 NOTE — TELEPHONE ENCOUNTER
Requested Prescriptions     Pending Prescriptions Disp Refills    DULoxetine (CYMBALTA) 60 MG extended release capsule [Pharmacy Med Name: DULOXETINE HCL DR 60 MG CAP] 90 capsule      Sig: take 1 capsule by mouth once daily       Patient last seen on: 08/16/23  Date of last refill: 06/13/23  Future appts: 09/13/23

## 2023-09-13 ENCOUNTER — OFFICE VISIT (OUTPATIENT)
Dept: PAIN MANAGEMENT | Age: 58
End: 2023-09-13

## 2023-09-13 VITALS
HEIGHT: 73 IN | SYSTOLIC BLOOD PRESSURE: 134 MMHG | TEMPERATURE: 97.1 F | BODY MASS INDEX: 28.63 KG/M2 | DIASTOLIC BLOOD PRESSURE: 76 MMHG | WEIGHT: 216 LBS

## 2023-09-13 DIAGNOSIS — F11.90 CHRONIC, CONTINUOUS USE OF OPIOIDS: ICD-10-CM

## 2023-09-13 DIAGNOSIS — M96.1 POSTLAMINECTOMY SYNDROME, LUMBAR REGION: Primary | Chronic | ICD-10-CM

## 2023-09-13 DIAGNOSIS — M51.26 DISPLACEMENT OF LUMBAR INTERVERTEBRAL DISC WITHOUT MYELOPATHY: Chronic | ICD-10-CM

## 2023-09-13 RX ORDER — BACLOFEN 10 MG/1
10 TABLET ORAL 2 TIMES DAILY
Qty: 45 TABLET | Refills: 2 | Status: SHIPPED | OUTPATIENT
Start: 2023-09-13 | End: 2023-12-12

## 2023-09-13 RX ORDER — PREGABALIN 150 MG/1
150 CAPSULE ORAL 3 TIMES DAILY
Qty: 90 CAPSULE | Refills: 0 | Status: SHIPPED | OUTPATIENT
Start: 2023-09-13 | End: 2023-10-13

## 2023-09-13 RX ORDER — DEXTROMETHORPHAN HYDROBROMIDE AND PROMETHAZINE HYDROCHLORIDE 15; 6.25 MG/5ML; MG/5ML
SYRUP ORAL
COMMUNITY
Start: 2023-09-10

## 2023-09-13 RX ORDER — HYDROCODONE BITARTRATE AND ACETAMINOPHEN 10; 325 MG/1; MG/1
1 TABLET ORAL
Qty: 80 TABLET | Refills: 0 | Status: SHIPPED | OUTPATIENT
Start: 2023-09-16 | End: 2023-10-16

## 2023-09-13 RX ORDER — PREDNISONE 20 MG/1
20 TABLET ORAL DAILY
COMMUNITY
Start: 2023-09-10

## 2023-09-13 RX ORDER — CEFUROXIME AXETIL 500 MG/1
500 TABLET ORAL 2 TIMES DAILY
COMMUNITY
Start: 2023-09-10

## 2023-09-13 ASSESSMENT — ENCOUNTER SYMPTOMS
BACK PAIN: 1
RESPIRATORY NEGATIVE: 1
DIARRHEA: 0
CONSTIPATION: 0

## 2023-09-13 NOTE — PROGRESS NOTES
Patient: Edelmira Corado  YOB: 1965  Date: 9/13/23        Subjective:     Edelmira Corado is a 60 y.o. male who complains today of:    Chief Complaint   Patient presents with    Follow-up    Back Pain     Lower         Allergies:  Patient has no known allergies. Past Medical History:   Diagnosis Date    Bronchitis     CAD (coronary artery disease)     past angioplasty > 10 yrs ago    Chronic back pain     Diabetes ([address])     dx > 2 yrs    Dyslipidemia     Hyperlipidemia     meds > 5 yrs    Hypertension     meds > 5 yrs    Neuropathy in diabetes [facility])     Osteoarthritis     Pneumonia     Sickle cell anemia (HCC)     Sleep apnea, obstructive      Past Surgical History:   Procedure Laterality Date    BACK SURGERY  2008    Lumbar disc OR. BLADDER SURGERY  07/10/2015    Patient states he was urininating blood and had a procedure done.     COLONOSCOPY  03/14/2014    [facility]    ENDOSCOPY, COLON, DIAGNOSTIC      LUMBAR FUSION  2008    OTHER SURGICAL HISTORY Right     tendon repair in (R) forearm because of previous injury as child    STIMULATOR SURGERY N/A 4/25/2023    SPINAL STIMULATOR REPLACEMENT performed by Elissa Mcgee MD at [address] Left 01/23/2020    LEFT HIP TOTAL HIP ARTHROPLASTY, RICARDO performed by Dario Pretty MD at [address] [facility] ENDOSCOPY  03/14/2014    BIOPSY Ivet Doty  2013    Dr. Augustine Jo office     Family History   Problem Relation Age of Onset    Cancer Mother         Throat & esophagus    Anemia Mother     Other Father         Natural causes    High Blood Pressure Sister     COPD Brother     Heart Disease Brother         Cardiac stent    Diabetes Sister     COPD Sister     Emphysema Sister     No Known Problems Daughter      Social History     Socioeconomic History    Marital status: Single     Spouse name: Not on file    Number of children: Not on file    Years of education: Not on file    Highest education level: Not

## 2023-10-11 ENCOUNTER — OFFICE VISIT (OUTPATIENT)
Dept: PAIN MANAGEMENT | Age: 58
End: 2023-10-11

## 2023-10-11 VITALS
SYSTOLIC BLOOD PRESSURE: 158 MMHG | HEIGHT: 73 IN | BODY MASS INDEX: 28.49 KG/M2 | DIASTOLIC BLOOD PRESSURE: 82 MMHG | TEMPERATURE: 96.8 F | WEIGHT: 215 LBS

## 2023-10-11 DIAGNOSIS — M51.26 DISPLACEMENT OF LUMBAR INTERVERTEBRAL DISC WITHOUT MYELOPATHY: Chronic | ICD-10-CM

## 2023-10-11 DIAGNOSIS — M96.1 POSTLAMINECTOMY SYNDROME, LUMBAR REGION: Chronic | ICD-10-CM

## 2023-10-11 RX ORDER — HYDROCODONE BITARTRATE AND ACETAMINOPHEN 10; 325 MG/1; MG/1
1 TABLET ORAL
Qty: 80 TABLET | Refills: 0 | Status: SHIPPED | OUTPATIENT
Start: 2023-10-16 | End: 2023-11-15

## 2023-10-11 RX ORDER — PREGABALIN 150 MG/1
150 CAPSULE ORAL 3 TIMES DAILY
Qty: 90 CAPSULE | Refills: 0 | Status: SHIPPED | OUTPATIENT
Start: 2023-10-16 | End: 2023-11-15

## 2023-10-11 ASSESSMENT — ENCOUNTER SYMPTOMS
GASTROINTESTINAL NEGATIVE: 1
SHORTNESS OF BREATH: 0
CONSTIPATION: 0
DIARRHEA: 0
EYES NEGATIVE: 1
COUGH: 0
BACK PAIN: 1
NAUSEA: 0

## 2023-10-11 NOTE — PROGRESS NOTES
[place] Haider  ([birth year])    10/11/2023    Subjective:     [place] Haider is 60 y.o. male who complains today of:    Chief Complaint   Patient presents with    Follow-up    Back Pain     Lower left and middle          Allergies:  Patient has no known allergies. Past Medical History:   Diagnosis Date    Bronchitis     CAD (coronary artery disease)     past angioplasty > 10 yrs ago    Chronic back pain     Diabetes ([address])     dx > 2 yrs    Dyslipidemia     Hyperlipidemia     meds > 5 yrs    Hypertension     meds > 5 yrs    Neuropathy in diabetes [facility])     Osteoarthritis     Pneumonia     Sickle cell anemia (HCC)     Sleep apnea, obstructive      Past Surgical History:   Procedure Laterality Date    BACK SURGERY  2008    Lumbar disc OR. BLADDER SURGERY  07/10/2015    Patient states he was urininating blood and had a procedure done.     COLONOSCOPY  03/14/2014    [facility]    ENDOSCOPY, COLON, DIAGNOSTIC      LUMBAR FUSION  2008    OTHER SURGICAL HISTORY Right     tendon repair in (R) forearm because of previous injury as child    STIMULATOR SURGERY N/A 4/25/2023    SPINAL STIMULATOR REPLACEMENT performed by Alexandria Quiorz MD at [address] Left 01/23/2020    LEFT HIP TOTAL HIP ARTHROPLASTY, RICARDO performed by Bette Lorenzo MD at [address] [facility] ENDOSCOPY  03/14/2014    BIOPSY Gricel Franco  2013    Dr. Xiao Car office     Family History   Problem Relation Age of Onset    Cancer Mother         Throat & esophagus    Anemia Mother     Other Father         Natural causes    High Blood Pressure Sister     COPD Brother     Heart Disease Brother         Cardiac stent    Diabetes Sister     COPD Sister     Emphysema Sister     No Known Problems Daughter      Social History     Socioeconomic History    Marital status: Single     Spouse name: Not on file    Number of children: Not on file    Years of education: Not on file    Highest education level: Not on file

## 2023-11-15 ENCOUNTER — OFFICE VISIT (OUTPATIENT)
Dept: PAIN MANAGEMENT | Age: 58
End: 2023-11-15

## 2023-11-15 VITALS
HEIGHT: 73 IN | TEMPERATURE: 97.1 F | BODY MASS INDEX: 29.03 KG/M2 | DIASTOLIC BLOOD PRESSURE: 86 MMHG | WEIGHT: 219 LBS | SYSTOLIC BLOOD PRESSURE: 132 MMHG

## 2023-11-15 DIAGNOSIS — M51.26 DISPLACEMENT OF LUMBAR INTERVERTEBRAL DISC WITHOUT MYELOPATHY: Chronic | ICD-10-CM

## 2023-11-15 DIAGNOSIS — M96.1 POSTLAMINECTOMY SYNDROME, LUMBAR REGION: Chronic | ICD-10-CM

## 2023-11-15 RX ORDER — DULOXETIN HYDROCHLORIDE 60 MG/1
60 CAPSULE, DELAYED RELEASE ORAL DAILY
Qty: 90 CAPSULE | Refills: 0 | Status: SHIPPED | OUTPATIENT
Start: 2023-11-15 | End: 2024-02-13

## 2023-11-15 RX ORDER — PREGABALIN 150 MG/1
150 CAPSULE ORAL 3 TIMES DAILY
Qty: 90 CAPSULE | Refills: 0 | Status: SHIPPED | OUTPATIENT
Start: 2023-11-15 | End: 2023-12-15

## 2023-11-15 RX ORDER — HYDROCODONE BITARTRATE AND ACETAMINOPHEN 10; 325 MG/1; MG/1
1 TABLET ORAL
Qty: 80 TABLET | Refills: 0 | Status: SHIPPED | OUTPATIENT
Start: 2023-11-15 | End: 2023-12-15

## 2023-11-15 RX ORDER — BACLOFEN 10 MG/1
10 TABLET ORAL 2 TIMES DAILY
Qty: 45 TABLET | Refills: 2 | Status: SHIPPED | OUTPATIENT
Start: 2023-11-15 | End: 2024-02-13

## 2023-11-15 ASSESSMENT — ENCOUNTER SYMPTOMS
DIARRHEA: 0
EYES NEGATIVE: 1
BACK PAIN: 1
NAUSEA: 0
SHORTNESS OF BREATH: 0
GASTROINTESTINAL NEGATIVE: 1
CONSTIPATION: 0
COUGH: 0

## 2023-11-22 DIAGNOSIS — R94.31 ABNORMAL EKG: Primary | ICD-10-CM

## 2023-12-13 ENCOUNTER — OFFICE VISIT (OUTPATIENT)
Dept: CARDIOLOGY CLINIC | Age: 58
End: 2023-12-13

## 2023-12-13 ENCOUNTER — OFFICE VISIT (OUTPATIENT)
Dept: PAIN MANAGEMENT | Age: 58
End: 2023-12-13

## 2023-12-13 VITALS
WEIGHT: 207 LBS | DIASTOLIC BLOOD PRESSURE: 78 MMHG | BODY MASS INDEX: 27.43 KG/M2 | HEIGHT: 73 IN | SYSTOLIC BLOOD PRESSURE: 120 MMHG | TEMPERATURE: 96.1 F

## 2023-12-13 VITALS
WEIGHT: 207 LBS | DIASTOLIC BLOOD PRESSURE: 80 MMHG | HEIGHT: 73 IN | HEART RATE: 60 BPM | OXYGEN SATURATION: 98 % | BODY MASS INDEX: 27.43 KG/M2 | SYSTOLIC BLOOD PRESSURE: 138 MMHG

## 2023-12-13 DIAGNOSIS — M96.1 POSTLAMINECTOMY SYNDROME, LUMBAR REGION: Chronic | ICD-10-CM

## 2023-12-13 DIAGNOSIS — R06.09 DOE (DYSPNEA ON EXERTION): Primary | ICD-10-CM

## 2023-12-13 DIAGNOSIS — R09.89 BILATERAL CAROTID BRUITS: ICD-10-CM

## 2023-12-13 DIAGNOSIS — R07.9 CHEST PAIN, UNSPECIFIED TYPE: ICD-10-CM

## 2023-12-13 DIAGNOSIS — M51.26 DISPLACEMENT OF LUMBAR INTERVERTEBRAL DISC WITHOUT MYELOPATHY: Chronic | ICD-10-CM

## 2023-12-13 DIAGNOSIS — R94.31 ABNORMAL ECG: ICD-10-CM

## 2023-12-13 DIAGNOSIS — F17.200 SMOKER: ICD-10-CM

## 2023-12-13 DIAGNOSIS — E78.5 DYSLIPIDEMIA: ICD-10-CM

## 2023-12-13 RX ORDER — AMLODIPINE BESYLATE 10 MG/1
10 TABLET ORAL DAILY
COMMUNITY
Start: 2023-11-08 | End: 2023-12-13 | Stop reason: SDUPTHER

## 2023-12-13 RX ORDER — HYDROCODONE BITARTRATE AND ACETAMINOPHEN 10; 325 MG/1; MG/1
1 TABLET ORAL
Qty: 80 TABLET | Refills: 0 | Status: SHIPPED | OUTPATIENT
Start: 2023-12-15 | End: 2024-01-14

## 2023-12-13 RX ORDER — PREGABALIN 150 MG/1
150 CAPSULE ORAL 3 TIMES DAILY
Qty: 90 CAPSULE | Refills: 0 | Status: SHIPPED | OUTPATIENT
Start: 2023-12-15 | End: 2024-01-14

## 2023-12-13 RX ORDER — ASPIRIN 81 MG/1
81 TABLET ORAL DAILY
Qty: 30 TABLET | Refills: 0
Start: 2023-12-13 | End: 2024-01-12

## 2023-12-13 RX ORDER — ATENOLOL AND CHLORTHALIDONE TABLET 50; 25 MG/1; MG/1
1 TABLET ORAL DAILY
COMMUNITY
Start: 2023-11-27 | End: 2023-12-13 | Stop reason: SDUPTHER

## 2023-12-13 ASSESSMENT — ENCOUNTER SYMPTOMS
DIARRHEA: 0
GASTROINTESTINAL NEGATIVE: 1
BLOOD IN STOOL: 0
SHORTNESS OF BREATH: 0
EYES NEGATIVE: 1
CHEST TIGHTNESS: 1
SHORTNESS OF BREATH: 1
WHEEZING: 0
BACK PAIN: 1
NAUSEA: 0
COUGH: 0
COUGH: 0
CONSTIPATION: 0
NAUSEA: 0
STRIDOR: 0
EYES NEGATIVE: 1
GASTROINTESTINAL NEGATIVE: 1

## 2023-12-13 NOTE — PROGRESS NOTES
improve quality of life. Pt is aware goal is to use the least amount of medication possible to allow pt to function and help with quality of life as discussed in detail. Ideal to keep MME below 50. Have discussed proper disposal of narcotic medication. Advised to have medications in safe place and locked up/in lock box. Discussed possible risks of opiate medication with pt, including, but not limited to possible constipation, nausea or vomiting, sedation, urinary retention, dependence and possible addiction. Pt agrees to use medication as prescribed/as directed. Pt advised to not use opiates while driving or operating heavy equipment, or in situations where pt may harm him/herself or others. Pt is aware that while on narcotics, pt needs to be seen to reassess pain and reassess need for continued medication at that time. NDP reviewed. OARRS was reviewed. Follow up:  Return in about 4 weeks (around 1/10/2024) for review meds and reassess pain.     Claudia Ness, APRN - CNP

## 2024-01-15 ENCOUNTER — OFFICE VISIT (OUTPATIENT)
Dept: PAIN MANAGEMENT | Age: 59
End: 2024-01-15

## 2024-01-15 VITALS
HEIGHT: 73 IN | TEMPERATURE: 97 F | DIASTOLIC BLOOD PRESSURE: 74 MMHG | BODY MASS INDEX: 27.43 KG/M2 | WEIGHT: 207 LBS | SYSTOLIC BLOOD PRESSURE: 136 MMHG

## 2024-01-15 DIAGNOSIS — M51.26 DISPLACEMENT OF LUMBAR INTERVERTEBRAL DISC WITHOUT MYELOPATHY: Chronic | ICD-10-CM

## 2024-01-15 DIAGNOSIS — M96.1 POSTLAMINECTOMY SYNDROME, LUMBAR REGION: Chronic | ICD-10-CM

## 2024-01-15 RX ORDER — HYDROCODONE BITARTRATE AND ACETAMINOPHEN 10; 325 MG/1; MG/1
1 TABLET ORAL
Qty: 80 TABLET | Refills: 0 | Status: SHIPPED | OUTPATIENT
Start: 2024-01-16 | End: 2024-01-17 | Stop reason: SDUPTHER

## 2024-01-15 RX ORDER — PREGABALIN 150 MG/1
150 CAPSULE ORAL 3 TIMES DAILY
Qty: 90 CAPSULE | Refills: 0 | Status: SHIPPED | OUTPATIENT
Start: 2024-01-15 | End: 2024-02-14

## 2024-01-15 ASSESSMENT — ENCOUNTER SYMPTOMS
SHORTNESS OF BREATH: 1
DIARRHEA: 0
EYES NEGATIVE: 1
COUGH: 0
CONSTIPATION: 0
GASTROINTESTINAL NEGATIVE: 1
NAUSEA: 0
BACK PAIN: 1

## 2024-01-15 NOTE — PROGRESS NOTES
Tightness in both hamstrings noted. Cranial nerves II-XII are intact.          Assessment:      Diagnosis Orders   1. Postlaminectomy syndrome, lumbar region  HYDROcodone-acetaminophen (NORCO)  MG per tablet    pregabalin (LYRICA) 150 MG capsule      2. L4-5 HERNAITED DISC  HYDROcodone-acetaminophen (NORCO)  MG per tablet    pregabalin (LYRICA) 150 MG capsule          Plan:     Periodic Controlled Substance Monitoring: Possible medication side effects, risk of tolerance/dependence & alternative treatments discussed., No signs of potential drug abuse or diversion identified., Assessed functional status (ability to engage in work or other purposeful activities, the pain intensity and its interference with activities of daily living, quality of family life and social activities, and the physical activity), Obtaining appropriate analgesic effect of treatment. (Elma Merlos, APRN - CNP)    Orders Placed This Encounter   Medications    HYDROcodone-acetaminophen (NORCO)  MG per tablet     Sig: Take 1 tablet by mouth every 8-12 hours as needed for Pain for up to 30 days. #80 tabs for 30 days Max Daily Amount: 3 tablets     Dispense:  80 tablet     Refill:  0     Reduce doses taken as pain becomes manageable    pregabalin (LYRICA) 150 MG capsule     Sig: Take 1 capsule by mouth 3 times daily for 30 days.     Dispense:  90 capsule     Refill:  0       No orders of the defined types were placed in this encounter.    Discussed options with the patient today. Anatomic model pathology was shown and reviewed with pt.   Will continue Lyrica 150mg TID and norco 10mg 2-3 daily PRN pain (#80) as it helps him function.   F/U with cardiology and hematology. Quit smoking. He has enough cymbalta and baclofen until next month. All questions were answered. Discussed home exercise program.  Relevant imaging and pain generators reviewed. Pt verbalized understanding and agrees with above plan. Pt has chronic pain.

## 2024-01-17 DIAGNOSIS — M96.1 POSTLAMINECTOMY SYNDROME, LUMBAR REGION: Chronic | ICD-10-CM

## 2024-01-17 DIAGNOSIS — M51.26 DISPLACEMENT OF LUMBAR INTERVERTEBRAL DISC WITHOUT MYELOPATHY: Chronic | ICD-10-CM

## 2024-01-17 RX ORDER — HYDROCODONE BITARTRATE AND ACETAMINOPHEN 10; 325 MG/1; MG/1
1 TABLET ORAL
Qty: 80 TABLET | Refills: 0 | Status: SHIPPED | OUTPATIENT
Start: 2024-01-17 | End: 2024-02-16

## 2024-01-17 NOTE — TELEPHONE ENCOUNTER
Requested Prescriptions     Pending Prescriptions Disp Refills    HYDROcodone-acetaminophen (NORCO)  MG per tablet 80 tablet 0     Sig: Take 1 tablet by mouth every 8-12 hours as needed for Pain for up to 30 days. #80 tabs for 30 days Max Daily Amount: 3 tablets       Patient last seen on:  1/15/2024 W/CDL    Date of last surgery:      Date of last refill:  1/16/2024    Reason for request:  PT. IS REQUESTING HIS SCRIPT FOR NORCO BE RE-SENT TO [facility] IN [place].  PT. STATES HIS REGULAR PHARMACY IS OUT OF HIS MEDICATION AND WILL NOT HAVE ANY IN FOR AT LEAST FIVE DAYS.  WILL YOSELIN AUTHORIZE NEW SCRIPT TO [facility]?    Request date for pharmacy pick-up:  [id number]    Next office visit date: 2/12/2024    Patient has no known allergies.

## 2024-02-12 ENCOUNTER — OFFICE VISIT (OUTPATIENT)
Dept: PAIN MANAGEMENT | Age: 59
End: 2024-02-12
Payer: COMMERCIAL

## 2024-02-12 VITALS
SYSTOLIC BLOOD PRESSURE: 160 MMHG | HEART RATE: 56 BPM | WEIGHT: 213 LBS | BODY MASS INDEX: 28.23 KG/M2 | HEIGHT: 73 IN | DIASTOLIC BLOOD PRESSURE: 74 MMHG

## 2024-02-12 DIAGNOSIS — M96.1 POSTLAMINECTOMY SYNDROME, LUMBAR REGION: Chronic | ICD-10-CM

## 2024-02-12 DIAGNOSIS — M51.26 DISPLACEMENT OF LUMBAR INTERVERTEBRAL DISC WITHOUT MYELOPATHY: Chronic | ICD-10-CM

## 2024-02-12 PROCEDURE — 99214 OFFICE O/P EST MOD 30 MIN: CPT | Performed by: NURSE PRACTITIONER

## 2024-02-12 RX ORDER — BACLOFEN 10 MG/1
10 TABLET ORAL 2 TIMES DAILY
Qty: 45 TABLET | Refills: 2 | Status: SHIPPED | OUTPATIENT
Start: 2024-02-12 | End: 2024-05-12

## 2024-02-12 RX ORDER — PREGABALIN 150 MG/1
150 CAPSULE ORAL 3 TIMES DAILY
Qty: 90 CAPSULE | Refills: 0 | Status: SHIPPED | OUTPATIENT
Start: 2024-02-14 | End: 2024-03-15

## 2024-02-12 RX ORDER — HYDROCODONE BITARTRATE AND ACETAMINOPHEN 10; 325 MG/1; MG/1
1 TABLET ORAL
Qty: 80 TABLET | Refills: 0 | Status: SHIPPED | OUTPATIENT
Start: 2024-02-16 | End: 2024-03-17

## 2024-02-12 RX ORDER — DULOXETIN HYDROCHLORIDE 60 MG/1
60 CAPSULE, DELAYED RELEASE ORAL DAILY
Qty: 90 CAPSULE | Refills: 0 | Status: SHIPPED | OUTPATIENT
Start: 2024-02-12 | End: 2024-05-12

## 2024-02-12 NOTE — PROGRESS NOTES
Mateo Marsh  (1965)    2/12/2024    Subjective:     Mateo Marsh is 58 y.o. male who complains today of:    Chief Complaint   Patient presents with    Back Pain     Lower          Allergies:  Patient has no known allergies.    Past Medical History:   Diagnosis Date    Bronchitis     CAD (coronary artery disease)     past angioplasty > 10 yrs ago    Chronic back pain     Diabetes (HCC)     dx > 2 yrs    Dyslipidemia     Hyperlipidemia     meds > 5 yrs    Hypertension     meds > 5 yrs    Neuropathy in diabetes (HCC)     Osteoarthritis     Pneumonia     Sickle cell anemia (HCC)     Sleep apnea, obstructive      Past Surgical History:   Procedure Laterality Date    BACK SURGERY  2008    Lumbar disc OR.    BLADDER SURGERY  07/10/2015    Patient states he was urininating blood and had a procedure done.    COLONOSCOPY  03/14/2014    [facility]    ENDOSCOPY, COLON, DIAGNOSTIC      LUMBAR FUSION  2008    OTHER SURGICAL HISTORY Right     tendon repair in (R) forearm because of previous injury as child    STIMULATOR SURGERY N/A 4/25/2023    SPINAL STIMULATOR REPLACEMENT performed by Navneet Lamb MD at [facility] OR    TOTAL HIP ARTHROPLASTY Left 01/23/2020    LEFT HIP TOTAL HIP ARTHROPLASTY, RICARDO performed by Andrew Mehta MD at [facility] OR    UPPER GASTROINTESTINAL ENDOSCOPY  03/14/2014    BIOPSY [facility]    VAGAL NERVE STIMULATION  2013    Dr. Antoine office     Family History   Problem Relation Age of Onset    Cancer Mother         Throat & esophagus    Anemia Mother     Other Father         Natural causes    High Blood Pressure Sister     COPD Brother     Heart Disease Brother         Cardiac stent    Diabetes Sister     COPD Sister     Emphysema Sister     No Known Problems Daughter      Social History     Socioeconomic History    Marital status: Single     Spouse name: Not on file    Number of children: Not on file    Years of education: Not on file    Highest education level: Not on file   Occupational History

## 2024-03-11 ENCOUNTER — OFFICE VISIT (OUTPATIENT)
Dept: PAIN MANAGEMENT | Age: 59
End: 2024-03-11
Payer: COMMERCIAL

## 2024-03-11 VITALS
SYSTOLIC BLOOD PRESSURE: 138 MMHG | WEIGHT: 214 LBS | BODY MASS INDEX: 28.36 KG/M2 | DIASTOLIC BLOOD PRESSURE: 80 MMHG | TEMPERATURE: 97.2 F | HEIGHT: 73 IN

## 2024-03-11 DIAGNOSIS — M51.26 DISPLACEMENT OF LUMBAR INTERVERTEBRAL DISC WITHOUT MYELOPATHY: Chronic | ICD-10-CM

## 2024-03-11 DIAGNOSIS — M96.1 POSTLAMINECTOMY SYNDROME, LUMBAR REGION: Chronic | ICD-10-CM

## 2024-03-11 PROCEDURE — 99214 OFFICE O/P EST MOD 30 MIN: CPT | Performed by: NURSE PRACTITIONER

## 2024-03-11 RX ORDER — PREGABALIN 150 MG/1
150 CAPSULE ORAL 3 TIMES DAILY
Qty: 90 CAPSULE | Refills: 0 | Status: SHIPPED | OUTPATIENT
Start: 2024-03-19 | End: 2024-04-18

## 2024-03-11 RX ORDER — HYDROCODONE BITARTRATE AND ACETAMINOPHEN 10; 325 MG/1; MG/1
1 TABLET ORAL
Qty: 80 TABLET | Refills: 0 | Status: SHIPPED | OUTPATIENT
Start: 2024-03-17 | End: 2024-04-16

## 2024-03-11 ASSESSMENT — ENCOUNTER SYMPTOMS
GASTROINTESTINAL NEGATIVE: 1
BACK PAIN: 1
DIARRHEA: 0
NAUSEA: 0
CONSTIPATION: 0
EYES NEGATIVE: 1
COUGH: 0
SHORTNESS OF BREATH: 0

## 2024-03-11 NOTE — PROGRESS NOTES
will check Utox and f/u with report - last took lyrica and norco today       Will continue medications for chronic pain that has been previously directed as they do help pt function with ADL and improve quality of life. Pt is aware goal is to use the least amount of medication possible to allow pt to function and help with quality of life as discussed in detail.  Ideal to keep MME below 50.  Have discussed proper disposal of narcotic medication. Advised to have medications in safe place and locked up/in lock box.  Discussed possible risks of opiate medication with pt, including, but not limited to possible constipation, nausea or vomiting, sedation, urinary retention, dependence and possible addiction. Pt agrees to use medication as prescribed/as directed. Pt advised to not use opiates while driving or operating heavy equipment, or in situations where pt may harm him/herself or others.  Pt is aware that while on narcotics, pt needs to be seen to reassess pain and reassess need for continued medication at that time. NDP reviewed.  OARRS was reviewed.      Follow up:  Return in about 5 weeks (around 4/15/2024) for review meds and reassess pain.    Elma Ness, APRN - CNP

## 2024-04-15 ENCOUNTER — OFFICE VISIT (OUTPATIENT)
Dept: PAIN MANAGEMENT | Age: 59
End: 2024-04-15

## 2024-04-15 VITALS
TEMPERATURE: 97 F | WEIGHT: 214 LBS | DIASTOLIC BLOOD PRESSURE: 70 MMHG | BODY MASS INDEX: 28.36 KG/M2 | HEIGHT: 73 IN | SYSTOLIC BLOOD PRESSURE: 130 MMHG

## 2024-04-15 DIAGNOSIS — M51.26 DISPLACEMENT OF LUMBAR INTERVERTEBRAL DISC WITHOUT MYELOPATHY: Chronic | ICD-10-CM

## 2024-04-15 DIAGNOSIS — M96.1 POSTLAMINECTOMY SYNDROME, LUMBAR REGION: Chronic | ICD-10-CM

## 2024-04-15 RX ORDER — PREGABALIN 150 MG/1
150 CAPSULE ORAL 3 TIMES DAILY
Qty: 90 CAPSULE | Refills: 0 | Status: SHIPPED | OUTPATIENT
Start: 2024-04-16 | End: 2024-05-16

## 2024-04-15 RX ORDER — HYDROCODONE BITARTRATE AND ACETAMINOPHEN 10; 325 MG/1; MG/1
1 TABLET ORAL
Qty: 80 TABLET | Refills: 0 | Status: SHIPPED | OUTPATIENT
Start: 2024-04-16 | End: 2024-05-16

## 2024-04-15 ASSESSMENT — ENCOUNTER SYMPTOMS
EYES NEGATIVE: 1
BACK PAIN: 1
GASTROINTESTINAL NEGATIVE: 1
DIARRHEA: 0
CONSTIPATION: 0
COUGH: 0
NAUSEA: 0
SHORTNESS OF BREATH: 0

## 2024-04-15 NOTE — PROGRESS NOTES
Mateo Marsh  (1965)    4/15/2024    Subjective:     Mateo Marsh is 58 y.o. male who complains today of:    Chief Complaint   Patient presents with    Follow-up    Back Pain         Allergies:  Patient has no known allergies.    Past Medical History:   Diagnosis Date    Bronchitis     CAD (coronary artery disease)     past angioplasty > 10 yrs ago    Chronic back pain     Diabetes (HCC)     dx > 2 yrs    Dyslipidemia     Hyperlipidemia     meds > 5 yrs    Hypertension     meds > 5 yrs    Neuropathy in diabetes (HCC)     Osteoarthritis     Pneumonia     Sickle cell anemia (HCC)     Sleep apnea, obstructive      Past Surgical History:   Procedure Laterality Date    BACK SURGERY  2008    Lumbar disc OR.    BLADDER SURGERY  07/10/2015    Patient states he was urininating blood and had a procedure done.    COLONOSCOPY  03/14/2014    [facility]    ENDOSCOPY, COLON, DIAGNOSTIC      LUMBAR FUSION  2008    OTHER SURGICAL HISTORY Right     tendon repair in (R) forearm because of previous injury as child    STIMULATOR SURGERY N/A 4/25/2023    SPINAL STIMULATOR REPLACEMENT performed by Navneet Lamb MD at [facility] OR    TOTAL HIP ARTHROPLASTY Left 01/23/2020    LEFT HIP TOTAL HIP ARTHROPLASTY, RICARDO performed by Andrew Mehta MD at [facility] OR    UPPER GASTROINTESTINAL ENDOSCOPY  03/14/2014    [facility]    VAGAL NERVE STIMULATION  2013    Dr. Antoine office     Family History   Problem Relation Age of Onset    Cancer Mother         Throat & esophagus    Anemia Mother     Other Father         Natural causes    High Blood Pressure Sister     COPD Brother     Heart Disease Brother         Cardiac stent    Diabetes Sister     COPD Sister     Emphysema Sister     No Known Problems Daughter      Social History     Socioeconomic History    Marital status: Single     Spouse name: Not on file    Number of children: Not on file    Years of education: Not on file    Highest education level: Not on file   Occupational History

## 2024-05-13 ENCOUNTER — OFFICE VISIT (OUTPATIENT)
Dept: PAIN MANAGEMENT | Age: 59
End: 2024-05-13

## 2024-05-13 VITALS
SYSTOLIC BLOOD PRESSURE: 130 MMHG | HEIGHT: 73 IN | BODY MASS INDEX: 28.11 KG/M2 | TEMPERATURE: 97 F | DIASTOLIC BLOOD PRESSURE: 72 MMHG | WEIGHT: 212.1 LBS

## 2024-05-13 DIAGNOSIS — M51.26 DISPLACEMENT OF LUMBAR INTERVERTEBRAL DISC WITHOUT MYELOPATHY: Chronic | ICD-10-CM

## 2024-05-13 DIAGNOSIS — M96.1 POSTLAMINECTOMY SYNDROME, LUMBAR REGION: Chronic | ICD-10-CM

## 2024-05-13 RX ORDER — BACLOFEN 10 MG/1
10 TABLET ORAL 2 TIMES DAILY
Qty: 45 TABLET | Refills: 2 | Status: SHIPPED | OUTPATIENT
Start: 2024-05-13 | End: 2024-08-11

## 2024-05-13 RX ORDER — HYDROCODONE BITARTRATE AND ACETAMINOPHEN 10; 325 MG/1; MG/1
1 TABLET ORAL
Qty: 80 TABLET | Refills: 0 | Status: SHIPPED | OUTPATIENT
Start: 2024-05-16 | End: 2024-06-15

## 2024-05-13 RX ORDER — PREGABALIN 150 MG/1
150 CAPSULE ORAL 3 TIMES DAILY
Qty: 90 CAPSULE | Refills: 0 | Status: SHIPPED | OUTPATIENT
Start: 2024-05-16 | End: 2024-06-15

## 2024-05-13 RX ORDER — DULOXETIN HYDROCHLORIDE 60 MG/1
60 CAPSULE, DELAYED RELEASE ORAL DAILY
Qty: 90 CAPSULE | Refills: 0 | Status: SHIPPED | OUTPATIENT
Start: 2024-05-13 | End: 2024-08-11

## 2024-05-13 ASSESSMENT — ENCOUNTER SYMPTOMS
BACK PAIN: 1
EYES NEGATIVE: 1
SHORTNESS OF BREATH: 0
COUGH: 0
DIARRHEA: 0
CONSTIPATION: 0
GASTROINTESTINAL NEGATIVE: 1
NAUSEA: 0

## 2024-05-13 NOTE — PROGRESS NOTES
spasms.  He is not interested in injections at this time.  All questions were answered. Discussed home exercise program and  smoking cessation.  Relevant imaging and pain generators reviewed. Pt verbalized understanding and agrees with above plan. Pt has chronic pain.     Utox reviewed and appropriate from March 2024. ORT score 1 - low risk. Narcan Rx sent in April 2021.     Will continue medications for chronic pain that has been previously directed as they do help pt function with ADL and improve quality of life. Pt is aware goal is to use the least amount of medication possible to allow pt to function and help with quality of life as discussed in detail.  Ideal to keep MME below 50.  Have discussed proper disposal of narcotic medication. Advised to have medications in safe place and locked up/in lock box.  Discussed possible risks of opiate medication with pt, including, but not limited to possible constipation, nausea or vomiting, sedation, urinary retention, dependence and possible addiction. Pt agrees to use medication as prescribed/as directed. Pt advised to not use opiates while driving or operating heavy equipment, or in situations where pt may harm him/herself or others.  Pt is aware that while on narcotics, pt needs to be seen to reassess pain and reassess need for continued medication at that time. NDP reviewed.  OARRS was reviewed.      Follow up:  Return in about 4 weeks (around 6/10/2024) for review meds and reassess pain.    Elma Ness, APRN - CNP

## 2024-05-29 ENCOUNTER — HOSPITAL ENCOUNTER (OUTPATIENT)
Dept: ULTRASOUND IMAGING | Age: 59
Discharge: HOME | End: 2024-05-31
Attending: GENERAL PRACTICE
Payer: COMMERCIAL

## 2024-05-29 ENCOUNTER — HOSPITAL ENCOUNTER (OUTPATIENT)
Age: 59
Discharge: HOME | End: 2024-05-31
Attending: INTERNAL MEDICINE
Payer: COMMERCIAL

## 2024-05-29 ENCOUNTER — HOSPITAL ENCOUNTER (OUTPATIENT)
Dept: NUCLEAR MEDICINE | Age: 59
Discharge: HOME | End: 2024-05-31
Attending: INTERNAL MEDICINE
Payer: COMMERCIAL

## 2024-05-29 ENCOUNTER — HOSPITAL ENCOUNTER (OUTPATIENT)
Dept: ULTRASOUND IMAGING | Age: 59
Discharge: HOME | End: 2024-05-31
Attending: INTERNAL MEDICINE
Payer: COMMERCIAL

## 2024-05-29 ENCOUNTER — TELEPHONE (OUTPATIENT)
Dept: CARDIOLOGY CLINIC | Age: 59
End: 2024-05-29

## 2024-05-29 VITALS — WEIGHT: 210 LBS | BODY MASS INDEX: 27.83 KG/M2 | HEIGHT: 73 IN

## 2024-05-29 DIAGNOSIS — N43.3 HYDROCELE, UNSPECIFIED HYDROCELE TYPE: ICD-10-CM

## 2024-05-29 DIAGNOSIS — R09.89 BILATERAL CAROTID BRUITS: ICD-10-CM

## 2024-05-29 DIAGNOSIS — R06.09 DOE (DYSPNEA ON EXERTION): ICD-10-CM

## 2024-05-29 DIAGNOSIS — R94.31 ABNORMAL ECG: ICD-10-CM

## 2024-05-29 LAB
VAS LEFT CCA DIST EDV: 35.5 CM/S
VAS LEFT CCA DIST PSV: 156 CM/S
VAS LEFT CCA MID EDV: 26.9 CM/S
VAS LEFT CCA MID PSV: 127 CM/S
VAS LEFT CCA PROX EDV: 23.4 CM/S
VAS LEFT CCA PROX PSV: 127 CM/S
VAS LEFT ECA EDV: 30.7 CM/S
VAS LEFT ECA PSV: 200 CM/S
VAS LEFT ICA DIST EDV: 26.9 CM/S
VAS LEFT ICA DIST PSV: 126 CM/S
VAS LEFT ICA MID EDV: 24.4 CM/S
VAS LEFT ICA MID PSV: 160 CM/S
VAS LEFT ICA PROX EDV: 82.3 CM/S
VAS LEFT ICA PROX PSV: 414 CM/S
VAS LEFT ICA/CCA PSV: 3.26
VAS LEFT VERTEBRAL EDV: 17.2 CM/S
VAS LEFT VERTEBRAL PSV: 58.5 CM/S
VAS RIGHT CCA DIST EDV: 14.3 CM/S
VAS RIGHT CCA DIST PSV: 74.2 CM/S
VAS RIGHT CCA MID EDV: 14.9 CM/S
VAS RIGHT CCA MID PSV: 109 CM/S
VAS RIGHT CCA PROX EDV: 18 CM/S
VAS RIGHT CCA PROX PSV: 118 CM/S
VAS RIGHT ECA EDV: 69.7 CM/S
VAS RIGHT ECA PSV: 391 CM/S
VAS RIGHT ICA DIST EDV: 23.1 CM/S
VAS RIGHT ICA DIST PSV: 70.8 CM/S
VAS RIGHT ICA MID EDV: 28.7 CM/S
VAS RIGHT ICA MID PSV: 84.1 CM/S
VAS RIGHT ICA PROX EDV: 56.2 CM/S
VAS RIGHT ICA PROX PSV: 395 CM/S
VAS RIGHT ICA/CCA PSV: 3.62
VAS RIGHT VERTEBRAL EDV: 21.1 CM/S
VAS RIGHT VERTEBRAL PSV: 106 CM/S

## 2024-05-29 PROCEDURE — 3430000000 HC RX DIAGNOSTIC RADIOPHARMACEUTICAL: Performed by: INTERNAL MEDICINE

## 2024-05-29 PROCEDURE — 78452 HT MUSCLE IMAGE SPECT MULT: CPT

## 2024-05-29 PROCEDURE — 93017 CV STRESS TEST TRACING ONLY: CPT

## 2024-05-29 PROCEDURE — 93306 TTE W/DOPPLER COMPLETE: CPT

## 2024-05-29 PROCEDURE — A9502 TC99M TETROFOSMIN: HCPCS | Performed by: INTERNAL MEDICINE

## 2024-05-29 PROCEDURE — 76870 US EXAM SCROTUM: CPT

## 2024-05-29 PROCEDURE — 6360000002 HC RX W HCPCS: Performed by: INTERNAL MEDICINE

## 2024-05-29 PROCEDURE — 2580000003 HC RX 258: Performed by: INTERNAL MEDICINE

## 2024-05-29 PROCEDURE — 93880 EXTRACRANIAL BILAT STUDY: CPT

## 2024-05-29 PROCEDURE — 93975 VASCULAR STUDY: CPT

## 2024-05-29 RX ORDER — SODIUM CHLORIDE 0.9 % (FLUSH) 0.9 %
10 SYRINGE (ML) INJECTION PRN
Status: DISCONTINUED | OUTPATIENT
Start: 2024-05-29 | End: 2024-06-01 | Stop reason: HOSPADM

## 2024-05-29 RX ORDER — REGADENOSON 0.08 MG/ML
0.4 INJECTION, SOLUTION INTRAVENOUS
Status: COMPLETED | OUTPATIENT
Start: 2024-05-29 | End: 2024-05-29

## 2024-05-29 RX ADMIN — SODIUM CHLORIDE, PRESERVATIVE FREE 10 ML: 5 INJECTION INTRAVENOUS at 08:33

## 2024-05-29 RX ADMIN — SODIUM CHLORIDE, PRESERVATIVE FREE 10 ML: 5 INJECTION INTRAVENOUS at 08:34

## 2024-05-29 RX ADMIN — REGADENOSON 0.4 MG: 0.08 INJECTION, SOLUTION INTRAVENOUS at 08:33

## 2024-05-29 RX ADMIN — TETROFOSMIN 11.9 MILLICURIE: 1.38 INJECTION, POWDER, LYOPHILIZED, FOR SOLUTION INTRAVENOUS at 07:28

## 2024-05-29 RX ADMIN — TETROFOSMIN 35.6 MILLICURIE: 1.38 INJECTION, POWDER, LYOPHILIZED, FOR SOLUTION INTRAVENOUS at 08:33

## 2024-05-29 RX ADMIN — SODIUM CHLORIDE, PRESERVATIVE FREE 10 ML: 5 INJECTION INTRAVENOUS at 07:29

## 2024-05-29 NOTE — TELEPHONE ENCOUNTER
----- Message from Denver RIVERA MD sent at 5/29/2024 11:51 AM EDT -----  Significant abn CUS.  Get all tests done and need OV

## 2024-05-30 LAB
ECHO AV AREA PEAK VELOCITY: 2 CM2
ECHO AV AREA VTI: 1.8 CM2
ECHO AV AREA/BSA PEAK VELOCITY: 0.9 CM2/M2
ECHO AV AREA/BSA VTI: 0.8 CM2/M2
ECHO AV CUSP MM: 1.7 CM
ECHO AV MEAN GRADIENT: 5 MMHG
ECHO AV MEAN VELOCITY: 1 M/S
ECHO AV PEAK GRADIENT: 9 MMHG
ECHO AV PEAK VELOCITY: 1.5 M/S
ECHO AV VELOCITY RATIO: 0.67
ECHO AV VTI: 37 CM
ECHO BSA: 2.21 M2
ECHO BSA: 2.21 M2
ECHO EST RA PRESSURE: 3 MMHG
ECHO LA DIAMETER INDEX: 1.95 CM/M2
ECHO LA DIAMETER: 4.3 CM
ECHO LA VOL A-L A2C: 59 ML (ref 18–58)
ECHO LA VOL A-L A4C: 59 ML (ref 18–58)
ECHO LA VOL MOD A2C: 57 ML (ref 18–58)
ECHO LA VOL MOD A4C: 57 ML (ref 18–58)
ECHO LA VOLUME AREA LENGTH: 60 ML
ECHO LA VOLUME INDEX A-L A2C: 27 ML/M2 (ref 16–34)
ECHO LA VOLUME INDEX A-L A4C: 27 ML/M2 (ref 16–34)
ECHO LA VOLUME INDEX AREA LENGTH: 27 ML/M2 (ref 16–34)
ECHO LA VOLUME INDEX MOD A2C: 26 ML/M2 (ref 16–34)
ECHO LA VOLUME INDEX MOD A4C: 26 ML/M2 (ref 16–34)
ECHO LV E' LATERAL VELOCITY: 7 CM/S
ECHO LV E' SEPTAL VELOCITY: 7 CM/S
ECHO LV EDV A2C: 91 ML
ECHO LV EDV A4C: 122 ML
ECHO LV EDV BP: 106 ML (ref 67–155)
ECHO LV EDV INDEX A4C: 55 ML/M2
ECHO LV EDV INDEX BP: 48 ML/M2
ECHO LV EDV NDEX A2C: 41 ML/M2
ECHO LV EJECTION FRACTION A2C: 64 %
ECHO LV EJECTION FRACTION A4C: 73 %
ECHO LV EJECTION FRACTION BIPLANE: 69 % (ref 55–100)
ECHO LV ESV A2C: 32 ML
ECHO LV ESV A4C: 34 ML
ECHO LV ESV BP: 33 ML (ref 22–58)
ECHO LV ESV INDEX A2C: 15 ML/M2
ECHO LV ESV INDEX A4C: 15 ML/M2
ECHO LV ESV INDEX BP: 15 ML/M2
ECHO LV FRACTIONAL SHORTENING: 40 % (ref 28–44)
ECHO LV INTERNAL DIMENSION DIASTOLE INDEX: 2.36 CM/M2
ECHO LV INTERNAL DIMENSION DIASTOLIC: 5.2 CM (ref 4.2–5.9)
ECHO LV INTERNAL DIMENSION SYSTOLIC INDEX: 1.41 CM/M2
ECHO LV INTERNAL DIMENSION SYSTOLIC: 3.1 CM
ECHO LV IVSD: 1.6 CM (ref 0.6–1)
ECHO LV IVSS: 2.1 CM
ECHO LV MASS 2D: 293.8 G (ref 88–224)
ECHO LV MASS INDEX 2D: 133.6 G/M2 (ref 49–115)
ECHO LV POSTERIOR WALL DIASTOLIC: 1.1 CM (ref 0.6–1)
ECHO LV POSTERIOR WALL SYSTOLIC: 1.7 CM
ECHO LV RELATIVE WALL THICKNESS RATIO: 0.42
ECHO LVOT AREA: 3.1 CM2
ECHO LVOT AV VTI INDEX: 0.58
ECHO LVOT DIAM: 2 CM
ECHO LVOT MEAN GRADIENT: 2 MMHG
ECHO LVOT PEAK GRADIENT: 4 MMHG
ECHO LVOT PEAK VELOCITY: 1 M/S
ECHO LVOT STROKE VOLUME INDEX: 30.8 ML/M2
ECHO LVOT SV: 67.8 ML
ECHO LVOT VTI: 21.6 CM
ECHO MV A VELOCITY: 0.54 M/S
ECHO MV AREA VTI: 3 CM2
ECHO MV E DECELERATION TIME (DT): 201.6 MS
ECHO MV E VELOCITY: 0.6 M/S
ECHO MV E/A RATIO: 1.11
ECHO MV E/E' LATERAL: 8.57
ECHO MV E/E' RATIO (AVERAGED): 8.57
ECHO MV E/E' SEPTAL: 8.57
ECHO MV LVOT VTI INDEX: 1.06
ECHO MV MAX VELOCITY: 0.8 M/S
ECHO MV MEAN GRADIENT: 1 MMHG
ECHO MV MEAN VELOCITY: 0.3 M/S
ECHO MV PEAK GRADIENT: 2 MMHG
ECHO MV VTI: 22.9 CM
ECHO PV MAX VELOCITY: 1.1 M/S
ECHO PV PEAK GRADIENT: 5 MMHG
ECHO RIGHT VENTRICULAR SYSTOLIC PRESSURE (RVSP): 26 MMHG
ECHO RV INTERNAL DIMENSION: 2.8 CM
ECHO RV TAPSE: 2 CM (ref 1.7–?)
ECHO TV REGURGITANT MAX VELOCITY: 2.38 M/S
ECHO TV REGURGITANT PEAK GRADIENT: 23 MMHG
NUC REST EJECTION FRACTION: 68 %
STRESS BASELINE DIAS BP: 74 MMHG
STRESS BASELINE HR: 66 BPM
STRESS BASELINE ST DEPRESSION: 0 MM
STRESS BASELINE SYS BP: 146 MMHG
STRESS ESTIMATED WORKLOAD: 1 METS
STRESS PEAK DIAS BP: 81 MMHG
STRESS PEAK SYS BP: 175 MMHG
STRESS PERCENT HR ACHIEVED: 61 %
STRESS POST PEAK HR: 98 BPM
STRESS RATE PRESSURE PRODUCT: NORMAL BPM*MMHG
STRESS ST DEPRESSION: 1 MM
STRESS TARGET HR: 161 BPM
TID: 1.04

## 2024-06-03 ENCOUNTER — OFFICE VISIT (OUTPATIENT)
Dept: CARDIOLOGY CLINIC | Age: 59
End: 2024-06-03
Payer: COMMERCIAL

## 2024-06-03 VITALS
SYSTOLIC BLOOD PRESSURE: 126 MMHG | BODY MASS INDEX: 27.3 KG/M2 | WEIGHT: 206 LBS | HEART RATE: 68 BPM | RESPIRATION RATE: 15 BRPM | DIASTOLIC BLOOD PRESSURE: 82 MMHG | OXYGEN SATURATION: 98 % | HEIGHT: 73 IN

## 2024-06-03 DIAGNOSIS — I65.23 BILATERAL CAROTID ARTERY STENOSIS: ICD-10-CM

## 2024-06-03 DIAGNOSIS — R06.09 DOE (DYSPNEA ON EXERTION): Primary | ICD-10-CM

## 2024-06-03 DIAGNOSIS — R06.09 DOE (DYSPNEA ON EXERTION): ICD-10-CM

## 2024-06-03 LAB
ANION GAP SERPL CALCULATED.3IONS-SCNC: 8 MEQ/L (ref 9–15)
BUN SERPL-MCNC: 16 MG/DL (ref 6–20)
CALCIUM SERPL-MCNC: 9.6 MG/DL (ref 8.5–9.9)
CHLORIDE SERPL-SCNC: 103 MEQ/L (ref 95–107)
CO2 SERPL-SCNC: 32 MEQ/L (ref 20–31)
CREAT SERPL-MCNC: 1.81 MG/DL (ref 0.7–1.2)
ERYTHROCYTE [DISTWIDTH] IN BLOOD BY AUTOMATED COUNT: 20.1 % (ref 11.5–14.5)
GLUCOSE SERPL-MCNC: 65 MG/DL (ref 70–99)
HCT VFR BLD AUTO: 37.4 % (ref 42–52)
HGB BLD-MCNC: 13.6 G/DL (ref 14–18)
MCH RBC QN AUTO: 26.2 PG (ref 27–31.3)
MCHC RBC AUTO-ENTMCNC: 36.4 % (ref 33–37)
MCV RBC AUTO: 72.1 FL (ref 79–92.2)
PLATELET # BLD AUTO: 194 K/UL (ref 130–400)
POTASSIUM SERPL-SCNC: 4.1 MEQ/L (ref 3.4–4.9)
RBC # BLD AUTO: 5.19 M/UL (ref 4.7–6.1)
SODIUM SERPL-SCNC: 143 MEQ/L (ref 135–144)
WBC # BLD AUTO: 7 K/UL (ref 4.8–10.8)

## 2024-06-03 PROCEDURE — 99215 OFFICE O/P EST HI 40 MIN: CPT | Performed by: INTERNAL MEDICINE

## 2024-06-03 PROCEDURE — 93000 ELECTROCARDIOGRAM COMPLETE: CPT | Performed by: INTERNAL MEDICINE

## 2024-06-03 RX ORDER — METOPROLOL SUCCINATE 50 MG/1
50 TABLET, EXTENDED RELEASE ORAL DAILY
Qty: 90 TABLET | Refills: 3 | Status: SHIPPED | OUTPATIENT
Start: 2024-06-03

## 2024-06-03 RX ORDER — ONDANSETRON 2 MG/ML
4 INJECTION INTRAMUSCULAR; INTRAVENOUS EVERY 6 HOURS PRN
OUTPATIENT
Start: 2024-06-03

## 2024-06-03 RX ORDER — SODIUM CHLORIDE 0.9 % (FLUSH) 0.9 %
5-40 SYRINGE (ML) INJECTION PRN
OUTPATIENT
Start: 2024-06-03

## 2024-06-03 RX ORDER — SODIUM CHLORIDE 450 MG/100ML
75 INJECTION, SOLUTION INTRAVENOUS CONTINUOUS
OUTPATIENT
Start: 2024-06-03

## 2024-06-03 RX ORDER — SODIUM CHLORIDE 0.9 % (FLUSH) 0.9 %
5-40 SYRINGE (ML) INJECTION EVERY 12 HOURS SCHEDULED
OUTPATIENT
Start: 2024-06-03

## 2024-06-03 RX ORDER — SODIUM CHLORIDE 9 MG/ML
INJECTION, SOLUTION INTRAVENOUS PRN
OUTPATIENT
Start: 2024-06-03

## 2024-06-03 RX ORDER — NITROGLYCERIN 0.4 MG/1
0.4 TABLET SUBLINGUAL EVERY 5 MIN PRN
OUTPATIENT
Start: 2024-06-03

## 2024-06-03 RX ORDER — DIPHENHYDRAMINE HYDROCHLORIDE 50 MG/ML
50 INJECTION INTRAMUSCULAR; INTRAVENOUS ONCE
OUTPATIENT
Start: 2024-06-03 | End: 2024-06-03

## 2024-06-03 ASSESSMENT — ENCOUNTER SYMPTOMS
STRIDOR: 0
BLOOD IN STOOL: 0
COUGH: 0
EYES NEGATIVE: 1
SHORTNESS OF BREATH: 1
GASTROINTESTINAL NEGATIVE: 1
CHEST TIGHTNESS: 1
WHEEZING: 0
NAUSEA: 0

## 2024-06-03 NOTE — PROGRESS NOTES
applicator by Does not apply route daily 1 each 0    Respiratory Therapy Supplies (FLUTTER) PANCHO 1 Device by Does not apply route 4 times daily 1 Device 0    metFORMIN (GLUCOPHAGE) 500 MG tablet Take 1 tablet by mouth 2 times daily (with meals)      fenofibrate 160 MG tablet Take by mouth daily       folic acid (FOLVITE) 1 MG tablet Take 1 tablet by mouth daily      amLODIPine-benazepril (LOTREL) 5-10 MG per capsule Take 1 capsule by mouth daily      aspirin 81 MG EC tablet Take 1 tablet by mouth daily 30 tablet 0    nicotine (NICODERM CQ) 21 MG/24HR Place 1 patch onto the skin daily 42 patch 0     No current facility-administered medications for this visit.       Review of Systems:   Review of Systems   Constitutional: Negative.  Negative for diaphoresis and fatigue.   HENT: Negative.     Eyes: Negative.    Respiratory:  Positive for chest tightness and shortness of breath. Negative for cough, wheezing and stridor.    Cardiovascular:  Positive for chest pain. Negative for palpitations and leg swelling.   Gastrointestinal: Negative.  Negative for blood in stool and nausea.   Genitourinary: Negative.    Musculoskeletal: Negative.    Skin: Negative.    Neurological: Negative.  Negative for dizziness, syncope, weakness and light-headedness.   Hematological: Negative.    Psychiatric/Behavioral: Negative.           Physical Examination:    /82 (Site: Left Upper Arm, Position: Sitting, Cuff Size: Large Adult)   Pulse 68   Resp 15   Ht 1.854 m (6' 0.99\")   Wt 93.4 kg (206 lb)   SpO2 98%   BMI 27.18 kg/m²    Physical Exam   Constitutional: He appears healthy. No distress.   HENT:   Normal cephalic and Atraumatic   Eyes: Pupils are equal, round, and reactive to light.   Neck: Thyroid normal. No JVD present. No neck adenopathy. No thyromegaly present.   Cardiovascular: Normal rate, regular rhythm, intact distal pulses and normal pulses.   Murmur heard.  Pulmonary/Chest: Effort normal and breath sounds normal. He

## 2024-06-04 ENCOUNTER — TELEPHONE (OUTPATIENT)
Dept: CARDIOLOGY CLINIC | Age: 59
End: 2024-06-04

## 2024-06-04 DIAGNOSIS — N28.9 KIDNEY FUNCTION ABNORMAL: Primary | ICD-10-CM

## 2024-06-04 NOTE — TELEPHONE ENCOUNTER
Called patient to notify him that per Dr. Montes, discontinue Losartan, Metformin, and Ibuprofen. Patient verbalized understanding. Also placed order for BMP the morning of his cath. Patient aware that they will redraw lab the morning of his cath. No additional questions or concerns. Medication list updated.     ----- Message from Denver RIVERA MD sent at 6/4/2024 10:38 AM EDT -----  DC Losartan.  DC Metformin. DC Ibuprofen.  Please call cath lab and ask them to recheck BMP am of Cath.   ----- Message -----  From: Fabienne Osman Incoming Lab Results From Soft  Sent: 6/3/2024   5:33 PM EDT  To: Denver RIVERA MD

## 2024-06-05 ENCOUNTER — APPOINTMENT (OUTPATIENT)
Age: 59
End: 2024-06-05
Attending: INTERNAL MEDICINE
Payer: COMMERCIAL

## 2024-06-13 ENCOUNTER — OFFICE VISIT (OUTPATIENT)
Dept: PAIN MANAGEMENT | Age: 59
End: 2024-06-13

## 2024-06-13 ENCOUNTER — HOSPITAL ENCOUNTER (OUTPATIENT)
Dept: CT IMAGING | Age: 59
Discharge: HOME | End: 2024-06-15
Attending: INTERNAL MEDICINE
Payer: COMMERCIAL

## 2024-06-13 VITALS
BODY MASS INDEX: 27.43 KG/M2 | DIASTOLIC BLOOD PRESSURE: 70 MMHG | WEIGHT: 207 LBS | SYSTOLIC BLOOD PRESSURE: 130 MMHG | TEMPERATURE: 97.3 F | HEIGHT: 73 IN

## 2024-06-13 DIAGNOSIS — M51.26 DISPLACEMENT OF LUMBAR INTERVERTEBRAL DISC WITHOUT MYELOPATHY: Chronic | ICD-10-CM

## 2024-06-13 DIAGNOSIS — M96.1 POSTLAMINECTOMY SYNDROME, LUMBAR REGION: Chronic | ICD-10-CM

## 2024-06-13 DIAGNOSIS — I65.23 BILATERAL CAROTID ARTERY STENOSIS: ICD-10-CM

## 2024-06-13 LAB
PERFORMED ON: ABNORMAL
POC CREATININE: 1.6 MG/DL (ref 0.8–1.3)
POC SAMPLE TYPE: ABNORMAL

## 2024-06-13 PROCEDURE — 6360000004 HC RX CONTRAST MEDICATION: Performed by: INTERNAL MEDICINE

## 2024-06-13 PROCEDURE — 70498 CT ANGIOGRAPHY NECK: CPT

## 2024-06-13 RX ORDER — PREGABALIN 150 MG/1
150 CAPSULE ORAL 3 TIMES DAILY
Qty: 90 CAPSULE | Refills: 0 | Status: SHIPPED | OUTPATIENT
Start: 2024-06-15 | End: 2024-07-15

## 2024-06-13 RX ORDER — NALOXONE HYDROCHLORIDE 4 MG/.1ML
1 SPRAY NASAL PRN
Qty: 2 EACH | Refills: 1 | Status: SHIPPED | OUTPATIENT
Start: 2024-06-13

## 2024-06-13 RX ORDER — HYDROCODONE BITARTRATE AND ACETAMINOPHEN 10; 325 MG/1; MG/1
1 TABLET ORAL
Qty: 80 TABLET | Refills: 0 | Status: SHIPPED | OUTPATIENT
Start: 2024-06-15 | End: 2024-07-15

## 2024-06-13 RX ADMIN — IOPAMIDOL 75 ML: 755 INJECTION, SOLUTION INTRAVENOUS at 08:02

## 2024-06-13 ASSESSMENT — ENCOUNTER SYMPTOMS
COUGH: 0
NAUSEA: 0
CONSTIPATION: 0
DIARRHEA: 0
EYES NEGATIVE: 1
GASTROINTESTINAL NEGATIVE: 1
BACK PAIN: 1
SHORTNESS OF BREATH: 0

## 2024-06-13 NOTE — PROGRESS NOTES
Mateo Marsh  (1965)    6/13/2024    Subjective:     Mateo Marsh is 58 y.o. male who complains today of:    Chief Complaint   Patient presents with    Back Pain         Allergies:  Patient has no known allergies.    Past Medical History:   Diagnosis Date    Bronchitis     CAD (coronary artery disease)     past angioplasty > 10 yrs ago    Chronic back pain     Diabetes (HCC)     dx > 2 yrs    Dyslipidemia     Hyperlipidemia     meds > 5 yrs    Hypertension     meds > 5 yrs    Neuropathy in diabetes (HCC)     Osteoarthritis     Pneumonia     Sickle cell anemia (HCC)     Sleep apnea, obstructive      Past Surgical History:   Procedure Laterality Date    BACK SURGERY  2008    Lumbar disc OR.    BLADDER SURGERY  07/10/2015    Patient states he was urininating blood and had a procedure done.    COLONOSCOPY  03/14/2014    [facility]    ENDOSCOPY, COLON, DIAGNOSTIC      LUMBAR FUSION  2008    OTHER SURGICAL HISTORY Right     tendon repair in (R) forearm because of previous injury as child    STIMULATOR SURGERY N/A 4/25/2023    SPINAL STIMULATOR REPLACEMENT performed by Navneet Lamb MD at [facility] [place]    TOTAL HIP ARTHROPLASTY Left 01/23/2020    LEFT HIP TOTAL HIP ARTHROPLASTY, RICARDO performed by Andrew Mehta MD at [facility] OR    UPPER GASTROINTESTINAL ENDOSCOPY  03/14/2014    [facility]    VAGAL NERVE STIMULATION  2013    Dr. Antoine office     Family History   Problem Relation Age of Onset    Cancer Mother         Throat & esophagus    Anemia Mother     Other Father         Natural causes    High Blood Pressure Sister     COPD Brother     Heart Disease Brother         Cardiac stent    Diabetes Sister     COPD Sister     Emphysema Sister     No Known Problems Daughter      Social History     Socioeconomic History    Marital status: Single     Spouse name: Not on file    Number of children: Not on file    Years of education: Not on file    Highest education level: Not on file   Occupational History    Occupation:

## 2024-06-17 ENCOUNTER — TELEPHONE (OUTPATIENT)
Dept: CARDIOLOGY CLINIC | Age: 59
End: 2024-06-17

## 2024-06-17 NOTE — PROGRESS NOTES
Insurance [facility] denied CATH    Peer 2 Peer available [phone] Auth#[id number]  Member#[id number]  1965

## 2024-06-17 NOTE — TELEPHONE ENCOUNTER
Called patient. Patient scheduled 2024 1430. Patient confirmed date and time.    ----- Message from Denver RIVERA MD sent at 2024 10:00 AM EDT -----  Regarding: RE: Peer 2 Peer available [phone] Auth#[id number] Member#[id number]  1965  Give him OV  ----- Message -----  From: Head, Margaret  Sent: 2024   8:54 AM EDT  To: Denver RIVERA MD; Germaine Grijalva RN  Subject: Peer 2 Peer available [phone] Auth#[id number]    [facility] - Denied CATH    Peer 2 Peer available [phone] Auth#[id number]  Member#[id number]  1965

## 2024-07-02 ENCOUNTER — OFFICE VISIT (OUTPATIENT)
Dept: CARDIOLOGY CLINIC | Age: 59
End: 2024-07-02
Payer: COMMERCIAL

## 2024-07-02 VITALS
WEIGHT: 208 LBS | DIASTOLIC BLOOD PRESSURE: 80 MMHG | HEART RATE: 64 BPM | BODY MASS INDEX: 27.57 KG/M2 | SYSTOLIC BLOOD PRESSURE: 130 MMHG | RESPIRATION RATE: 16 BRPM | OXYGEN SATURATION: 97 % | HEIGHT: 73 IN

## 2024-07-02 DIAGNOSIS — R94.31 ABNORMAL ECG: ICD-10-CM

## 2024-07-02 DIAGNOSIS — R06.09 DOE (DYSPNEA ON EXERTION): ICD-10-CM

## 2024-07-02 DIAGNOSIS — R09.89 BILATERAL CAROTID BRUITS: ICD-10-CM

## 2024-07-02 DIAGNOSIS — N28.9 KIDNEY FUNCTION ABNORMAL: ICD-10-CM

## 2024-07-02 DIAGNOSIS — I65.23 BILATERAL CAROTID ARTERY STENOSIS: ICD-10-CM

## 2024-07-02 DIAGNOSIS — R07.9 CHEST PAIN, UNSPECIFIED TYPE: ICD-10-CM

## 2024-07-02 DIAGNOSIS — E78.5 DYSLIPIDEMIA: ICD-10-CM

## 2024-07-02 DIAGNOSIS — N28.9 KIDNEY FUNCTION ABNORMAL: Primary | ICD-10-CM

## 2024-07-02 DIAGNOSIS — F17.200 SMOKER: ICD-10-CM

## 2024-07-02 LAB
ANION GAP SERPL CALCULATED.3IONS-SCNC: 10 MEQ/L (ref 9–15)
BUN SERPL-MCNC: 17 MG/DL (ref 6–20)
CALCIUM SERPL-MCNC: 8.9 MG/DL (ref 8.5–9.9)
CHLORIDE SERPL-SCNC: 101 MEQ/L (ref 95–107)
CO2 SERPL-SCNC: 30 MEQ/L (ref 20–31)
CREAT SERPL-MCNC: 1.41 MG/DL (ref 0.7–1.2)
ERYTHROCYTE [DISTWIDTH] IN BLOOD BY AUTOMATED COUNT: 19.9 % (ref 11.5–14.5)
GLUCOSE SERPL-MCNC: 109 MG/DL (ref 70–99)
HCT VFR BLD AUTO: 35 % (ref 42–52)
HGB BLD-MCNC: 12.8 G/DL (ref 14–18)
MCH RBC QN AUTO: 26.3 PG (ref 27–31.3)
MCHC RBC AUTO-ENTMCNC: 36.6 % (ref 33–37)
MCV RBC AUTO: 71.9 FL (ref 79–92.2)
PLATELET # BLD AUTO: 164 K/UL (ref 130–400)
POTASSIUM SERPL-SCNC: 3.1 MEQ/L (ref 3.4–4.9)
RBC # BLD AUTO: 4.87 M/UL (ref 4.7–6.1)
SODIUM SERPL-SCNC: 141 MEQ/L (ref 135–144)
WBC # BLD AUTO: 6.7 K/UL (ref 4.8–10.8)

## 2024-07-02 PROCEDURE — 99215 OFFICE O/P EST HI 40 MIN: CPT | Performed by: INTERNAL MEDICINE

## 2024-07-02 ASSESSMENT — ENCOUNTER SYMPTOMS
SHORTNESS OF BREATH: 1
NAUSEA: 0
BLOOD IN STOOL: 0
EYES NEGATIVE: 1
STRIDOR: 0
COUGH: 0
CHEST TIGHTNESS: 1
GASTROINTESTINAL NEGATIVE: 1
WHEEZING: 0

## 2024-07-02 NOTE — PROGRESS NOTES
03/11/2019 06:52 PM    ALKPHOS 39 03/11/2019 06:52 PM    AST 32 03/11/2019 06:52 PM    ALT 16 03/11/2019 06:52 PM     BMP:    Lab Results   Component Value Date/Time     06/03/2024 04:11 PM    K 4.1 06/03/2024 04:11 PM    K 3.8 01/24/2020 05:01 AM     06/03/2024 04:11 PM    CO2 32 06/03/2024 04:11 PM    BUN 16 06/03/2024 04:11 PM    CREATININE 1.6 06/13/2024 07:56 AM    CREATININE 1.81 06/03/2024 04:11 PM    CALCIUM 9.6 06/03/2024 04:11 PM    GFRAA >60.0 01/24/2020 05:01 AM    LABGLOM 49 06/13/2024 07:56 AM    LABGLOM >60.0 04/18/2023 09:00 AM    GLUCOSE 65 06/03/2024 04:11 PM     Magnesium:    Lab Results   Component Value Date/Time    MG 1.8 08/06/2014 09:21 AM     TSH:  Lab Results   Component Value Date    TSH 1.830 08/05/2014             Patient Active Problem List   Diagnosis    Postlaminectomy syndrome, lumbar region    L4-5 HERNIATED DISC    Chronic pain syndrome    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 (HCC)    Diabetic polyneuropathy associated with type 2 diabetes mellitus (HCC)    Dermatophytosis of nail    Hallux abducto valgus, bilateral    Tailor's bunionette, bilateral    Degenerative joint disease of left hip    Tobacco use disorder    REGINA (obstructive sleep apnea)    S/P insertion of spinal cord stimulator    Status post total replacement of left hip    Disease of larynx    Hb-SS disease with crisis, unspecified (HCC)       There are no discontinued medications.    Modified Medications    No medications on file       No orders of the defined types were placed in this encounter.      Assessment/Plan:    1. JENSEN (dyspnea on exertion)     Abn stress - RBA cath PCI disscussed again.  We will try to schedule again.  He needs cath due to continued angina.  Recheck Labs today.     2. Abnormal ECG     3. Bilateral carotid bruits     - Vascular duplex carotid bilateral; Future - abn CUS- abiola CTA neck- B/L Moderate     4. Smoker  Stop     6. Dyslipidemia  Statin low fat diet     7. Chest pain,

## 2024-07-07 ENCOUNTER — HOSPITAL ENCOUNTER (EMERGENCY)
Age: 59
Discharge: HOME | DRG: 811 | End: 2024-07-07
Attending: EMERGENCY MEDICINE
Payer: COMMERCIAL

## 2024-07-07 ENCOUNTER — APPOINTMENT (OUTPATIENT)
Dept: GENERAL RADIOLOGY | Age: 59
DRG: 811 | End: 2024-07-07
Payer: COMMERCIAL

## 2024-07-07 VITALS
HEART RATE: 56 BPM | SYSTOLIC BLOOD PRESSURE: 167 MMHG | RESPIRATION RATE: 12 BRPM | HEIGHT: 74 IN | WEIGHT: 210 LBS | DIASTOLIC BLOOD PRESSURE: 65 MMHG | TEMPERATURE: 98 F | BODY MASS INDEX: 26.95 KG/M2 | OXYGEN SATURATION: 98 %

## 2024-07-07 DIAGNOSIS — R07.9 CHEST PAIN, UNSPECIFIED TYPE: Primary | ICD-10-CM

## 2024-07-07 DIAGNOSIS — D57.00 SICKLE CELL CRISIS (HCC): ICD-10-CM

## 2024-07-07 LAB
ALBUMIN SERPL-MCNC: 4.4 G/DL (ref 3.5–4.6)
ALP SERPL-CCNC: 58 U/L (ref 35–104)
ALT SERPL-CCNC: 55 U/L (ref 0–41)
ANION GAP SERPL CALCULATED.3IONS-SCNC: 9 MEQ/L (ref 9–15)
ANISOCYTOSIS BLD QL SMEAR: ABNORMAL
AST SERPL-CCNC: 69 U/L (ref 0–40)
BASOPHILS # BLD: 0.2 K/UL (ref 0–0.2)
BASOPHILS NFR BLD: 2 %
BILIRUB SERPL-MCNC: 0.9 MG/DL (ref 0.2–0.7)
BUN SERPL-MCNC: 17 MG/DL (ref 6–20)
CALCIUM SERPL-MCNC: 9.3 MG/DL (ref 8.5–9.9)
CHLORIDE SERPL-SCNC: 98 MEQ/L (ref 95–107)
CO2 SERPL-SCNC: 30 MEQ/L (ref 20–31)
CREAT SERPL-MCNC: 1.38 MG/DL (ref 0.7–1.2)
EOSINOPHIL # BLD: 0 K/UL (ref 0–0.7)
EOSINOPHIL NFR BLD: 0.6 %
ERYTHROCYTE [DISTWIDTH] IN BLOOD BY AUTOMATED COUNT: 20.1 % (ref 11.5–14.5)
GLOBULIN SER CALC-MCNC: 3.1 G/DL (ref 2.3–3.5)
GLUCOSE SERPL-MCNC: 124 MG/DL (ref 70–99)
HCT VFR BLD AUTO: 36.1 % (ref 42–52)
HCT VFR BLD AUTO: 36.3 % (ref 42–52)
HGB BLD-MCNC: 13.2 G/DL (ref 14–18)
INR PPP: 1
LYMPHOCYTES # BLD: 3.1 K/UL (ref 1–4.8)
LYMPHOCYTES NFR BLD: 27 %
MACROCYTES BLD QL SMEAR: ABNORMAL
MCH RBC QN AUTO: 26.3 PG (ref 27–31.3)
MCHC RBC AUTO-ENTMCNC: 36.4 % (ref 33–37)
MCV RBC AUTO: 72.3 FL (ref 79–92.2)
MONOCYTES # BLD: 0.5 K/UL (ref 0.2–0.8)
MONOCYTES NFR BLD: 5.8 %
NEUTROPHILS # BLD: 5 K/UL (ref 1.4–6.5)
NEUTS SEG NFR BLD: 57 %
PATH INTERP BLD-IMP: YES
PLATELET # BLD AUTO: 152 K/UL (ref 130–400)
PLATELET BLD QL SMEAR: ADEQUATE
POIKILOCYTOSIS BLD QL SMEAR: ABNORMAL
POLYCHROMASIA BLD QL SMEAR: ABNORMAL
POTASSIUM SERPL-SCNC: 3.1 MEQ/L (ref 3.4–4.9)
PROT SERPL-MCNC: 7.5 G/DL (ref 6.3–8)
PROTHROMBIN TIME: 13.6 SEC (ref 12.3–14.9)
RBC # BLD AUTO: 5.02 M/UL (ref 4.7–6.1)
RETICS # AUTO: 0.14 M/CUMM (ref 0.02–0.11)
RETICS/RBC NFR: 2.7 % (ref 0.6–2.2)
SODIUM SERPL-SCNC: 137 MEQ/L (ref 135–144)
TARGETS BLD QL SMEAR: ABNORMAL
TROPONIN, HIGH SENSITIVITY: 14 NG/L (ref 0–19)
TROPONIN, HIGH SENSITIVITY: 15 NG/L (ref 0–19)
VARIANT LYMPHS NFR BLD: 9 %
WBC # BLD AUTO: 8.7 K/UL (ref 4.8–10.8)

## 2024-07-07 PROCEDURE — 96374 THER/PROPH/DIAG INJ IV PUSH: CPT

## 2024-07-07 PROCEDURE — 85610 PROTHROMBIN TIME: CPT

## 2024-07-07 PROCEDURE — 84484 ASSAY OF TROPONIN QUANT: CPT

## 2024-07-07 PROCEDURE — 85046 RETICYTE/HGB CONCENTRATE: CPT

## 2024-07-07 PROCEDURE — 96375 TX/PRO/DX INJ NEW DRUG ADDON: CPT

## 2024-07-07 PROCEDURE — 85025 COMPLETE CBC W/AUTO DIFF WBC: CPT

## 2024-07-07 PROCEDURE — 6360000002 HC RX W HCPCS: Performed by: EMERGENCY MEDICINE

## 2024-07-07 PROCEDURE — 93005 ELECTROCARDIOGRAM TRACING: CPT | Performed by: EMERGENCY MEDICINE

## 2024-07-07 PROCEDURE — 99285 EMERGENCY DEPT VISIT HI MDM: CPT

## 2024-07-07 PROCEDURE — 6370000000 HC RX 637 (ALT 250 FOR IP): Performed by: EMERGENCY MEDICINE

## 2024-07-07 PROCEDURE — 80053 COMPREHEN METABOLIC PANEL: CPT

## 2024-07-07 PROCEDURE — 36415 COLL VENOUS BLD VENIPUNCTURE: CPT

## 2024-07-07 PROCEDURE — 96376 TX/PRO/DX INJ SAME DRUG ADON: CPT

## 2024-07-07 PROCEDURE — 2580000003 HC RX 258: Performed by: EMERGENCY MEDICINE

## 2024-07-07 PROCEDURE — 71045 X-RAY EXAM CHEST 1 VIEW: CPT

## 2024-07-07 RX ORDER — NITROGLYCERIN 0.4 MG/1
0.4 TABLET SUBLINGUAL EVERY 5 MIN PRN
Status: DISCONTINUED | OUTPATIENT
Start: 2024-07-07 | End: 2024-07-07 | Stop reason: HOSPADM

## 2024-07-07 RX ORDER — MORPHINE SULFATE 4 MG/ML
4 INJECTION, SOLUTION INTRAMUSCULAR; INTRAVENOUS ONCE
Status: COMPLETED | OUTPATIENT
Start: 2024-07-07 | End: 2024-07-07

## 2024-07-07 RX ORDER — 0.9 % SODIUM CHLORIDE 0.9 %
1000 INTRAVENOUS SOLUTION INTRAVENOUS ONCE
Status: COMPLETED | OUTPATIENT
Start: 2024-07-07 | End: 2024-07-07

## 2024-07-07 RX ORDER — ONDANSETRON 2 MG/ML
4 INJECTION INTRAMUSCULAR; INTRAVENOUS ONCE
Status: COMPLETED | OUTPATIENT
Start: 2024-07-07 | End: 2024-07-07

## 2024-07-07 RX ORDER — ISOSORBIDE MONONITRATE 30 MG/1
30 TABLET, EXTENDED RELEASE ORAL ONCE
Status: COMPLETED | OUTPATIENT
Start: 2024-07-07 | End: 2024-07-07

## 2024-07-07 RX ORDER — KETOROLAC TROMETHAMINE 30 MG/ML
30 INJECTION, SOLUTION INTRAMUSCULAR; INTRAVENOUS ONCE
Status: COMPLETED | OUTPATIENT
Start: 2024-07-07 | End: 2024-07-07

## 2024-07-07 RX ADMIN — MORPHINE SULFATE 4 MG: 4 INJECTION, SOLUTION INTRAMUSCULAR; INTRAVENOUS at 18:56

## 2024-07-07 RX ADMIN — ISOSORBIDE MONONITRATE 30 MG: 30 TABLET, EXTENDED RELEASE ORAL at 18:18

## 2024-07-07 RX ADMIN — NITROGLYCERIN 0.4 MG: 0.4 TABLET, ORALLY DISINTEGRATING SUBLINGUAL at 15:43

## 2024-07-07 RX ADMIN — ONDANSETRON 4 MG: 2 INJECTION INTRAMUSCULAR; INTRAVENOUS at 17:16

## 2024-07-07 RX ADMIN — MORPHINE SULFATE 4 MG: 4 INJECTION, SOLUTION INTRAMUSCULAR; INTRAVENOUS at 17:16

## 2024-07-07 RX ADMIN — SODIUM CHLORIDE 1000 ML: 9 INJECTION, SOLUTION INTRAVENOUS at 16:10

## 2024-07-07 RX ADMIN — KETOROLAC TROMETHAMINE 30 MG: 30 INJECTION INTRAMUSCULAR; INTRAVENOUS at 18:56

## 2024-07-07 ASSESSMENT — PAIN DESCRIPTION - FREQUENCY: FREQUENCY: CONTINUOUS

## 2024-07-07 ASSESSMENT — PAIN DESCRIPTION - LOCATION
LOCATION: BACK
LOCATION: GENERALIZED

## 2024-07-07 ASSESSMENT — PAIN SCALES - GENERAL
PAINLEVEL_OUTOF10: 10
PAINLEVEL_OUTOF10: 0
PAINLEVEL_OUTOF10: 9
PAINLEVEL_OUTOF10: 10

## 2024-07-07 ASSESSMENT — PAIN - FUNCTIONAL ASSESSMENT: PAIN_FUNCTIONAL_ASSESSMENT: 0-10

## 2024-07-07 ASSESSMENT — PAIN DESCRIPTION - DESCRIPTORS: DESCRIPTORS: ACHING

## 2024-07-07 ASSESSMENT — LIFESTYLE VARIABLES
HOW OFTEN DO YOU HAVE A DRINK CONTAINING ALCOHOL: NEVER
HOW MANY STANDARD DRINKS CONTAINING ALCOHOL DO YOU HAVE ON A TYPICAL DAY: PATIENT DOES NOT DRINK

## 2024-07-07 ASSESSMENT — PAIN DESCRIPTION - PAIN TYPE: TYPE: ACUTE PAIN

## 2024-07-07 NOTE — ED PROVIDER NOTES
Infusion Nursing Note:  Diego Buchanan presents today for Cycle 1 Day 1 Cyclophosphamide, Dactinomycin, Vincristine (CAV).    Patient seen by provider today: No   present during visit today: Not Applicable.    Note: Robert presents to infusion today accompanied by his sister. He feels well and denies any new concerns or symptoms. He denies any pain today. Chemotherapy teaching previously completed. Reviewed all medication administration and side effects with patient and sister. Patient received this chemo regimen in the past and the changes were reviewed with the orders. Patient and sister deny any questions at this time.       Intravenous Access:  Implanted Port.    Treatment Conditions:  Lab Results   Component Value Date    HGB 12.7 (L) 04/22/2022    WBC 10.1 04/22/2022    ANEU 11.7 (H) 06/23/2021    ANEUTAUTO 8.1 04/22/2022     04/22/2022      Lab Results   Component Value Date     04/22/2022    POTASSIUM 3.9 04/22/2022    MAG 2.2 06/10/2021    CR 0.89 04/22/2022    FAISAL 9.1 04/22/2022    BILITOTAL 0.6 04/22/2022    ALBUMIN 3.7 04/22/2022    ALT 37 04/22/2022    AST 26 04/22/2022     Results reviewed, labs MET treatment parameters, ok to proceed with treatment.      Post Infusion Assessment:  Patient tolerated infusion without incident.  Blood return noted pre and post infusion.  Blood return noted during Vincristine and Dactinomycin administration per protocol.  Site patent and intact, free from redness, edema or discomfort.  No evidence of extravasations.  Access discontinued per protocol.     Neulasta Onpro On-Body injector applied to Right Arm at 1130 with light facing down.  Writer discussed Neulasta injection would start on 4/23/2022 at 2:30, approximately 27 hours after application applied today.  Written and Verbal instruction reviewed with patient.  Pt instructed when the dose delivery starts, it will take about 45 minutes to complete.  Pt aware Neulasta Onpro On-Body should have  [facility] ED  EMERGENCY DEPARTMENT ENCOUNTER      Pt Name: Mateo Marsh  MRN: [id number]  Birthdate 1965  Date of evaluation: 7/7/2024  Provider: Brandie Talavera DO    CHIEF COMPLAINT       Chief Complaint   Patient presents with    Sickle Cell Pain Crisis         HISTORY OF PRESENT ILLNESS   (Location/Symptom, Timing/Onset, Context/Setting, Quality, Duration, Modifying Factors, Severity)  Note limiting factors.   Mateo Marsh is a 58 y.o. male who presents to the emergency department .  Patient with history of sickle cell and coronary artery disease presents with chest pain.  It started when laying on the couch.  He did feel warm but not short of breath.  Patient has been told that he has calcifications in his carotid arteries and heart but hasn't had recent heart cath.  Patient had angioplasty 10 years ago.  Patient also is having pain in his upper back which is typical for his sickle cell.  He has a nerve stimulator in his back.  Takes Norco 7.5 at home for his sickle cell pain and chronic back pain.    Patient also has history of diabetes and is not taking anything for it at this time.  He states that his doctor took him off the metformin because of his kidneys but he never told his primary care physician that he was not taking anymore.  Patient also has hyperlipidemia on a statin, hypertension.    HPI    Nursing Notes were reviewed.    REVIEW OF SYSTEMS    (2-9 systems for level 4, 10 or more for level 5)     Review of Systems   Constitutional:  Negative for activity change, appetite change and fatigue.   HENT:  Negative for congestion and sore throat.    Eyes:  Negative for pain and visual disturbance.   Respiratory:  Negative for chest tightness and shortness of breath.    Cardiovascular:  Positive for chest pain.   Gastrointestinal:  Negative for abdominal pain, nausea and vomiting.   Endocrine: Negative for polydipsia.   Genitourinary:  Negative for flank pain and urgency.   Musculoskeletal:   Positive for back pain. Negative for gait problem and neck stiffness.   Skin:  Negative for rash.   Neurological:  Negative for weakness, light-headedness and headaches.   Psychiatric/Behavioral:  Negative for confusion and sleep disturbance.        Except as noted above the remainder of the review of systems was reviewed and negative.       PAST MEDICAL HISTORY     Past Medical History:   Diagnosis Date    Bronchitis     CAD (coronary artery disease)     past angioplasty > 10 yrs ago    Chronic back pain     Diabetes (HCC)     dx > 2 yrs    Dyslipidemia     Hyperlipidemia     meds > 5 yrs    Hypertension     meds > 5 yrs    Neuropathy in diabetes (HCC)     Osteoarthritis     Pneumonia     Sickle cell anemia (HCC)     Sleep apnea, obstructive          SURGICAL HISTORY       Past Surgical History:   Procedure Laterality Date    BACK SURGERY  2008    Lumbar disc OR.    BLADDER SURGERY  07/10/2015    Patient states he was urininating blood and had a procedure done.    COLONOSCOPY  03/14/2014    [facility]    ENDOSCOPY, COLON, DIAGNOSTIC      LUMBAR FUSION  2008    OTHER SURGICAL HISTORY Right     tendon repair in (R) forearm because of previous injury as child    STIMULATOR SURGERY N/A 4/25/2023    SPINAL STIMULATOR REPLACEMENT performed by Navneet Lamb MD at [facility] [place]    TOTAL HIP ARTHROPLASTY Left 01/23/2020    LEFT HIP TOTAL HIP ARTHROPLASTY, RICARDO performed by Andrew Mehta MD at [facility] [place]    UPPER GASTROINTESTINAL ENDOSCOPY  03/14/2014    [facility]    VAGAL NERVE STIMULATION  2013    Dr. Antoine office         CURRENT MEDICATIONS       Previous Medications    AMLODIPINE-BENAZEPRIL (LOTREL) 5-10 MG PER CAPSULE    Take 1 capsule by mouth daily    ASPIRIN 81 MG EC TABLET    Take 1 tablet by mouth daily    BACLOFEN (LIORESAL) 10 MG TABLET    Take 1 tablet by mouth 2 times daily    CEFUROXIME (CEFTIN) 500 MG TABLET    Take 1 tablet by mouth 2 times daily    DULOXETINE (CYMBALTA) 60 MG EXTENDED RELEASE CAPSULE    Take  green flashing light and to call triage or on-call MD if injector flashes red or appears to be leaking. Pt aware to keep Onpro On-Body Neulasta 4 inches away from electrical equipment and to avoid showering 4 hours prior to injection.   Neulasta Onpro Lot number: [id number]        Discharge Plan:   Prescription refills given for zofran, compazine, emla cream, and mesna.  Discharge instructions reviewed with: Patient and Family.  Patient and/or family verbalized understanding of discharge instructions and all questions answered.  AVS to patient via [employer].  Patient will return 5/13/2022 for next appointment.   Patient discharged in stable condition accompanied by: sister.  Departure Mode: Ambulatory.      Criss Song RN

## 2024-07-07 NOTE — ED NOTES
1st nitro given at this time   Radiology at the bedside   Call light with in reach, lights on, patient sitting up in the bed with family at the bedside, respirations even and unlabored

## 2024-07-09 ENCOUNTER — HOSPITAL ENCOUNTER (INPATIENT)
Age: 59
LOS: 9 days | Discharge: HOME HEALTH | DRG: 811 | End: 2024-07-18
Attending: EMERGENCY MEDICINE | Admitting: INTERNAL MEDICINE
Payer: COMMERCIAL

## 2024-07-09 ENCOUNTER — APPOINTMENT (OUTPATIENT)
Dept: CT IMAGING | Age: 59
DRG: 811 | End: 2024-07-09
Payer: COMMERCIAL

## 2024-07-09 DIAGNOSIS — J18.9 PNEUMONIA OF BOTH LUNGS DUE TO INFECTIOUS ORGANISM, UNSPECIFIED PART OF LUNG: ICD-10-CM

## 2024-07-09 DIAGNOSIS — R09.02 HYPOXEMIA: Primary | ICD-10-CM

## 2024-07-09 DIAGNOSIS — M96.1 POSTLAMINECTOMY SYNDROME, LUMBAR REGION: Chronic | ICD-10-CM

## 2024-07-09 DIAGNOSIS — D57.00 SICKLE CELL DISEASE WITH CRISIS (HCC): ICD-10-CM

## 2024-07-09 DIAGNOSIS — M79.641 RIGHT HAND PAIN: ICD-10-CM

## 2024-07-09 DIAGNOSIS — M51.26 DISPLACEMENT OF LUMBAR INTERVERTEBRAL DISC WITHOUT MYELOPATHY: Chronic | ICD-10-CM

## 2024-07-09 LAB
AMPHET UR QL SCN: ABNORMAL
ANION GAP SERPL CALCULATED.3IONS-SCNC: 13 MEQ/L (ref 9–15)
ANISOCYTOSIS BLD QL SMEAR: ABNORMAL
BACTERIA URNS QL MICRO: NEGATIVE /HPF
BARBITURATES UR QL SCN: ABNORMAL
BASOPHILS # BLD: 0 K/UL (ref 0–0.2)
BASOPHILS NFR BLD: 0.2 %
BENZODIAZ UR QL SCN: ABNORMAL
BILIRUB UR QL STRIP: NEGATIVE
BNP BLD-MCNC: 2516 PG/ML
BUN SERPL-MCNC: 25 MG/DL (ref 6–20)
CALCIUM SERPL-MCNC: 9 MG/DL (ref 8.5–9.9)
CANNABINOIDS UR QL SCN: ABNORMAL
CHLORIDE SERPL-SCNC: 101 MEQ/L (ref 95–107)
CLARITY UR: CLEAR
CO2 SERPL-SCNC: 28 MEQ/L (ref 20–31)
COCAINE UR QL SCN: ABNORMAL
COLOR UR: YELLOW
CREAT SERPL-MCNC: 1.22 MG/DL (ref 0.7–1.2)
DRUG SCREEN COMMENT UR-IMP: ABNORMAL
EOSINOPHIL # BLD: 0.3 K/UL (ref 0–0.7)
EOSINOPHIL NFR BLD: 2 %
EPI CELLS #/AREA URNS AUTO: NORMAL /HPF (ref 0–5)
ERYTHROCYTE [DISTWIDTH] IN BLOOD BY AUTOMATED COUNT: 21.7 % (ref 11.5–14.5)
FENTANYL SCREEN, URINE: ABNORMAL
GLUCOSE SERPL-MCNC: 176 MG/DL (ref 70–99)
GLUCOSE UR STRIP-MCNC: NEGATIVE MG/DL
HCT VFR BLD AUTO: 34.3 % (ref 42–52)
HGB BLD-MCNC: 12.1 G/DL (ref 14–18)
HGB UR QL STRIP: ABNORMAL
HYALINE CASTS #/AREA URNS AUTO: NORMAL /HPF (ref 0–5)
KETONES UR STRIP-MCNC: NEGATIVE MG/DL
LEUKOCYTE ESTERASE UR QL STRIP: NEGATIVE
LYMPHOCYTES # BLD: 2.4 K/UL (ref 1–4.8)
LYMPHOCYTES NFR BLD: 8 %
MACROCYTES BLD QL SMEAR: ABNORMAL
MCH RBC QN AUTO: 25.7 PG (ref 27–31.3)
MCHC RBC AUTO-ENTMCNC: 35.3 % (ref 33–37)
MCV RBC AUTO: 72.8 FL (ref 79–92.2)
METHADONE UR QL SCN: ABNORMAL
MONOCYTES # BLD: 0.9 K/UL (ref 0.2–0.8)
MONOCYTES NFR BLD: 6.9 %
NEUTROPHILS # BLD: 9.8 K/UL (ref 1.4–6.5)
NEUTS BAND NFR BLD MANUAL: 3 %
NEUTS SEG NFR BLD: 71 %
NEUTS VAC BLD QL SMEAR: ABNORMAL
NITRITE UR QL STRIP: NEGATIVE
NRBC BLD-RTO: 5 /100 WBC
OPIATES UR QL SCN: POSITIVE
OXYCODONE UR QL SCN: ABNORMAL
PATH INTERP BLD-IMP: YES
PCP UR QL SCN: ABNORMAL
PH UR STRIP: 5.5 [PH] (ref 5–9)
PLATELET # BLD AUTO: 115 K/UL (ref 130–400)
PLATELET BLD QL SMEAR: ABNORMAL
POIKILOCYTOSIS BLD QL SMEAR: ABNORMAL
POLYCHROMASIA BLD QL SMEAR: ABNORMAL
POTASSIUM SERPL-SCNC: 3.4 MEQ/L (ref 3.4–4.9)
PROPOXYPH UR QL SCN: ABNORMAL
PROT UR STRIP-MCNC: 100 MG/DL
RBC # BLD AUTO: 4.71 M/UL (ref 4.7–6.1)
RBC #/AREA URNS AUTO: NORMAL /HPF (ref 0–5)
RETICS # AUTO: 0.12 M/CUMM (ref 0.02–0.11)
RETICS/RBC NFR: 2.4 % (ref 0.6–2.2)
SLIDE REVIEW: ABNORMAL
SMUDGE CELLS BLD QL SMEAR: 8.8
SODIUM SERPL-SCNC: 142 MEQ/L (ref 135–144)
SP GR UR STRIP: 1.01 (ref 1–1.03)
TARGETS BLD QL SMEAR: ABNORMAL
TROPONIN, HIGH SENSITIVITY: 25 NG/L (ref 0–19)
URINE REFLEX TO CULTURE: ABNORMAL
UROBILINOGEN UR STRIP-ACNC: 1 E.U./DL
VARIANT LYMPHS NFR BLD: 10 %
WBC # BLD AUTO: 13.2 K/UL (ref 4.8–10.8)
WBC #/AREA URNS AUTO: NORMAL /HPF (ref 0–5)

## 2024-07-09 PROCEDURE — 2580000003 HC RX 258: Performed by: PHYSICIAN ASSISTANT

## 2024-07-09 PROCEDURE — 85025 COMPLETE CBC W/AUTO DIFF WBC: CPT

## 2024-07-09 PROCEDURE — 71275 CT ANGIOGRAPHY CHEST: CPT

## 2024-07-09 PROCEDURE — 2580000003 HC RX 258: Performed by: EMERGENCY MEDICINE

## 2024-07-09 PROCEDURE — 84132 ASSAY OF SERUM POTASSIUM: CPT

## 2024-07-09 PROCEDURE — 83880 ASSAY OF NATRIURETIC PEPTIDE: CPT

## 2024-07-09 PROCEDURE — 80307 DRUG TEST PRSMV CHEM ANLYZR: CPT

## 2024-07-09 PROCEDURE — 36600 WITHDRAWAL OF ARTERIAL BLOOD: CPT

## 2024-07-09 PROCEDURE — 93005 ELECTROCARDIOGRAM TRACING: CPT | Performed by: EMERGENCY MEDICINE

## 2024-07-09 PROCEDURE — 36415 COLL VENOUS BLD VENIPUNCTURE: CPT

## 2024-07-09 PROCEDURE — 0202U NFCT DS 22 TRGT SARS-COV-2: CPT

## 2024-07-09 PROCEDURE — 96365 THER/PROPH/DIAG IV INF INIT: CPT

## 2024-07-09 PROCEDURE — 82435 ASSAY OF BLOOD CHLORIDE: CPT

## 2024-07-09 PROCEDURE — 99285 EMERGENCY DEPT VISIT HI MDM: CPT

## 2024-07-09 PROCEDURE — 96376 TX/PRO/DX INJ SAME DRUG ADON: CPT

## 2024-07-09 PROCEDURE — 82803 BLOOD GASES ANY COMBINATION: CPT

## 2024-07-09 PROCEDURE — 6360000002 HC RX W HCPCS: Performed by: EMERGENCY MEDICINE

## 2024-07-09 PROCEDURE — 6360000002 HC RX W HCPCS: Performed by: PHYSICIAN ASSISTANT

## 2024-07-09 PROCEDURE — 84484 ASSAY OF TROPONIN QUANT: CPT

## 2024-07-09 PROCEDURE — 85046 RETICYTE/HGB CONCENTRATE: CPT

## 2024-07-09 PROCEDURE — 81001 URINALYSIS AUTO W/SCOPE: CPT

## 2024-07-09 PROCEDURE — 96375 TX/PRO/DX INJ NEW DRUG ADDON: CPT

## 2024-07-09 PROCEDURE — 70450 CT HEAD/BRAIN W/O DYE: CPT

## 2024-07-09 PROCEDURE — 1210000000 HC MED SURG R&B

## 2024-07-09 PROCEDURE — 6360000004 HC RX CONTRAST MEDICATION: Performed by: PHYSICIAN ASSISTANT

## 2024-07-09 PROCEDURE — 82330 ASSAY OF CALCIUM: CPT

## 2024-07-09 PROCEDURE — 80048 BASIC METABOLIC PNL TOTAL CA: CPT

## 2024-07-09 PROCEDURE — 83605 ASSAY OF LACTIC ACID: CPT

## 2024-07-09 RX ORDER — LABETALOL 20 MG/4 ML (5 MG/ML) INTRAVENOUS SYRINGE
20 ONCE
Status: COMPLETED | OUTPATIENT
Start: 2024-07-09 | End: 2024-07-09

## 2024-07-09 RX ORDER — 0.9 % SODIUM CHLORIDE 0.9 %
1000 INTRAVENOUS SOLUTION INTRAVENOUS ONCE
Status: COMPLETED | OUTPATIENT
Start: 2024-07-09 | End: 2024-07-09

## 2024-07-09 RX ORDER — MORPHINE SULFATE 4 MG/ML
4 INJECTION, SOLUTION INTRAMUSCULAR; INTRAVENOUS
Status: COMPLETED | OUTPATIENT
Start: 2024-07-09 | End: 2024-07-09

## 2024-07-09 RX ORDER — MORPHINE SULFATE 4 MG/ML
4 INJECTION, SOLUTION INTRAMUSCULAR; INTRAVENOUS ONCE
Status: COMPLETED | OUTPATIENT
Start: 2024-07-09 | End: 2024-07-09

## 2024-07-09 RX ORDER — ONDANSETRON 2 MG/ML
4 INJECTION INTRAMUSCULAR; INTRAVENOUS ONCE
Status: COMPLETED | OUTPATIENT
Start: 2024-07-09 | End: 2024-07-09

## 2024-07-09 RX ADMIN — ONDANSETRON 4 MG: 2 INJECTION INTRAMUSCULAR; INTRAVENOUS at 20:49

## 2024-07-09 RX ADMIN — MORPHINE SULFATE 4 MG: 4 INJECTION, SOLUTION INTRAMUSCULAR; INTRAVENOUS at 20:49

## 2024-07-09 RX ADMIN — CEFTRIAXONE SODIUM 1000 MG: 1 INJECTION, POWDER, FOR SOLUTION INTRAMUSCULAR; INTRAVENOUS at 23:25

## 2024-07-09 RX ADMIN — IOPAMIDOL 75 ML: 755 INJECTION, SOLUTION INTRAVENOUS at 22:32

## 2024-07-09 RX ADMIN — LABETALOL HYDROCHLORIDE 20 MG: 5 INJECTION, SOLUTION INTRAVENOUS at 21:30

## 2024-07-09 RX ADMIN — MORPHINE SULFATE 4 MG: 4 INJECTION, SOLUTION INTRAMUSCULAR; INTRAVENOUS at 22:00

## 2024-07-09 RX ADMIN — SODIUM CHLORIDE 1000 ML: 9 INJECTION, SOLUTION INTRAVENOUS at 20:48

## 2024-07-09 ASSESSMENT — PAIN DESCRIPTION - LOCATION
LOCATION: GENERALIZED

## 2024-07-09 ASSESSMENT — PAIN - FUNCTIONAL ASSESSMENT: PAIN_FUNCTIONAL_ASSESSMENT: 0-10

## 2024-07-09 ASSESSMENT — PAIN SCALES - GENERAL
PAINLEVEL_OUTOF10: 8
PAINLEVEL_OUTOF10: 8

## 2024-07-10 ENCOUNTER — APPOINTMENT (OUTPATIENT)
Dept: CT IMAGING | Age: 59
DRG: 811 | End: 2024-07-10
Payer: COMMERCIAL

## 2024-07-10 PROBLEM — R09.02 HYPOXEMIA: Status: ACTIVE | Noted: 2024-07-10

## 2024-07-10 PROBLEM — G93.40 ENCEPHALOPATHY: Status: ACTIVE | Noted: 2024-07-10

## 2024-07-10 PROBLEM — B34.8 PARAINFLUENZA INFECTION: Status: ACTIVE | Noted: 2024-07-10

## 2024-07-10 LAB
ALBUMIN SERPL-MCNC: 4.1 G/DL (ref 3.5–4.6)
ALP SERPL-CCNC: 275 U/L (ref 35–104)
ALT SERPL-CCNC: 65 U/L (ref 0–41)
AMMONIA PLAS-SCNC: 35 UMOL/L (ref 16–60)
ANION GAP SERPL CALCULATED.3IONS-SCNC: 13 MEQ/L (ref 9–15)
ANISOCYTOSIS BLD QL SMEAR: ABNORMAL
AST SERPL-CCNC: 56 U/L (ref 0–40)
B PARAP IS1001 DNA NPH QL NAA+NON-PROBE: NOT DETECTED
B PERT.PT PRMT NPH QL NAA+NON-PROBE: NOT DETECTED
BASE EXCESS ARTERIAL: 6 (ref -3–3)
BASE EXCESS ARTERIAL: 9 (ref -3–3)
BASOPHILS # BLD: 0 K/UL (ref 0–0.2)
BASOPHILS NFR BLD: 0.4 %
BILIRUB SERPL-MCNC: 1.5 MG/DL (ref 0.2–0.7)
BUN SERPL-MCNC: 24 MG/DL (ref 6–20)
C PNEUM DNA NPH QL NAA+NON-PROBE: NOT DETECTED
CALCIUM IONIZED: 1.12 MMOL/L (ref 1.12–1.32)
CALCIUM IONIZED: 1.16 MMOL/L (ref 1.12–1.32)
CALCIUM SERPL-MCNC: 9.1 MG/DL (ref 8.5–9.9)
CHLORIDE SERPL-SCNC: 104 MEQ/L (ref 95–107)
CO2 SERPL-SCNC: 27 MEQ/L (ref 20–31)
CREAT SERPL-MCNC: 1.25 MG/DL (ref 0.7–1.2)
EKG ATRIAL RATE: 51 BPM
EKG ATRIAL RATE: 85 BPM
EKG P AXIS: 46 DEGREES
EKG P AXIS: 50 DEGREES
EKG P-R INTERVAL: 146 MS
EKG P-R INTERVAL: 158 MS
EKG Q-T INTERVAL: 382 MS
EKG Q-T INTERVAL: 500 MS
EKG QRS DURATION: 92 MS
EKG QRS DURATION: 96 MS
EKG QTC CALCULATION (BAZETT): 454 MS
EKG QTC CALCULATION (BAZETT): 460 MS
EKG R AXIS: -20 DEGREES
EKG R AXIS: 10 DEGREES
EKG T AXIS: 107 DEGREES
EKG T AXIS: 92 DEGREES
EKG VENTRICULAR RATE: 51 BPM
EKG VENTRICULAR RATE: 85 BPM
EOSINOPHIL # BLD: 0.1 K/UL (ref 0–0.7)
EOSINOPHIL NFR BLD: 1 %
ERYTHROCYTE [DISTWIDTH] IN BLOOD BY AUTOMATED COUNT: 21.8 % (ref 11.5–14.5)
FLUAV RNA NPH QL NAA+NON-PROBE: NOT DETECTED
FLUBV RNA NPH QL NAA+NON-PROBE: NOT DETECTED
GLOBULIN SER CALC-MCNC: 3.3 G/DL (ref 2.3–3.5)
GLUCOSE BLD-MCNC: 151 MG/DL (ref 70–99)
GLUCOSE BLD-MCNC: 164 MG/DL (ref 70–99)
GLUCOSE BLD-MCNC: 173 MG/DL (ref 70–99)
GLUCOSE BLD-MCNC: 191 MG/DL (ref 70–99)
GLUCOSE BLD-MCNC: 193 MG/DL (ref 70–99)
GLUCOSE BLD-MCNC: 193 MG/DL (ref 70–99)
GLUCOSE SERPL-MCNC: 160 MG/DL (ref 70–99)
HADV DNA NPH QL NAA+NON-PROBE: NOT DETECTED
HCO3 ARTERIAL: 29.2 MMOL/L (ref 21–29)
HCO3 ARTERIAL: 32.7 MMOL/L (ref 21–29)
HCOV 229E RNA NPH QL NAA+NON-PROBE: NOT DETECTED
HCOV HKU1 RNA NPH QL NAA+NON-PROBE: NOT DETECTED
HCOV NL63 RNA NPH QL NAA+NON-PROBE: NOT DETECTED
HCOV OC43 RNA NPH QL NAA+NON-PROBE: NOT DETECTED
HCT VFR BLD AUTO: 33.6 % (ref 42–52)
HCT VFR BLD AUTO: 36 % (ref 41–53)
HGB BLD CALC-MCNC: 12.1 GM/DL (ref 13.5–17.5)
HGB BLD-MCNC: 12.1 G/DL (ref 14–18)
HMPV RNA NPH QL NAA+NON-PROBE: NOT DETECTED
HPIV1 RNA NPH QL NAA+NON-PROBE: NOT DETECTED
HPIV2 RNA NPH QL NAA+NON-PROBE: NOT DETECTED
HPIV3 RNA NPH QL NAA+NON-PROBE: DETECTED
HPIV4 RNA NPH QL NAA+NON-PROBE: NOT DETECTED
LACTATE: 0.71 MMOL/L (ref 0.4–2)
LACTATE: 0.81 MMOL/L (ref 0.4–2)
LYMPHOCYTES # BLD: 3.1 K/UL (ref 1–4.8)
LYMPHOCYTES NFR BLD: 12 %
M PNEUMO DNA NPH QL NAA+NON-PROBE: NOT DETECTED
MACROCYTES BLD QL SMEAR: ABNORMAL
MAGNESIUM SERPL-MCNC: 2.1 MG/DL (ref 1.7–2.4)
MCH RBC QN AUTO: 26.3 PG (ref 27–31.3)
MCHC RBC AUTO-ENTMCNC: 36 % (ref 33–37)
MCV RBC AUTO: 73 FL (ref 79–92.2)
METAMYELOCYTES NFR BLD MANUAL: 1 %
MICROCYTES BLD QL SMEAR: ABNORMAL
MONOCYTES # BLD: 0.4 K/UL (ref 0.2–0.8)
MONOCYTES NFR BLD: 2.9 %
NEUTROPHILS # BLD: 10.6 K/UL (ref 1.4–6.5)
NEUTS SEG NFR BLD: 74 %
NEUTS VAC BLD QL SMEAR: ABNORMAL
NRBC BLD-RTO: 10 /100 WBC
O2 SAT, ARTERIAL: 91 % (ref 93–100)
O2 SAT, ARTERIAL: 92 % (ref 93–100)
PATH INTERP BLD-IMP: NORMAL
PCO2 ARTERIAL: 37 MM HG (ref 35–45)
PCO2 ARTERIAL: 45 MM HG (ref 35–45)
PERFORMED ON: ABNORMAL
PH ARTERIAL: 7.47 (ref 7.35–7.45)
PH ARTERIAL: 7.5 (ref 7.35–7.45)
PLATELET # BLD AUTO: 116 K/UL (ref 130–400)
PLATELET BLD QL SMEAR: ABNORMAL
PO2 ARTERIAL: 56 MM HG (ref 75–108)
PO2 ARTERIAL: 57 MM HG (ref 75–108)
POC CHLORIDE: 107 MEQ/L (ref 99–110)
POC CHLORIDE: 112 MEQ/L (ref 99–110)
POC CREATININE: 1 MG/DL (ref 0.8–1.3)
POC FIO2: 4
POC FIO2: 4
POC SAMPLE TYPE: ABNORMAL
POC SAMPLE TYPE: ABNORMAL
POIKILOCYTOSIS BLD QL SMEAR: ABNORMAL
POLYCHROMASIA BLD QL SMEAR: ABNORMAL
POTASSIUM SERPL-SCNC: 2.9 MEQ/L (ref 3.5–5.1)
POTASSIUM SERPL-SCNC: 3.3 MEQ/L (ref 3.5–5.1)
POTASSIUM SERPL-SCNC: 3.4 MEQ/L (ref 3.4–4.9)
PROT SERPL-MCNC: 7.4 G/DL (ref 6.3–8)
PSA SERPL-MCNC: 2.91 NG/ML (ref 0–4)
RBC # BLD AUTO: 4.6 M/UL (ref 4.7–6.1)
RSV RNA NPH QL NAA+NON-PROBE: NOT DETECTED
RV+EV RNA NPH QL NAA+NON-PROBE: NOT DETECTED
SARS-COV-2 RNA NPH QL NAA+NON-PROBE: NOT DETECTED
SLIDE REVIEW: ABNORMAL
SMUDGE CELLS BLD QL SMEAR: 10.7
SODIUM BLD-SCNC: 146 MEQ/L (ref 136–145)
SODIUM SERPL-SCNC: 144 MEQ/L (ref 135–144)
TARGETS BLD QL SMEAR: ABNORMAL
TCO2 ARTERIAL: 30 MMOL/L (ref 21–32)
TCO2 ARTERIAL: 34 MMOL/L (ref 21–32)
TROPONIN, HIGH SENSITIVITY: 24 NG/L (ref 0–19)
VARIANT LYMPHS NFR BLD: 10 %
WBC # BLD AUTO: 14.1 K/UL (ref 4.8–10.8)

## 2024-07-10 PROCEDURE — 6360000002 HC RX W HCPCS: Performed by: FAMILY MEDICINE

## 2024-07-10 PROCEDURE — APPSS45 APP SPLIT SHARED TIME 31-45 MINUTES: Performed by: NURSE PRACTITIONER

## 2024-07-10 PROCEDURE — 2580000003 HC RX 258: Performed by: INTERNAL MEDICINE

## 2024-07-10 PROCEDURE — 6360000002 HC RX W HCPCS: Performed by: INTERNAL MEDICINE

## 2024-07-10 PROCEDURE — 82140 ASSAY OF AMMONIA: CPT

## 2024-07-10 PROCEDURE — 80053 COMPREHEN METABOLIC PANEL: CPT

## 2024-07-10 PROCEDURE — 85014 HEMATOCRIT: CPT

## 2024-07-10 PROCEDURE — 83735 ASSAY OF MAGNESIUM: CPT

## 2024-07-10 PROCEDURE — 84295 ASSAY OF SERUM SODIUM: CPT

## 2024-07-10 PROCEDURE — 82435 ASSAY OF BLOOD CHLORIDE: CPT

## 2024-07-10 PROCEDURE — 97167 OT EVAL HIGH COMPLEX 60 MIN: CPT

## 2024-07-10 PROCEDURE — 97162 PT EVAL MOD COMPLEX 30 MIN: CPT

## 2024-07-10 PROCEDURE — 99223 1ST HOSP IP/OBS HIGH 75: CPT | Performed by: INTERNAL MEDICINE

## 2024-07-10 PROCEDURE — 36600 WITHDRAWAL OF ARTERIAL BLOOD: CPT

## 2024-07-10 PROCEDURE — 87529 HSV DNA AMP PROBE: CPT

## 2024-07-10 PROCEDURE — 84153 ASSAY OF PSA TOTAL: CPT

## 2024-07-10 PROCEDURE — 82948 REAGENT STRIP/BLOOD GLUCOSE: CPT

## 2024-07-10 PROCEDURE — 82803 BLOOD GASES ANY COMBINATION: CPT

## 2024-07-10 PROCEDURE — 6360000002 HC RX W HCPCS: Performed by: PHYSICIAN ASSISTANT

## 2024-07-10 PROCEDURE — 83605 ASSAY OF LACTIC ACID: CPT

## 2024-07-10 PROCEDURE — 85025 COMPLETE CBC W/AUTO DIFF WBC: CPT

## 2024-07-10 PROCEDURE — 82565 ASSAY OF CREATININE: CPT

## 2024-07-10 PROCEDURE — 1210000000 HC MED SURG R&B

## 2024-07-10 PROCEDURE — 51798 US URINE CAPACITY MEASURE: CPT

## 2024-07-10 PROCEDURE — 36415 COLL VENOUS BLD VENIPUNCTURE: CPT

## 2024-07-10 PROCEDURE — 74176 CT ABD & PELVIS W/O CONTRAST: CPT

## 2024-07-10 PROCEDURE — 84484 ASSAY OF TROPONIN QUANT: CPT

## 2024-07-10 PROCEDURE — 82330 ASSAY OF CALCIUM: CPT

## 2024-07-10 PROCEDURE — 99223 1ST HOSP IP/OBS HIGH 75: CPT | Performed by: PSYCHIATRY & NEUROLOGY

## 2024-07-10 PROCEDURE — 84132 ASSAY OF SERUM POTASSIUM: CPT

## 2024-07-10 PROCEDURE — 82378 CARCINOEMBRYONIC ANTIGEN: CPT

## 2024-07-10 PROCEDURE — 2580000003 HC RX 258: Performed by: PHYSICIAN ASSISTANT

## 2024-07-10 RX ORDER — LORAZEPAM 2 MG/ML
1 INJECTION INTRAMUSCULAR ONCE
Status: COMPLETED | OUTPATIENT
Start: 2024-07-10 | End: 2024-07-10

## 2024-07-10 RX ORDER — LORAZEPAM 2 MG/ML
1 INJECTION INTRAMUSCULAR EVERY 4 HOURS PRN
Status: DISCONTINUED | OUTPATIENT
Start: 2024-07-10 | End: 2024-07-18 | Stop reason: HOSPADM

## 2024-07-10 RX ORDER — MAGNESIUM SULFATE IN WATER 40 MG/ML
2000 INJECTION, SOLUTION INTRAVENOUS PRN
Status: DISCONTINUED | OUTPATIENT
Start: 2024-07-10 | End: 2024-07-18 | Stop reason: HOSPADM

## 2024-07-10 RX ORDER — INSULIN LISPRO 100 [IU]/ML
0-8 INJECTION, SOLUTION INTRAVENOUS; SUBCUTANEOUS
Status: DISCONTINUED | OUTPATIENT
Start: 2024-07-10 | End: 2024-07-18 | Stop reason: HOSPADM

## 2024-07-10 RX ORDER — 0.9 % SODIUM CHLORIDE 0.9 %
1000 INTRAVENOUS SOLUTION INTRAVENOUS ONCE
Status: COMPLETED | OUTPATIENT
Start: 2024-07-10 | End: 2024-07-10

## 2024-07-10 RX ORDER — SODIUM CHLORIDE 9 MG/ML
INJECTION, SOLUTION INTRAVENOUS CONTINUOUS
Status: DISCONTINUED | OUTPATIENT
Start: 2024-07-10 | End: 2024-07-13

## 2024-07-10 RX ORDER — SODIUM CHLORIDE 0.9 % (FLUSH) 0.9 %
5-40 SYRINGE (ML) INJECTION PRN
Status: DISCONTINUED | OUTPATIENT
Start: 2024-07-10 | End: 2024-07-18 | Stop reason: HOSPADM

## 2024-07-10 RX ORDER — SODIUM CHLORIDE 0.9 % (FLUSH) 0.9 %
5-40 SYRINGE (ML) INJECTION EVERY 12 HOURS SCHEDULED
Status: DISCONTINUED | OUTPATIENT
Start: 2024-07-10 | End: 2024-07-18 | Stop reason: HOSPADM

## 2024-07-10 RX ORDER — GLUCAGON 1 MG/ML
1 KIT INJECTION PRN
Status: DISCONTINUED | OUTPATIENT
Start: 2024-07-10 | End: 2024-07-18 | Stop reason: HOSPADM

## 2024-07-10 RX ORDER — HYDRALAZINE HYDROCHLORIDE 20 MG/ML
10 INJECTION INTRAMUSCULAR; INTRAVENOUS EVERY 4 HOURS PRN
Status: DISCONTINUED | OUTPATIENT
Start: 2024-07-10 | End: 2024-07-18 | Stop reason: HOSPADM

## 2024-07-10 RX ORDER — POTASSIUM CHLORIDE 7.45 MG/ML
10 INJECTION INTRAVENOUS PRN
Status: DISCONTINUED | OUTPATIENT
Start: 2024-07-10 | End: 2024-07-18 | Stop reason: HOSPADM

## 2024-07-10 RX ORDER — ACETAMINOPHEN 325 MG/1
650 TABLET ORAL EVERY 6 HOURS PRN
Status: DISCONTINUED | OUTPATIENT
Start: 2024-07-10 | End: 2024-07-18 | Stop reason: HOSPADM

## 2024-07-10 RX ORDER — POLYETHYLENE GLYCOL 3350 17 G/17G
17 POWDER, FOR SOLUTION ORAL DAILY PRN
Status: DISCONTINUED | OUTPATIENT
Start: 2024-07-10 | End: 2024-07-18 | Stop reason: HOSPADM

## 2024-07-10 RX ORDER — SODIUM CHLORIDE 9 MG/ML
INJECTION, SOLUTION INTRAVENOUS PRN
Status: DISCONTINUED | OUTPATIENT
Start: 2024-07-10 | End: 2024-07-18 | Stop reason: HOSPADM

## 2024-07-10 RX ORDER — INSULIN LISPRO 100 [IU]/ML
0-4 INJECTION, SOLUTION INTRAVENOUS; SUBCUTANEOUS NIGHTLY
Status: DISCONTINUED | OUTPATIENT
Start: 2024-07-10 | End: 2024-07-18 | Stop reason: HOSPADM

## 2024-07-10 RX ORDER — TAMSULOSIN HYDROCHLORIDE 0.4 MG/1
0.4 CAPSULE ORAL DAILY
Status: DISCONTINUED | OUTPATIENT
Start: 2024-07-10 | End: 2024-07-18 | Stop reason: HOSPADM

## 2024-07-10 RX ORDER — CARVEDILOL 12.5 MG/1
12.5 TABLET ORAL 2 TIMES DAILY
Status: DISCONTINUED | OUTPATIENT
Start: 2024-07-10 | End: 2024-07-16

## 2024-07-10 RX ORDER — DIPHENHYDRAMINE HYDROCHLORIDE 50 MG/ML
50 INJECTION INTRAMUSCULAR; INTRAVENOUS ONCE
Status: DISCONTINUED | OUTPATIENT
Start: 2024-07-10 | End: 2024-07-10

## 2024-07-10 RX ORDER — POTASSIUM CHLORIDE 20 MEQ/1
40 TABLET, EXTENDED RELEASE ORAL PRN
Status: DISCONTINUED | OUTPATIENT
Start: 2024-07-10 | End: 2024-07-18 | Stop reason: HOSPADM

## 2024-07-10 RX ORDER — ONDANSETRON 2 MG/ML
4 INJECTION INTRAMUSCULAR; INTRAVENOUS EVERY 6 HOURS PRN
Status: DISCONTINUED | OUTPATIENT
Start: 2024-07-10 | End: 2024-07-18

## 2024-07-10 RX ORDER — ASPIRIN 81 MG/1
81 TABLET, CHEWABLE ORAL DAILY
Status: DISCONTINUED | OUTPATIENT
Start: 2024-07-10 | End: 2024-07-18 | Stop reason: HOSPADM

## 2024-07-10 RX ORDER — MORPHINE SULFATE 2 MG/ML
2 INJECTION, SOLUTION INTRAMUSCULAR; INTRAVENOUS EVERY 4 HOURS PRN
Status: DISCONTINUED | OUTPATIENT
Start: 2024-07-10 | End: 2024-07-12

## 2024-07-10 RX ORDER — LANOLIN ALCOHOL/MO/W.PET/CERES
3 CREAM (GRAM) TOPICAL NIGHTLY PRN
Status: DISCONTINUED | OUTPATIENT
Start: 2024-07-10 | End: 2024-07-18 | Stop reason: HOSPADM

## 2024-07-10 RX ORDER — SODIUM CHLORIDE 450 MG/100ML
75 INJECTION, SOLUTION INTRAVENOUS CONTINUOUS
Status: DISCONTINUED | OUTPATIENT
Start: 2024-07-10 | End: 2024-07-10 | Stop reason: SDDI

## 2024-07-10 RX ORDER — ACETAMINOPHEN 650 MG/1
650 SUPPOSITORY RECTAL EVERY 6 HOURS PRN
Status: DISCONTINUED | OUTPATIENT
Start: 2024-07-10 | End: 2024-07-18 | Stop reason: HOSPADM

## 2024-07-10 RX ORDER — ENOXAPARIN SODIUM 100 MG/ML
40 INJECTION SUBCUTANEOUS DAILY
Status: DISCONTINUED | OUTPATIENT
Start: 2024-07-10 | End: 2024-07-18 | Stop reason: HOSPADM

## 2024-07-10 RX ORDER — DEXTROSE MONOHYDRATE 100 MG/ML
INJECTION, SOLUTION INTRAVENOUS CONTINUOUS PRN
Status: DISCONTINUED | OUTPATIENT
Start: 2024-07-10 | End: 2024-07-18 | Stop reason: HOSPADM

## 2024-07-10 RX ORDER — NITROGLYCERIN 0.4 MG/1
0.4 TABLET SUBLINGUAL EVERY 5 MIN PRN
Status: DISCONTINUED | OUTPATIENT
Start: 2024-07-10 | End: 2024-07-18 | Stop reason: HOSPADM

## 2024-07-10 RX ADMIN — MORPHINE SULFATE 2 MG: 2 INJECTION, SOLUTION INTRAMUSCULAR; INTRAVENOUS at 13:07

## 2024-07-10 RX ADMIN — SODIUM CHLORIDE 75 ML/HR: 4.5 INJECTION, SOLUTION INTRAVENOUS at 02:56

## 2024-07-10 RX ADMIN — HYDROMORPHONE HYDROCHLORIDE 0.5 MG: 1 INJECTION, SOLUTION INTRAMUSCULAR; INTRAVENOUS; SUBCUTANEOUS at 17:09

## 2024-07-10 RX ADMIN — Medication 1 MG: at 02:57

## 2024-07-10 RX ADMIN — HYDRALAZINE HYDROCHLORIDE 10 MG: 20 INJECTION INTRAMUSCULAR; INTRAVENOUS at 08:18

## 2024-07-10 RX ADMIN — SODIUM CHLORIDE: 9 INJECTION, SOLUTION INTRAVENOUS at 08:10

## 2024-07-10 RX ADMIN — Medication 1 MG: at 10:08

## 2024-07-10 RX ADMIN — SODIUM CHLORIDE, PRESERVATIVE FREE 10 ML: 5 INJECTION INTRAVENOUS at 20:09

## 2024-07-10 RX ADMIN — HYDRALAZINE HYDROCHLORIDE 10 MG: 20 INJECTION INTRAMUSCULAR; INTRAVENOUS at 13:32

## 2024-07-10 RX ADMIN — POTASSIUM CHLORIDE 10 MEQ: 7.46 INJECTION, SOLUTION INTRAVENOUS at 08:27

## 2024-07-10 RX ADMIN — SODIUM CHLORIDE, PRESERVATIVE FREE 10 ML: 5 INJECTION INTRAVENOUS at 20:20

## 2024-07-10 RX ADMIN — MORPHINE SULFATE 2 MG: 2 INJECTION, SOLUTION INTRAMUSCULAR; INTRAVENOUS at 08:15

## 2024-07-10 RX ADMIN — MORPHINE SULFATE 2 MG: 2 INJECTION, SOLUTION INTRAMUSCULAR; INTRAVENOUS at 02:57

## 2024-07-10 RX ADMIN — SODIUM CHLORIDE 1000 ML: 900 INJECTION, SOLUTION INTRAVENOUS at 02:45

## 2024-07-10 RX ADMIN — HYDROMORPHONE HYDROCHLORIDE 0.5 MG: 1 INJECTION, SOLUTION INTRAMUSCULAR; INTRAVENOUS; SUBCUTANEOUS at 21:32

## 2024-07-10 RX ADMIN — AZITHROMYCIN MONOHYDRATE 500 MG: 500 INJECTION, POWDER, LYOPHILIZED, FOR SOLUTION INTRAVENOUS at 00:02

## 2024-07-10 RX ADMIN — HYDRALAZINE HYDROCHLORIDE 10 MG: 20 INJECTION INTRAMUSCULAR; INTRAVENOUS at 20:16

## 2024-07-10 RX ADMIN — SODIUM CHLORIDE, PRESERVATIVE FREE 10 ML: 5 INJECTION INTRAVENOUS at 08:22

## 2024-07-10 RX ADMIN — POTASSIUM CHLORIDE 10 MEQ: 7.46 INJECTION, SOLUTION INTRAVENOUS at 09:30

## 2024-07-10 RX ADMIN — SODIUM CHLORIDE: 9 INJECTION, SOLUTION INTRAVENOUS at 02:50

## 2024-07-10 RX ADMIN — Medication 1 MG: at 20:09

## 2024-07-10 RX ADMIN — POTASSIUM CHLORIDE 10 MEQ: 7.46 INJECTION, SOLUTION INTRAVENOUS at 11:28

## 2024-07-10 RX ADMIN — POTASSIUM CHLORIDE 10 MEQ: 7.46 INJECTION, SOLUTION INTRAVENOUS at 10:25

## 2024-07-10 ASSESSMENT — PAIN DESCRIPTION - DESCRIPTORS
DESCRIPTORS: ACHING
DESCRIPTORS: PATIENT UNABLE TO DESCRIBE

## 2024-07-10 ASSESSMENT — PAIN SCALES - GENERAL
PAINLEVEL_OUTOF10: 4
PAINLEVEL_OUTOF10: 4
PAINLEVEL_OUTOF10: 6
PAINLEVEL_OUTOF10: 9

## 2024-07-10 ASSESSMENT — PAIN DESCRIPTION - LOCATION: LOCATION: GENERALIZED

## 2024-07-10 NOTE — ED PROVIDER NOTES
8:45 PM EDT  [facility] ED  EMERGENCY DEPARTMENT ENCOUNTER      Pt Name: Mateo Marsh  MRN: [id number]  Birthdate 1965  Date of evaluation: 7/9/2024  Provider: Christian Zaidi PA-C    CHIEF COMPLAINT       Chief Complaint   Patient presents with    Sickle Cell Pain Crisis     Patient states pain all over from sickle cell .         HISTORY OF PRESENT ILLNESS   (Location/Symptom, Timing/Onset, Context/Setting, Quality, Duration, Modifying Factors, Severity)  Note limiting factors.   58-year-old male presenting with diffuse pain.  Patient has a history of sickle cell and believes he is in a flare.  Per report, flares are worse with elevated BP.  No fevers.  Has not taken anything for relief.  Does not know the name of his hematologist.        Nursing Notes were reviewed.    REVIEW OF SYSTEMS    (2-9 systems for level 4, 10 or more for level 5)     Review of Systems   All other systems reviewed and are negative.      Except as noted above the remainder of the review of systems was reviewed and negative.       PAST MEDICAL HISTORY     Past Medical History:   Diagnosis Date    Bronchitis     CAD (coronary artery disease)     past angioplasty > 10 yrs ago    Chronic back pain     Diabetes (HCC)     dx > 2 yrs    Dyslipidemia     Hyperlipidemia     meds > 5 yrs    Hypertension     meds > 5 yrs    Neuropathy in diabetes (HCC)     Osteoarthritis     Pneumonia     Sickle cell anemia (HCC)     Sleep apnea, obstructive          SURGICAL HISTORY       Past Surgical History:   Procedure Laterality Date    BACK SURGERY  2008    Lumbar disc OR.    BLADDER SURGERY  07/10/2015    Patient states he was urininating blood and had a procedure done.    COLONOSCOPY  03/14/2014    [facility]    ENDOSCOPY, COLON, DIAGNOSTIC      LUMBAR FUSION  2008    OTHER SURGICAL HISTORY Right     tendon repair in (R) forearm because of previous injury as child    STIMULATOR SURGERY N/A 4/25/2023    SPINAL STIMULATOR REPLACEMENT performed by  not   excluded.  No evidence   8. Epidural catheter.   9. Nonspecific gallbladder distension.         CT Head W/O Contrast   Final Result   No acute intracranial abnormality.             LABS:  Labs Reviewed   RESPIRATORY PANEL, MOLECULAR, WITH COVID-19 - Abnormal; Notable for the following components:       Result Value    Parainfluenza Virus 3 by PCR DETECTED (*)     All other components within normal limits   BASIC METABOLIC PANEL - Abnormal; Notable for the following components:    Glucose 176 (*)     BUN 25 (*)     Creatinine 1.22 (*)     All other components within normal limits   CBC WITH AUTO DIFFERENTIAL - Abnormal; Notable for the following components:    WBC 13.2 (*)     Hemoglobin 12.1 (*)     Hematocrit 34.3 (*)     MCV 72.8 (*)     MCH 25.7 (*)     RDW 21.7 (*)     Platelets 115 (*)     Neutrophils Absolute 9.8 (*)     Monocytes Absolute 0.9 (*)     All other components within normal limits   RETICULOCYTES - Abnormal; Notable for the following components:    Retic Ct Pct 2.4 (*)     Retic Ct Abs 0.115 (*)     All other components within normal limits   TROPONIN - Abnormal; Notable for the following components:    Troponin, High Sensitivity 25 (*)     All other components within normal limits   URINALYSIS WITH REFLEX TO CULTURE - Abnormal; Notable for the following components:    Blood, Urine TRACE (*)     Protein,  (*)     All other components within normal limits   URINE DRUG SCREEN - Abnormal; Notable for the following components:    Opiate Screen, Urine POSITIVE (*)     All other components within normal limits   BRAIN NATRIURETIC PEPTIDE   MICROSCOPIC URINALYSIS       All other labs were within normal range or not returned as of this dictation.    EMERGENCY DEPARTMENT COURSE and DIFFERENTIAL DIAGNOSIS/MDM:   Vitals:    Vitals:    07/09/24 2305 07/09/24 2330 07/10/24 0000 07/10/24 0100   BP:  (!) 157/86 (!) 177/90 (!) 181/94   Pulse: 84 84 84 91   Resp: 17 18 17    Temp:       TempSrc:

## 2024-07-10 NOTE — H&P
Hospitalist Group   History and Physical      CHIEF COMPLAINT: Shortness of breath, chest pain, generalized pain, confusion    History of Present Illness:  58 y.o. male with a history of sickle cell, CAD, hypertension, diabetes, presents with shortness of breath, chest pain, generalized pain, confusion.  According to the daughter patient started having symptoms 2 days ago and been progressively worsening.  He was able to have a conversation earlier today but then later he became more confused.  Currently trying to get up out of bed and only oriented to self and place but not to time.  No nausea or vomiting.  No diarrhea.  No abdominal pain.  According to the daughter they tried to give him some water earlier but he did not know how to swallow it.  No history of stroke in the past.    Patient was found to be hypoxic in the ER and currently on 6 L O2 maintaining saturation in the 90s.  Patient recently had abnormal stress test.  Recommendation for cath but patient did not follow-up.    REVIEW OF SYSTEMS:  no fevers, chills, has cp, has sob, no n/v, ha, vision/hearing changes, wt changes, hot/cold flashes, other open skin lesions, diarrhea, constipation, dysuria/hematuria unless noted in HPI. Complete ROS performed with the patient and is otherwise negative.      PMH:  Past Medical History:   Diagnosis Date    Bronchitis     CAD (coronary artery disease)     past angioplasty > 10 yrs ago    Chronic back pain     Diabetes (HCC)     dx > 2 yrs    Dyslipidemia     Hyperlipidemia     meds > 5 yrs    Hypertension     meds > 5 yrs    Neuropathy in diabetes (HCC)     Osteoarthritis     Pneumonia     Sickle cell anemia (HCC)     Sleep apnea, obstructive        Surgical History:  Past Surgical History:   Procedure Laterality Date    BACK SURGERY  2008    Lumbar disc OR.    BLADDER SURGERY  07/10/2015    Patient states he was urininating blood and had a procedure done.    COLONOSCOPY  03/14/2014    [facility]    ENDOSCOPY, COLON,

## 2024-07-10 NOTE — ED NOTES
[facility]                INFECTIOUS DISEASE PROGRESS NOTE    Filemon Monroe Patient Status:  Inpatient    1940 MRN [id number]   [facility] 3NW-A Attending Niesha Castellano MD   Hosp Day # 9 PCP MD Inez Orozco Report    stable on ancef  Had refused interventions  Can switch to po keflex - will need amputations after cleaered by vascular    2.  Lung mass, outp bx per Jerel Canchola MD  [facility]  [phone]

## 2024-07-10 NOTE — PROGRESS NOTES
[facility] OCCUPATIONAL THERAPY EVALUATION - ACUTE     NAME: Mateo Marsh  : 1965 (58 y.o.)  MRN: [id number]  CODE STATUS: Full Code  Room: [facility]/[place] [address]    Date of Service: 7/10/2024    Patient Diagnosis(es): Hypoxemia [R09.02]  Sickle cell crisis (HCC) [D57.00]  Sickle cell disease with crisis (HCC) [D57.00]  Pneumonia of both lungs due to infectious organism, unspecified part of lung [J18.9]   Patient Active Problem List    Diagnosis Date Noted    Sickle cell crisis (HCC) 2024    Disease of larynx 2021    Hb-SS disease with crisis, unspecified (HCC) 2021    Status post total replacement of left hip 2020    Degenerative joint disease of left hip 2020    Tobacco use disorder 2020    REGINA (obstructive sleep apnea) 2020    S/P insertion of spinal cord stimulator 2020    Diabetic polyneuropathy associated with type 2 diabetes mellitus (HCC) 2019    Dermatophytosis of nail 2019    Hallux abducto valgus, bilateral 2019    Tailor's bunionette, bilateral 2019    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 (HCC) 2019    Chronic pain syndrome 2017    Postlaminectomy syndrome, lumbar region 2015    L4-5 HERNIATED DISC 2015        Past Medical History:   Diagnosis Date    Bronchitis     CAD (coronary artery disease)     past angioplasty > 10 yrs ago    Chronic back pain     Diabetes (HCC)     dx > 2 yrs    Dyslipidemia     Hyperlipidemia     meds > 5 yrs    Hypertension     meds > 5 yrs    Neuropathy in diabetes (HCC)     Osteoarthritis     Pneumonia     Sickle cell anemia (HCC)     Sleep apnea, obstructive      Past Surgical History:   Procedure Laterality Date    BACK SURGERY      Lumbar disc OR.    BLADDER SURGERY  07/10/2015    Patient states he was urininating blood and had a procedure done.    COLONOSCOPY  2014    [facility]    ENDOSCOPY, COLON, DIAGNOSTIC      LUMBAR FUSION      OTHER SURGICAL HISTORY Right

## 2024-07-10 NOTE — ED NOTES
Updated patients daughter about admission and patients current status. Patient remains confused. Patient currently on 6L NC. No outward signs of acute distress noted at this time.

## 2024-07-10 NOTE — FLOWSHEET NOTE
1630 spoke with Mnina RN regarding LP consult.  Will touch base tomorrow 7/11/24 regarding time for procedure in IR.

## 2024-07-10 NOTE — CONSULTS
compatible with chronicity.    SOFT TISSUES/SKULL:  No acute abnormality of the visualized skull or soft  tissues.    Impression  No acute intracranial abnormality.  No results found for this or any previous visit.  No results found for this or any previous visit.      Carotid duplex: No results found for this or any previous visit.  No results found for this or any previous visit.  No results found for this or any previous visit.      Echo No results found for this or any previous visit.            Assessment/Plan:    t is a 58-year-old male with past medical history of obstructive sleep apnea, sickle cell anemia, diabetes with diabetic neuropathy, hypertension, hyperlipidemia, CAD, vagal nerve stimulator who presented to [facility] emergency room on 7/9/2024 from home with complaints of diffuse pain and confusion worsening over 2 days.  Patient concerned he is in sickle cell exacerbation.  Oxygen level noted to be 82% on arrival and would drop into the 70s with ambulation or movement.    Vital signs in the emergency room 189/95, 101, 22, 99.4 °F, 93%.  EKG showed normal sinus rhythm at 85 bpm.  Patient was noted to be positive for parainfluenza virus.  Lab test remarkable for creatinine of 1.22, white blood cell count of 13.2, platelets of 115, high-sensitivity troponin of 25.  CTA of the chest negative for PE.  Patchy bilateral areas of groundglass infiltrate noted with bibasilar.   infiltrates/atelectasis with small mediastinal and bilateral hilar nodes.  CT of the head was negative for acute findings  Patient was admitted with acute respiratory failure with hypoxia, sickle cell crisis, dehydration and encephalopathy.    Patient with ongoing lethargy and encephalopathy in the setting of sickle cell crisis, parainfluenza, acute hypoxic respiratory failure, hypertensive urgency.  ABGs repeated today and pH elevated with slightly low pO2.  EEG ordered and pending.  Ammonia level normal  Will order MRI of the brain.   Patient has some sort of implanted neurostimulator.  Will need to have this shut off for MRI.  Will also obtain lumbar puncture given extent of lethargy and encephalopathy.  Patient did verbalize complaint of headache.    I have personally performed a face to face diagnostic evaluation on this patient, reviewed all data and investigations, and am the sole provider of all clinical decisions on the neurological status of this patient.  Significant encephalopathy which is multifactorial.  Patient is in sickle cell crisis.  Patient had ABG done today.  EEG is pending.  We recommend MRI the patient has an implanted neurostimulator and may need to be short of if it is working.  Recommended lumbar puncture given all this findings as we are not quite sure what exactly has occurred here.  60% time spent on evaluating      Hussein Strong MD, LAMBERT  Diplomate, American Board of Psychiatry & Neurology  Board Certified in Vascular Neurology  Board Certified in Neuromuscular Medicine  Certified in Neurorehabilitation         Collaborating physicians: Dr Strong    Electronically signed by DARLENE Dugan CNP on 7/10/2024 at 10:30 AM

## 2024-07-10 NOTE — CONSULTS
control, iterative reconstruction, and/or weight based adjustment of the mA/kV was utilized to reduce the radiation dose to as low as reasonably achievable. COMPARISON: None. HISTORY: ORDERING SYSTEM PROVIDED HISTORY: ALTERED TECHNOLOGIST PROVIDED HISTORY: Reason for exam:->ALTERED Has a \"code stroke\" or \"stroke alert\" been called?->No Decision Support Exception - unselect if not a suspected or confirmed emergency medical condition->Emergency Medical Condition (MA) What reading provider will be dictating this exam?->CRC FINDINGS: BRAIN/VENTRICLES: There is no acute intracranial hemorrhage, mass effect or midline shift.  No abnormal extra-axial fluid collection.  The gray-white differentiation is maintained without evidence of an acute infarct.  There is no evidence of hydrocephalus. ORBITS: The visualized portion of the orbits demonstrate no acute abnormality. SINUSES: The visualized paranasal sinuses and mastoid air cells demonstrate no acute abnormality.  There is a small retention cyst in the left maxillary sinus.  There is mucosal thickening in some of the sphenoid sinus.  There is some associated cortical thickening as well compatible with chronicity. SOFT TISSUES/SKULL:  No acute abnormality of the visualized skull or soft tissues.     No acute intracranial abnormality.     XR CHEST PORTABLE    Result Date: 7/7/2024  EXAMINATION: ONE XRAY VIEW OF THE CHEST 7/7/2024 3:48 pm COMPARISON: 07/13/2016 HISTORY: ORDERING SYSTEM PROVIDED HISTORY: Chest Pain TECHNOLOGIST PROVIDED HISTORY: Reason for exam:->Chest Pain What reading provider will be dictating this exam?->CRC FINDINGS: The lungs are without acute focal process.  There is no effusion or pneumothorax. The cardiomediastinal silhouette is without acute process. The osseous structures are without acute process.     No acute process.     CTA NECK W WO CONTRAST    Result Date: 6/13/2024  EXAMINATION: CTA OF THE NECK 6/13/2024 7:54 am TECHNIQUE: CTA of the neck was  performed with the administration of intravenous contrast. Multiplanar reformatted images are provided for review.  MIP images are provided for review. Stenosis of the internal carotid arteries measured using NASCET criteria. Automated exposure control, iterative reconstruction, and/or weight based adjustment of the mA/kV was utilized to reduce the radiation dose to as low as reasonably achievable. COMPARISON: None. HISTORY: ORDERING SYSTEM PROVIDED HISTORY: Bilateral carotid artery stenosis TECHNOLOGIST PROVIDED HISTORY: Additional Contrast?->Radiologist Recommendation STAT Creatinine as needed:->No Reason for exam:->carotid stensois What reading provider will be dictating this exam?->CRC FINDINGS: There is stenosis involving proximal left carotid artery of approximately the 60%.  There is approximately the 50% stenosis at level of the right carotid bulb.  No significant stenosis involving right internal carotid artery. Calcified and noncalcified atherosclerotic plaque is associated with the carotid bulbs and proximal cervical internal carotid arteries.  Common carotid arteries are patent without evidence of stenosis.  No evidence of stenosis involving the vertebral arteries.     1. Stenosis of approximately 60% involving proximal left internal carotid artery. 2. Stenosis of approximately 50% at level of the right carotid bulb. 3. No obvious stenosis involving the vertebral arteries.       Active Hospital Problems    Diagnosis Date Noted    Sickle cell crisis (HCC) [D57.00] 07/09/2024     Priority: Low         Impression/Plan:   Sickle cell Crisis- iv Fluid. Pain control  MS Changes  Demand ischemia and known abn stress - ASA BB Statin.  We will plan cath once he improves.  HPL - Statin (on hold due to Liver)  Moderate B/L Carotid DZ by recent CTA  LVEF 60-65%     Thank you for allowing us to participate in the care of this patient.     Will continue to follow.    Please call if questions or concerns

## 2024-07-10 NOTE — ED NOTES
Patient initially stated he wears oxygen at home. Patient's family states patient does not wear O2. Patient was 82% on RA. Patient currently 93% on 5L NC.

## 2024-07-10 NOTE — CARE COORDINATION
plan with any other family members/significant others, and if so, who? Yes (TRISTON BOWERS)  Plans to Return to Present Housing: Yes  Other Identified Issues/Barriers to RETURNING to current housing: MEDICALLY CLEARED AND CONFUSION IMPROVED  Potential Assistance needed at discharge: N/A            Potential DME:    Patient expects to discharge to: House  Plan for transportation at discharge:      Financial    Payor: [facility] / Plan: [facility] [employer] [place] / Product Type: *No Product type* /     Does insurance require precert for SNF: Yes    Potential assistance Purchasing Medications: No  Meds-to-Beds request: Yes      [id number] - [place], [place] - [address] [phone] - F [phone]  [address] [address]  Phone: [phone] Fax: [phone]      Notes:    Factors facilitating achievement of predicted outcomes: Family support    Barriers to discharge: Confusion    Additional Case Management Notes: FROM HOME ALONE BUT HAS A SIGNIFICANT OTHER WHO IS VERY SUPPORTIVE.  PT'S DAUGHTER ELISSA IS HIS DECISION MAKER WHILE HE IS CONFUSED.  HER NUMBER [phone].  PT IS NORMALLY INDEPENDENT AND NOT CONFUSED.  PLAN IS FOR HIM TO RETURN TO HIS HOME ONCE THE CONFUSION IS CLEARED.  NO OXYGEN, NO DIALYSIS.      The Plan for Transition of Care is related to the following treatment goals of Hypoxemia [R09.02]  Sickle cell crisis (HCC) [D57.00]  Sickle cell disease with crisis (HCC) [D57.00]  Pneumonia of both lungs due to infectious organism, unspecified part of lung [J18.9]    IF APPLICABLE: The Patient and/or patient representative Mateo and his family were provided with a choice of provider and agrees with the discharge plan. Freedom of choice list with basic dialogue that supports the patient's individualized plan of care/goals and shares the quality data associated with the providers was provided to: Patient Representative   Patient Representative Name: TRISTON BOWERS      The Patient and/or Patient Representative Agree with the Discharge Plan? Yes    ANTONINO Diaz  Case Management Department  Ph: [phone] Fax: [phone]

## 2024-07-10 NOTE — PROGRESS NOTES
Physical Therapy Med Surg Initial Assessment  Facility/Department: [address] ORTHO TELE  Room: [facility]/[place] [address]       NAME: Mateo Marsh  : 1965 (58 y.o.)  MRN: [id number]  CODE STATUS: Full Code    Date of Service: 7/10/2024    Patient Diagnosis(es): Hypoxemia [R09.02]  Sickle cell crisis (HCC) [D57.00]  Sickle cell disease with crisis (HCC) [D57.00]  Pneumonia of both lungs due to infectious organism, unspecified part of lung [J18.9]   Chief Complaint   Patient presents with    Sickle Cell Pain Crisis     Patient states pain all over from sickle cell .     Patient Active Problem List    Diagnosis Date Noted    Sickle cell crisis (HCC) 2024    Disease of larynx 2021    Hb-SS disease with crisis, unspecified (HCC) 2021    Status post total replacement of left hip 2020    Degenerative joint disease of left hip 2020    Tobacco use disorder 2020    REGINA (obstructive sleep apnea) 2020    S/P insertion of spinal cord stimulator 2020    Diabetic polyneuropathy associated with type 2 diabetes mellitus (HCC) 2019    Dermatophytosis of nail 2019    Hallux abducto valgus, bilateral 2019    Tailor's bunionette, bilateral 2019    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 (HCC) 2019    Chronic pain syndrome 2017    Postlaminectomy syndrome, lumbar region 2015    L4-5 HERNIATED DISC 2015        Past Medical History:   Diagnosis Date    Bronchitis     CAD (coronary artery disease)     past angioplasty > 10 yrs ago    Chronic back pain     Diabetes (HCC)     dx > 2 yrs    Dyslipidemia     Hyperlipidemia     meds > 5 yrs    Hypertension     meds > 5 yrs    Neuropathy in diabetes (HCC)     Osteoarthritis     Pneumonia     Sickle cell anemia (HCC)     Sleep apnea, obstructive      Past Surgical History:   Procedure Laterality Date    BACK SURGERY      Lumbar disc OR.    BLADDER SURGERY  07/10/2015    Patient states he was urininating blood  and had a procedure done.    COLONOSCOPY  03/14/2014    [facility]    ENDOSCOPY, COLON, DIAGNOSTIC      LUMBAR FUSION  2008    OTHER SURGICAL HISTORY Right     tendon repair in (R) forearm because of previous injury as child    STIMULATOR SURGERY N/A 4/25/2023    SPINAL STIMULATOR REPLACEMENT performed by Navneet Lamb MD at [facility] [place] [place]    TOTAL HIP ARTHROPLASTY Left 01/23/2020    LEFT HIP TOTAL HIP ARTHROPLASTY, RICARDO performed by Andrew Mehta MD at [facility] [place] [place]    UPPER GASTROINTESTINAL ENDOSCOPY  03/14/2014    [facility]    VAGAL NERVE STIMULATION  2013    Dr. Antoine office       Chart Reviewed: Yes  Patient assessed for rehabilitation services?: Yes  Family / Caregiver Present: No  Diagnosis: Sickle cell crisis (HCC)  General Comment  Comments: Pt resting in bed - agreeable to PT evaluation    Restrictions:  Restrictions/Precautions: Fall Risk, Up as Tolerated, Contact Precautions     SUBJECTIVE:   Subjective: Pt mumbling throughout session.    Pain  Pain: Unable to formally rate pain throughout session.    Prior Level of Function:  Social/Functional History  Additional Comments: Pt unable to provide social/functional information. Per nursing staff, pt independent PTA including driving and picking up his grandchildren    OBJECTIVE:   Vision  Vision:  (No glasses present, unable to fully assess)  Hearing: Within functional limits (Appears WFL)    Cognition:  Overall Orientation Status:  (Pt unable to answer questions)  Orientation Level: Unable to assess  Follows Commands: Within Functional Limits  Overall Cognitive Status: Exceptions  Cognition Comment: Decreased sequencing, safety awareness, impulsive throughout    Observation/Palpation  Observation: Pt slow to respond, minimally following directions. Anxious and impulsive at EOB. States he needs to toilet but only would complete off of L side of bed    ROM:  AROM: Within functional limits  PROM: Generally decreased, functional    Strength:  Strength: Generally

## 2024-07-10 NOTE — PLAN OF CARE
See OT evaluation for all goals and OT POC. Electronically signed by Mitzy Quintana OTR/L on 7/10/2024 at 1:01 PM

## 2024-07-10 NOTE — FLOWSHEET NOTE
Pt resting in bed with his eyes closed.  Daughter at bed side trying to help with fathers care has just gave birth to a primi at [facility]  (C section) baby still there.  Father jumps out of bed when he has to urinate and is 6ft 5in and 176lbs and wants to walk to the bathroom.  Pt unable to stand by himself and is very strong to hold him to prevent him from falling,  daughter helped hold him and I held him and held the urinal on but pt is holding on to my neck , after he voids we assist him back into bed and he goes back to sleep.  Pt voided 400cc orange urine.  His  lung are diminished and and he understands but wants to do things his way.  He is 100% on 5 so decreased o2 to   4 liters vitals 180/81 mapping at 107 heart rate 95 resp 20 100%on 5l .Electronically signed by Raquel Phillips LPN on 7/10/2024 at 8:19 AM  Pt trying to get out of bed states he has to void assisted him getting up with RN and his daughter.pt voided 300 ccorange urine and is back in bed . Medicated by RN with morphine due to him grimising in pain rolling back and forth and trying to get up.Electronically signed by Raquel Phillips LPN on 7/10/2024 at 8:21 AM  Daughter does not want any information being given out to anybody.  Daughter will tell who she wants to know..Electronically signed by Raquel Phillips LPN on 7/10/2024 at 8:52 AM  Dr Montes into see pt checked him out and said he will be following himwhile he is in here.Electronically signed by Raquel Phillips LPN on 7/10/2024 at 8:53 AM  Pt/OT here to work with pt. Unable to follow commands did get him fome the bed to stand and that is what he has been doing to void.  Returned him to the bed and within 5 minute he was trying to get up but this time to the left side once up he started to void also checked his oxygen level and it was 93% on 4 liters..Electronically signed by Raquel Phillips LPN on 7/10/2024 at 9:39 AM   notified through [employer] that he was

## 2024-07-10 NOTE — ED NOTES
Notified hospital supervisor Brandie that patient will require sitter due to multiple attempts to get out of bed. Supervisor states she will contact 2w and make them aware.

## 2024-07-10 NOTE — CONSULTS
Pulmonary Medicine  Consult Note      Reason for consultation: Parainfluenza, hypoxia, sickle cell crisis    Consulting physician: DARLENE Isbell      HISTORY OF PRESENT ILLNESS:    This is 58-year-old male who was admitted with confusion and shortness of breath.  CT chest showing no pulmonary embolism, he has patchy infiltration.  Urinalysis shows some blood and he has been having pain while urinating.  He is currently on antibiotic and analgesics..  Not sure he has sickle cell trait or disease.  He had last sickle cell crisis 10 years ago.  He is currently on 4 L O2 via nasal cannula and O2 saturation is 96%.  He is very restless.  He is encephalopathic.  Unable to get any history or review of systems from patient chart was reviewed for detail  He had CT abdomen and pelvis was ordered but not done.      Past Medical History:        Diagnosis Date    Bronchitis     CAD (coronary artery disease)     past angioplasty > 10 yrs ago    Chronic back pain     Diabetes (HCC)     dx > 2 yrs    Dyslipidemia     Hyperlipidemia     meds > 5 yrs    Hypertension     meds > 5 yrs    Neuropathy in diabetes (HCC)     Osteoarthritis     Pneumonia     Sickle cell anemia (HCC)     Sleep apnea, obstructive        Past Surgical History:        Procedure Laterality Date    BACK SURGERY  2008    Lumbar disc OR.    BLADDER SURGERY  07/10/2015    Patient states he was urininating blood and had a procedure done.    COLONOSCOPY  03/14/2014    [facility]    ENDOSCOPY, COLON, DIAGNOSTIC      LUMBAR FUSION  2008    OTHER SURGICAL HISTORY Right     tendon repair in (R) forearm because of previous injury as child    STIMULATOR SURGERY N/A 4/25/2023    SPINAL STIMULATOR REPLACEMENT [place] [facility] [place]    TOTAL HIP ARTHROPLASTY Left 01/23/2020    LEFT HIP TOTAL HIP ARTHROPLASTY, RICARDO performed [place] [facility] [place]    UPPER GASTROINTESTINAL ENDOSCOPY  03/14/2014    [facility]    VAGAL NERVE STIMULATION  2013

## 2024-07-10 NOTE — ED TRIAGE NOTES
Patient to ED due to sickle cell pain. Patient states he is having 8/10 generalized pain everywhere. Patient states he was evaluated for the same 2 days ago but is still having severe pain. Patient is confused, patient is oriented to person and place but is confused on the time and situation. Patients family states this morning around 0830 that the patient started to have increased confusion.

## 2024-07-10 NOTE — PROGRESS NOTES
Patient is a 1 to 1 at this time, patient family is at the bedside  Patient is only alert to self, is impulsive and has an unsteady gait   Patient medicated for BP of 180/80 as per patient MAR  Patient is unable to take oral medications at this time R/T patient is not alert enough at this time   Patient treatment for pain and hydration at this time  Electronically signed by Minna Prado RN on 7/10/2024 at 9:40 AM

## 2024-07-10 NOTE — PROGRESS NOTES
Patient will need consent for the Fluoroscopy Guided LP per Specials from patient family  Patient daughter Cassandra [phone] is the point of contact at this time per   For patient to have ordered MRI patient will need his neuro stimulators turned off   Patient is to have a CT of the abdomen pelvic area R/T patient presentation of painful urination   Electronically signed by Minna Prado RN on 7/10/2024 at 6:13 PM

## 2024-07-10 NOTE — CONSULTS
acetaminophen (TYLENOL) suppository 650 mg  650 mg Rectal Q6H PRN Elizabet Roth MD        aspirin chewable tablet 81 mg  81 mg Oral Daily Elizabet Roth MD        [START ON 7/11/2024] azithromycin (ZITHROMAX) 500 mg in 250 mL addavial  500 mg IntraVENous Q24H Elizabet Roth MD        insulin lispro (HUMALOG,ADMELOG) injection vial 0-8 Units  0-8 Units SubCUTAneous TID WC Elizabet Roth MD        insulin lispro (HUMALOG,ADMELOG) injection vial 0-4 Units  0-4 Units SubCUTAneous Nightly Elizabet Roth MD        glucose chewable tablet 16 g  4 tablet Oral PRN Elizabet Roth MD        dextrose bolus 10% 125 mL  125 mL IntraVENous PRN Elizabet Roth MD        Or    dextrose bolus 10% 250 mL  250 mL IntraVENous PRN Elizabet Roth MD        glucagon injection 1 mg  1 mg SubCUTAneous PRN Elizabet Roth MD        dextrose 10 % infusion   IntraVENous Continuous PRN Elizabet Roth MD        carvedilol (COREG) tablet 12.5 mg  12.5 mg Oral BID Denver Montes MD        LORazepam (ATIVAN) injection 1 mg  1 mg IntraVENous Q4H PRN Camilo Michael MD   1 mg at 07/10/24 1008    tamsulosin (FLOMAX) capsule 0.4 mg  0.4 mg Oral Daily Camilo Michael MD         [unfilled]  No Known Allergies     Review of Systems  Confused.  PHYSICAL EXAMINATION:   VITAL SIGNS: BP (!) 188/87   Pulse (!) 103   Temp 97.3 °F (36.3 °C) (Oral)   Resp 20   Ht 1.88 m (6' 2.02\")   Wt 36.6 kg (80 lb 11.8 oz)   SpO2 96%   BMI 10.36 kg/m²         GENERAL: In no acute distress, well- nourished, well- developed,alert and oriented to person place and time.  SKIN: Warm and dry, withoutjaundice, ecchymoses, or petechiae.  HEENT: Normocephalic, sclera anicteric, oral mucosa moist without lesion or exudate in the visible oral cavity or oropharynx, tongue mid-line with good mobility and no deviation with extension.  NODES: No palpable adenopathy in the neck Levels I-V, bilateral   Supraclavicular fossae, axillary chains, or inguinal regions.  LUNGS:  intravenous contrast. Automated exposure control, iterative reconstruction, and/or weight based adjustment of the mA/kV was utilized to reduce the radiation dose to as low as reasonably achievable. COMPARISON: None. HISTORY: ORDERING SYSTEM PROVIDED HISTORY: ALTERED TECHNOLOGIST PROVIDED HISTORY: Reason for exam:->ALTERED Has a \"code stroke\" or \"stroke alert\" been called?->No Decision Support Exception - unselect if not a suspected or confirmed emergency medical condition->Emergency Medical Condition (MA) What reading provider will be dictating this exam?->CRC FINDINGS: BRAIN/VENTRICLES: There is no acute intracranial hemorrhage, mass effect or midline shift.  No abnormal extra-axial fluid collection.  The gray-white differentiation is maintained without evidence of an acute infarct.  There is no evidence of hydrocephalus. ORBITS: The visualized portion of the orbits demonstrate no acute abnormality. SINUSES: The visualized paranasal sinuses and mastoid air cells demonstrate no acute abnormality.  There is a small retention cyst in the left maxillary sinus.  There is mucosal thickening in some of the sphenoid sinus.  There is some associated cortical thickening as well compatible with chronicity. SOFT TISSUES/SKULL:  No acute abnormality of the visualized skull or soft tissues.     No acute intracranial abnormality.     XR CHEST PORTABLE    Result Date: 7/7/2024  EXAMINATION: ONE XRAY VIEW OF THE CHEST 7/7/2024 3:48 pm COMPARISON: 07/13/2016 HISTORY: ORDERING SYSTEM PROVIDED HISTORY: Chest Pain TECHNOLOGIST PROVIDED HISTORY: Reason for exam:->Chest Pain What reading provider will be dictating this exam?->CRC FINDINGS: The lungs are without acute focal process.  There is no effusion or pneumothorax. The cardiomediastinal silhouette is without acute process. The osseous structures are without acute process.     No acute process.     CTA NECK W WO CONTRAST    Result Date: 6/13/2024  EXAMINATION: CTA OF THE NECK

## 2024-07-11 ENCOUNTER — APPOINTMENT (OUTPATIENT)
Dept: MRI IMAGING | Age: 59
DRG: 811 | End: 2024-07-11
Payer: COMMERCIAL

## 2024-07-11 PROBLEM — R30.9 PAINFUL URINATION: Status: ACTIVE | Noted: 2024-07-11

## 2024-07-11 PROBLEM — J18.9 PNEUMONIA OF BOTH LUNGS DUE TO INFECTIOUS ORGANISM: Status: ACTIVE | Noted: 2024-07-11

## 2024-07-11 LAB
ALBUMIN SERPL-MCNC: 3.8 G/DL (ref 3.5–4.6)
ALP SERPL-CCNC: 255 U/L (ref 35–104)
ALT SERPL-CCNC: 43 U/L (ref 0–41)
ANION GAP SERPL CALCULATED.3IONS-SCNC: 14 MEQ/L (ref 9–15)
ANISOCYTOSIS BLD QL SMEAR: ABNORMAL
AST SERPL-CCNC: 30 U/L (ref 0–40)
BASOPHILS # BLD: 0 K/UL (ref 0–0.2)
BASOPHILS NFR BLD: 0.2 %
BILIRUB SERPL-MCNC: 1.4 MG/DL (ref 0.2–0.7)
BUN SERPL-MCNC: 18 MG/DL (ref 6–20)
CALCIUM SERPL-MCNC: 9.2 MG/DL (ref 8.5–9.9)
CARCINOEMBRYONIC ANTIGEN: 7.7 NG/ML (ref 0–3.8)
CHLORIDE SERPL-SCNC: 109 MEQ/L (ref 95–107)
CO2 SERPL-SCNC: 27 MEQ/L (ref 20–31)
CREAT SERPL-MCNC: 0.91 MG/DL (ref 0.7–1.2)
EOSINOPHIL # BLD: 0.3 K/UL (ref 0–0.7)
EOSINOPHIL NFR BLD: 2 %
ERYTHROCYTE [DISTWIDTH] IN BLOOD BY AUTOMATED COUNT: 22.5 % (ref 11.5–14.5)
GLOBULIN SER CALC-MCNC: 3.4 G/DL (ref 2.3–3.5)
GLUCOSE BLD-MCNC: 148 MG/DL (ref 70–99)
GLUCOSE BLD-MCNC: 153 MG/DL (ref 70–99)
GLUCOSE BLD-MCNC: 164 MG/DL (ref 70–99)
GLUCOSE BLD-MCNC: 169 MG/DL (ref 70–99)
GLUCOSE SERPL-MCNC: 142 MG/DL (ref 70–99)
HCT VFR BLD AUTO: 32.8 % (ref 42–52)
HGB BLD-MCNC: 11.5 G/DL (ref 14–18)
LDH SERPL-CCNC: 903 U/L (ref 135–225)
LYMPHOCYTES # BLD: 3.3 K/UL (ref 1–4.8)
LYMPHOCYTES NFR BLD: 22 %
MAGNESIUM SERPL-MCNC: 2.1 MG/DL (ref 1.7–2.4)
MCH RBC QN AUTO: 25.8 PG (ref 27–31.3)
MCHC RBC AUTO-ENTMCNC: 35.1 % (ref 33–37)
MCV RBC AUTO: 73.7 FL (ref 79–92.2)
MICROCYTES BLD QL SMEAR: ABNORMAL
MONOCYTES # BLD: 1 K/UL (ref 0.2–0.8)
MONOCYTES NFR BLD: 7.6 %
NEUTROPHILS # BLD: 8.2 K/UL (ref 1.4–6.5)
NEUTS SEG NFR BLD: 65 %
NRBC BLD-RTO: 6 /100 WBC
PERFORMED ON: ABNORMAL
PLATELET # BLD AUTO: 101 K/UL (ref 130–400)
PLATELET BLD QL SMEAR: ABNORMAL
POIKILOCYTOSIS BLD QL SMEAR: ABNORMAL
POLYCHROMASIA BLD QL SMEAR: ABNORMAL
POTASSIUM SERPL-SCNC: 3.2 MEQ/L (ref 3.4–4.9)
PROCALCITONIN SERPL IA-MCNC: 0.19 NG/ML (ref 0–0.15)
PROT SERPL-MCNC: 7.2 G/DL (ref 6.3–8)
RBC # BLD AUTO: 4.45 M/UL (ref 4.7–6.1)
SODIUM SERPL-SCNC: 150 MEQ/L (ref 135–144)
TARGETS BLD QL SMEAR: ABNORMAL
VARIANT LYMPHS NFR BLD: 4 %
WBC # BLD AUTO: 12.6 K/UL (ref 4.8–10.8)

## 2024-07-11 PROCEDURE — 99222 1ST HOSP IP/OBS MODERATE 55: CPT | Performed by: NURSE PRACTITIONER

## 2024-07-11 PROCEDURE — 84145 PROCALCITONIN (PCT): CPT

## 2024-07-11 PROCEDURE — 6360000002 HC RX W HCPCS: Performed by: INTERNAL MEDICINE

## 2024-07-11 PROCEDURE — 99233 SBSQ HOSP IP/OBS HIGH 50: CPT | Performed by: INTERNAL MEDICINE

## 2024-07-11 PROCEDURE — 85025 COMPLETE CBC W/AUTO DIFF WBC: CPT

## 2024-07-11 PROCEDURE — 2700000000 HC OXYGEN THERAPY PER DAY

## 2024-07-11 PROCEDURE — 36415 COLL VENOUS BLD VENIPUNCTURE: CPT

## 2024-07-11 PROCEDURE — 6360000002 HC RX W HCPCS: Performed by: FAMILY MEDICINE

## 2024-07-11 PROCEDURE — 83735 ASSAY OF MAGNESIUM: CPT

## 2024-07-11 PROCEDURE — 83615 LACTATE (LD) (LDH) ENZYME: CPT

## 2024-07-11 PROCEDURE — 1210000000 HC MED SURG R&B

## 2024-07-11 PROCEDURE — 80053 COMPREHEN METABOLIC PANEL: CPT

## 2024-07-11 PROCEDURE — 2580000003 HC RX 258: Performed by: INTERNAL MEDICINE

## 2024-07-11 PROCEDURE — APPSS30 APP SPLIT SHARED TIME 16-30 MINUTES: Performed by: NURSE PRACTITIONER

## 2024-07-11 PROCEDURE — 99233 SBSQ HOSP IP/OBS HIGH 50: CPT | Performed by: PSYCHIATRY & NEUROLOGY

## 2024-07-11 PROCEDURE — 99221 1ST HOSP IP/OBS SF/LOW 40: CPT | Performed by: PHYSICIAN ASSISTANT

## 2024-07-11 PROCEDURE — 2500000003 HC RX 250 WO HCPCS: Performed by: FAMILY MEDICINE

## 2024-07-11 RX ORDER — MAGNESIUM SULFATE IN WATER 40 MG/ML
2000 INJECTION, SOLUTION INTRAVENOUS ONCE
Status: COMPLETED | OUTPATIENT
Start: 2024-07-11 | End: 2024-07-11

## 2024-07-11 RX ORDER — METOPROLOL TARTRATE 1 MG/ML
5 INJECTION, SOLUTION INTRAVENOUS ONCE
Status: COMPLETED | OUTPATIENT
Start: 2024-07-11 | End: 2024-07-11

## 2024-07-11 RX ADMIN — SODIUM CHLORIDE: 9 INJECTION, SOLUTION INTRAVENOUS at 17:30

## 2024-07-11 RX ADMIN — Medication 1 MG: at 14:20

## 2024-07-11 RX ADMIN — HYDRALAZINE HYDROCHLORIDE 10 MG: 20 INJECTION INTRAMUSCULAR; INTRAVENOUS at 17:57

## 2024-07-11 RX ADMIN — Medication 1 MG: at 20:31

## 2024-07-11 RX ADMIN — METOPROLOL TARTRATE 5 MG: 1 INJECTION, SOLUTION INTRAVENOUS at 12:06

## 2024-07-11 RX ADMIN — Medication 1 MG: at 00:16

## 2024-07-11 RX ADMIN — SODIUM CHLORIDE: 9 INJECTION, SOLUTION INTRAVENOUS at 03:35

## 2024-07-11 RX ADMIN — MORPHINE SULFATE 2 MG: 2 INJECTION, SOLUTION INTRAMUSCULAR; INTRAVENOUS at 21:39

## 2024-07-11 RX ADMIN — SODIUM CHLORIDE, PRESERVATIVE FREE 10 ML: 5 INJECTION INTRAVENOUS at 20:31

## 2024-07-11 RX ADMIN — HYDROMORPHONE HYDROCHLORIDE 0.5 MG: 1 INJECTION, SOLUTION INTRAMUSCULAR; INTRAVENOUS; SUBCUTANEOUS at 06:25

## 2024-07-11 RX ADMIN — AZITHROMYCIN MONOHYDRATE 500 MG: 500 INJECTION, POWDER, LYOPHILIZED, FOR SOLUTION INTRAVENOUS at 01:50

## 2024-07-11 RX ADMIN — MORPHINE SULFATE 2 MG: 2 INJECTION, SOLUTION INTRAMUSCULAR; INTRAVENOUS at 17:03

## 2024-07-11 RX ADMIN — SODIUM CHLORIDE: 9 INJECTION, SOLUTION INTRAVENOUS at 10:34

## 2024-07-11 RX ADMIN — HYDRALAZINE HYDROCHLORIDE 10 MG: 20 INJECTION INTRAMUSCULAR; INTRAVENOUS at 09:39

## 2024-07-11 RX ADMIN — HYDROMORPHONE HYDROCHLORIDE 0.5 MG: 1 INJECTION, SOLUTION INTRAMUSCULAR; INTRAVENOUS; SUBCUTANEOUS at 18:50

## 2024-07-11 RX ADMIN — HYDROMORPHONE HYDROCHLORIDE 0.5 MG: 1 INJECTION, SOLUTION INTRAMUSCULAR; INTRAVENOUS; SUBCUTANEOUS at 02:10

## 2024-07-11 RX ADMIN — MAGNESIUM SULFATE HEPTAHYDRATE 2000 MG: 40 INJECTION, SOLUTION INTRAVENOUS at 17:32

## 2024-07-11 RX ADMIN — HYDROMORPHONE HYDROCHLORIDE 0.5 MG: 1 INJECTION, SOLUTION INTRAMUSCULAR; INTRAVENOUS; SUBCUTANEOUS at 12:06

## 2024-07-11 RX ADMIN — MORPHINE SULFATE 2 MG: 2 INJECTION, SOLUTION INTRAMUSCULAR; INTRAVENOUS at 09:55

## 2024-07-11 RX ADMIN — Medication 1 MG: at 10:32

## 2024-07-11 RX ADMIN — Medication 1 MG: at 04:46

## 2024-07-11 ASSESSMENT — PAIN SCALES - GENERAL
PAINLEVEL_OUTOF10: 6
PAINLEVEL_OUTOF10: 4
PAINLEVEL_OUTOF10: 4
PAINLEVEL_OUTOF10: 8

## 2024-07-11 ASSESSMENT — PAIN SCALES - WONG BAKER: WONGBAKER_NUMERICALRESPONSE: HURTS WORST

## 2024-07-11 NOTE — PROGRESS NOTES
Physical Therapy Missed Treatment   Facility/Department: [facility] MED SURG W268/W268-01    NAME: Mateo Marsh    : 1965 (58 y.o.)  MRN: [id number]    Account: [id number]  Gender: male    Chart reviewed, attempted PT at 1045. Patient unavailable 2° to:    [x] Hold per nsg request: pt very confused, not following commands and impulsive when he does wake up, which is usually to urinate.  Will check back in PM and nursing agreed.      Will attempt PT treatment again at earliest convenience.      Electronically signed by Sammie Trinidad PTA on 24 at 10:45 AM EDT

## 2024-07-11 NOTE — CONSULTS
Urology Consult      Mateo Marsh  1965  [id number]    Date of Admission:  7/9/2024  8:13 PM  Date of Consultation:  7/11/2024    Consultant: Rj Michelle PA-C  SupervisingPhysician: Dr. Baxter  PCP:  Rey Pang MD       Reason for Consultation: pain with urination      C/C:   Chief Complaint   Patient presents with    Sickle Cell Pain Crisis     Patient states pain all over from sickle cell .       History of Present Illness: Benign Prostatic Hypertrophy  Patient complains of lower urinary tract symptoms. He reports  pain with urination . He denies weak stream. Patient states symptoms are of moderate severity. Onset of symptoms was several years ago and was unknown in onset. His AUA Symptom Score is, /  . He has no personal history of prostate cancer. He reports a history of no complicating symptoms. He denies flank pain, gross hematuria, kidney stones, and recurrent UTI.    Allergies:No Known Allergies    PMH: Patient has a past medical history of Bronchitis, CAD (coronary artery disease), Chronic back pain, Diabetes (HCC), Dyslipidemia, Hyperlipidemia, Hypertension, Neuropathy in diabetes ([facility]), Osteoarthritis, Pneumonia, Sickle cell anemia ([facility]), and Sleep apnea, obstructive.    PSH: Patient has a past surgical history that includes lumbar fusion (2008); Vagus nerve stimulator insertion (2013); Colonoscopy (03/14/2014); Upper gastrointestinal endoscopy (03/14/2014); Bladder surgery (07/10/2015); other surgical history (Right); Endoscopy, colon, diagnostic; back surgery (2008); Total hip arthroplasty (Left, 01/23/2020); and Stimulator Surgery (N/A, 4/25/2023).    Social History: Patient reports that he has been smoking cigarettes. He started smoking about 39 years ago. He has a 39.5 pack-year smoking history. He has never used smokeless tobacco. He reports that he does not drink alcohol and does not use drugs.    Family History: Patientsfamily history includes Anemia in his mother; COPD in his  brother and sister; Cancer in his mother; Diabetes in his sister; Emphysema in his sister; Heart Disease in his brother; High Blood Pressure in his sister; No Known Problems in his daughter; Other in his father.    ROS:  Review of Systems   Unable to perform ROS: Acuity of condition       Current Meds: [Held by provider] piperacillin-tazobactam (ZOSYN) 3,375 mg in sodium chloride 0.9 % 50 mL IVPB (mini-bag), Q8H  sodium chloride flush 0.9 % injection 5-40 mL, 2 times per day  sodium chloride flush 0.9 % injection 5-40 mL, PRN  0.9 % sodium chloride infusion, PRN  ondansetron (ZOFRAN) injection 4 mg, Q6H PRN  nitroGLYCERIN (NITROSTAT) SL tablet 0.4 mg, Q5 Min PRN  0.9 % sodium chloride infusion, Continuous  morphine (PF) injection 2 mg, Q4H PRN  HYDROmorphone (DILAUDID) injection 0.5 mg, Q4H PRN  hydrALAZINE (APRESOLINE) injection 10 mg, Q4H PRN  sodium chloride flush 0.9 % injection 5-40 mL, 2 times per day  sodium chloride flush 0.9 % injection 5-40 mL, PRN  0.9 % sodium chloride infusion, PRN  potassium chloride (KLOR-CON M) extended release tablet 40 mEq, PRN   Or  potassium bicarb-citric acid (EFFER-K) effervescent tablet 40 mEq, PRN   Or  potassium chloride 10 mEq/100 mL IVPB (Peripheral Line), PRN  magnesium sulfate 2000 mg in 50 mL IVPB premix, PRN  [Held by provider] enoxaparin (LOVENOX) injection 40 mg, Daily  melatonin tablet 3 mg, Nightly PRN  polyethylene glycol (GLYCOLAX) packet 17 g, Daily PRN  acetaminophen (TYLENOL) tablet 650 mg, Q6H PRN   Or  acetaminophen (TYLENOL) suppository 650 mg, Q6H PRN  aspirin chewable tablet 81 mg, Daily  azithromycin (ZITHROMAX) 500 mg in 250 mL addavial, Q24H  insulin lispro (HUMALOG,ADMELOG) injection vial 0-8 Units, TID WC  insulin lispro (HUMALOG,ADMELOG) injection vial 0-4 Units, Nightly  glucose chewable tablet 16 g, PRN  dextrose bolus 10% 125 mL, PRN   Or  dextrose bolus 10% 250 mL, PRN  glucagon injection 1 mg, PRN  dextrose 10 % infusion, Continuous

## 2024-07-11 NOTE — PROGRESS NOTES
Hospitalist Daily Progress Note  Name: Mateo Marsh  Age: 58 y.o.  Gender: male  CodeStatus: Full Code  Allergies: No Known Allergies    Chief Complaint:Sickle Cell Pain Crisis (Patient states pain all over from sickle cell .)      Primary Care Provider: Rey Pang MD    InpatientTreatment Team: Treatment Team: Attending Provider: Camilo Michael MD; Consulting Physician: Satya Sanchez MD; Consulting Physician: Hussein Strong MD; Consulting Physician: Denver Montes MD; Consulting Physician: Miles Baxter MD; Physician Assistant: Rj Michelle PA; Consulting Physician: Edgar Olson MD; Consulting Physician: Ruslan Almodovar MD; Registered Nurse: Zeny James, BIANKA; : Joselyn Johns, RN; Utilization Reviewer: Shandra Leal, RN; Registered Nurse: Hortensia Conrad RN    Admission Date: 7/9/2024      Subjective: Obtunded, occasional attempts to urinate and awakens with pain/distress.  Girlfriend and daughter at bedside.    Physical Exam  Vitals and nursing note reviewed.   Constitutional:       Comments: Confused when alert, obtunded otherwise   Cardiovascular:      Rate and Rhythm: Regular rhythm. Tachycardia present.   Pulmonary:      Effort: Pulmonary effort is normal.      Breath sounds: Normal breath sounds.   Abdominal:      General: Bowel sounds are normal.      Palpations: Abdomen is soft.   Musculoskeletal:         General: Normal range of motion.   Skin:     General: Skin is warm and dry.   Neurological:      Comments: Confused, obtunded through my exam except when attempting to urinate.           Medications:  Reviewed    Infusion Medications:    sodium chloride      sodium chloride 150 mL/hr at 07/11/24 0335    sodium chloride      dextrose       Scheduled Medications:    piperacillin-tazobactam  3,375 mg IntraVENous Q8H    sodium chloride flush  5-40 mL IntraVENous 2 times per day    sodium chloride flush  5-40 mL IntraVENous 2 times per day    [Held by provider]

## 2024-07-11 NOTE — PROGRESS NOTES
PROGRESS NOTE    Patient Name: Mateo Marsh  Admit Date: 2024  8:13 PM  MR #: [id number]  : 1965    Attending Physician: Camilo Michael MD  Reason for consult: CP    History of Presenting Illness:      Mateo Marsh is a 58 y.o. male on hospital day 2 with a history of .   History Obtained From:  patient, electronic medical record    Admitted with confusion severe generalized pain to include CP and SOB. Daughter is in room.     Pt is confused and does not recognize me.     He was hypoxic in ER and placed on 6L O2. He presented w dehydration.   He is is Sickle cell crisis sand Parainfluenza infection.     HS Troponin 12 >25>24    ECG SR 85 NSST Changes and PVC    He has recent ABN Stress. Cath not done as insurance denied.  Currently in another precert for this,.     24 Tele SR 97 pt remains very confused. Not following directions. Moving around in bed. Restless. 1:1 sitter in room for pt safety.     History:      EKG:  Past Medical History:   Diagnosis Date    Bronchitis     CAD (coronary artery disease)     past angioplasty > 10 yrs ago    Chronic back pain     Diabetes (HCC)     dx > 2 yrs    Dyslipidemia     Hyperlipidemia     meds > 5 yrs    Hypertension     meds > 5 yrs    Neuropathy in diabetes (HCC)     Osteoarthritis     Pneumonia     Sickle cell anemia (HCC)     Sleep apnea, obstructive      Past Surgical History:   Procedure Laterality Date    BACK SURGERY      Lumbar disc OR.    BLADDER SURGERY  07/10/2015    Patient states he was urininating blood and had a procedure done.    COLONOSCOPY  2014    [facility]    ENDOSCOPY, COLON, DIAGNOSTIC      LUMBAR FUSION      OTHER SURGICAL HISTORY Right     tendon repair in (R) forearm because of previous injury as child    STIMULATOR SURGERY N/A 2023    SPINAL STIMULATOR REPLACEMENT performed by Navneet Lamb MD at [facility]    TOTAL HIP ARTHROPLASTY Left 2020    LEFT HIP TOTAL HIP ARTHROPLASTY, RICARDO performed by Andrew GONZALES

## 2024-07-11 NOTE — PROGRESS NOTES
1330 Pt's neurotransmitter  placed on pt per request of MRI tech.    1440 Pt unable to complete MRI d/t constant movement despite Ativan being given. Hortensia LATHAM NP informed.

## 2024-07-11 NOTE — CARE COORDINATION
LSW SPOKE WITH THE PT'S GIRLFRIEND OF 17 YEARS TO CONFIRM HIS DAUGHTER'S INFORMATION.  DAUGHTER IS FRANCHESCA AND HER PHONE NUMBER [phone].  SHE IS THE DECISION MAKER AT THIS TIME.  PT'S BROTHER WAS HERE AND WAS ANGRY THAT FRANCHESCA IS THE DECISION MAKER.  LISA CONFIRMED THAT THERE ARE A LOT OF FAMILY DYNAMICS.      CALL PLACED TO RISK MANAGEMENT (REBECCA BEEMER [phone])

## 2024-07-11 NOTE — PROGRESS NOTES
This nurse aproached Scarlet Marsh (daughter) and the significant other @ 8152 and inquired about the other daughter's information. Scarlet Marsh stated that the other daughter's name was Shandra Sims from [place], [place] and that she has only faced time her and did not have her number, but will get it to this nurse. A few minutes later the significant other approached this nurse with a number for Shandra Sism, which was [phone]. The daughter Scarlet Marsh, shortly afterwards approached this nurse to verify the number for her sibling Shandra as [phone]. This information was forwarded to Shandra Alba, .

## 2024-07-11 NOTE — PLAN OF CARE
Problem: Discharge Planning  Goal: Discharge to home or other facility with appropriate resources  7/11/2024 1054 by Zeny James RN  Outcome: Progressing  7/11/2024 0004 by Fatoumata Hodge RN  Outcome: Progressing     Problem: Pain  Goal: Verbalizes/displays adequate comfort level or baseline comfort level  7/11/2024 1054 by Zeny James RN  Outcome: Progressing  7/11/2024 0004 by Fatoumata Hodge RN  Outcome: Progressing     Problem: Safety - Adult  Goal: Free from fall injury  7/11/2024 1054 by Zeny James RN  Outcome: Progressing  7/11/2024 0004 by Fatoumata Hodge RN  Outcome: Progressing     Problem: Confusion  Goal: Confusion, delirium, dementia, or psychosis is improved or at baseline  Description: INTERVENTIONS:  1. Assess for possible contributors to thought disturbance, including medications, impaired vision or hearing, underlying metabolic abnormalities, dehydration, psychiatric diagnoses, and notify attending LIP  2. [place] high risk fall precautions, as indicated  3. Provide frequent short contacts to provide reality reorientation, refocusing and direction  4. Decrease environmental stimuli, including noise as appropriate  5. Monitor and intervene to maintain adequate nutrition, hydration, elimination, sleep and activity  6. If unable to ensure safety without constant attention obtain sitter and review sitter guidelines with assigned personnel  7. Initiate Psychosocial CNS and Spiritual Care consult, as indicated  7/11/2024 1054 by Zeny James RN  Outcome: Progressing  7/11/2024 0004 by Fatoumata Hodge RN  Outcome: Progressing  Flowsheets (Taken 7/10/2024 2010)  Effect of thought disturbance (confusion, delirium, dementia, or psychosis) are managed with adequate functional status: Assess for contributors to thought disturbance, including medications, impaired vision or hearing, underlying metabolic abnormalities, dehydration, psychiatric diagnoses, notify LIP     Problem:

## 2024-07-11 NOTE — PROGRESS NOTES
Hematology/Oncology   Progress Note        CHIEF COMPLAINT/HPI:  Follow up of sickle cell pain crisis. Probably triggered by the pneumonia. Ct scan of abdomen and pelvis showed bone sclerosis consistent with bone infarcts. On antibiotics, hydration and analgesics. Adding magnesium infusion.     REVIEW OF SYSTEMS:    Unremarkable except for symptoms mentioned in HPI.    Current Inpatient Medications:    Current Facility-Administered Medications   Medication Dose Route Frequency Provider Last Rate Last Admin    [Held by provider] piperacillin-tazobactam (ZOSYN) 3,375 mg in sodium chloride 0.9 % 50 mL IVPB (mini-bag)  3,375 mg IntraVENous Q8H Camilo Michael MD        magnesium sulfate 2000 mg in 50 mL IVPB premix  2,000 mg IntraVENous Once Litam, Satya VILLANUEVA MD        sodium chloride flush 0.9 % injection 5-40 mL  5-40 mL IntraVENous 2 times per day Denver Montes MD   10 mL at 07/10/24 2020    sodium chloride flush 0.9 % injection 5-40 mL  5-40 mL IntraVENous PRN Denver Montes MD        0.9 % sodium chloride infusion   IntraVENous PRN Denver Montes MD        ondansetron (ZOFRAN) injection 4 mg  4 mg IntraVENous Q6H PRN Denver Montes MD        nitroGLYCERIN (NITROSTAT) SL tablet 0.4 mg  0.4 mg SubLINGual Q5 Min PRN Denver Montes MD        0.9 % sodium chloride infusion   IntraVENous Continuous Elizabet Roth  mL/hr at 07/11/24 1034 New Bag at 07/11/24 1034    morphine (PF) injection 2 mg  2 mg IntraVENous Q4H PRN Elizabet Roth MD   2 mg at 07/11/24 0955    HYDROmorphone (DILAUDID) injection 0.5 mg  0.5 mg IntraVENous Q4H PRN Elizabet Roth MD   0.5 mg at 07/11/24 1206    hydrALAZINE (APRESOLINE) injection 10 mg  10 mg IntraVENous Q4H PRN Elizabet Roth MD   10 mg at 07/11/24 0939    sodium chloride flush 0.9 % injection 5-40 mL  5-40 mL IntraVENous 2 times per day Elizabet Roth MD   10 mL at 07/10/24 2009    sodium chloride flush 0.9 % injection 5-40 mL  5-40 mL IntraVENous PRN Elizabet Roth MD

## 2024-07-11 NOTE — FLOWSHEET NOTE
0418 spoke with Hortensia ESCOBAR RN and pts daughter and informed the LP procedure will be tomorrow at 10 am.  Spoke with MRI staff, informed pt will be receiving conscious sedation for the LP in case they would like to re scan pt.

## 2024-07-11 NOTE — PLAN OF CARE
Problem: Discharge Planning  Goal: Discharge to home or other facility with appropriate resources  Outcome: Progressing     Problem: Pain  Goal: Verbalizes/displays adequate comfort level or baseline comfort level  Outcome: Progressing     Problem: Safety - Adult  Goal: Free from fall injury  Outcome: Progressing     Problem: Confusion  Goal: Confusion, delirium, dementia, or psychosis is improved or at baseline  Description: INTERVENTIONS:  1. Assess for possible contributors to thought disturbance, including medications, impaired vision or hearing, underlying metabolic abnormalities, dehydration, psychiatric diagnoses, and notify attending LIP  2. [place] high risk fall precautions, as indicated  3. Provide frequent short contacts to provide reality reorientation, refocusing and direction  4. Decrease environmental stimuli, including noise as appropriate  5. Monitor and intervene to maintain adequate nutrition, hydration, elimination, sleep and activity  6. If unable to ensure safety without constant attention obtain sitter and review sitter guidelines with assigned personnel  7. Initiate Psychosocial CNS and Spiritual Care consult, as indicated  Outcome: Progressing     Problem: ABCDS Injury Assessment  Goal: Absence of physical injury  Outcome: Progressing     Problem: Chronic Conditions and Co-morbidities  Goal: Patient's chronic conditions and co-morbidity symptoms are monitored and maintained or improved  Outcome: Progressing     Problem: Occupational Therapy - Adult  Goal: By Discharge: Performs self-care activities at highest level of function for planned discharge setting.  See evaluation for individualized goals.  7/10/2024 1301 by Mitzy Quintana, OTR/L  Outcome: Progressing     Problem: Physical Therapy - Adult  Goal: By Discharge: Performs mobility at highest level of function for planned discharge setting.  See evaluation for individualized goals.  7/10/2024 1346 by Michelle Gomez, PT  Outcome:

## 2024-07-11 NOTE — CONSULTS
Drug use: No    Sexual activity: Not on file   Other Topics Concern    Not on file   Social History Narrative    Not on file     Social Determinants of Health     Financial Resource Strain: Not on file   Food Insecurity: No Food Insecurity (7/10/2024)    Hunger Vital Sign     Worried About Running Out of Food in the Last Year: Never true     Ran Out of Food in the Last Year: Never true   Transportation Needs: No Transportation Needs (7/10/2024)    PRAPARE - Transportation     Lack of Transportation (Medical): No     Lack of Transportation (Non-Medical): No   Physical Activity: Not on file   Stress: Not on file   Social Connections: Not on file   Intimate Partner Violence: Not on file   Housing Stability: Low Risk  (7/10/2024)    Housing Stability Vital Sign     Unable to Pay for Housing in the Last Year: No     Number of Places Lived in the Last Year: 1     Unstable Housing in the Last Year: No        Review of Systems:    Review of Systems   Unable to perform ROS: Mental status change       Objective:   Vitals: BP (!) 190/95   Pulse 94   Temp 99.3 °F (37.4 °C) (Axillary)   Resp 18   Ht 1.88 m (6' 2.02\")   Wt 36.6 kg (80 lb 11.8 oz)   SpO2 95%   BMI 10.36 kg/m²      Physical Examination:      Physical Exam  Constitutional:       General: He is not in acute distress.     Appearance: Normal appearance. He is normal weight. He is not ill-appearing.   HENT:      Head: Normocephalic and atraumatic.      Nose: Nose normal.      Mouth/Throat:      Mouth: Mucous membranes are moist.   Eyes:      General: No scleral icterus.     Conjunctiva/sclera: Conjunctivae normal.   Cardiovascular:      Rate and Rhythm: Normal rate and regular rhythm.      Pulses: Normal pulses.      Heart sounds: Normal heart sounds.   Pulmonary:      Effort: Pulmonary effort is normal. No respiratory distress.      Breath sounds: Normal breath sounds.   Abdominal:      General: Bowel sounds are normal.      Palpations: Abdomen is soft.  pneumonia.  2. Extensive sclerosis throughout the lumbar vertebral bodies, sacrum, and  the inferior thoracic vertebral bodies.  Additional patchy sclerosis is seen  throughout the iliac wings and right hip.  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  3. Moderately enlarged prostate.  Bladder not distended.  4. Small fat containing right inguinal hernia.  5. Moderate right hydrocele.        ASSESSMENT:  58-year-old male admitted with acute respiratory failure with hypoxia, sickle cell crisis, altered mental status, chest pain, and dehydration.   CTA without evidence of pulmonary embolism, however patchy areas of groundglass bibasilar infiltrates noted.  CT head negative. MRI brain pending.      PLAN:   --Will plan for lumbar puncture this afternoon or tomorrow morning (7/12/2024).    --Hold baby Asprin and lovenox in anticipation of lumbar puncture.    Thank you for allowing us to participate in the care of this patient:   Contact Interventional Radiology Clinic with and questions or concerns.

## 2024-07-11 NOTE — PROGRESS NOTES
INPATIENT PROGRESS NOTES    PATIENT NAME: Mateo Marsh  MRN: [id number]  SERVICE DATE:  July 11, 2024   SERVICE TIME:  2:53 PM      PRIMARY SERVICE: Pulmonary Disease    CHIEF COMPLAIN: Sickle cell pain crisis and encephalopathy      INTERVAL HPI: Patient seen and examined at bedside, Interval Notes, orders reviewed. Nursing notes noted  Patient had CT of the abdomen pelvis shows bone sclerosis consistent with infarction.  He has parainfluenza infection with possible pneumonia.  He has Tmax 99.3.  He is currently on analgesic, antibiotic and IV hydration.  He is going for MRI and lumbar puncture.         OBJECTIVE   I/O:24HR INTAKE/OUTPUT:    Intake/Output Summary (Last 24 hours) at 7/11/2024 1453  Last data filed at 7/11/2024 1140  Gross per 24 hour   Intake --   Output 5 ml   Net -5 ml     07/10 0701 - 07/11 0700  In: -   Out: 1300 [Urine:1300]  Body mass index is 24.87 kg/m².    PHYSICAL EXAM:  Vitals:  BP (!) 174/87   Pulse 93   Temp 99.3 °F (37.4 °C) (Axillary)   Resp 16   Ht 1.88 m (6' 2.02\")   Wt 87.9 kg (193 lb 12.8 oz)   SpO2 93%   BMI 24.87 kg/m²     General: Very sleepy.No distress.  Head: Atraumatic , Normocephalic   Eyes: PERRL. No sclera icterus. No conjunctival injection. No discharge   ENT: No nasal  discharge. Pharynx clear.  Neck:  Trachea midline. No thyromegaly, no JVD, No cervical adenopathy.  Chest : Bilaterally symmetrical ,Normal effort,  No accessory muscle use  Lung : Diminished breath sound bilaterally No Rales. No wheezing. No rhonchi.   Heart:: Normal rate. Regular rhythm. No mumur ,  Rub or gallop  ABD: Non-tender. Non-distended. No masses. No organmegaly. Normal bowel sounds. No hernia.  Ext : No Pitting both leg , No Cyanosis No clubbing  Neuro: no focal weakness    Labs:  Recent Labs     07/09/24  2030 07/10/24  0300 07/10/24  1213 07/11/24  0506   WBC 13.2* 14.1*  --  12.6*   HGB 12.1* 12.1* 12.1* 11.5*   * 116*  --  101*     Recent Labs     07/09/24  2030 07/10/24  0306

## 2024-07-12 ENCOUNTER — APPOINTMENT (OUTPATIENT)
Dept: MRI IMAGING | Age: 59
DRG: 811 | End: 2024-07-12
Payer: COMMERCIAL

## 2024-07-12 ENCOUNTER — ANESTHESIA EVENT (OUTPATIENT)
Dept: MRI IMAGING | Age: 59
End: 2024-07-12
Payer: COMMERCIAL

## 2024-07-12 ENCOUNTER — HOSPITAL ENCOUNTER (INPATIENT)
Dept: INTERVENTIONAL RADIOLOGY/VASCULAR | Age: 59
Discharge: HOME | DRG: 811 | End: 2024-07-14
Payer: COMMERCIAL

## 2024-07-12 ENCOUNTER — ANESTHESIA (OUTPATIENT)
Dept: MRI IMAGING | Age: 59
End: 2024-07-12
Payer: COMMERCIAL

## 2024-07-12 VITALS
HEART RATE: 99 BPM | RESPIRATION RATE: 22 BRPM | DIASTOLIC BLOOD PRESSURE: 126 MMHG | OXYGEN SATURATION: 97 % | SYSTOLIC BLOOD PRESSURE: 208 MMHG

## 2024-07-12 LAB
ALBUMIN SERPL-MCNC: 3.9 G/DL (ref 3.5–4.6)
ALP SERPL-CCNC: 253 U/L (ref 35–104)
ALT SERPL-CCNC: 40 U/L (ref 0–41)
ANION GAP SERPL CALCULATED.3IONS-SCNC: 13 MEQ/L (ref 9–15)
APPEARANCE CSF: CLEAR
AST SERPL-CCNC: 40 U/L (ref 0–40)
BASOPHILS # BLD: 0 K/UL (ref 0–0.2)
BASOPHILS NFR BLD: 0.3 %
BILIRUB SERPL-MCNC: 2.7 MG/DL (ref 0.2–0.7)
BUN SERPL-MCNC: 21 MG/DL (ref 6–20)
CALCIUM SERPL-MCNC: 9.7 MG/DL (ref 8.5–9.9)
CHLORIDE SERPL-SCNC: 112 MEQ/L (ref 95–107)
CLOT EVALUATION CSF: NORMAL
CO2 SERPL-SCNC: 25 MEQ/L (ref 20–31)
COLOR CSF: NORMAL
CREAT SERPL-MCNC: 0.95 MG/DL (ref 0.7–1.2)
EOSINOPHIL # BLD: 0.1 K/UL (ref 0–0.7)
EOSINOPHIL NFR BLD: 0.9 %
ERYTHROCYTE [DISTWIDTH] IN BLOOD BY AUTOMATED COUNT: 22.9 % (ref 11.5–14.5)
GLOBULIN SER CALC-MCNC: 3.5 G/DL (ref 2.3–3.5)
GLUCOSE BLD-MCNC: 143 MG/DL (ref 70–99)
GLUCOSE BLD-MCNC: 147 MG/DL (ref 70–99)
GLUCOSE BLD-MCNC: 159 MG/DL (ref 70–99)
GLUCOSE BLD-MCNC: 166 MG/DL (ref 70–99)
GLUCOSE CSF-MCNC: 94 MG/DL (ref 50–70)
GLUCOSE SERPL-MCNC: 138 MG/DL (ref 70–99)
HCT VFR BLD AUTO: 33.9 % (ref 42–52)
HGB BLD-MCNC: 11.7 G/DL (ref 14–18)
LYMPHOCYTES # BLD: 2 K/UL (ref 1–4.8)
LYMPHOCYTES NFR BLD: 18.6 %
MAGNESIUM SERPL-MCNC: 2.4 MG/DL (ref 1.7–2.4)
MCH RBC QN AUTO: 25.8 PG (ref 27–31.3)
MCHC RBC AUTO-ENTMCNC: 34.5 % (ref 33–37)
MCV RBC AUTO: 74.7 FL (ref 79–92.2)
MONOCYTES # BLD: 1.3 K/UL (ref 0.2–0.8)
MONOCYTES NFR BLD: 12.3 %
NEUTROPHILS # BLD: 7.2 K/UL (ref 1.4–6.5)
NEUTS SEG NFR BLD: 66.4 %
NO DIFFERENTIAL CSF: NORMAL
PATH CONSULT CSF: YES
PERFORMED ON: ABNORMAL
PLATELET # BLD AUTO: 92 K/UL (ref 130–400)
POTASSIUM SERPL-SCNC: 3.1 MEQ/L (ref 3.4–4.9)
PROT CSF-MCNC: 58 MG/DL (ref 15–45)
PROT SERPL-MCNC: 7.4 G/DL (ref 6.3–8)
RBC # BLD AUTO: 4.54 M/UL (ref 4.7–6.1)
RBC CSF: <2000 K/UL
SODIUM SERPL-SCNC: 150 MEQ/L (ref 135–144)
TUBE NUMBER CSF: NORMAL
VOLUME CSF: 12 ML
WBC # BLD AUTO: 10.8 K/UL (ref 4.8–10.8)
WBC CSF: 7 K/UL (ref 0–8)

## 2024-07-12 PROCEDURE — 7100000011 HC PHASE II RECOVERY - ADDTL 15 MIN

## 2024-07-12 PROCEDURE — 2580000003 HC RX 258: Performed by: NURSE PRACTITIONER

## 2024-07-12 PROCEDURE — 99231 SBSQ HOSP IP/OBS SF/LOW 25: CPT | Performed by: PHYSICIAN ASSISTANT

## 2024-07-12 PROCEDURE — 3700000000 HC ANESTHESIA ATTENDED CARE

## 2024-07-12 PROCEDURE — 86696 HERPES SIMPLEX TYPE 2 TEST: CPT

## 2024-07-12 PROCEDURE — 87205 SMEAR GRAM STAIN: CPT

## 2024-07-12 PROCEDURE — 86789 WEST NILE VIRUS ANTIBODY: CPT

## 2024-07-12 PROCEDURE — 83735 ASSAY OF MAGNESIUM: CPT

## 2024-07-12 PROCEDURE — 2000000000 HC ICU R&B

## 2024-07-12 PROCEDURE — 99233 SBSQ HOSP IP/OBS HIGH 50: CPT | Performed by: PSYCHIATRY & NEUROLOGY

## 2024-07-12 PROCEDURE — 80053 COMPREHEN METABOLIC PANEL: CPT

## 2024-07-12 PROCEDURE — 6360000002 HC RX W HCPCS: Performed by: PSYCHIATRY & NEUROLOGY

## 2024-07-12 PROCEDURE — 89050 BODY FLUID CELL COUNT: CPT

## 2024-07-12 PROCEDURE — 2580000003 HC RX 258: Performed by: FAMILY MEDICINE

## 2024-07-12 PROCEDURE — 2500000003 HC RX 250 WO HCPCS: Performed by: NURSE PRACTITIONER

## 2024-07-12 PROCEDURE — 2580000003 HC RX 258: Performed by: INTERNAL MEDICINE

## 2024-07-12 PROCEDURE — 86592 SYPHILIS TEST NON-TREP QUAL: CPT

## 2024-07-12 PROCEDURE — 6360000002 HC RX W HCPCS: Performed by: FAMILY MEDICINE

## 2024-07-12 PROCEDURE — 2580000003 HC RX 258: Performed by: PSYCHIATRY & NEUROLOGY

## 2024-07-12 PROCEDURE — 86694 HERPES SIMPLEX NES ANTBDY: CPT

## 2024-07-12 PROCEDURE — 2500000003 HC RX 250 WO HCPCS: Performed by: RADIOLOGY

## 2024-07-12 PROCEDURE — 2500000003 HC RX 250 WO HCPCS: Performed by: PSYCHIATRY & NEUROLOGY

## 2024-07-12 PROCEDURE — 86788 WEST NILE VIRUS AB IGM: CPT

## 2024-07-12 PROCEDURE — 86765 RUBEOLA ANTIBODY: CPT

## 2024-07-12 PROCEDURE — 7100000010 HC PHASE II RECOVERY - FIRST 15 MIN

## 2024-07-12 PROCEDURE — 6360000002 HC RX W HCPCS: Performed by: RADIOLOGY

## 2024-07-12 PROCEDURE — A9579 GAD-BASE MR CONTRAST NOS,1ML: HCPCS | Performed by: NURSE PRACTITIONER

## 2024-07-12 PROCEDURE — 85025 COMPLETE CBC W/AUTO DIFF WBC: CPT

## 2024-07-12 PROCEDURE — 86735 MUMPS ANTIBODY: CPT

## 2024-07-12 PROCEDURE — 84157 ASSAY OF PROTEIN OTHER: CPT

## 2024-07-12 PROCEDURE — 70553 MRI BRAIN STEM W/O & W/DYE: CPT

## 2024-07-12 PROCEDURE — 009U3ZX DRAINAGE OF SPINAL CANAL, PERCUTANEOUS APPROACH, DIAGNOSTIC: ICD-10-PCS | Performed by: INTERNAL MEDICINE

## 2024-07-12 PROCEDURE — 87070 CULTURE OTHR SPECIMN AEROBIC: CPT

## 2024-07-12 PROCEDURE — 86695 HERPES SIMPLEX TYPE 1 TEST: CPT

## 2024-07-12 PROCEDURE — 6360000002 HC RX W HCPCS: Performed by: INTERNAL MEDICINE

## 2024-07-12 PROCEDURE — 6360000004 HC RX CONTRAST MEDICATION: Performed by: NURSE PRACTITIONER

## 2024-07-12 PROCEDURE — 36415 COLL VENOUS BLD VENIPUNCTURE: CPT

## 2024-07-12 PROCEDURE — 86787 VARICELLA-ZOSTER ANTIBODY: CPT

## 2024-07-12 PROCEDURE — 82945 GLUCOSE OTHER FLUID: CPT

## 2024-07-12 PROCEDURE — 62328 DX LMBR SPI PNXR W/FLUOR/CT: CPT

## 2024-07-12 PROCEDURE — 3700000001 HC ADD 15 MINUTES (ANESTHESIA)

## 2024-07-12 PROCEDURE — 62328 DX LMBR SPI PNXR W/FLUOR/CT: CPT | Performed by: RADIOLOGY

## 2024-07-12 PROCEDURE — 99233 SBSQ HOSP IP/OBS HIGH 50: CPT | Performed by: INTERNAL MEDICINE

## 2024-07-12 RX ORDER — FENTANYL CITRATE 0.05 MG/ML
INJECTION, SOLUTION INTRAMUSCULAR; INTRAVENOUS PRN
Status: COMPLETED | OUTPATIENT
Start: 2024-07-12 | End: 2024-07-12

## 2024-07-12 RX ORDER — PROPOFOL 10 MG/ML
INJECTION, EMULSION INTRAVENOUS PRN
Status: DISCONTINUED | OUTPATIENT
Start: 2024-07-12 | End: 2024-07-12 | Stop reason: SDUPTHER

## 2024-07-12 RX ORDER — LIDOCAINE HYDROCHLORIDE 20 MG/ML
INJECTION, SOLUTION INFILTRATION; PERINEURAL PRN
Status: COMPLETED | OUTPATIENT
Start: 2024-07-12 | End: 2024-07-12

## 2024-07-12 RX ORDER — MIDAZOLAM HYDROCHLORIDE 2 MG/2ML
INJECTION, SOLUTION INTRAMUSCULAR; INTRAVENOUS PRN
Status: COMPLETED | OUTPATIENT
Start: 2024-07-12 | End: 2024-07-12

## 2024-07-12 RX ORDER — HYDROMORPHONE HYDROCHLORIDE 1 MG/ML
1 INJECTION, SOLUTION INTRAMUSCULAR; INTRAVENOUS; SUBCUTANEOUS
Status: DISCONTINUED | OUTPATIENT
Start: 2024-07-12 | End: 2024-07-18 | Stop reason: HOSPADM

## 2024-07-12 RX ADMIN — LIDOCAINE HYDROCHLORIDE 3 ML: 20 INJECTION, SOLUTION INFILTRATION; PERINEURAL at 10:21

## 2024-07-12 RX ADMIN — SODIUM CHLORIDE: 9 INJECTION, SOLUTION INTRAVENOUS at 15:00

## 2024-07-12 RX ADMIN — POTASSIUM CHLORIDE 10 MEQ: 7.46 INJECTION, SOLUTION INTRAVENOUS at 08:39

## 2024-07-12 RX ADMIN — PIPERACILLIN AND TAZOBACTAM 3375 MG: 3; .375 INJECTION, POWDER, LYOPHILIZED, FOR SOLUTION INTRAVENOUS at 17:50

## 2024-07-12 RX ADMIN — SODIUM CHLORIDE 5 MG/HR: 9 INJECTION, SOLUTION INTRAVENOUS at 17:48

## 2024-07-12 RX ADMIN — PROPOFOL 50 MG: 10 INJECTION, EMULSION INTRAVENOUS at 15:38

## 2024-07-12 RX ADMIN — SODIUM CHLORIDE 12.5 MG/HR: 9 INJECTION, SOLUTION INTRAVENOUS at 21:04

## 2024-07-12 RX ADMIN — Medication 1 MG: at 01:55

## 2024-07-12 RX ADMIN — SODIUM CHLORIDE, PRESERVATIVE FREE 10 ML: 5 INJECTION INTRAVENOUS at 22:43

## 2024-07-12 RX ADMIN — MIDAZOLAM HYDROCHLORIDE 1 MG: 1 INJECTION, SOLUTION INTRAMUSCULAR; INTRAVENOUS at 10:15

## 2024-07-12 RX ADMIN — AZITHROMYCIN MONOHYDRATE 500 MG: 500 INJECTION, POWDER, LYOPHILIZED, FOR SOLUTION INTRAVENOUS at 01:44

## 2024-07-12 RX ADMIN — HYDROMORPHONE HYDROCHLORIDE 1 MG: 1 INJECTION, SOLUTION INTRAMUSCULAR; INTRAVENOUS; SUBCUTANEOUS at 17:24

## 2024-07-12 RX ADMIN — HYDROMORPHONE HYDROCHLORIDE 0.5 MG: 1 INJECTION, SOLUTION INTRAMUSCULAR; INTRAVENOUS; SUBCUTANEOUS at 11:09

## 2024-07-12 RX ADMIN — HYDRALAZINE HYDROCHLORIDE 10 MG: 20 INJECTION INTRAMUSCULAR; INTRAVENOUS at 05:33

## 2024-07-12 RX ADMIN — MORPHINE SULFATE 2 MG: 2 INJECTION, SOLUTION INTRAMUSCULAR; INTRAVENOUS at 03:56

## 2024-07-12 RX ADMIN — HYDRALAZINE HYDROCHLORIDE 10 MG: 20 INJECTION INTRAMUSCULAR; INTRAVENOUS at 11:23

## 2024-07-12 RX ADMIN — Medication 1 MG: at 20:04

## 2024-07-12 RX ADMIN — PROPOFOL 100 MCG/KG/MIN: 10 INJECTION, EMULSION INTRAVENOUS at 15:39

## 2024-07-12 RX ADMIN — POTASSIUM CHLORIDE 10 MEQ: 7.46 INJECTION, SOLUTION INTRAVENOUS at 11:15

## 2024-07-12 RX ADMIN — VALPROATE SODIUM 1000 MG: 100 INJECTION, SOLUTION INTRAVENOUS at 17:42

## 2024-07-12 RX ADMIN — SODIUM CHLORIDE: 9 INJECTION, SOLUTION INTRAVENOUS at 22:30

## 2024-07-12 RX ADMIN — VALPROATE SODIUM 500 MG: 100 INJECTION, SOLUTION INTRAVENOUS at 22:42

## 2024-07-12 RX ADMIN — HYDROMORPHONE HYDROCHLORIDE 0.5 MG: 1 INJECTION, SOLUTION INTRAMUSCULAR; INTRAVENOUS; SUBCUTANEOUS at 00:48

## 2024-07-12 RX ADMIN — MORPHINE SULFATE 2 MG: 2 INJECTION, SOLUTION INTRAMUSCULAR; INTRAVENOUS at 12:32

## 2024-07-12 RX ADMIN — HYDROMORPHONE HYDROCHLORIDE 1 MG: 1 INJECTION, SOLUTION INTRAMUSCULAR; INTRAVENOUS; SUBCUTANEOUS at 20:01

## 2024-07-12 RX ADMIN — POTASSIUM CHLORIDE 10 MEQ: 7.46 INJECTION, SOLUTION INTRAVENOUS at 06:44

## 2024-07-12 RX ADMIN — HYDRALAZINE HYDROCHLORIDE 10 MG: 20 INJECTION INTRAMUSCULAR; INTRAVENOUS at 18:04

## 2024-07-12 RX ADMIN — SODIUM CHLORIDE 15 MG/HR: 9 INJECTION, SOLUTION INTRAVENOUS at 21:31

## 2024-07-12 RX ADMIN — SODIUM CHLORIDE: 9 INJECTION, SOLUTION INTRAVENOUS at 08:40

## 2024-07-12 RX ADMIN — HYDROMORPHONE HYDROCHLORIDE 0.5 MG: 1 INJECTION, SOLUTION INTRAMUSCULAR; INTRAVENOUS; SUBCUTANEOUS at 05:18

## 2024-07-12 RX ADMIN — FENTANYL CITRATE 100 MCG: 50 INJECTION INTRAMUSCULAR; INTRAVENOUS at 10:15

## 2024-07-12 RX ADMIN — SODIUM CHLORIDE 15 MG/HR: 9 INJECTION, SOLUTION INTRAVENOUS at 23:34

## 2024-07-12 RX ADMIN — MORPHINE SULFATE 2 MG: 2 INJECTION, SOLUTION INTRAMUSCULAR; INTRAVENOUS at 08:32

## 2024-07-12 RX ADMIN — POTASSIUM CHLORIDE 10 MEQ: 7.46 INJECTION, SOLUTION INTRAVENOUS at 12:19

## 2024-07-12 RX ADMIN — POTASSIUM CHLORIDE 10 MEQ: 7.46 INJECTION, SOLUTION INTRAVENOUS at 13:34

## 2024-07-12 RX ADMIN — GADOTERIDOL 15 ML: 279.3 INJECTION, SOLUTION INTRAVENOUS at 16:03

## 2024-07-12 RX ADMIN — DEXMEDETOMIDINE HYDROCHLORIDE 0.2 MCG/KG/HR: 100 INJECTION, SOLUTION, CONCENTRATE INTRAVENOUS at 23:40

## 2024-07-12 ASSESSMENT — PAIN SCALES - GENERAL
PAINLEVEL_OUTOF10: 9
PAINLEVEL_OUTOF10: 9
PAINLEVEL_OUTOF10: 10
PAINLEVEL_OUTOF10: 0
PAINLEVEL_OUTOF10: 10
PAINLEVEL_OUTOF10: 8
PAINLEVEL_OUTOF10: 10
PAINLEVEL_OUTOF10: 10

## 2024-07-12 ASSESSMENT — LIFESTYLE VARIABLES: SMOKING_STATUS: 1

## 2024-07-12 ASSESSMENT — PAIN DESCRIPTION - LOCATION
LOCATION: GENERALIZED
LOCATION: GENERALIZED

## 2024-07-12 ASSESSMENT — PAIN DESCRIPTION - FREQUENCY: FREQUENCY: CONTINUOUS

## 2024-07-12 NOTE — PROGRESS NOTES
Physical Therapy Missed Treatment   Facility/Department: [facility] MED SURG W268/W268-01    NAME: Mateo Marsh    : 1965 (58 y.o.)  MRN: [id number]    Account: [id number]  Gender: male    Chart reviewed, attempted PT at 0900. Patient unavailable 2° to:    [x] Hold per nsg request.  Pt too confused and not following any commands.  Pt going for a lumbar puncture later this AM.  Nursing requested to hold all day.     Will attempt PT treatment again at earliest convenience.      Electronically signed by Sammie Trinidad PTA on 24 at 9:10 AM EDT

## 2024-07-12 NOTE — PRE SEDATION
Sedation Pre-Procedure Note    Patient Name: Mateo Marsh   YOB: 1965  Room/Bed: Room/bed info not found  Medical Record Number: [id number]  Date: 7/12/2024   Time: 10:15 AM       Indication:  LP    Consent: I have discussed with the patient and/or the patient representative the indication, alternatives, and the possible risks and/or complications of the planned procedure and the anesthesia methods. The patient and/or patient representative appear to understand and agree to proceed.    Vital Signs:   There were no vitals filed for this visit.    Past Medical History:   has a past medical history of Bronchitis, CAD (coronary artery disease), Chronic back pain, Diabetes (HCC), Dyslipidemia, Hyperlipidemia, Hypertension, Neuropathy in diabetes (HCC), Osteoarthritis, Pneumonia, Sickle cell anemia (HCC), and Sleep apnea, obstructive.    Past Surgical History:   has a past surgical history that includes lumbar fusion (2008); Vagus nerve stimulator insertion (2013); Colonoscopy (03/14/2014); Upper gastrointestinal endoscopy (03/14/2014); Bladder surgery (07/10/2015); other surgical history (Right); Endoscopy, colon, diagnostic; back surgery (2008); Total hip arthroplasty (Left, 01/23/2020); and Stimulator Surgery (N/A, 4/25/2023).    Medications:   Scheduled Meds:   Continuous Infusions:   PRN Meds:   Home Meds:   Prior to Admission medications    Medication Sig Start Date End Date Taking? Authorizing Provider   naloxone 4 MG/0.1ML LIQD nasal spray 1 spray by Nasal route as needed for Opioid Reversal 6/13/24   Elma Merlos APRN - CNP   pregabalin (LYRICA) 150 MG capsule Take 1 capsule by mouth 3 times daily for 30 days. 6/15/24 7/15/24  Elma Merlos APRN - CNP   HYDROcodone-acetaminophen (NORCO)  MG per tablet Take 1 tablet by mouth every 8-12 hours as needed for Pain for up to 30 days. Do not fill until 6/15/24 - #80 tabs for 30 days Max Daily Amount: 3 tablets 6/15/24 7/15/24  Elma Merlos,

## 2024-07-12 NOTE — ANESTHESIA POSTPROCEDURE EVALUATION
Department of Anesthesiology  Postprocedure Note    Patient: Mateo Marsh  MRN: [id number]  YOB: 1965  Date of evaluation: 7/12/2024    Procedure Summary       Date: 07/12/24 Room / Location: [facility]    Anesthesia Start: 1530 Anesthesia Stop: 1626    Procedure: MRI BRAIN W WO CONTRAST Diagnosis: (Persistent encephalopathy, hypoxia, uncontrolled hypertension (urgency), rule out [place] ES/ischemic event)    Scheduled Providers:  Responsible Provider: Camilo Carmona MD    Anesthesia Type: MAC ASA Status: 4 - Emergent            Anesthesia Type: MAC    Ivan Phase I: Ivan Score: 8    Ivan Phase II:      Anesthesia Post Evaluation    Patient location during evaluation: PACU  Patient participation: waiting for patient participation  Level of consciousness: responsive to verbal stimuli and confused  Pain score: 0  Airway patency: patent  Nausea & Vomiting: no nausea and no vomiting  Cardiovascular status: blood pressure returned to baseline and hypotensive  Respiratory status: acceptable  Hydration status: stable  Pain management: adequate        No notable events documented.

## 2024-07-12 NOTE — CARE COORDINATION
ANTONINO SPOKE WITH THE PT'S DAUGHTER ALEXANDER WHO LIVES 3 1/2 HOURS AWAY.  SHE IS AWARE OF THE FAMILY DRAMA AND SHE WOULD LIKE TO BE INCLUDED IN THE DECISION MAKING FOR HER FATHER.  ANTONINO ASSURED HER THAT WE WOULD BE KEEPING HER INFORMED AND THAT SHE AND FRANCHESCA WOULD BE MAKING DECISIONS TOGETHER AS WE ARE FOLLOWING THE LAW.  PT IS TO HAVE AN LP TODAY AND AN MRI IF HE CAN TOLERATE.  MIRELLAW TO FOLLOW.

## 2024-07-12 NOTE — OR NURSING
SEDATION    Lumbar Puncture procedure explained.  Consent confirmed.  VSS. Pt alert, non-cooperative, rolling around on cart/table.    Patient positioned on left side on IR table with pillows and staff for support.  Imaging done by IR tech using fluoroscopy to visualize lumbar spine.    1013 Time out complete    1015 Sedation given by BIANKA CABRALES, see eMAR.    1017 Site marked by Dr. Olson, then procedure site prepped with Betadine and draped with sterile drape and towels.     1021 Lumbar spine site then numbed with lidocaine 2% by Dr Olson, spinal needle placed into the central canal of the spinal cord using fluoroscopic guidance.  Fluid draining appears pink tinged, then clear.      Sample of fluid obtained and placed into 4 separate specimen tubes.  Each tube labeled with one through four.  Total CSF removed 13ml.  Band-aid applied.  Patient tolerated well and assisted onto cart for recovery period.  Post procedure instructions telling patient and nurse (and sitter) to keep the head of bed no higher than 30 degrees x 4 hours.    Pt remains restless, attempting to roll around on cart. Uncooperative with verbal direction. BIANKA Ortiz, spoke with MRI - pt not cooperative enough at this time for MRI.  Report called to BIANKA Bowden by Diana CARLTON.  Fluid taken to lab for testing as ordered.

## 2024-07-12 NOTE — ANESTHESIA PRE PROCEDURE
Department of Anesthesiology  Preprocedure Note       Name:  Mateo Marsh   Age:  58 y.o.  :  1965                                          MRN:  [id number]         Date:  2024      Surgeon: * Surgery not found *    Procedure:     Medications prior to admission:   Prior to Admission medications    Medication Sig Start Date End Date Taking? Authorizing Provider   naloxone 4 MG/0.1ML LIQD nasal spray 1 spray by Nasal route as needed for Opioid Reversal 24   Elma Merlos APRN - CNP   pregabalin (LYRICA) 150 MG capsule Take 1 capsule by mouth 3 times daily for 30 days. 6/15/24 7/15/24  Elma Merlos APRN - CNP   HYDROcodone-acetaminophen (NORCO)  MG per tablet Take 1 tablet by mouth every 8-12 hours as needed for Pain for up to 30 days. Do not fill until 6/15/24 - #80 tabs for 30 days Max Daily Amount: 3 tablets 6/15/24 7/15/24  Elma Merlos APRN - CNP   metoprolol succinate (TOPROL XL) 50 MG extended release tablet Take 1 tablet by mouth daily 6/3/24   Denver Montes MD   DULoxetine (CYMBALTA) 60 MG extended release capsule Take 1 capsule by mouth daily 24  Elma Merlos APRN - CNP   baclofen (LIORESAL) 10 MG tablet Take 1 tablet by mouth 2 times daily 24  Elma Merlos APRN - CNP   aspirin 81 MG EC tablet Take 1 tablet by mouth daily 23  Denver Montes MD   predniSONE (DELTASONE) 20 MG tablet Take 1 tablet by mouth daily 9/10/23   Adebayo Blackwood MD   promethazine-dextromethorphan (PROMETHAZINE-DM) 6.25-15 MG/5ML syrup take 5 milliliters by mouth every 6 hours if needed 9/10/23   Adebayo Blackwood MD   cefUROXime (CEFTIN) 500 MG tablet Take 1 tablet by mouth 2 times daily 9/10/23   Adebayo Blackwood MD   Handicap Placard MISC by Does not apply route Good through 25. Has difficulty walking distance without rest 22   Elma Merlos, APRN - CNP   nicotine (NICODERM CQ) 21 MG/24HR Place 1 patch onto the skin daily

## 2024-07-12 NOTE — PROGRESS NOTES
Subjective:      Patient ID: Mateo Marsh is a 58 y.o. male    HPI 58 year old male who is having pain with urination    Past Medical History:   Diagnosis Date    Bronchitis     CAD (coronary artery disease)     past angioplasty > 10 yrs ago    Chronic back pain     Diabetes (HCC)     dx > 2 yrs    Dyslipidemia     Hyperlipidemia     meds > 5 yrs    Hypertension     meds > 5 yrs    Neuropathy in diabetes (HCC)     Osteoarthritis     Pneumonia     Sickle cell anemia (HCC)     Sleep apnea, obstructive      Past Surgical History:   Procedure Laterality Date    BACK SURGERY  2008    Lumbar disc OR.    BLADDER SURGERY  07/10/2015    Patient states he was urininating blood and had a procedure done.    COLONOSCOPY  03/14/2014    [facility]    ENDOSCOPY, COLON, DIAGNOSTIC      LUMBAR FUSION  2008    LUMBAR PUNCTURE N/A 07/12/2024    Diagnostic, performed by Dr. Olson    OTHER SURGICAL HISTORY Right     tendon repair in (R) forearm because of previous injury as child    STIMULATOR SURGERY N/A 04/25/2023    SPINAL STIMULATOR REPLAC[place] [facility] [place]    TOTAL HIP ARTHROPLASTY Left 01/23/2020    LEFT HIP TOTAL HIP ARTHROPLASTY, RICARDO [place] [facility] [place]    UPPER GASTROINTESTINAL ENDOSCOPY  03/14/2014    [facility]    VAGAL NERVE STIMULATION  2013    Dr. 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   Vaping Use    Vaping Use: Former

## 2024-07-12 NOTE — FLOWSHEET NOTE
1000- Pt down to specials for LP    1100- Pt back to floor from specials. Unable to complete MRI due to increased restlessness shortly after sedation from lumbar puncture. Pt restless in bed. Medicated with dilaudid 0.5mg IV. Repeat blood pressure 198/103. Pt medicated with IV apresoline. [employer] message sent to Dr. Michael regarding patient's ongoing hypertension, restlessness and inability to complete MRI. [employer] message sent to Hortensia Houston regarding incomplete MRI.     1230- Pt remains restless in the bed. Moaning at times. Urinating frequently. Appears to have more pain when urinating. Medicated with morphine 2mg IV.     1300- Dr. Michael up to see patient. Hortensia Houston also to bedside.     1400- Arrangements made for MRI with anesthesia to be done today. Daughter Scarlet and girlfriend April at beside notified of plan.    1500- Pt down to MRI.

## 2024-07-12 NOTE — FLOWSHEET NOTE
Morning assessment completed. Pt restless in bed. Appears to be in pain. Medicated with 2 mg morphine IV. Second bag of IV potassium replacement up. Pt not answering questions verbally. Only moaning. Not following many commands. Did help with turning to change bedding secondary to incontinent episode of urine. Dr. Montes in to see patient. Says this is far from patient's baseline. Notified Dr. Montes of high BP. Attempted to repeat blood pressure but unable due to patient's movements. Report called to specials.

## 2024-07-12 NOTE — PLAN OF CARE
Problem: Discharge Planning  Goal: Discharge to home or other facility with appropriate resources  Outcome: Progressing     Problem: Pain  Goal: Verbalizes/displays adequate comfort level or baseline comfort level  Outcome: Progressing     Problem: Safety - Adult  Goal: Free from fall injury  Outcome: Progressing     Problem: Confusion  Goal: Confusion, delirium, dementia, or psychosis is improved or at baseline  Description: INTERVENTIONS:  1. Assess for possible contributors to thought disturbance, including medications, impaired vision or hearing, underlying metabolic abnormalities, dehydration, psychiatric diagnoses, and notify attending LIP  2. [place] high risk fall precautions, as indicated  3. Provide frequent short contacts to provide reality reorientation, refocusing and direction  4. Decrease environmental stimuli, including noise as appropriate  5. Monitor and intervene to maintain adequate nutrition, hydration, elimination, sleep and activity  6. If unable to ensure safety without constant attention obtain sitter and review sitter guidelines with assigned personnel  7. Initiate Psychosocial CNS and Spiritual Care consult, as indicated  Outcome: Progressing     Problem: ABCDS Injury Assessment  Goal: Absence of physical injury  Outcome: Progressing     Problem: Chronic Conditions and Co-morbidities  Goal: Patient's chronic conditions and co-morbidity symptoms are monitored and maintained or improved  Outcome: Progressing     Problem: Skin/Tissue Integrity  Goal: Absence of new skin breakdown  Description: 1.  Monitor for areas of redness and/or skin breakdown  2.  Assess vascular access sites hourly  3.  Every 4-6 hours minimum:  Change oxygen saturation probe site  4.  Every 4-6 hours:  If on nasal continuous positive airway pressure, respiratory therapy assess nares and determine need for appliance change or resting period.  Outcome: Progressing     Problem: Neurosensory - Adult  Goal: Achieves  stable or improved neurological status  Outcome: Progressing  Goal: Achieves maximal functionality and self care  Outcome: Progressing     Problem: Genitourinary - Adult  Goal: Absence of urinary retention  Outcome: Progressing     Problem: Infection - Adult  Goal: Absence of infection at discharge  Outcome: Progressing  Goal: Absence of infection during hospitalization  Outcome: Progressing  Goal: Absence of fever/infection during anticipated neutropenic period  Outcome: Progressing

## 2024-07-12 NOTE — PROGRESS NOTES
[facility] Neurology Daily Progress Note  Name: Mateo Marsh  Age: 58 y.o.  Gender: male  CodeStatus: Full Code  Allergies: No Known Allergies    Chief Complaint:Sickle Cell Pain Crisis (Patient states pain all over from sickle cell .)    Primary Care Provider: Rey Pang MD  InpatientTreatment Team: Treatment Team: Attending Provider: Camilo Michael MD; Consulting Physician: Satya Sanchez MD; Consulting Physician: Hussein Strong MD; Consulting Physician: Denver Montes MD; Consulting Physician: Miles Baxter MD; Physician Assistant: Rj Michelle PA; Consulting Physician: Edgar Olson MD; Consulting Physician: Ruslan Almodovar MD; LPN: Whitley Zuluaga LPN; Registered Nurse: Dennise Colvin RN; : Sammie Erickson; Utilization Reviewer: Hortensia Quezada RN  Admission Date: 7/9/2024      HPI   Pt seen and examined for neurology follow-up.  Patient is lethargic.  Will briefly awaken to tactile stimuli but falls back asleep.  Confused.  No seizure activity reported.  Moves all extremities spontaneously.  Significant other at bedside.  Afebrile.  Patient is hypertensive with blood pressure of 195/79.  Lumbar puncture completed today.    Patient seen and examined and very agitated.  Very confused not opening eyes  Vitals:    07/12/24 1245   BP: (!) 195/79   Pulse: (!) 101   Resp:    Temp:    SpO2: 96%        Review of Systems   Unable to perform ROS: Mental status change   Constitutional:  Negative for fever.   HENT:  Negative for hearing loss.    Respiratory:  Negative for cough and wheezing.    Cardiovascular:  Negative for leg swelling.   Gastrointestinal:  Negative for vomiting.   Skin:  Negative for color change and rash.   Neurological:  Positive for speech difficulty and weakness (eneralized). Negative for tremors, seizures, syncope and facial asymmetry.   Psychiatric/Behavioral:  Positive for confusion. Negative for agitation and hallucinations. The patient is  although unsure if full-blown sickle cell disease.  Will await further input from hematology.  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  MRI of the brain pending  Lumbar puncture pending  Patient being followed by pulmonology, hematology oncology, cardiology.  EEG completed with report pending    I have personally performed a face to face diagnostic evaluation on this patient, reviewed all data and investigations, and am the sole provider of all clinical decisions on the neurological status of this patient.  Significant complicated case with patient having significant pain upon urinating.  Urology on consult.  Patient is now followed by heme at oncology and appears to be discrepancy regarding him being sickle cell trait versus full-blown sickle cell disease.  According to his significant other he was only told that he had sickle cell disease.  MRI reviewed.  Patient's CT showed extensive lesions which could be infarctions and may only be seen with sickle cell disease and not traits.  MRI of the brain is pending lumbar puncture pending.  Multiple consultant on the case and notes reviewed.  60% time spent on evaluating patient with a clinical time of 50-minute      7/12/2024:  Acute encephalopathy in the setting of acute hypoxic respiratory failure, parainfluenza, bilateral lower lobe pneumonia, hypernatremia, thrombocytopenia, sickle cell crisis.  Encephalopathy persists.  Patient is lethargic.  Lumbar puncture completed today with 7 WBCs, glucose of 97 and protein of 58 with HSV encephalitis panel pending  MRI attempted x 2 but patient unable to complete due to restlessness.  Will order under anesthesia given patient's ongoing lethargy and uncontrolled hypertension to rule out PRES versus acute infarct versus acute

## 2024-07-12 NOTE — ANESTHESIA PRE PROCEDURE
Department of Anesthesiology  Preprocedure Note       Name:  Mateo Marsh   Age:  58 y.o.  :  1965                                          MRN:  [id number]         Date:  2024      Surgeon: * Surgery not found *    Procedure:     Medications prior to admission:   Prior to Admission medications    Medication Sig Start Date End Date Taking? Authorizing Provider   naloxone 4 MG/0.1ML LIQD nasal spray 1 spray by Nasal route as needed for Opioid Reversal 24   Elma Merlos APRN - CNP   pregabalin (LYRICA) 150 MG capsule Take 1 capsule by mouth 3 times daily for 30 days. 6/15/24 7/15/24  Elma Merlos APRN - CNP   HYDROcodone-acetaminophen (NORCO)  MG per tablet Take 1 tablet by mouth every 8-12 hours as needed for Pain for up to 30 days. Do not fill until 6/15/24 - #80 tabs for 30 days Max Daily Amount: 3 tablets 6/15/24 7/15/24  Elma Merlos APRN - CNP   metoprolol succinate (TOPROL XL) 50 MG extended release tablet Take 1 tablet by mouth daily 6/3/24   Denver Montes MD   DULoxetine (CYMBALTA) 60 MG extended release capsule Take 1 capsule by mouth daily 24  Elma Merlos APRN - CNP   baclofen (LIORESAL) 10 MG tablet Take 1 tablet by mouth 2 times daily 24  Elma Merlos APRN - CNP   aspirin 81 MG EC tablet Take 1 tablet by mouth daily 23  Denver Montes MD   predniSONE (DELTASONE) 20 MG tablet Take 1 tablet by mouth daily 9/10/23   Adebayo Blackwood MD   promethazine-dextromethorphan (PROMETHAZINE-DM) 6.25-15 MG/5ML syrup take 5 milliliters by mouth every 6 hours if needed 9/10/23   Adebayo Blackwood MD   cefUROXime (CEFTIN) 500 MG tablet Take 1 tablet by mouth 2 times daily 9/10/23   Adebayo Blackwood MD   Handicap Placard MISC by Does not apply route Good through 25. Has difficulty walking distance without rest 22   Elma Merlos, APRN - CNP   nicotine (NICODERM CQ) 21 MG/24HR Place 1 patch onto the skin

## 2024-07-12 NOTE — PROGRESS NOTES
[facility]  Occupational Therapy        NAME:  Mateo Marsh  ROOM: W268/W268-01  :  1965  DATE: 2024    Attempted to see Mateo Marsh at 1009 on this date for:   []  Initial Evaluation   []  Treatment       Patient was unable to be seen due to:   [x] Off unit for testing/procedure - Lumbar Puncture   [] Patient refused, stating \"    [] Therapy on hold due to     [] Nursing deferred due to    [] Other:      Electronically signed by ELI Maldonado on 24 at 10:09 AM EDT

## 2024-07-12 NOTE — PROGRESS NOTES
07/13/18    XR CHEST STANDARD (2 VW)    Narrative  Chest X-ray, 2 views    Clinical information:  Cough    Comparison:  CT chest, August 6, 2014, chest radiograph August 5, 2014    Findings    Heart: Cardiopericardial silhouette is normal    Lungs: Scarring left lung base unchanged.    Mediastinum:  Without anomaly    Bones: No stones or layering wires identified overlying thoracic spine with tip approximately T7 level.    Impression    No acute cardiopulmonary disease       Portable: Results for orders placed during the hospital encounter of 07/07/24    XR CHEST PORTABLE    Narrative  EXAMINATION:  ONE XRAY VIEW OF THE CHEST    7/7/2024 3:48 pm    COMPARISON:  07/13/2016    HISTORY:  ORDERING SYSTEM PROVIDED HISTORY: Chest Pain  TECHNOLOGIST PROVIDED HISTORY:  Reason for exam:->Chest Pain  What reading provider will be dictating this exam?->CRC    FINDINGS:  The lungs are without acute focal process.  There is no effusion or  pneumothorax. The cardiomediastinal silhouette is without acute process. The  osseous structures are without acute process.    Impression  No acute process.      Echo No results found for this or any previous visit.            Assessment/Plan:    Active Hospital Problems    Diagnosis Date Noted    Painful urination [R30.9] 07/11/2024    Pneumonia of both lungs due to infectious organism [J18.9] 07/11/2024    Hypoxemia [R09.02] 07/10/2024    Parainfluenza infection [B34.8] 07/10/2024    Encephalopathy [G93.40] 07/10/2024    Sickle cell disease with crisis (HCC) [D57.00] 07/09/2024     Acute respiratory failure with hypoxia-shortness of breath and hypoxia on presentation currently on 6 L O2.  Found to have parainfluenza.  Will check procalcitonin.  Continue azithromycin for now.  Patient is clinically very dehydrated.  IV fluids.  If needed, will consult pulmonology  7/11 - resolved.  Sickle cell crisis-pain all over his body.  Also complaining of chest pain.  Very dehydrated.  IV fluids and  pain control.  Hematology consult.  7/11 - not likely contributing to current state, pain control as needed.  Altered mental status-patient is very confused and oriented only x 2.  He did not know how to swallow.  No other neurological symptoms.  Most likely due to severe dehydration.  Will consult neurology  7/11 - LP and MRI pending in am.  7/12 - LP reveals no bacterial infection, MRI pending.  Check RPR  Chest pain-history of CAD with recent stress test showing possible ischemia.  Plan for cath but patient did not follow-up.  Will consult cardiology.  Cycle troponin.  Repeat EKG.  7/11 - ct chest shows possible pneumonia however not consistent with patient history per girlfriend, hold additional abx until LP.  7/12 - starting abx today  Dehydration-very dehydrated.  Worsening his sickle cell symptoms.  Will continue IV fluids and monitor  Diabetes-insulin sliding scale.  Verify home medication  Hypertension-resume home medication.  Hydralazine as needed.     Code Status: Full  DVT prophylaxis: Lovenox    Electronically signed by ELISABETH JUAREZ MD on 7/12/2024 at 9:22 AM

## 2024-07-12 NOTE — CODE DOCUMENTATION
Rapid response called.  Dr. Pearson ordered 20 mg labatolol  Given from Code box #4 (no replacement available due to national shortage)    GOSIA Leonard.Ph.  7/12/2024  7:29 PM

## 2024-07-12 NOTE — PROGRESS NOTES
07/13/2016 HISTORY: ORDERING SYSTEM PROVIDED HISTORY: Chest Pain TECHNOLOGIST PROVIDED HISTORY: Reason for exam:->Chest Pain What reading provider will be dictating this exam?->CRC FINDINGS: The lungs are without acute focal process.  There is no effusion or pneumothorax. The cardiomediastinal silhouette is without acute process. The osseous structures are without acute process.     No acute process.     CTA NECK W WO CONTRAST    Result Date: 6/13/2024  EXAMINATION: CTA OF THE NECK 6/13/2024 7:54 am TECHNIQUE: CTA of the neck was performed with the administration of intravenous contrast. Multiplanar reformatted images are provided for review.  MIP images are provided for review. Stenosis of the internal carotid arteries measured using NASCET criteria. Automated exposure control, iterative reconstruction, and/or weight based adjustment of the mA/kV was utilized to reduce the radiation dose to as low as reasonably achievable. COMPARISON: None. HISTORY: ORDERING SYSTEM PROVIDED HISTORY: Bilateral carotid artery stenosis TECHNOLOGIST PROVIDED HISTORY: Additional Contrast?->Radiologist Recommendation STAT Creatinine as needed:->No Reason for exam:->carotid stensois What reading provider will be dictating this exam?->CRC FINDINGS: There is stenosis involving proximal left carotid artery of approximately the 60%.  There is approximately the 50% stenosis at level of the right carotid bulb.  No significant stenosis involving right internal carotid artery. Calcified and noncalcified atherosclerotic plaque is associated with the carotid bulbs and proximal cervical internal carotid arteries.  Common carotid arteries are patent without evidence of stenosis.  No evidence of stenosis involving the vertebral arteries.     1. Stenosis of approximately 60% involving proximal left internal carotid artery. 2. Stenosis of approximately 50% at level of the right carotid bulb. 3. No obvious stenosis involving the vertebral arteries.        Active Hospital Problems    Diagnosis Date Noted    Painful urination [R30.9] 07/11/2024     Priority: Low    Pneumonia of both lungs due to infectious organism [J18.9] 07/11/2024     Priority: Low    Hypoxemia [R09.02] 07/10/2024     Priority: Low    Parainfluenza infection [B34.8] 07/10/2024     Priority: Low    Encephalopathy [G93.40] 07/10/2024     Priority: Low    Sickle cell disease with crisis (HCC) [D57.00] 07/09/2024     Priority: Low         Impression/Plan:   Sickle cell Trait per Hematology- iv Fluid. Pain control  MS Changes - for LP today at 10  HypoK - replace iv.   HTN - not to goal -likely related to pain.  Demand ischemia and known abn stress - ASA BB Statin.  We will plan cath once he improves.  HPL - Statin (on hold due to Liver)  Moderate B/L Carotid DZ by recent CTA  LVEF 60-65%     Thank you for allowing us to participate in the care of this patient.     Will continue to follow.    Please call if questions or concerns arise.    Electronically signed by JOY JACK MD on 7/12/2024 at 7:46 AM

## 2024-07-12 NOTE — PROGRESS NOTES
Patient arrived to ICU after having a sedated MRI. Patient very restless in bed not alert and oriented, does not answer questions but grimacing and rocking back and forth like he is in pain 10/10. This was witnessed by maximus mccollum rn and dalton rollins rn- dilaudid was given per dr order. Patient started on a cardene drip. 2 new peripheral IV's started and cardene drip started right away for bp of 235/110. Patients daughter and girlfriend at bedside. Patient now resting in bed    1900- RR called patients 's 's/100's with being on cardene drip and hydralazine pish given. Dr begum came to RR and labetalol was given. Family addressed to dr begum she wants to initiate move to [facility]- perfect serve sent to dr hines since dr weaver not available on perfect serve.

## 2024-07-13 ENCOUNTER — APPOINTMENT (OUTPATIENT)
Dept: GENERAL RADIOLOGY | Age: 59
DRG: 811 | End: 2024-07-13
Payer: COMMERCIAL

## 2024-07-13 LAB
ALBUMIN SERPL-MCNC: 3.1 G/DL (ref 3.5–4.6)
ALP SERPL-CCNC: 186 U/L (ref 35–104)
ALT SERPL-CCNC: 35 U/L (ref 0–41)
ANION GAP SERPL CALCULATED.3IONS-SCNC: 12 MEQ/L (ref 9–15)
AST SERPL-CCNC: 38 U/L (ref 0–40)
BASOPHILS # BLD: 0 K/UL (ref 0–0.2)
BASOPHILS NFR BLD: 0.2 %
BILIRUB SERPL-MCNC: 1.7 MG/DL (ref 0.2–0.7)
BUN SERPL-MCNC: 27 MG/DL (ref 6–20)
CALCIUM SERPL-MCNC: 8.5 MG/DL (ref 8.5–9.9)
CHLORIDE SERPL-SCNC: 119 MEQ/L (ref 95–107)
CO2 SERPL-SCNC: 21 MEQ/L (ref 20–31)
CREAT SERPL-MCNC: 1.2 MG/DL (ref 0.7–1.2)
EOSINOPHIL # BLD: 0.3 K/UL (ref 0–0.7)
EOSINOPHIL NFR BLD: 3.7 %
ERYTHROCYTE [DISTWIDTH] IN BLOOD BY AUTOMATED COUNT: 22.1 % (ref 11.5–14.5)
GLOBULIN SER CALC-MCNC: 2.8 G/DL (ref 2.3–3.5)
GLUCOSE BLD-MCNC: 161 MG/DL (ref 70–99)
GLUCOSE BLD-MCNC: 190 MG/DL (ref 70–99)
GLUCOSE BLD-MCNC: 269 MG/DL (ref 70–99)
GLUCOSE SERPL-MCNC: 146 MG/DL (ref 70–99)
HCT VFR BLD AUTO: 28 % (ref 42–52)
HGB BLD-MCNC: 9.6 G/DL (ref 14–18)
LYMPHOCYTES # BLD: 1.9 K/UL (ref 1–4.8)
LYMPHOCYTES NFR BLD: 23 %
MAGNESIUM SERPL-MCNC: 2.1 MG/DL (ref 1.7–2.4)
MCH RBC QN AUTO: 25.6 PG (ref 27–31.3)
MCHC RBC AUTO-ENTMCNC: 34.3 % (ref 33–37)
MCV RBC AUTO: 74.7 FL (ref 79–92.2)
MONOCYTES # BLD: 1 K/UL (ref 0.2–0.8)
MONOCYTES NFR BLD: 12.5 %
NEUTROPHILS # BLD: 4.7 K/UL (ref 1.4–6.5)
NEUTS SEG NFR BLD: 59.1 %
PERFORMED ON: ABNORMAL
PLATELET # BLD AUTO: 82 K/UL (ref 130–400)
POTASSIUM SERPL-SCNC: 3.4 MEQ/L (ref 3.4–4.9)
PROCALCITONIN SERPL IA-MCNC: 0.17 NG/ML (ref 0–0.15)
PROT SERPL-MCNC: 5.9 G/DL (ref 6.3–8)
RBC # BLD AUTO: 3.75 M/UL (ref 4.7–6.1)
RPR SER QL: NORMAL
SODIUM SERPL-SCNC: 152 MEQ/L (ref 135–144)
WBC # BLD AUTO: 8 K/UL (ref 4.8–10.8)

## 2024-07-13 PROCEDURE — 85025 COMPLETE CBC W/AUTO DIFF WBC: CPT

## 2024-07-13 PROCEDURE — 2500000003 HC RX 250 WO HCPCS: Performed by: PSYCHIATRY & NEUROLOGY

## 2024-07-13 PROCEDURE — 2500000003 HC RX 250 WO HCPCS: Performed by: INTERNAL MEDICINE

## 2024-07-13 PROCEDURE — 51798 US URINE CAPACITY MEASURE: CPT

## 2024-07-13 PROCEDURE — 2000000000 HC ICU R&B

## 2024-07-13 PROCEDURE — 51701 INSERT BLADDER CATHETER: CPT

## 2024-07-13 PROCEDURE — 2580000003 HC RX 258: Performed by: FAMILY MEDICINE

## 2024-07-13 PROCEDURE — 2580000003 HC RX 258: Performed by: INTERNAL MEDICINE

## 2024-07-13 PROCEDURE — 99233 SBSQ HOSP IP/OBS HIGH 50: CPT | Performed by: INTERNAL MEDICINE

## 2024-07-13 PROCEDURE — 2500000003 HC RX 250 WO HCPCS: Performed by: NURSE PRACTITIONER

## 2024-07-13 PROCEDURE — 36415 COLL VENOUS BLD VENIPUNCTURE: CPT

## 2024-07-13 PROCEDURE — 6370000000 HC RX 637 (ALT 250 FOR IP): Performed by: INTERNAL MEDICINE

## 2024-07-13 PROCEDURE — 99291 CRITICAL CARE FIRST HOUR: CPT | Performed by: INTERNAL MEDICINE

## 2024-07-13 PROCEDURE — 6360000002 HC RX W HCPCS: Performed by: FAMILY MEDICINE

## 2024-07-13 PROCEDURE — 2580000003 HC RX 258: Performed by: PSYCHIATRY & NEUROLOGY

## 2024-07-13 PROCEDURE — 80053 COMPREHEN METABOLIC PANEL: CPT

## 2024-07-13 PROCEDURE — 71045 X-RAY EXAM CHEST 1 VIEW: CPT

## 2024-07-13 PROCEDURE — 6360000002 HC RX W HCPCS: Performed by: PSYCHIATRY & NEUROLOGY

## 2024-07-13 PROCEDURE — 51702 INSERT TEMP BLADDER CATH: CPT

## 2024-07-13 PROCEDURE — 84145 PROCALCITONIN (PCT): CPT

## 2024-07-13 PROCEDURE — 99233 SBSQ HOSP IP/OBS HIGH 50: CPT | Performed by: PSYCHIATRY & NEUROLOGY

## 2024-07-13 PROCEDURE — 83735 ASSAY OF MAGNESIUM: CPT

## 2024-07-13 PROCEDURE — 2580000003 HC RX 258: Performed by: NURSE PRACTITIONER

## 2024-07-13 RX ORDER — DEXTROSE MONOHYDRATE, SODIUM CHLORIDE, AND POTASSIUM CHLORIDE 50; 1.49; 4.5 G/1000ML; G/1000ML; G/1000ML
INJECTION, SOLUTION INTRAVENOUS CONTINUOUS
Status: DISCONTINUED | OUTPATIENT
Start: 2024-07-13 | End: 2024-07-14

## 2024-07-13 RX ORDER — DEXTROSE MONOHYDRATE 50 MG/ML
INJECTION, SOLUTION INTRAVENOUS CONTINUOUS
Status: DISCONTINUED | OUTPATIENT
Start: 2024-07-13 | End: 2024-07-13

## 2024-07-13 RX ADMIN — DEXMEDETOMIDINE HYDROCHLORIDE 1.2 MCG/KG/HR: 100 INJECTION, SOLUTION, CONCENTRATE INTRAVENOUS at 17:00

## 2024-07-13 RX ADMIN — DEXMEDETOMIDINE HYDROCHLORIDE 1.5 MCG/KG/HR: 100 INJECTION, SOLUTION, CONCENTRATE INTRAVENOUS at 08:04

## 2024-07-13 RX ADMIN — SODIUM CHLORIDE 15 MG/HR: 9 INJECTION, SOLUTION INTRAVENOUS at 03:33

## 2024-07-13 RX ADMIN — SODIUM CHLORIDE, PRESERVATIVE FREE 10 ML: 5 INJECTION INTRAVENOUS at 20:36

## 2024-07-13 RX ADMIN — VALPROATE SODIUM 500 MG: 100 INJECTION, SOLUTION INTRAVENOUS at 07:20

## 2024-07-13 RX ADMIN — SODIUM CHLORIDE 2.5 MG/HR: 9 INJECTION, SOLUTION INTRAVENOUS at 20:24

## 2024-07-13 RX ADMIN — VALPROATE SODIUM 500 MG: 100 INJECTION, SOLUTION INTRAVENOUS at 23:34

## 2024-07-13 RX ADMIN — VALPROATE SODIUM 500 MG: 100 INJECTION, SOLUTION INTRAVENOUS at 13:51

## 2024-07-13 RX ADMIN — PIPERACILLIN AND TAZOBACTAM 3375 MG: 3; .375 INJECTION, POWDER, LYOPHILIZED, FOR SOLUTION INTRAVENOUS at 10:10

## 2024-07-13 RX ADMIN — SODIUM CHLORIDE 15 MG/HR: 9 INJECTION, SOLUTION INTRAVENOUS at 01:35

## 2024-07-13 RX ADMIN — SODIUM CHLORIDE 15 MG/HR: 9 INJECTION, SOLUTION INTRAVENOUS at 06:50

## 2024-07-13 RX ADMIN — POTASSIUM CHLORIDE, DEXTROSE MONOHYDRATE AND SODIUM CHLORIDE: 150; 5; 450 INJECTION, SOLUTION INTRAVENOUS at 09:06

## 2024-07-13 RX ADMIN — SODIUM CHLORIDE 7.5 MG/HR: 9 INJECTION, SOLUTION INTRAVENOUS at 11:41

## 2024-07-13 RX ADMIN — INSULIN LISPRO 4 UNITS: 100 INJECTION, SOLUTION INTRAVENOUS; SUBCUTANEOUS at 11:45

## 2024-07-13 RX ADMIN — PIPERACILLIN AND TAZOBACTAM 3375 MG: 3; .375 INJECTION, POWDER, LYOPHILIZED, FOR SOLUTION INTRAVENOUS at 03:58

## 2024-07-13 RX ADMIN — DEXMEDETOMIDINE HYDROCHLORIDE 1 MCG/KG/HR: 100 INJECTION, SOLUTION, CONCENTRATE INTRAVENOUS at 20:26

## 2024-07-13 RX ADMIN — PIPERACILLIN AND TAZOBACTAM 3375 MG: 3; .375 INJECTION, POWDER, LYOPHILIZED, FOR SOLUTION INTRAVENOUS at 18:39

## 2024-07-13 RX ADMIN — POTASSIUM CHLORIDE, DEXTROSE MONOHYDRATE AND SODIUM CHLORIDE: 150; 5; 450 INJECTION, SOLUTION INTRAVENOUS at 17:03

## 2024-07-13 RX ADMIN — DEXTROSE MONOHYDRATE: 50 INJECTION, SOLUTION INTRAVENOUS at 07:11

## 2024-07-13 RX ADMIN — SODIUM CHLORIDE 12.5 MG/HR: 9 INJECTION, SOLUTION INTRAVENOUS at 08:48

## 2024-07-13 ASSESSMENT — PAIN SCALES - GENERAL: PAINLEVEL_OUTOF10: 0

## 2024-07-13 NOTE — PROGRESS NOTES
Daughter Scarlet called in , update attempted but she wants pt transfer to another facility. I told her I would have hospitalist call her for information and update.

## 2024-07-13 NOTE — PROGRESS NOTES
Hospitalist Daily Progress Note  Name: Mateo Marsh  Age: 58 y.o.  Gender: male  CodeStatus: Full Code  Allergies: No Known Allergies    Chief Complaint:Sickle Cell Pain Crisis (Patient states pain all over from sickle cell .)      Primary Care Provider: Rey Pang MD    InpatientTreatment Team: Treatment Team: Attending Provider: Camilo Michael MD; Consulting Physician: Satya Sanchez MD; Consulting Physician: Hussein Strong MD; Consulting Physician: Denver Montes MD; Consulting Physician: Miles Baxter MD; Physician Assistant: Rj Michelle PA; Consulting Physician: Ruslan Almodovar MD; Registered Nurse: Mariella Gilmore RN; : Fatuma Lyon RN; Utilization Reviewer: Shandra Leal, RN; Consulting Physician: Silva Bain MD    Admission Date: 7/9/2024      Subjective: Resting comfortably in bed, per RN more rousable when off precedex.      Physical Exam  Vitals and nursing note reviewed.   Constitutional:       Comments: Confused when alert, obtunded otherwise   Cardiovascular:      Rate and Rhythm: Normal rate and regular rhythm.   Pulmonary:      Effort: Pulmonary effort is normal.      Breath sounds: Normal breath sounds.   Abdominal:      General: Bowel sounds are normal.      Palpations: Abdomen is soft.   Musculoskeletal:         General: Normal range of motion.   Skin:     General: Skin is warm and dry.   Neurological:      Comments: Sleeping, unable to fully assess.         Medications:  Reviewed    Infusion Medications:    dextrose 5% and 0.45% NaCl with KCl 20 mEq 125 mL/hr at 07/13/24 0906    niCARdipine 7.5 mg/hr (07/13/24 1141)    dexmedeTOMIDine (PRECEDEX) 1,000 mcg in sodium chloride 0.9 % 250 mL infusion 1.5 mcg/kg/hr (07/13/24 0804)    sodium chloride      sodium chloride      dextrose       Scheduled Medications:    valproate (DEPACON) 500 mg in sodium chloride 0.9 % 100 mL IVPB  500 mg IntraVENous 3 times per day    piperacillin-tazobactam

## 2024-07-13 NOTE — PROGRESS NOTES
Patient is extremely restless and attempting to get out of bed.  Patient is not able to be redirected, nor is he oriented.  Patient appears to be in pain based on adult non-verbal pain scale.  Medicated with Dilaudid for pain and Ativan for anxiety and restlessness.  Incontinent of urine.  Incontinent care rendered at this time.  Patient is ST on telemetry and B/P remains elevated.  Continues on Cardine for elevated BP.

## 2024-07-13 NOTE — PROGRESS NOTES
Spoke with daughter Shandra at bedside, update given. After speaking with supervisor gave daughter HIPAA code. Pt restless at times and usually when needing toileting. ADL's completed before family had arrived and chest xray completed.

## 2024-07-13 NOTE — PROGRESS NOTES
Patient slept soundly for a few hours post Dilaudid and Ativan.  However when patient woke up he again became restless, agitated, and attempting to get out of the bed.  Dr. Roth at bedside and orders Precedex.  1:1 continues for patient safety.

## 2024-07-13 NOTE — PROGRESS NOTES
Cardene weaned off at this time, b/p 140/72. Pt incontinent of gerry colored urine, leaked around the external cath. aDL's completed and pt repositioned. Opens eyes to name . Does not follow commands.

## 2024-07-13 NOTE — PROGRESS NOTES
[facility] Neurology Daily Progress Note  Name: Mateo Marsh  Age: 58 y.o.  Gender: male  CodeStatus: Full Code  Allergies: No Known Allergies    Chief Complaint:Sickle Cell Pain Crisis (Patient states pain all over from sickle cell .)    Primary Care Provider: Rey Pang MD  InpatientTreatment Team: Treatment Team: Attending Provider: Camilo Michael MD; Consulting Physician: Satya Sanchez MD; Consulting Physician: Hussein Strong MD; Consulting Physician: Denver Montes MD; Consulting Physician: Miles Baxter MD; Physician Assistant: Rj Michelle PA; Consulting Physician: Ruslan Almodovar MD; Registered Nurse: Mariella Gilmore RN; : Fatuma Lyon RN; Utilization Reviewer: Shandra Leal, BIANKA; Consulting Physician: Silva Bain MD  Admission Date: 7/9/2024      HPI   Pt seen and examined for neurology follow-up.  Patient is lethargic.  Will briefly awaken to tactile stimuli but falls back asleep.  Confused.  No seizure activity reported.  Moves all extremities spontaneously.  Significant other at bedside.  Afebrile.  Patient is hypertensive with blood pressure of 195/79.  Lumbar puncture completed today.    Patient seen and examined and very agitated.  Very confused not opening eyes    7/13  Patient transferred secondary unit under my direction for Cardene drip.  Patient actually much better with his blood pressures.  Overnight there had been some bumps in his heart rates and blood pressures are now stable.  Patient not intubated.  Patient appears to be resting quietly and at several points he was actually able to open his eyes to his name.  Does not follow any other commands at this time.  No definite sedation given that he is on multiple pain medications.  I have left him on Depakote as I was not able to complete his EEG.  MRI done and reviewed and essentially normal  Vitals:    07/13/24 0930   BP: (!) 129/56   Pulse: 66   Resp: 21   Temp:    SpO2:         Review of

## 2024-07-13 NOTE — PROGRESS NOTES
Pt voided but wants to get up again. Bladder scanned and showed 577ml. Straight cathed for 725ml dk yellow urine.

## 2024-07-13 NOTE — PROGRESS NOTES
Precedex continued and titrated appropriately. Patient did rest quietly for several hours, however he is waking and noted to be slightly restless at this time.  Patient has been incontinent of urine 3 times during night.  Inc. Care rendered.  Patient noted to be NSR on telemetry and B/P is normotensive at this time.

## 2024-07-13 NOTE — PROGRESS NOTES
Physical Therapy   Facility/Department: [facility] SURG IC11/IC11-01    NAME: Mateo Marsh    : 1965 (58 y.o.)  MRN: [id number]    Account: [id number]  Gender: male    Hold PT treatment on this date d/t patient had a change in medical status. Pt moved from W268 to IC11.    Change in medical status discussed with supervising PT on this date.    Regards,  [facility] Physical Therapy Department      Electronically signed by Jose Montgomery PTA on 24 at 7:48 AM EDT

## 2024-07-13 NOTE — PROGRESS NOTES
Review of Systems    Not obtainable  Objective Findings:     Vitals:/62   Pulse 64   Temp 97.7 °F (36.5 °C) (Axillary)   Resp 18   Ht 1.88 m (6' 2.02\")   Wt 87.9 kg (193 lb 12.8 oz)   SpO2 99%   PF (!) 20 L/min   BMI 24.87 kg/m²      Physical Examination:    Physical Exam   Constitutional: He appears healthy. No distress.   HENT:   Normal cephalic and Atraumatic   Eyes: Pupils are equal, round, and reactive to light.   Neck: Thyroid normal. No JVD present. No neck adenopathy. No thyromegaly present.   Cardiovascular: Normal rate, regular rhythm, normal heart sounds, intact distal pulses and normal pulses.   Pulmonary/Chest: Effort normal and breath sounds normal. He has no wheezes. He has no rales. He exhibits no tenderness.   Abdominal: Soft. Bowel sounds are normal. There is no abdominal tenderness.   Musculoskeletal:         General: No tenderness or edema. Normal range of motion.      Cervical back: Normal range of motion and neck supple.   Neurological: He is alert and oriented to person, place, and time.   Skin: Skin is warm. No cyanosis. Nails show no clubbing.         Results/ Medications reviewed 7/13/2024, 12:23 PM     Laboratory, Microbiology, Pathology, Radiology, Cardiology, Medications and Transcriptions reviewed  Scheduled Meds:   valproate (DEPACON) 500 mg in sodium chloride 0.9 % 100 mL IVPB  500 mg IntraVENous 3 times per day    piperacillin-tazobactam  3,375 mg IntraVENous Q8H    sodium chloride flush  5-40 mL IntraVENous 2 times per day    sodium chloride flush  5-40 mL IntraVENous 2 times per day    [Held by provider] enoxaparin  40 mg SubCUTAneous Daily    aspirin  81 mg Oral Daily    insulin lispro  0-8 Units SubCUTAneous TID WC    insulin lispro  0-4 Units SubCUTAneous Nightly    carvedilol  12.5 mg Oral BID    tamsulosin  0.4 mg Oral Daily     Continuous Infusions:   dextrose 5% and 0.45% NaCl with KCl 20 mEq 125 mL/hr at 07/13/24 0906    niCARdipine 7.5 mg/hr (07/13/24 1141)  cardiomegaly.  Pulmonary arteries are of normal caliber without emboli. Lungs/Pleura: There is bilateral interstitial and parenchymal scarring. There are some patchy bilateral areas of ground-glass infiltrate.  There is bibasilar infiltrates/atelectasis..  No evidence of pleural effusion or pneumothorax. Upper Abdomen: Limited images of the upper abdomen demonstrates some gallbladder distension.. Soft Tissues/Bones: There is an epidural catheter.  There is sclerosis within 2 lower thoracic vertebral bodies and what is felt to be L1..     1. No evidence of thoracic aortic aneurysm or dissection.  No evidence of pulmonary emboli 2. Moderate cardiomegaly. 3. Bilateral interstitial and parenchymal scarring. 4. Patchy bilateral areas of ground-glass infiltrate. 5. Bibasilar infiltrates/atelectasis. 6. Small mediastinal and bilateral hilar nodes. 7. Nonspecific sclerosis within 2 lower thoracic vertebral bodies and what is felt to be L1.  Etiology of this is uncertain.  Metastatic disease is not excluded.  No evidence 8. Epidural catheter. 9. Nonspecific gallbladder distension.     CT Head W/O Contrast    Result Date: 7/9/2024  EXAMINATION: CT OF THE HEAD WITHOUT CONTRAST  7/9/2024 10:33 pm TECHNIQUE: CT of the head was performed without the administration of intravenous contrast. Automated exposure control, iterative reconstruction, and/or weight based adjustment of the mA/kV was utilized to reduce the radiation dose to as low as reasonably achievable. COMPARISON: None. HISTORY: ORDERING SYSTEM PROVIDED HISTORY: ALTERED TECHNOLOGIST PROVIDED HISTORY: Reason for exam:->ALTERED Has a \"code stroke\" or \"stroke alert\" been called?->No Decision Support Exception - unselect if not a suspected or confirmed emergency medical condition->Emergency Medical Condition (MA) What reading provider will be dictating this exam?->CRC FINDINGS: BRAIN/VENTRICLES: There is no acute intracranial hemorrhage, mass effect or midline shift.  No

## 2024-07-13 NOTE — CONSULTS
Pulmonary and Critical Care Medicine  Consult Note  Encounter Date: 2024 8:31 AM    Mr. Mateo Marsh is a 58 y.o. male  : 1965  Requesting Provider: Camilo Michael MD    Reason for request: ICU management            HISTORY OF PRESENT ILLNESS:    Patient is 58 y.o. presents with worsening encephalopathy, he is thrashing in bed, did not answer question, transferred yesterday to ICU, he is admitted with sickle cell crisis, has no fever, he is on Precedex at 1.5 mcg/kg/h, he was hypertensive and started nicardipine drip yesterday, blood pressure better controlled, no reported vomiting, urine output not recorded,    He is admitted with acute encephalopathy underlying hypoxic respiratory failure, with parainfluenza  pneumonia, had LP done yesterday showing 7 white blood cells, and protein of 58, MRI done yesterday, shows no acute finding      Past Medical History:        Diagnosis Date    Bronchitis     CAD (coronary artery disease)     past angioplasty > 10 yrs ago    Chronic back pain     Diabetes (HCC)     dx > 2 yrs    Dyslipidemia     Hyperlipidemia     meds > 5 yrs    Hypertension     meds > 5 yrs    Neuropathy in diabetes (HCC)     Osteoarthritis     Pneumonia     Sickle cell anemia (HCC)     Sleep apnea, obstructive        Past Surgical History:        Procedure Laterality Date    BACK SURGERY      Lumbar disc OR.    BLADDER SURGERY  07/10/2015    Patient states he was urininating blood and had a procedure done.    COLONOSCOPY  2014    AGUEDA    ENDOSCOPY, COLON, DIAGNOSTIC      LUMBAR FUSION      LUMBAR PUNCTURE N/A 2024    Diagnostic, performed by Dr. Olson    OTHER SURGICAL HISTORY Right     tendon repair in (R) forearm because of previous injury as child    STIMULATOR SURGERY N/A 2023    SPINAL STIMULATOR REPLACEMENT performed by Navneet Lamb MD at [facility] OR    TOTAL HIP ARTHROPLASTY Left 2020    LEFT HIP TOTAL HIP ARTHROPLASTY, RICARDO performed by Andrew GONZALES  magnesium sulfate, melatonin, polyethylene glycol, acetaminophen **OR** acetaminophen, glucose, dextrose bolus **OR** dextrose bolus, glucagon (rDNA), dextrose, LORazepam        REVIEW OF SYSTEMS:   Not obtainable due to acute encephalopathy    PHYSICAL EXAM:    Vitals:  BP (!) 155/74   Pulse 63   Temp 98.3 °F (36.8 °C) (Axillary)   Resp 21   Ht 1.88 m (6' 2.02\")   Wt 87.9 kg (193 lb 12.8 oz)   SpO2 96%   PF (!) 20 L/min   BMI 24.87 kg/m²     General: Encephalopathic, agitated, thrashing in bed  Head: normocephalic, atraumatic  Eyes:No gross abnormalities.  ENT:  MMM no lesions  Neck:  supple and no masses  Chest : clear to auscultation bilaterally- no wheezes, rales or rhonchi, normal air movement, no respiratory distress  Heart:: Heart sounds are normal.  Regular rate and rhythm without murmur, gallop or rub.  ABD:  symmetric, soft, nondistended, no apparent guarding tenderness or rebound  Musculoskeletal : no cyanosis, no clubbing, and no edema  Neuro:   Moves all 4 extremities, encephalopathic  Skin: No rashes or nodules noted.  Lymph node:  no cervical nodes  Urology: No Gorman   Psychiatric: Anxious        Data Review  Recent Labs     07/11/24  0506 07/12/24  0448 07/13/24  0456   WBC 12.6* 10.8 8.0   HGB 11.5* 11.7* 9.6*   HCT 32.8* 33.9* 28.0*   * 92* 82*      Recent Labs     07/11/24  0506 07/12/24  0448 07/13/24  0456   * 150* 152*   K 3.2* 3.1* 3.4   * 112* 119*   CO2 27 25 21   BUN 18 21* 27*   CREATININE 0.91 0.95 1.20   GLUCOSE 142* 138* 146*       MV Settings:          ABGs:   Recent Labs     07/10/24  1213   PHART 7.501*   [id number] 37   PO2ART 56*   FTN3TQR 29.2*   BEART 6*   [id number] 92*   [id number] 30     O2 Device: Nasal cannula  O2 Flow Rate (L/min): 3 L/min  No results found for: \"LACTA\"    Radiology  I personally reviewed imaging studies films and CT chest shows bibasilar atelectatic changes        Assessment, plan:   This is a critically ill patient at risk of

## 2024-07-14 LAB
ALBUMIN SERPL-MCNC: 3 G/DL (ref 3.5–4.6)
ALP SERPL-CCNC: 172 U/L (ref 35–104)
ALT SERPL-CCNC: 40 U/L (ref 0–41)
ANION GAP SERPL CALCULATED.3IONS-SCNC: 11 MEQ/L (ref 9–15)
ANISOCYTOSIS BLD QL SMEAR: ABNORMAL
AST SERPL-CCNC: 45 U/L (ref 0–40)
BASOPHILS # BLD: 0 K/UL (ref 0–0.2)
BASOPHILS NFR BLD: 0.3 %
BILIRUB DIRECT SERPL-MCNC: 0.6 MG/DL (ref 0–0.4)
BILIRUB INDIRECT SERPL-MCNC: 0.8 MG/DL (ref 0–0.6)
BILIRUB SERPL-MCNC: 1.4 MG/DL (ref 0.2–0.7)
BUN SERPL-MCNC: 15 MG/DL (ref 6–20)
CALCIUM SERPL-MCNC: 8.7 MG/DL (ref 8.5–9.9)
CHLORIDE SERPL-SCNC: 119 MEQ/L (ref 95–107)
CO2 SERPL-SCNC: 23 MEQ/L (ref 20–31)
CREAT SERPL-MCNC: 0.97 MG/DL (ref 0.7–1.2)
EOSINOPHIL # BLD: 0.2 K/UL (ref 0–0.7)
EOSINOPHIL NFR BLD: 3 %
ERYTHROCYTE [DISTWIDTH] IN BLOOD BY AUTOMATED COUNT: 22.4 % (ref 11.5–14.5)
GLOBULIN SER CALC-MCNC: 3 G/DL (ref 2.3–3.5)
GLUCOSE BLD-MCNC: 156 MG/DL (ref 70–99)
GLUCOSE BLD-MCNC: 157 MG/DL (ref 70–99)
GLUCOSE SERPL-MCNC: 159 MG/DL (ref 70–99)
HCT VFR BLD AUTO: 28.2 % (ref 42–52)
HGB BLD-MCNC: 10.1 G/DL (ref 14–18)
HYPOCHROMIA BLD QL SMEAR: ABNORMAL
LYMPHOCYTES # BLD: 1.3 K/UL (ref 1–4.8)
LYMPHOCYTES NFR BLD: 16 %
MACROCYTES BLD QL SMEAR: ABNORMAL
MAGNESIUM SERPL-MCNC: 1.9 MG/DL (ref 1.7–2.4)
MCH RBC QN AUTO: 26.4 PG (ref 27–31.3)
MCHC RBC AUTO-ENTMCNC: 35.8 % (ref 33–37)
MCV RBC AUTO: 73.8 FL (ref 79–92.2)
MICROCYTES BLD QL SMEAR: ABNORMAL
MONOCYTES # BLD: 0.7 K/UL (ref 0.2–0.8)
MONOCYTES NFR BLD: 8.6 %
NEUTROPHILS # BLD: 5.6 K/UL (ref 1.4–6.5)
NEUTS SEG NFR BLD: 71 %
NRBC BLD-RTO: 14 /100 WBC
PERFORMED ON: ABNORMAL
PERFORMED ON: ABNORMAL
PLATELET # BLD AUTO: 81 K/UL (ref 130–400)
PLATELET BLD QL SMEAR: ABNORMAL
POIKILOCYTOSIS BLD QL SMEAR: ABNORMAL
POLYCHROMASIA BLD QL SMEAR: ABNORMAL
POTASSIUM SERPL-SCNC: 3.3 MEQ/L (ref 3.4–4.9)
PROT SERPL-MCNC: 6 G/DL (ref 6.3–8)
RBC # BLD AUTO: 3.82 M/UL (ref 4.7–6.1)
SCHISTOCYTES BLD QL SMEAR: ABNORMAL
SMUDGE CELLS BLD QL SMEAR: 9.6
SODIUM SERPL-SCNC: 153 MEQ/L (ref 135–144)
TARGETS BLD QL SMEAR: ABNORMAL
VALPROATE SERPL-MCNC: 51.7 UG/ML (ref 50–100)
VARIANT LYMPHS NFR BLD: 1 %
WBC # BLD AUTO: 7.9 K/UL (ref 4.8–10.8)

## 2024-07-14 PROCEDURE — 99232 SBSQ HOSP IP/OBS MODERATE 35: CPT | Performed by: PSYCHIATRY & NEUROLOGY

## 2024-07-14 PROCEDURE — 92610 EVALUATE SWALLOWING FUNCTION: CPT

## 2024-07-14 PROCEDURE — 2500000003 HC RX 250 WO HCPCS: Performed by: PSYCHIATRY & NEUROLOGY

## 2024-07-14 PROCEDURE — 6370000000 HC RX 637 (ALT 250 FOR IP): Performed by: INTERNAL MEDICINE

## 2024-07-14 PROCEDURE — 99291 CRITICAL CARE FIRST HOUR: CPT | Performed by: INTERNAL MEDICINE

## 2024-07-14 PROCEDURE — 2580000003 HC RX 258: Performed by: INTERNAL MEDICINE

## 2024-07-14 PROCEDURE — 2700000000 HC OXYGEN THERAPY PER DAY

## 2024-07-14 PROCEDURE — 6360000002 HC RX W HCPCS: Performed by: PSYCHIATRY & NEUROLOGY

## 2024-07-14 PROCEDURE — 85025 COMPLETE CBC W/AUTO DIFF WBC: CPT

## 2024-07-14 PROCEDURE — 2580000003 HC RX 258: Performed by: NURSE PRACTITIONER

## 2024-07-14 PROCEDURE — 6360000002 HC RX W HCPCS: Performed by: FAMILY MEDICINE

## 2024-07-14 PROCEDURE — 2580000003 HC RX 258: Performed by: FAMILY MEDICINE

## 2024-07-14 PROCEDURE — 80164 ASSAY DIPROPYLACETIC ACD TOT: CPT

## 2024-07-14 PROCEDURE — 6370000000 HC RX 637 (ALT 250 FOR IP): Performed by: FAMILY MEDICINE

## 2024-07-14 PROCEDURE — 2000000000 HC ICU R&B

## 2024-07-14 PROCEDURE — 2580000003 HC RX 258: Performed by: PSYCHIATRY & NEUROLOGY

## 2024-07-14 PROCEDURE — 83735 ASSAY OF MAGNESIUM: CPT

## 2024-07-14 PROCEDURE — 99233 SBSQ HOSP IP/OBS HIGH 50: CPT | Performed by: INTERNAL MEDICINE

## 2024-07-14 PROCEDURE — 36415 COLL VENOUS BLD VENIPUNCTURE: CPT

## 2024-07-14 PROCEDURE — 2500000003 HC RX 250 WO HCPCS: Performed by: NURSE PRACTITIONER

## 2024-07-14 PROCEDURE — 2500000003 HC RX 250 WO HCPCS: Performed by: INTERNAL MEDICINE

## 2024-07-14 PROCEDURE — 80053 COMPREHEN METABOLIC PANEL: CPT

## 2024-07-14 RX ORDER — DEXTROSE MONOHYDRATE 50 MG/ML
INJECTION, SOLUTION INTRAVENOUS CONTINUOUS
Status: DISCONTINUED | OUTPATIENT
Start: 2024-07-14 | End: 2024-07-15

## 2024-07-14 RX ORDER — AMLODIPINE BESYLATE 10 MG/1
10 TABLET ORAL DAILY
Status: DISCONTINUED | OUTPATIENT
Start: 2024-07-14 | End: 2024-07-18 | Stop reason: HOSPADM

## 2024-07-14 RX ADMIN — POTASSIUM CHLORIDE 40 MEQ: 1500 TABLET, EXTENDED RELEASE ORAL at 09:12

## 2024-07-14 RX ADMIN — ACETAMINOPHEN 325MG 650 MG: 325 TABLET ORAL at 09:39

## 2024-07-14 RX ADMIN — HYDROMORPHONE HYDROCHLORIDE 1 MG: 1 INJECTION, SOLUTION INTRAMUSCULAR; INTRAVENOUS; SUBCUTANEOUS at 01:00

## 2024-07-14 RX ADMIN — PIPERACILLIN AND TAZOBACTAM 3375 MG: 3; .375 INJECTION, POWDER, LYOPHILIZED, FOR SOLUTION INTRAVENOUS at 03:08

## 2024-07-14 RX ADMIN — PIPERACILLIN AND TAZOBACTAM 3375 MG: 3; .375 INJECTION, POWDER, LYOPHILIZED, FOR SOLUTION INTRAVENOUS at 10:53

## 2024-07-14 RX ADMIN — DEXTROSE MONOHYDRATE: 50 INJECTION, SOLUTION INTRAVENOUS at 09:18

## 2024-07-14 RX ADMIN — TAMSULOSIN HYDROCHLORIDE 0.4 MG: 0.4 CAPSULE ORAL at 08:59

## 2024-07-14 RX ADMIN — SODIUM CHLORIDE 7.5 MG/HR: 9 INJECTION, SOLUTION INTRAVENOUS at 06:10

## 2024-07-14 RX ADMIN — PIPERACILLIN AND TAZOBACTAM 3375 MG: 3; .375 INJECTION, POWDER, LYOPHILIZED, FOR SOLUTION INTRAVENOUS at 17:25

## 2024-07-14 RX ADMIN — DEXMEDETOMIDINE HYDROCHLORIDE 1 MCG/KG/HR: 100 INJECTION, SOLUTION, CONCENTRATE INTRAVENOUS at 06:05

## 2024-07-14 RX ADMIN — SODIUM CHLORIDE, PRESERVATIVE FREE 10 ML: 5 INJECTION INTRAVENOUS at 07:47

## 2024-07-14 RX ADMIN — SODIUM CHLORIDE 5 MG/HR: 9 INJECTION, SOLUTION INTRAVENOUS at 00:52

## 2024-07-14 RX ADMIN — AMLODIPINE BESYLATE 10 MG: 10 TABLET ORAL at 16:05

## 2024-07-14 RX ADMIN — DEXTROSE MONOHYDRATE: 50 INJECTION, SOLUTION INTRAVENOUS at 18:07

## 2024-07-14 RX ADMIN — VALPROATE SODIUM 500 MG: 100 INJECTION, SOLUTION INTRAVENOUS at 15:01

## 2024-07-14 RX ADMIN — CARVEDILOL 12.5 MG: 12.5 TABLET, FILM COATED ORAL at 21:31

## 2024-07-14 RX ADMIN — ASPIRIN 81 MG 81 MG: 81 TABLET ORAL at 09:00

## 2024-07-14 RX ADMIN — CARVEDILOL 12.5 MG: 12.5 TABLET, FILM COATED ORAL at 08:59

## 2024-07-14 RX ADMIN — VALPROATE SODIUM 500 MG: 100 INJECTION, SOLUTION INTRAVENOUS at 21:36

## 2024-07-14 RX ADMIN — POTASSIUM CHLORIDE, DEXTROSE MONOHYDRATE AND SODIUM CHLORIDE: 150; 5; 450 INJECTION, SOLUTION INTRAVENOUS at 00:59

## 2024-07-14 RX ADMIN — SODIUM CHLORIDE, PRESERVATIVE FREE 10 ML: 5 INJECTION INTRAVENOUS at 21:33

## 2024-07-14 RX ADMIN — VALPROATE SODIUM 500 MG: 100 INJECTION, SOLUTION INTRAVENOUS at 06:19

## 2024-07-14 ASSESSMENT — PAIN SCALES - WONG BAKER: WONGBAKER_NUMERICALRESPONSE: HURTS A LITTLE BIT

## 2024-07-14 ASSESSMENT — PAIN SCALES - GENERAL
PAINLEVEL_OUTOF10: 10
PAINLEVEL_OUTOF10: 0
PAINLEVEL_OUTOF10: 0
PAINLEVEL_OUTOF10: 8

## 2024-07-14 ASSESSMENT — PAIN DESCRIPTION - LOCATION: LOCATION: BACK

## 2024-07-14 NOTE — PLAN OF CARE
Problem: Discharge Planning  Goal: Discharge to home or other facility with appropriate resources  Outcome: Progressing  Flowsheets (Taken 7/13/2024 2000)  Discharge to home or other facility with appropriate resources:   Identify barriers to discharge with patient and caregiver   Arrange for needed discharge resources and transportation as appropriate   Identify discharge learning needs (meds, wound care, etc)   Refer to discharge planning if patient needs post-hospital services based on physician order or complex needs related to functional status, cognitive ability or social support system     Problem: Pain  Goal: Verbalizes/displays adequate comfort level or baseline comfort level  Outcome: Progressing  Flowsheets  Taken 7/13/2024 2000  Verbalizes/displays adequate comfort level or baseline comfort level: Assess pain using appropriate pain scale  Taken 7/13/2024 1900  Verbalizes/displays adequate comfort level or baseline comfort level: Assess pain using appropriate pain scale     Problem: Safety - Adult  Goal: Free from fall injury  Outcome: Progressing     Problem: Confusion  Goal: Confusion, delirium, dementia, or psychosis is improved or at baseline  Description: INTERVENTIONS:  1. Assess for possible contributors to thought disturbance, including medications, impaired vision or hearing, underlying metabolic abnormalities, dehydration, psychiatric diagnoses, and notify attending LIP  2. [place] high risk fall precautions, as indicated  3. Provide frequent short contacts to provide reality reorientation, refocusing and direction  4. Decrease environmental stimuli, including noise as appropriate  5. Monitor and intervene to maintain adequate nutrition, hydration, elimination, sleep and activity  6. If unable to ensure safety without constant attention obtain sitter and review sitter guidelines with assigned personnel  7. Initiate Psychosocial CNS and Spiritual Care consult, as indicated  Outcome:  2000)  Achieves stable or improved neurological status:   Assess for and report changes in neurological status   Monitor temperature, glucose, and sodium. Initiate appropriate interventions as ordered  Goal: Achieves maximal functionality and self care  Outcome: Progressing  Flowsheets (Taken 7/13/2024 2000)  Achieves maximal functionality and self care: Monitor swallowing and airway patency with patient fatigue and changes in neurological status     Problem: Genitourinary - Adult  Goal: Absence of urinary retention  Outcome: Progressing     Problem: Infection - Adult  Goal: Absence of infection at discharge  Outcome: Progressing  Flowsheets (Taken 7/13/2024 2000)  Absence of infection at discharge:   Assess and monitor for signs and symptoms of infection   Monitor lab/diagnostic results  Goal: Absence of infection during hospitalization  Outcome: Progressing  Flowsheets (Taken 7/13/2024 2000)  Absence of infection during hospitalization:   Assess and monitor for signs and symptoms of infection   Monitor lab/diagnostic results  Goal: Absence of fever/infection during anticipated neutropenic period  Outcome: Progressing  Flowsheets (Taken 7/13/2024 2000)  Absence of fever/infection during anticipated neutropenic period: Monitor white blood cell count

## 2024-07-14 NOTE — PROGRESS NOTES
[facility]   Facility/Department: [facility]  Speech Language Pathology  Clinical Bedside Swallow Evaluation    NAME:Mateo Marsh  : 1965 (58 y.o.)   [x]   confirmed    MRN: [id number]  ROOM: [place] [address]  ADMISSION DATE: 2024  PATIENT DIAGNOSIS(ES): Hypoxemia [R09.02]  Sickle cell crisis (HCC) [D57.00]  Sickle cell disease with crisis (HCC) [D57.00]  Pneumonia of both lungs due to infectious organism, unspecified part of lung [J18.9]  Chief Complaint   Patient presents with    Sickle Cell Pain Crisis     Patient states pain all over from sickle cell .     Patient Active Problem List    Diagnosis Date Noted    Painful urination 2024    Pneumonia of both lungs due to infectious organism 2024    Hypoxemia 07/10/2024    Parainfluenza infection 07/10/2024    Encephalopathy 07/10/2024    Sickle cell crisis (HCC) 2024    Disease of larynx 2021    Hb-SS disease with crisis, unspecified (HCC) 2021    Status post total replacement of left hip 2020    Degenerative joint disease of left hip 2020    Tobacco use disorder 2020    REGINA (obstructive sleep apnea) 2020    S/P insertion of spinal cord stimulator 2020    Diabetic polyneuropathy associated with type 2 diabetes mellitus (HCC) 2019    Dermatophytosis of nail 2019    Hallux abducto valgus, bilateral 2019    Tailor's bunionette, bilateral 2019    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 (HCC) 2019    Chronic pain syndrome 2017    Postlaminectomy syndrome, lumbar region 2015    L4-5 HERNIATED DISC 2015     Past Medical History:   Diagnosis Date    Bronchitis     CAD (coronary artery disease)     past angioplasty > 10 yrs ago    Chronic back pain     Diabetes (HCC)     dx > 2 yrs    Dyslipidemia     Hyperlipidemia     meds > 5 yrs    Hypertension     meds > 5 yrs    Neuropathy in diabetes (HCC)     Osteoarthritis     Pneumonia     Sickle cell anemia (HCC)

## 2024-07-14 NOTE — PROGRESS NOTES
Hematology/Oncology   Progress Note        CHIEF COMPLAINT/HPI:  Follow up of sickle cell crisis, likely triggered by pneumonia. Confusion is resolved. Pain is less.     REVIEW OF SYSTEMS:    Unremarkable except for symptoms mentioned in HPI.    Current Inpatient Medications:    Current Facility-Administered Medications   Medication Dose Route Frequency Provider Last Rate Last Admin    dextrose 5 % solution   IntraVENous Continuous Silva Bain  mL/hr at 07/14/24 0958 Rate Verify at 07/14/24 0958    HYDROmorphone HCl PF (DILAUDID) injection 1 mg  1 mg IntraVENous Q3H PRN Camilo Michael MD   1 mg at 07/14/24 0100    valproate (DEPACON) 500 mg in sodium chloride 0.9 % 100 mL IVPB  500 mg IntraVENous 3 times per day Hussein Strong MD   Stopped at 07/14/24 0729    niCARdipine (CARDENE) 25 mg in sodium chloride 0.9 % 250 mL infusion  2.5-15 mg/hr IntraVENous Continuous Hussein Strong MD 50 mL/hr at 07/14/24 0958 5 mg/hr at 07/14/24 0958    dexmedeTOMIDine (PRECEDEX) 1,000 mcg in sodium chloride 0.9 % 250 mL infusion  0.1-1.5 mcg/kg/hr IntraVENous Continuous Bolzan-Roche, Simon, APRBETHANY - CNP 17.58 mL/hr at 07/14/24 0958 0.8 mcg/kg/hr at 07/14/24 0958    piperacillin-tazobactam (ZOSYN) 3,375 mg in sodium chloride 0.9 % 50 mL IVPB (mini-bag)  3,375 mg IntraVENous Q8H Camilo Michael MD 12.5 mL/hr at 07/14/24 1053 3,375 mg at 07/14/24 1053    sodium chloride flush 0.9 % injection 5-40 mL  5-40 mL IntraVENous 2 times per day Denver Montes MD   10 mL at 07/14/24 0747    sodium chloride flush 0.9 % injection 5-40 mL  5-40 mL IntraVENous PRN Denver Montes MD        0.9 % sodium chloride infusion   IntraVENous PRN Denver Montes MD        ondansetron (ZOFRAN) injection 4 mg  4 mg IntraVENous Q6H PRN Denver Montes MD        nitroGLYCERIN (NITROSTAT) SL tablet 0.4 mg  0.4 mg SubLINGual Q5 Min PRN Denver Montes MD        hydrALAZINE (APRESOLINE) injection 10 mg  10 mg IntraVENous Q4H PRN Elizabet Roth MD

## 2024-07-14 NOTE — PROGRESS NOTES
[facility] Neurology Daily Progress Note  Name: Mateo Marsh  Age: 58 y.o.  Gender: male  CodeStatus: Full Code  Allergies: No Known Allergies    Chief Complaint:Sickle Cell Pain Crisis (Patient states pain all over from sickle cell .)    Primary Care Provider: Rey Pang MD  InpatientTreatment Team: Treatment Team: Attending Provider: Camilo Michael MD; Consulting Physician: Satya Sanchez MD; Consulting Physician: Hussein Strong MD; Consulting Physician: Denver Montes MD; Consulting Physician: Miles Baxter MD; Physician Assistant: Rj Michelle PA; Consulting Physician: Ruslan Almodovar MD; Consulting Physician: Silva Bain MD; Registered Nurse: Mariella Gilmore RN; Utilization Reviewer: Shandra Leal RN; : Fatuma Lyon RN  Admission Date: 7/9/2024      HPI   Pt seen and examined for neurology follow-up.  Patient is lethargic.  Will briefly awaken to tactile stimuli but falls back asleep.  Confused.  No seizure activity reported.  Moves all extremities spontaneously.  Significant other at bedside.  Afebrile.  Patient is hypertensive with blood pressure of 195/79.  Lumbar puncture completed today.    Patient seen and examined and very agitated.  Very confused not opening eyes    7/13  Patient transferred secondary unit under my direction for Cardene drip.  Patient actually much better with his blood pressures.  Overnight there had been some bumps in his heart rates and blood pressures are now stable.  Patient not intubated.  Patient appears to be resting quietly and at several points he was actually able to open his eyes to his name.  Does not follow any other commands at this time.  No definite sedation given that he is on multiple pain medications.  I have left him on Depakote as I was not able to complete his EEG.  MRI done and reviewed and essentially normal    7/14  Patient actually much improved today and awake.  Patient follows commands.  Is got a Gorman  patient with a clinical time of 50-minute      7/12/2024:  Acute encephalopathy in the setting of acute hypoxic respiratory failure, parainfluenza, bilateral lower lobe pneumonia, hypernatremia, thrombocytopenia, sickle cell crisis.  Encephalopathy persists.  Patient is lethargic.  Lumbar puncture completed today with 7 WBCs, glucose of 97 and protein of 58 with HSV encephalitis panel pending  MRI attempted x 2 but patient unable to complete due to restlessness.  Will order under anesthesia given patient's ongoing lethargy and uncontrolled hypertension to rule out PRES versus acute infarct versus acute findings.  EEG attempted but unable to be completed due to patient pulling off leads  Will assess for RPR  Patient remains hypertensive and needs ongoing attention to blood pressure control  Multiple consultants following including hematology oncology, pulmonology, neurology, cardiology  Patient is receiving IV Dilaudid, IV morphine and IV Ativan which could be contributing to his hypersomnolence although encephalopathy was present prior to admission.    I have personally performed a face to face diagnostic evaluation on this patient, reviewed all data and investigations, and am the sole provider of all clinical decisions on the neurological status of this patient.  Patient still very agitated EEG could not be done as he pulled out all the leads.  He is seen pain and is on Dilaudid.  We not quite sure if this is crisis or hypoxemia.  Lumbar puncture did not reveal anything sequent to suggest encephalitis at least for the first cells that are appearing is normal.  Hypertensive crisis with PRES or vasculitis cannot be ruled out.  Patient require urgent MRI of the brain under anesthesia.  Multiple calls made to arrange for this and we will attempt this again.  We were somewhat delayed due to the stimulator.  Unfortunately EEG cannot be done as patient pulled out all the leads.  We will treat him empirically with Keppra

## 2024-07-14 NOTE — PROGRESS NOTES
pericardium demonstrate moderate cardiomegaly.  Pulmonary arteries are of normal caliber without emboli. Lungs/Pleura: There is bilateral interstitial and parenchymal scarring. There are some patchy bilateral areas of ground-glass infiltrate.  There is bibasilar infiltrates/atelectasis..  No evidence of pleural effusion or pneumothorax. Upper Abdomen: Limited images of the upper abdomen demonstrates some gallbladder distension.. Soft Tissues/Bones: There is an epidural catheter.  There is sclerosis within 2 lower thoracic vertebral bodies and what is felt to be L1..     1. No evidence of thoracic aortic aneurysm or dissection.  No evidence of pulmonary emboli 2. Moderate cardiomegaly. 3. Bilateral interstitial and parenchymal scarring. 4. Patchy bilateral areas of ground-glass infiltrate. 5. Bibasilar infiltrates/atelectasis. 6. Small mediastinal and bilateral hilar nodes. 7. Nonspecific sclerosis within 2 lower thoracic vertebral bodies and what is felt to be L1.  Etiology of this is uncertain.  Metastatic disease is not excluded.  No evidence 8. Epidural catheter. 9. Nonspecific gallbladder distension.     CT Head W/O Contrast    Result Date: 7/9/2024  EXAMINATION: CT OF THE HEAD WITHOUT CONTRAST  7/9/2024 10:33 pm TECHNIQUE: CT of the head was performed without the administration of intravenous contrast. Automated exposure control, iterative reconstruction, and/or weight based adjustment of the mA/kV was utilized to reduce the radiation dose to as low as reasonably achievable. COMPARISON: None. HISTORY: ORDERING SYSTEM PROVIDED HISTORY: ALTERED TECHNOLOGIST PROVIDED HISTORY: Reason for exam:->ALTERED Has a \"code stroke\" or \"stroke alert\" been called?->No Decision Support Exception - unselect if not a suspected or confirmed emergency medical condition->Emergency Medical Condition (MA) What reading provider will be dictating this exam?->CRC FINDINGS: BRAIN/VENTRICLES: There is no acute intracranial hemorrhage, mass  effect or midline shift.  No abnormal extra-axial fluid collection.  The gray-white differentiation is maintained without evidence of an acute infarct.  There is no evidence of hydrocephalus. ORBITS: The visualized portion of the orbits demonstrate no acute abnormality. SINUSES: The visualized paranasal sinuses and mastoid air cells demonstrate no acute abnormality.  There is a small retention cyst in the left maxillary sinus.  There is mucosal thickening in some of the sphenoid sinus.  There is some associated cortical thickening as well compatible with chronicity. SOFT TISSUES/SKULL:  No acute abnormality of the visualized skull or soft tissues.     No acute intracranial abnormality.     XR CHEST PORTABLE    Result Date: 7/7/2024  EXAMINATION: ONE XRAY VIEW OF THE CHEST 7/7/2024 3:48 pm COMPARISON: 07/13/2016 HISTORY: ORDERING SYSTEM PROVIDED HISTORY: Chest Pain TECHNOLOGIST PROVIDED HISTORY: Reason for exam:->Chest Pain What reading provider will be dictating this exam?->CRC FINDINGS: The lungs are without acute focal process.  There is no effusion or pneumothorax. The cardiomediastinal silhouette is without acute process. The osseous structures are without acute process.     No acute process.     CTA NECK W WO CONTRAST    Result Date: 6/13/2024  EXAMINATION: CTA OF THE NECK 6/13/2024 7:54 am TECHNIQUE: CTA of the neck was performed with the administration of intravenous contrast. Multiplanar reformatted images are provided for review.  MIP images are provided for review. Stenosis of the internal carotid arteries measured using NASCET criteria. Automated exposure control, iterative reconstruction, and/or weight based adjustment of the mA/kV was utilized to reduce the radiation dose to as low as reasonably achievable. COMPARISON: None. HISTORY: ORDERING SYSTEM PROVIDED HISTORY: Bilateral carotid artery stenosis TECHNOLOGIST PROVIDED HISTORY: Additional Contrast?->Radiologist Recommendation STAT Creatinine as

## 2024-07-14 NOTE — PROGRESS NOTES
Pulmonary & Critical Care Medicine ICU Progress Note  Chief complaint : Acute encephalopathy/hypertensive emergency    Subjunctive/24 hour events :   Patient seen and examined during multidisciplinary rounds with RN, charge nurse, RT, pharmacy, dietitian, and social service.   Improved, answers questions, calm, still on nicardipine drip, no fever, on Precedex at 1 mg/kg/h, no nausea no vomiting, able to swallow ice chips however not eating yet.  Urine output 4 L,        New information updated in the note today, rest of the examination did not change compared to yesterday.  Social History     Tobacco Use    Smoking status: Every Day     Current packs/day: 1.00     Average packs/day: 1 pack/day for 39.5 years (39.5 ttl pk-yrs)     Types: Cigarettes     Start date: 1985    Smokeless tobacco: Never    Tobacco comments:     Getting closer to wanting to quit.   Substance Use Topics    Alcohol use: No     Alcohol/week: 0.0 standard drinks of alcohol         Problem Relation Age of Onset    Cancer Mother         Throat & esophagus    Anemia Mother     Other Father         Natural causes    High Blood Pressure Sister     COPD Brother     Heart Disease Brother         Cardiac stent    Diabetes Sister     COPD Sister     Emphysema Sister     No Known Problems Daughter        No results for input(s): \"PHART\", \"[id number]\", \"PO2ART\" in the last 72 hours.    MV Settings:     / / /            IV:   dextrose 5% and 0.45% NaCl with KCl 20 mEq 125 mL/hr at 07/14/24 0059    niCARdipine 7.5 mg/hr (07/14/24 0610)    dexmedeTOMIDine (PRECEDEX) 1,000 mcg in sodium chloride 0.9 % 250 mL infusion 1 mcg/kg/hr (07/14/24 0605)    sodium chloride      sodium chloride      dextrose         Vitals:  BP (!) 158/68   Pulse 72   Temp 97.9 °F (36.6 °C) (Axillary)   Resp 21   Ht 1.88 m (6' 2.02\")   Wt 87.9 kg (193 lb 12.8 oz)   SpO2 98%   PF (!) 20 L/min   BMI 24.87 kg/m²    Tmax:        Intake/Output Summary (Last 24 hours) at 7/14/2024

## 2024-07-14 NOTE — PROGRESS NOTES
Patient more alert throughout shift.  Noted to be oriented to name but disoriented to day/time/situation.  Family at bedside at HS.  Patient is able to converse with family and makes complete sentences, however they are nonsensical.  Patient told daughter \"it smells like skunk in here, put that shit out!\"  Also stated:  \"theres a fire out there,\" while looking out into hallway.  Patient also yelled at daughter telling her to:  \"sit your ass down and shut up.\"  Patient had c/o pain to back during night and was given prn pain med IV x1, which was effective.  Overall, patient rested quietly in bed with eyes closes.  No restlessness or agitation noted.  NSR on telemtry, B/P continues to be elevated.  Cardene reinitiated at HS and titrated accordingly.  Gorman to CD draining clear, yellow urine, large amount.

## 2024-07-15 LAB
ACANTHOCYTES BLD QL SMEAR: ABNORMAL
ALBUMIN SERPL-MCNC: 2.9 G/DL (ref 3.5–4.6)
ALP SERPL-CCNC: 148 U/L (ref 35–104)
ALT SERPL-CCNC: 39 U/L (ref 0–41)
ANION GAP SERPL CALCULATED.3IONS-SCNC: 12 MEQ/L (ref 9–15)
ANISOCYTOSIS BLD QL SMEAR: ABNORMAL
AST SERPL-CCNC: 46 U/L (ref 0–40)
BACTERIA CSF CULT: NORMAL
BASOPHILS # BLD: 0 K/UL (ref 0–0.2)
BASOPHILS NFR BLD: 0.2 %
BILIRUB SERPL-MCNC: 0.9 MG/DL (ref 0.2–0.7)
BUN SERPL-MCNC: 13 MG/DL (ref 6–20)
CALCIUM SERPL-MCNC: 8.5 MG/DL (ref 8.5–9.9)
CHLORIDE SERPL-SCNC: 110 MEQ/L (ref 95–107)
CO2 SERPL-SCNC: 24 MEQ/L (ref 20–31)
CREAT SERPL-MCNC: 0.98 MG/DL (ref 0.7–1.2)
EOSINOPHIL # BLD: 0.5 K/UL (ref 0–0.7)
EOSINOPHIL NFR BLD: 6 %
ERYTHROCYTE [DISTWIDTH] IN BLOOD BY AUTOMATED COUNT: 22.5 % (ref 11.5–14.5)
GLOBULIN SER CALC-MCNC: 2.8 G/DL (ref 2.3–3.5)
GLUCOSE BLD-MCNC: 110 MG/DL (ref 70–99)
GLUCOSE BLD-MCNC: 149 MG/DL (ref 70–99)
GLUCOSE BLD-MCNC: 156 MG/DL (ref 70–99)
GLUCOSE SERPL-MCNC: 117 MG/DL (ref 70–99)
HCT VFR BLD AUTO: 27.7 % (ref 42–52)
HGB BLD-MCNC: 9.9 G/DL (ref 14–18)
HSV1 DNA CSF QL NAA+PROBE: NOT DETECTED
HSV2 DNA CSF QL NAA+PROBE: NOT DETECTED
HYPOCHROMIA BLD QL SMEAR: ABNORMAL
LYMPHOCYTES # BLD: 1.7 K/UL (ref 1–4.8)
LYMPHOCYTES NFR BLD: 21 %
MACROCYTES BLD QL SMEAR: ABNORMAL
MAGNESIUM SERPL-MCNC: 1.7 MG/DL (ref 1.7–2.4)
MCH RBC QN AUTO: 25.8 PG (ref 27–31.3)
MCHC RBC AUTO-ENTMCNC: 35.7 % (ref 33–37)
MCV RBC AUTO: 72.3 FL (ref 79–92.2)
MONOCYTES # BLD: 0.6 K/UL (ref 0.2–0.8)
MONOCYTES NFR BLD: 6.7 %
NEUTROPHILS # BLD: 5.5 K/UL (ref 1.4–6.5)
NEUTS SEG NFR BLD: 67 %
NRBC BLD-RTO: 8 /100 WBC
PATH CONSULT FLUID: NORMAL
PERFORMED ON: ABNORMAL
PLATELET # BLD AUTO: 86 K/UL (ref 130–400)
PLATELET BLD QL SMEAR: ABNORMAL
POIKILOCYTOSIS BLD QL SMEAR: ABNORMAL
POLYCHROMASIA BLD QL SMEAR: ABNORMAL
POTASSIUM SERPL-SCNC: 3 MEQ/L (ref 3.4–4.9)
PROCALCITONIN SERPL IA-MCNC: 0.09 NG/ML (ref 0–0.15)
PROT SERPL-MCNC: 5.7 G/DL (ref 6.3–8)
RBC # BLD AUTO: 3.83 M/UL (ref 4.7–6.1)
SLIDE REVIEW: ABNORMAL
SMUDGE CELLS BLD QL SMEAR: 4.8
SODIUM SERPL-SCNC: 146 MEQ/L (ref 135–144)
SPECIMEN SOURCE: NORMAL
TARGETS BLD QL SMEAR: ABNORMAL
WBC # BLD AUTO: 8.2 K/UL (ref 4.8–10.8)

## 2024-07-15 PROCEDURE — 84145 PROCALCITONIN (PCT): CPT

## 2024-07-15 PROCEDURE — 6360000002 HC RX W HCPCS: Performed by: FAMILY MEDICINE

## 2024-07-15 PROCEDURE — 2580000003 HC RX 258: Performed by: INTERNAL MEDICINE

## 2024-07-15 PROCEDURE — 6370000000 HC RX 637 (ALT 250 FOR IP): Performed by: INTERNAL MEDICINE

## 2024-07-15 PROCEDURE — 2500000003 HC RX 250 WO HCPCS: Performed by: PSYCHIATRY & NEUROLOGY

## 2024-07-15 PROCEDURE — 2580000003 HC RX 258: Performed by: FAMILY MEDICINE

## 2024-07-15 PROCEDURE — 85025 COMPLETE CBC W/AUTO DIFF WBC: CPT

## 2024-07-15 PROCEDURE — 99233 SBSQ HOSP IP/OBS HIGH 50: CPT | Performed by: INTERNAL MEDICINE

## 2024-07-15 PROCEDURE — 6360000002 HC RX W HCPCS: Performed by: INTERNAL MEDICINE

## 2024-07-15 PROCEDURE — 95816 EEG AWAKE AND DROWSY: CPT

## 2024-07-15 PROCEDURE — 83735 ASSAY OF MAGNESIUM: CPT

## 2024-07-15 PROCEDURE — 97168 OT RE-EVAL EST PLAN CARE: CPT

## 2024-07-15 PROCEDURE — 92526 ORAL FUNCTION THERAPY: CPT

## 2024-07-15 PROCEDURE — 1210000000 HC MED SURG R&B

## 2024-07-15 PROCEDURE — 97162 PT EVAL MOD COMPLEX 30 MIN: CPT

## 2024-07-15 PROCEDURE — 6370000000 HC RX 637 (ALT 250 FOR IP): Performed by: FAMILY MEDICINE

## 2024-07-15 PROCEDURE — 99233 SBSQ HOSP IP/OBS HIGH 50: CPT | Performed by: PSYCHIATRY & NEUROLOGY

## 2024-07-15 PROCEDURE — 2580000003 HC RX 258: Performed by: PSYCHIATRY & NEUROLOGY

## 2024-07-15 PROCEDURE — 36415 COLL VENOUS BLD VENIPUNCTURE: CPT

## 2024-07-15 PROCEDURE — 2700000000 HC OXYGEN THERAPY PER DAY

## 2024-07-15 PROCEDURE — 80053 COMPREHEN METABOLIC PANEL: CPT

## 2024-07-15 RX ORDER — LISINOPRIL 10 MG/1
10 TABLET ORAL DAILY
Status: DISCONTINUED | OUTPATIENT
Start: 2024-07-15 | End: 2024-07-16

## 2024-07-15 RX ORDER — FUROSEMIDE 10 MG/ML
20 INJECTION INTRAMUSCULAR; INTRAVENOUS ONCE
Status: COMPLETED | OUTPATIENT
Start: 2024-07-15 | End: 2024-07-15

## 2024-07-15 RX ADMIN — DEXTROSE MONOHYDRATE: 50 INJECTION, SOLUTION INTRAVENOUS at 02:17

## 2024-07-15 RX ADMIN — POTASSIUM CHLORIDE 10 MEQ: 7.46 INJECTION, SOLUTION INTRAVENOUS at 11:11

## 2024-07-15 RX ADMIN — HYDRALAZINE HYDROCHLORIDE 10 MG: 20 INJECTION INTRAMUSCULAR; INTRAVENOUS at 06:27

## 2024-07-15 RX ADMIN — PIPERACILLIN AND TAZOBACTAM 3375 MG: 3; .375 INJECTION, POWDER, LYOPHILIZED, FOR SOLUTION INTRAVENOUS at 02:18

## 2024-07-15 RX ADMIN — Medication 3 MG: at 20:43

## 2024-07-15 RX ADMIN — SODIUM CHLORIDE, PRESERVATIVE FREE 10 ML: 5 INJECTION INTRAVENOUS at 20:43

## 2024-07-15 RX ADMIN — POTASSIUM CHLORIDE 10 MEQ: 7.46 INJECTION, SOLUTION INTRAVENOUS at 12:30

## 2024-07-15 RX ADMIN — PIPERACILLIN AND TAZOBACTAM 3375 MG: 3; .375 INJECTION, POWDER, LYOPHILIZED, FOR SOLUTION INTRAVENOUS at 20:47

## 2024-07-15 RX ADMIN — POTASSIUM CHLORIDE 10 MEQ: 7.46 INJECTION, SOLUTION INTRAVENOUS at 10:45

## 2024-07-15 RX ADMIN — VALPROATE SODIUM 500 MG: 100 INJECTION, SOLUTION INTRAVENOUS at 06:31

## 2024-07-15 RX ADMIN — LISINOPRIL 10 MG: 10 TABLET ORAL at 07:48

## 2024-07-15 RX ADMIN — POTASSIUM CHLORIDE 10 MEQ: 7.46 INJECTION, SOLUTION INTRAVENOUS at 09:45

## 2024-07-15 RX ADMIN — POTASSIUM CHLORIDE 10 MEQ: 7.46 INJECTION, SOLUTION INTRAVENOUS at 07:43

## 2024-07-15 RX ADMIN — POTASSIUM CHLORIDE 10 MEQ: 7.46 INJECTION, SOLUTION INTRAVENOUS at 08:45

## 2024-07-15 RX ADMIN — AMLODIPINE BESYLATE 10 MG: 10 TABLET ORAL at 07:48

## 2024-07-15 RX ADMIN — HYDRALAZINE HYDROCHLORIDE 10 MG: 20 INJECTION INTRAMUSCULAR; INTRAVENOUS at 00:06

## 2024-07-15 RX ADMIN — SODIUM CHLORIDE, PRESERVATIVE FREE 10 ML: 5 INJECTION INTRAVENOUS at 07:48

## 2024-07-15 RX ADMIN — TAMSULOSIN HYDROCHLORIDE 0.4 MG: 0.4 CAPSULE ORAL at 07:48

## 2024-07-15 RX ADMIN — CARVEDILOL 12.5 MG: 12.5 TABLET, FILM COATED ORAL at 20:43

## 2024-07-15 RX ADMIN — PIPERACILLIN AND TAZOBACTAM 3375 MG: 3; .375 INJECTION, POWDER, LYOPHILIZED, FOR SOLUTION INTRAVENOUS at 09:52

## 2024-07-15 RX ADMIN — CARVEDILOL 12.5 MG: 12.5 TABLET, FILM COATED ORAL at 07:48

## 2024-07-15 RX ADMIN — FUROSEMIDE 20 MG: 10 INJECTION, SOLUTION INTRAMUSCULAR; INTRAVENOUS at 09:45

## 2024-07-15 RX ADMIN — ASPIRIN 81 MG 81 MG: 81 TABLET ORAL at 07:48

## 2024-07-15 ASSESSMENT — PAIN SCALES - GENERAL
PAINLEVEL_OUTOF10: 0
PAINLEVEL_OUTOF10: 0

## 2024-07-15 NOTE — PROGRESS NOTES
Pulmonary & Critical Care Medicine ICU Progress Note  Chief complaint : Acute encephalopathy/hypertensive emergency    Subjunctive/24 hour events :   Patient seen and examined during multidisciplinary rounds with RN, charge nurse, RT, pharmacy, dietitian, and social service.   \    Awake, no complaint, slight confusion able to answer questions appropriately, no fever, on 2 L O2 saturation 98%, urine output 3.3 L, +5 L      New information updated in the note today, rest of the examination did not change compared to yesterday.  Social History     Tobacco Use    Smoking status: Every Day     Current packs/day: 1.00     Average packs/day: 1 pack/day for 39.5 years (39.5 ttl pk-yrs)     Types: Cigarettes     Start date: 1985    Smokeless tobacco: Never    Tobacco comments:     Getting closer to wanting to quit.   Substance Use Topics    Alcohol use: No     Alcohol/week: 0.0 standard drinks of alcohol         Problem Relation Age of Onset    Cancer Mother         Throat & esophagus    Anemia Mother     Other Father         Natural causes    High Blood Pressure Sister     COPD Brother     Heart Disease Brother         Cardiac stent    Diabetes Sister     COPD Sister     Emphysema Sister     No Known Problems Daughter        No results for input(s): \"PHART\", \"TSH1LXB\", \"PO2ART\" in the last 72 hours.    MV Settings:     / / /            IV:   dextrose 125 mL/hr at 07/15/24 0809    niCARdipine Stopped (07/14/24 1007)    dexmedeTOMIDine (PRECEDEX) 1,000 mcg in sodium chloride 0.9 % 250 mL infusion Stopped (07/14/24 1400)    sodium chloride      sodium chloride      dextrose         Vitals:  BP (!) 168/60   Pulse 84   Temp 98.3 °F (36.8 °C) (Oral)   Resp 22   Ht 1.88 m (6' 2.02\")   Wt 87.9 kg (193 lb 12.8 oz)   SpO2 98%   PF (!) 20 L/min   BMI 24.87 kg/m²    Tmax:        Intake/Output Summary (Last 24 hours) at 7/15/2024 0845  Last data filed at 7/15/2024 0809  Gross per 24 hour   Intake 81896.44 ml   Output 3375 ml

## 2024-07-15 NOTE — CARE COORDINATION
Quality round completed with care management team. Pt is in for transfer to floor. Pt confuse at this time. Neuro following.   Pt's fiance is at bedside would like HPOA paper work done. ANTONINO and Jelani Polanco met with fisylwia at bedside and explained that because pt is confuse HPOA paper work can not be done at this time.     DC plan: Pend Pt/OT eval. Legal next of kin is pt's 2 daughter.     LSW will follow.     Electronically signed by ANTONINO Martínez on 7/15/2024 at 9:49 AM    LSW received a call from pt's dtrShandra. Answered all questions and concerns at this time.     Electronically signed by ANTONINO Martínez on 7/15/2024 at 10:31 AM

## 2024-07-15 NOTE — PROGRESS NOTES
Generally decreased, functional (R hand worse d/t edema)    UE TONE:  Tone: Normal    UE SENSATION:  Sensation: Intact    Hand Dominance:  Hand Dominance  Hand Dominance: Right    ADL Status:  ADL  Feeding: Minimal assistance  Feeding Skilled Clinical Factors: Difficulty using R hand functionally  Grooming: Moderate assistance  UE Bathing: Maximum assistance  LE Bathing: Dependent/Total  UE Dressing: Maximum assistance  LE Dressing: Dependent/Total  Toileting: Dependent/Total  Additional Comments: Simulated ADLs as above. Limited d/t pain, weakness and fatigue. Decreased coordination.  Toilet Transfers  Toilet Transfer: Unable to assess  Toilet Transfers Comments: Anticipate mod A    Functional Mobility:    Transfers  Sit to stand: Moderate assistance, 2 Person assistance, Minimal assistance  Stand to sit: Moderate assistance, 2 Person assistance, Minimal assistance  Transfer Comments: Decreased balance, difficulty side stepping, decreased coordination    Patient ambulated to head of bed with Handheld assist at Mod A level. Significant difficulty side stepping. Increased effort to weight shift and sequence taking any steps.     Bed Mobility  Bed mobility  Supine to Sit: Moderate assistance  Sit to Supine: Moderate assistance  Bed Mobility Comments: Increased effort to come to EOB, difficulty advancing LEs. Difficulty using RUE for pushing to assist    Seated and Standing Balance:  Balance  Sitting: Impaired  Sitting - Static: Good (unsupported)  Sitting - Dynamic: Fair (occasional)  Standing: Impaired  Standing - Static: Fair  Standing - Dynamic: Fair    Functional Endurance:  Activity Tolerance  Activity Tolerance: Patient limited by fatigue;Treatment limited secondary to decreased cognition    D/C Recommendations:  OT D/C RECOMMENDATIONS  REQUIRES OT FOLLOW-UP: Yes    Equipment Recommendations:  OT Equipment Recommendations  Other: Continue to assess    OT Education:   Patient Education  Education Given To:  with no assistance but may require a device   Stand by assistance = Pt. does not perform task at an independent level but does not need physical assistance, requires verbal cues  Minimal, Moderate, Maximal Assistance = Pt. requires physical assistance (25%, 50%, 75% assist from helper) for task but is able to actively participate in task   Dependent = Pt. requires total assistance with task and is not able to actively participate with task completion     Plan:  Occupational Therapy Plan  Times Per Week: 1-4x  Therapy Duration:  (Length of acute stay)  Current Treatment Recommendations: Strengthening, Functional mobility training, Endurance training, Balance training, Cognitive reorientation, Pain management, Safety education & training, Equipment evaluation, education, & procurement, Patient/Caregiver education & training, Self-Care / ADL, Coordination training, Home management training, Cognitive/Perceptual training    Goals:   Patient will:    - Improve functional endurance to tolerate/complete 30 mins of ADL's  - Be Min A in UB ADLs   - Be Min A in LB ADLs  - Be Min A in ADL transfers without LOB  - Be Min A in toileting tasks  - Improve B hand fine motor coordination to WFL in order to manage clothing fasteners/self-care containers in a timely manner  - Improve B UE strength and endurance to 4/5 in order to participate in self-care activities as projected.  - Access appropriate D/C site with as few architectural barriers as possible.  - Sequence self-care tasks with no verbal cues for safety    Patient Goal: Patient goals : \"I want to get home\"     Discussed and agreed upon: Yes Comments:       Therapy Time:   Individual   Time In 1034   Time Out 1050   Minutes 16     Eval: 16 minutes - reassessment    Electronically signed by:    JEAN MARIE Serrano/ISACC,   7/15/2024, 11:26 AM

## 2024-07-15 NOTE — PROGRESS NOTES
Spiritual Care Services     Summary of Visit:   visited with family in ICU. Social work also present. Patient and family want to complete advanced directive packet they started. Patient slow to speak at times but answered most but not all questions correctly to determine level of orientation. Patient hospitalist will visit and further action will be taken after the visit.    Encounter Summary  Encounter Overview/Reason: Initial Encounter  Service Provided For: Patient and family together  Referral/Consult From: Multi-disciplinary team  Support System: Significant other, Children  Begin Time: 0930  End Time : 0945  Total Time Calculated: 15 min        Spiritual/Emotional needs  Type: Spiritual Support                      Spiritual Assessment/Intervention/Outcomes:    Assessment: Calm    Intervention: Sustaining Presence/Ministry of presence    Outcome: Receptive      Care Plan:    Plan and Referrals  Plan/Referrals: Continue Support (comment)    Assist with advanced directives when patient is able      Spiritual Care Services   Electronically signed by Chaplain Silas on 7/15/2024 at 10:26 AM.    To reach a  for emotional and spiritual support, place an EPIC consult request.   If a  is needed immediately, dial “0” and ask to page the on-call .

## 2024-07-15 NOTE — PLAN OF CARE
See OT evaluation for all goals and OT POC. Electronically signed by Mitzy Quintana OTGOSIA/L on 7/15/2024 at 11:27 AM

## 2024-07-15 NOTE — PROGRESS NOTES
Physical Therapy Med Surg Initial Assessment  Facility/Department: [facility] ICU  Room: [place] [address]       NAME: Mateo Marsh  : 1965 (58 y.o.)  MRN: [id number]  CODE STATUS: Full Code    Date of Service: 7/15/2024    Patient Diagnosis(es): Hypoxemia [R09.02]  Sickle cell crisis (HCC) [D57.00]  Sickle cell disease with crisis (HCC) [D57.00]  Pneumonia of both lungs due to infectious organism, unspecified part of lung [J18.9]   Chief Complaint   Patient presents with    Sickle Cell Pain Crisis     Patient states pain all over from sickle cell .     Patient Active Problem List    Diagnosis Date Noted    Painful urination 2024    Pneumonia of both lungs due to infectious organism 2024    Hypoxemia 07/10/2024    Parainfluenza infection 07/10/2024    Encephalopathy 07/10/2024    Sickle cell crisis (HCC) 2024    Disease of larynx 2021    Hb-SS disease with crisis, unspecified (HCC) 2021    Status post total replacement of left hip 2020    Degenerative joint disease of left hip 2020    Tobacco use disorder 2020    REGINA (obstructive sleep apnea) 2020    S/P insertion of spinal cord stimulator 2020    Diabetic polyneuropathy associated with type 2 diabetes mellitus (HCC) 2019    Dermatophytosis of nail 2019    Hallux abducto valgus, bilateral 2019    Tailor's bunionette, bilateral 2019    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 (HCC) 2019    Chronic pain syndrome 2017    Postlaminectomy syndrome, lumbar region 2015    L4-5 HERNIATED DISC 2015        Past Medical History:   Diagnosis Date    Bronchitis     CAD (coronary artery disease)     past angioplasty > 10 yrs ago    Chronic back pain     Diabetes (HCC)     dx > 2 yrs    Dyslipidemia     Hyperlipidemia     meds > 5 yrs    Hypertension     meds > 5 yrs    Neuropathy in diabetes (HCC)     Osteoarthritis     Pneumonia     Sickle cell anemia (HCC)     Sleep apnea,

## 2024-07-15 NOTE — CARE COORDINATION
Spoke with Dr. Michael re: pt and his decision making capacity. Dr. Michael feels pt does have capacity to state his wishes for his GF to be his medical POA. I let Collin from Spiritual care know that he can do A.D. with them and GF can be POA for patient per pt wishes.

## 2024-07-15 NOTE — FLOWSHEET NOTE
Pt arrived to the floor 1225 and family at bedside.  Brought pt's tray over for him to eat but does not want to eat.  Latrice (girlfriend) here and sister (Merissa) here also.  Pt denies pain or discomfort  just that he was cold warm blankets given.Electronically signed by Raquel Phillips LPN on 7/15/2024 at 1:11 PM

## 2024-07-15 NOTE — PROGRESS NOTES
[facility] Neurology Daily Progress Note  Name: Mateo Marsh  Age: 58 y.o.  Gender: male  CodeStatus: Full Code  Allergies: No Known Allergies    Chief Complaint:Sickle Cell Pain Crisis (Patient states pain all over from sickle cell .)    Primary Care Provider: Rey Pang MD  InpatientTreatment Team: Treatment Team: Attending Provider: Camilo Michael MD; Consulting Physician: Satya Sanchez MD; Consulting Physician: Hussein Strong MD; Consulting Physician: Denver Montes MD; Consulting Physician: Miles Baxter MD; Physician Assistant: Rj Michelle PA; Consulting Physician: Ruslan Almodovar MD; Consulting Physician: Silva Bain MD; : Fatuma Lyon RN; Registered Nurse: Mirlande Gilman, RN; : Sammie Erickson; Utilization Reviewer: Shandra Leal RN; : Colette Liu; : Francisco Jaime LSW  Admission Date: 7/9/2024      HPI   Pt seen and examined for neurology follow-up.  Patient is lethargic.  Will briefly awaken to tactile stimuli but falls back asleep.  Confused.  No seizure activity reported.  Moves all extremities spontaneously.  Significant other at bedside.  Afebrile.  Patient is hypertensive with blood pressure of 195/79.  Lumbar puncture completed today.    Patient seen and examined and very agitated.  Very confused not opening eyes    7/13  Patient transferred secondary unit under my direction for Cardene drip.  Patient actually much better with his blood pressures.  Overnight there had been some bumps in his heart rates and blood pressures are now stable.  Patient not intubated.  Patient appears to be resting quietly and at several points he was actually able to open his eyes to his name.  Does not follow any other commands at this time.  No definite sedation given that he is on multiple pain medications.  I have left him on Depakote as I was not able to complete his EEG.  MRI done and reviewed and essentially  family in detail    7/14  Encephalopathy likely to be hypertensive encephalopathy related to underlying sickle cell as well.  Patient also had painful priapism which could be a part of the sickle cell.  This is all resolving.  Patient is much more awake and actually oriented today.  Blood pressure now better controlled and Cardene drip discontinued.  Patient maintaining blood pressures less than 160 which is our goal.  As noted MRI was negative and so was his LP.  Patient has considerable pain which likely is causing more issues and he becomes more agitated and this is mostly from his back.  Patient also recovering from parainfluenza.  Will arrange for an EEG and at some point in time he would like to discontinue his Depakote.    7/15  Complete nonfocal examination notable today patient reports no headache.  Encephalopathy resolved likely to be a pure hypertensive cystopathy with crisis without any residual PRES. patient much improved with Cardene drip lumbar puncture negative.  It is more than likely that this patient truly definitely has sickle cell disease given the presentation and his bone findings.  Patient initially actually had significant pain on micturition and could be a painful priapism reported in the sickle cell.  He has a Gorman catheter which may not be discontinued and will follow this up with an EEG to discontinue his Depakote as well.  This was used transiently as we were not able to perform an EEG given his agitation as he pulled out leads will follow-up with an EEG    Hussein Strong MD, LAMBERT  Diplomate, American Board of Psychiatry & Neurology  Board Certified in Vascular Neurology  Board Certified in Neuromuscular Medicine  Certified in Neurorehabilitation             Collaborating physicians: Dr Strong    Electronically signed by Hussein Strong MD on 7/15/2024 at 9:53 AM

## 2024-07-15 NOTE — PROGRESS NOTES
Hospitalist Daily Progress Note  Name: Mateo Marsh  Age: 58 y.o.  Gender: male  CodeStatus: Full Code  Allergies: No Known Allergies    Chief Complaint:Sickle Cell Pain Crisis (Patient states pain all over from sickle cell .)      Primary Care Provider: Rey Pang MD    InpatientTreatment Team: Treatment Team: Attending Provider: Camilo Michael MD; Consulting Physician: Satya Sanchez MD; Consulting Physician: Hussein Strong MD; Consulting Physician: Denver Montes MD; Consulting Physician: Miles Baxter MD; Physician Assistant: Rj Michelle PA; Consulting Physician: Ruslan Almodovar MD; Consulting Physician: Silva Bain MD; : Fatuma Lyon RN; Registered Nurse: Brandie Anaya RN    Admission Date: 7/9/2024      Subjective: Remains alert, answers questions, daughter Scarlet and GF at bedside.  No cp, sob.    Physical Exam  Vitals and nursing note reviewed.   Constitutional:       Appearance: Normal appearance.   Cardiovascular:      Rate and Rhythm: Normal rate and regular rhythm.   Pulmonary:      Effort: Pulmonary effort is normal.      Breath sounds: Normal breath sounds.   Abdominal:      General: Bowel sounds are normal.      Palpations: Abdomen is soft.   Musculoskeletal:         General: Normal range of motion.   Skin:     General: Skin is warm and dry.   Neurological:      Mental Status: He is alert and oriented to person, place, and time. Mental status is at baseline.         Medications:  Reviewed    Infusion Medications:    dextrose 125 mL/hr at 07/15/24 0528    niCARdipine Stopped (07/14/24 1007)    dexmedeTOMIDine (PRECEDEX) 1,000 mcg in sodium chloride 0.9 % 250 mL infusion Stopped (07/14/24 1400)    sodium chloride      sodium chloride      dextrose       Scheduled Medications:    amLODIPine  10 mg Oral Daily    valproate (DEPACON) 500 mg in sodium chloride 0.9 % 100 mL IVPB  500 mg IntraVENous 3 times per day    piperacillin-tazobactam  3,375 mg

## 2024-07-15 NOTE — PLAN OF CARE
Problem: Discharge Planning  Goal: Discharge to home or other facility with appropriate resources  Outcome: Progressing  Flowsheets (Taken 7/14/2024 1930)  Discharge to home or other facility with appropriate resources: Identify barriers to discharge with patient and caregiver     Problem: Pain  Goal: Verbalizes/displays adequate comfort level or baseline comfort level  Outcome: Progressing  Flowsheets  Taken 7/15/2024 0000  Verbalizes/displays adequate comfort level or baseline comfort level:   Encourage patient to monitor pain and request assistance   Assess pain using appropriate pain scale   Administer analgesics based on type and severity of pain and evaluate response  Taken 7/14/2024 1930  Verbalizes/displays adequate comfort level or baseline comfort level:   Encourage patient to monitor pain and request assistance   Assess pain using appropriate pain scale   Administer analgesics based on type and severity of pain and evaluate response     Problem: Safety - Adult  Goal: Free from fall injury  Outcome: Progressing     Problem: Confusion  Goal: Confusion, delirium, dementia, or psychosis is improved or at baseline  Description: INTERVENTIONS:  1. Assess for possible contributors to thought disturbance, including medications, impaired vision or hearing, underlying metabolic abnormalities, dehydration, psychiatric diagnoses, and notify attending LIP  2. [place] high risk fall precautions, as indicated  3. Provide frequent short contacts to provide reality reorientation, refocusing and direction  4. Decrease environmental stimuli, including noise as appropriate  5. Monitor and intervene to maintain adequate nutrition, hydration, elimination, sleep and activity  6. If unable to ensure safety without constant attention obtain sitter and review sitter guidelines with assigned personnel  7. Initiate Psychosocial CNS and Spiritual Care consult, as indicated  Outcome: Progressing  Flowsheets (Taken 7/14/2024

## 2024-07-15 NOTE — PROGRESS NOTES
PROGRESS NOTE    Patient Name: Mateo Marsh  Admit Date: 2024  8:13 PM  MR #: [id number]  : 1965    Attending Physician: Camilo Michael MD  Reason for consult: CP    History of Presenting Illness:      Mateo Marsh is a 58 y.o. male on hospital day 6 with a history of .   History Obtained From:  patient, electronic medical record    Admitted with confusion severe generalized pain to include CP and SOB. Daughter is in room.     Pt is confused and does not recognize me.     He was hypoxic in ER and placed on 6L O2. He presented w dehydration.   He is is Sickle cell crisis sand Parainfluenza infection.     HS Troponin 12 >25>24    ECG SR 85 NSST Changes and PVC    He has recent ABN Stress. Cath not done as insurance denied.  Currently in another precert for this,.     24 Tele SR 97 pt remains very confused. Not following directions. Moving around in bed. Restless. 1:1 sitter in room for pt safety.     24 Tele   restless.  Moving around in bed. Remains confused and lethargic.     24 Tele SR 64. Due to worse Encephalopathy he was transferred to ICU.  Pt was thrashing around. He is sedated. On Cardene gtt     24 Tele SR 73.  He is awake this am and does recognize me.  Mentation is still slow.     7/15/24 Tele SR 84.  Family in room. Again, more awake and alert. Feeding himself breakfast.     History:      EKG:  Past Medical History:   Diagnosis Date    Bronchitis     CAD (coronary artery disease)     past angioplasty > 10 yrs ago    Chronic back pain     Diabetes (HCC)     dx > 2 yrs    Dyslipidemia     Hyperlipidemia     meds > 5 yrs    Hypertension     meds > 5 yrs    Neuropathy in diabetes (HCC)     Osteoarthritis     Pneumonia     Sickle cell anemia (HCC)     Sleep apnea, obstructive      Past Surgical History:   Procedure Laterality Date    BACK SURGERY      Lumbar disc OR.    BLADDER SURGERY  07/10/2015    Patient states he was urininating blood and had a procedure done.  Units SubCUTAneous Nightly    carvedilol  12.5 mg Oral BID    tamsulosin  0.4 mg Oral Daily     Continuous Infusions:   dextrose 125 mL/hr at 07/15/24 0809    niCARdipine Stopped (07/14/24 1007)    dexmedeTOMIDine (PRECEDEX) 1,000 mcg in sodium chloride 0.9 % 250 mL infusion Stopped (07/14/24 1400)    sodium chloride      sodium chloride      dextrose         Recent Labs     07/13/24  0456 07/14/24  0445 07/15/24  0502   WBC 8.0 7.9 8.2   HGB 9.6* 10.1* 9.9*   HCT 28.0* 28.2* 27.7*   MCV 74.7* 73.8* 72.3*   PLT 82* 81* 86*       Recent Labs     07/13/24  0456 07/14/24  0445 07/15/24  0502   * 153* 146*   K 3.4 3.3* 3.0*   * 119* 110*   CO2 21 23 24   BUN 27* 15 13   CREATININE 1.20 0.97 0.98       Recent Labs     07/13/24  0456 07/14/24  0445 07/15/24  0502   AST 38 45* 46*   ALT 35 40 39   BILIDIR  --  0.6*  --    BILITOT 1.7* 1.4* 0.9*   ALKPHOS 186* 172* 148*       No results for input(s): \"LIPASE\", \"AMYLASE\" in the last 72 hours.  No results for input(s): \"INR\" in the last 72 hours.    Invalid input(s): \"PROT\"    CTA CHEST W WO CONTRAST PE Eval    Result Date: 7/9/2024  EXAMINATION: CTA OF THE CHEST WITH AND WITHOUT CONTRAST 7/9/2024 10:33 pm TECHNIQUE: CTA of the chest was performed before and after the administration of intravenous contrast.  Multiplanar reformatted images are provided for review.  MIP images are provided for review. Automated exposure control, iterative reconstruction, and/or weight based adjustment of the mA/kV was utilized to reduce the radiation dose to as low as reasonably achievable. COMPARISON: None. HISTORY: ORDERING SYSTEM PROVIDED HISTORY: PE TECHNOLOGIST PROVIDED HISTORY: Reason for exam:->PE Decision Support Exception - unselect if not a suspected or confirmed emergency medical condition->Emergency Medical Condition (MA) What reading provider will be dictating this exam?->CRC FINDINGS: Aorta: No evidence of thoracic aortic aneurysm or dissection.  No acute abnormality

## 2024-07-15 NOTE — PROGRESS NOTES
Patient mental status much improved this shift.  Patient denies any pain.  Gorman in place draining yellow urine.  Bp elevated and required prn bp medications x2 this shift.  This morning, patient critical k of 3.0.   prn replacement to be given this am.  Report given to yvan at bedside.  Electronically signed by Brandie Anaya RN on 7/15/2024 at 7:19 AM

## 2024-07-15 NOTE — PROGRESS NOTES
[facility]  Facility/Department: [facility] 2W ORTHO TELE  Speech Language Pathology   Treatment Note      Mateo Marsh  1965  [id number]  [x]   confirmed      Date: 7/15/2024    Hypoxemia [R09.02]  Sickle cell crisis (HCC) [D57.00]  Sickle cell disease with crisis (HCC) [D57.00]  Pneumonia of both lungs due to infectious organism, unspecified part of lung [J18.9]    Restrictions/Precautions: Fall Risk, Up as Tolerated, Contact Precautions    ADULT DIET; Dysphagia - Soft and Bite Sized; 4 carb choices (60 gm/meal); Low Fat/Low Chol/High Fiber/2 gm Na     Respiratory Status:O2 Flow Rate (L/min): 2 L/min (07/15/24 0430)   Contact      Subjective:  Alert and Cooperative      Stated name and birthday with increased time    Interventions used this date:  Therapeutic Meal Monitor      Objective/Assessment:  Patient progressing towards goals:  Goal 1: Pt will tolerate the recommended diet level without clinical s/s of aspiration.  Goal 2: Pt will tolerate 5/5 trials of regular solid consistencies with adequate mastication and oral clearance of bolus in all opportunities.  Goal 3: Pt will demonstrate small bites/sips, slow rate, alternate solids/liquids strategies for safe and efficient swallow of current recommended diet consistencies in all given opportunities.  Goal 4: Pt will complete lingual exercises that promote anterior to posterior propulsion of bolus and improve tongue base retraction x10 each in order to strengthen the muscles of the swallow to decrease risk of aspiration and  to increase ability to safely handle the least restrictive diet level.    Pt and family reported decreased intake this date with not much appetite.  Pt in agreement to have a deli sandwich.  After set up assist, pt fed self with increased time.  Pt demonstrated adequate bolus formation, mastication, and clearance.  Pt tolerated lemonade via straw with no overt s/s of aspiration.    Rec: advance diet to regular textures, continue with

## 2024-07-16 ENCOUNTER — APPOINTMENT (OUTPATIENT)
Dept: ULTRASOUND IMAGING | Age: 59
DRG: 811 | End: 2024-07-16
Payer: COMMERCIAL

## 2024-07-16 ENCOUNTER — TELEPHONE (OUTPATIENT)
Dept: CARDIOLOGY CLINIC | Age: 59
End: 2024-07-16

## 2024-07-16 LAB
ALBUMIN SERPL-MCNC: 3.2 G/DL (ref 3.5–4.6)
ALP SERPL-CCNC: 145 U/L (ref 35–104)
ALT SERPL-CCNC: 33 U/L (ref 0–41)
ANION GAP SERPL CALCULATED.3IONS-SCNC: 13 MEQ/L (ref 9–15)
ANISOCYTOSIS BLD QL SMEAR: ABNORMAL
AST SERPL-CCNC: 42 U/L (ref 0–40)
BASOPHILS # BLD: 0 K/UL (ref 0–0.2)
BASOPHILS NFR BLD: 0.3 %
BILIRUB SERPL-MCNC: 0.9 MG/DL (ref 0.2–0.7)
BUN SERPL-MCNC: 17 MG/DL (ref 6–20)
CALCIUM SERPL-MCNC: 9 MG/DL (ref 8.5–9.9)
CHLORIDE SERPL-SCNC: 107 MEQ/L (ref 95–107)
CO2 SERPL-SCNC: 23 MEQ/L (ref 20–31)
CREAT SERPL-MCNC: 1.13 MG/DL (ref 0.7–1.2)
EOSINOPHIL # BLD: 0.3 K/UL (ref 0–0.7)
EOSINOPHIL NFR BLD: 4 %
ERYTHROCYTE [DISTWIDTH] IN BLOOD BY AUTOMATED COUNT: 22.1 % (ref 11.5–14.5)
GLOBULIN SER CALC-MCNC: 3 G/DL (ref 2.3–3.5)
GLUCOSE BLD-MCNC: 112 MG/DL (ref 70–99)
GLUCOSE BLD-MCNC: 125 MG/DL (ref 70–99)
GLUCOSE BLD-MCNC: 179 MG/DL (ref 70–99)
GLUCOSE BLD-MCNC: 225 MG/DL (ref 70–99)
GLUCOSE SERPL-MCNC: 110 MG/DL (ref 70–99)
HCT VFR BLD AUTO: 28.1 % (ref 42–52)
HGB BLD-MCNC: 10.2 G/DL (ref 14–18)
HSV1+2 IGG CSF-ACNC: 1.5 IV
HSV1+2 IGM CSF-ACNC: 0.12 IV
HYPOCHROMIA BLD QL SMEAR: ABNORMAL
LYMPHOCYTES # BLD: 2.5 K/UL (ref 1–4.8)
LYMPHOCYTES NFR BLD: 32 %
MACROCYTES BLD QL SMEAR: ABNORMAL
MAGNESIUM SERPL-MCNC: 2.1 MG/DL (ref 1.7–2.4)
MCH RBC QN AUTO: 26.1 PG (ref 27–31.3)
MCHC RBC AUTO-ENTMCNC: 36.3 % (ref 33–37)
MCV RBC AUTO: 71.9 FL (ref 79–92.2)
MEV IGG CSF-ACNC: <5 AU/ML
MEV IGM CSF-ACNC: 0.19 AU (ref 0–0.79)
MICROCYTES BLD QL SMEAR: ABNORMAL
MONOCYTES # BLD: 0.4 K/UL (ref 0.2–0.8)
MONOCYTES NFR BLD: 4.7 %
MUMPS AB IGG CSF: <5 AU/ML
MUV IGM CSF-ACNC: 0.09 IV
NEUTROPHILS # BLD: 4.5 K/UL (ref 1.4–6.5)
NEUTS SEG NFR BLD: 59 %
NRBC BLD-RTO: 3 /100 WBC
PERFORMED ON: ABNORMAL
PLATELET # BLD AUTO: 101 K/UL (ref 130–400)
PLATELET BLD QL SMEAR: ABNORMAL
POIKILOCYTOSIS BLD QL SMEAR: ABNORMAL
POLYCHROMASIA BLD QL SMEAR: ABNORMAL
POTASSIUM SERPL-SCNC: 3.2 MEQ/L (ref 3.4–4.9)
PROT SERPL-MCNC: 6.2 G/DL (ref 6.3–8)
RBC # BLD AUTO: 3.91 M/UL (ref 4.7–6.1)
SLIDE REVIEW: ABNORMAL
SMUDGE CELLS BLD QL SMEAR: 18.9
SODIUM SERPL-SCNC: 143 MEQ/L (ref 135–144)
TARGETS BLD QL SMEAR: ABNORMAL
VARIANT LYMPHS NFR BLD: 1 %
VZV IGG CSF IA-ACNC: 22.1 IV
VZV IGM CSF-ACNC: 0.11 ISR
WBC # BLD AUTO: 7.6 K/UL (ref 4.8–10.8)
WNV IGG CSF-ACNC: 0.04 IV
WNV IGM CSF-ACNC: 0 IV

## 2024-07-16 PROCEDURE — 2580000003 HC RX 258: Performed by: FAMILY MEDICINE

## 2024-07-16 PROCEDURE — 6360000002 HC RX W HCPCS: Performed by: FAMILY MEDICINE

## 2024-07-16 PROCEDURE — APPSS30 APP SPLIT SHARED TIME 16-30 MINUTES: Performed by: NURSE PRACTITIONER

## 2024-07-16 PROCEDURE — 1210000000 HC MED SURG R&B

## 2024-07-16 PROCEDURE — 6360000002 HC RX W HCPCS

## 2024-07-16 PROCEDURE — 99232 SBSQ HOSP IP/OBS MODERATE 35: CPT | Performed by: INTERNAL MEDICINE

## 2024-07-16 PROCEDURE — 85025 COMPLETE CBC W/AUTO DIFF WBC: CPT

## 2024-07-16 PROCEDURE — 6370000000 HC RX 637 (ALT 250 FOR IP): Performed by: FAMILY MEDICINE

## 2024-07-16 PROCEDURE — 6370000000 HC RX 637 (ALT 250 FOR IP): Performed by: INTERNAL MEDICINE

## 2024-07-16 PROCEDURE — 83735 ASSAY OF MAGNESIUM: CPT

## 2024-07-16 PROCEDURE — 93971 EXTREMITY STUDY: CPT

## 2024-07-16 PROCEDURE — 99232 SBSQ HOSP IP/OBS MODERATE 35: CPT | Performed by: PSYCHIATRY & NEUROLOGY

## 2024-07-16 PROCEDURE — 97116 GAIT TRAINING THERAPY: CPT

## 2024-07-16 PROCEDURE — 6360000002 HC RX W HCPCS: Performed by: INTERNAL MEDICINE

## 2024-07-16 PROCEDURE — 80053 COMPREHEN METABOLIC PANEL: CPT

## 2024-07-16 PROCEDURE — 36415 COLL VENOUS BLD VENIPUNCTURE: CPT

## 2024-07-16 PROCEDURE — 2580000003 HC RX 258: Performed by: INTERNAL MEDICINE

## 2024-07-16 PROCEDURE — 99233 SBSQ HOSP IP/OBS HIGH 50: CPT | Performed by: INTERNAL MEDICINE

## 2024-07-16 RX ORDER — LISINOPRIL 20 MG/1
20 TABLET ORAL 2 TIMES DAILY
Status: DISCONTINUED | OUTPATIENT
Start: 2024-07-16 | End: 2024-07-18 | Stop reason: HOSPADM

## 2024-07-16 RX ORDER — POTASSIUM CHLORIDE 20 MEQ/1
40 TABLET, EXTENDED RELEASE ORAL
Status: COMPLETED | OUTPATIENT
Start: 2024-07-16 | End: 2024-07-16

## 2024-07-16 RX ORDER — LABETALOL HYDROCHLORIDE 5 MG/ML
10 INJECTION, SOLUTION INTRAVENOUS EVERY 4 HOURS PRN
Status: DISCONTINUED | OUTPATIENT
Start: 2024-07-16 | End: 2024-07-18 | Stop reason: HOSPADM

## 2024-07-16 RX ORDER — CARVEDILOL 25 MG/1
25 TABLET ORAL 2 TIMES DAILY
Status: DISCONTINUED | OUTPATIENT
Start: 2024-07-16 | End: 2024-07-18 | Stop reason: HOSPADM

## 2024-07-16 RX ADMIN — Medication 3 MG: at 21:18

## 2024-07-16 RX ADMIN — PIPERACILLIN AND TAZOBACTAM 3375 MG: 3; .375 INJECTION, POWDER, LYOPHILIZED, FOR SOLUTION INTRAVENOUS at 12:48

## 2024-07-16 RX ADMIN — INSULIN LISPRO 2 UNITS: 100 INJECTION, SOLUTION INTRAVENOUS; SUBCUTANEOUS at 12:39

## 2024-07-16 RX ADMIN — CARVEDILOL 25 MG: 25 TABLET, FILM COATED ORAL at 21:18

## 2024-07-16 RX ADMIN — AMLODIPINE BESYLATE 10 MG: 10 TABLET ORAL at 08:03

## 2024-07-16 RX ADMIN — TAMSULOSIN HYDROCHLORIDE 0.4 MG: 0.4 CAPSULE ORAL at 08:03

## 2024-07-16 RX ADMIN — SODIUM CHLORIDE, PRESERVATIVE FREE 10 ML: 5 INJECTION INTRAVENOUS at 08:04

## 2024-07-16 RX ADMIN — LISINOPRIL 10 MG: 10 TABLET ORAL at 08:04

## 2024-07-16 RX ADMIN — ACETAMINOPHEN 325MG 650 MG: 325 TABLET ORAL at 10:18

## 2024-07-16 RX ADMIN — POTASSIUM CHLORIDE 40 MEQ: 1500 TABLET, EXTENDED RELEASE ORAL at 08:03

## 2024-07-16 RX ADMIN — ASPIRIN 81 MG 81 MG: 81 TABLET ORAL at 08:03

## 2024-07-16 RX ADMIN — HYDRALAZINE HYDROCHLORIDE 10 MG: 20 INJECTION INTRAMUSCULAR; INTRAVENOUS at 02:52

## 2024-07-16 RX ADMIN — CARVEDILOL 12.5 MG: 12.5 TABLET, FILM COATED ORAL at 08:03

## 2024-07-16 RX ADMIN — SODIUM CHLORIDE, PRESERVATIVE FREE 10 ML: 5 INJECTION INTRAVENOUS at 21:18

## 2024-07-16 RX ADMIN — POTASSIUM CHLORIDE 40 MEQ: 1500 TABLET, EXTENDED RELEASE ORAL at 12:39

## 2024-07-16 RX ADMIN — LISINOPRIL 20 MG: 20 TABLET ORAL at 21:18

## 2024-07-16 RX ADMIN — PIPERACILLIN AND TAZOBACTAM 3375 MG: 3; .375 INJECTION, POWDER, LYOPHILIZED, FOR SOLUTION INTRAVENOUS at 04:58

## 2024-07-16 RX ADMIN — LABETALOL HYDROCHLORIDE 10 MG: 5 INJECTION, SOLUTION INTRAVENOUS at 04:55

## 2024-07-16 RX ADMIN — PIPERACILLIN AND TAZOBACTAM 3375 MG: 3; .375 INJECTION, POWDER, LYOPHILIZED, FOR SOLUTION INTRAVENOUS at 21:19

## 2024-07-16 RX ADMIN — HYDRALAZINE HYDROCHLORIDE 10 MG: 20 INJECTION INTRAMUSCULAR; INTRAVENOUS at 09:51

## 2024-07-16 ASSESSMENT — ENCOUNTER SYMPTOMS
SHORTNESS OF BREATH: 0
WHEEZING: 0
COLOR CHANGE: 0
NAUSEA: 0
VOMITING: 0
TROUBLE SWALLOWING: 0
COUGH: 0
CHEST TIGHTNESS: 0

## 2024-07-16 ASSESSMENT — PAIN SCALES - GENERAL
PAINLEVEL_OUTOF10: 5
PAINLEVEL_OUTOF10: 7

## 2024-07-16 ASSESSMENT — PAIN DESCRIPTION - LOCATION: LOCATION: BACK

## 2024-07-16 NOTE — TELEPHONE ENCOUNTER
Patient currently admitted, KCL protocol being given per medication list.     ----- Message from Denver RIVERA MD sent at 7/5/2024 11:31 AM EDT -----  Add KCL 20 Meq QD  ----- Message -----  From: Fabienne Osman Incoming Lab Results From Soft  Sent: 7/2/2024   4:44 PM EDT  To: Denver RIVERA MD

## 2024-07-16 NOTE — PROCEDURES
[facility]                   [address] [place] [address]                          ELECTROENCEPHALOGRAM      PATIENT NAME: PTEER VALENTINE                  : 1965  MED REC NO: [id number]                        ROOM: W289  ACCOUNT NO: [id number]                       ADMIT DATE: 2024  PROVIDER: Olga Stevens MD       DATE OF :  2024    MEDICATIONS:  Lisinopril, Norvasc, Zosyn, aspirin, insulin, Coreg, Flomax.    DESCRIPTION:  This is a spontaneous 20-channel EEG recording for this patient with post-laminectomy syndrome.  The patient had questionable encephalopathy.  The background rhythm of EEG shows well regulated 8 to 9 hertz posterior dominant rhythm of alpha.  This is symmetrical and attenuates with eye opening.  EKG was monitored, this is regular.  Photic stimulation was performed without any photic responses.  Hyperventilation not performed and the record remains symmetrical in awake phase with well-regulated alpha rhythm seen throughout the EEG recording.    IMPRESSION:  This is a normal well-regulated EEG recording.  Clinical correlation recommended.          OLGA STEVENS MD      D:  2024 10:26:13     T:  2024 11:18:21     NATHAN/RACHID  Job #:  [id number]     Doc#:  [id number]

## 2024-07-16 NOTE — PROGRESS NOTES
Pt c/o pain, swelling, and warmth to right hand/wrist. Pt states he previously had an IV in that location. Warm compress applied and NP Brittani notified.    Order for venous duplex obtained. Pt informed. Declines need for Tylenol at this time.    0425 PRN hydralazine given for /80. Rpt /81. NP informed and order for labetalol obtained.

## 2024-07-16 NOTE — PLAN OF CARE
Problem: Discharge Planning  Goal: Discharge to home or other facility with appropriate resources  Outcome: Progressing  Flowsheets (Taken 7/15/2024 0800 by Mirlande Gilman, RN)  Discharge to home or other facility with appropriate resources: Identify barriers to discharge with patient and caregiver     Problem: Pain  Goal: Verbalizes/displays adequate comfort level or baseline comfort level  Outcome: Progressing     Problem: Safety - Adult  Goal: Free from fall injury  Outcome: Progressing     Problem: Confusion  Goal: Confusion, delirium, dementia, or psychosis is improved or at baseline  Description: INTERVENTIONS:  1. Assess for possible contributors to thought disturbance, including medications, impaired vision or hearing, underlying metabolic abnormalities, dehydration, psychiatric diagnoses, and notify attending LIP  2. [place] high risk fall precautions, as indicated  3. Provide frequent short contacts to provide reality reorientation, refocusing and direction  4. Decrease environmental stimuli, including noise as appropriate  5. Monitor and intervene to maintain adequate nutrition, hydration, elimination, sleep and activity  6. If unable to ensure safety without constant attention obtain sitter and review sitter guidelines with assigned personnel  7. Initiate Psychosocial CNS and Spiritual Care consult, as indicated  Outcome: Progressing  Flowsheets (Taken 7/15/2024 0800 by Mirlande Gilman, RN)  Effect of thought disturbance (confusion, delirium, dementia, or psychosis) are managed with adequate functional status: Assess for contributors to thought disturbance, including medications, impaired vision or hearing, underlying metabolic abnormalities, dehydration, psychiatric diagnoses, notify LIP     Problem: ABCDS Injury Assessment  Goal: Absence of physical injury  Outcome: Progressing     Problem: Chronic Conditions and Co-morbidities  Goal: Patient's chronic conditions and co-morbidity symptoms are  Mirlande Gilman, RN)  Absence of infection at discharge: Assess and monitor for signs and symptoms of infection  Goal: Absence of infection during hospitalization  Outcome: Progressing  Flowsheets (Taken 7/15/2024 0800 by Mirlande Gilman, RN)  Absence of infection during hospitalization: Assess and monitor for signs and symptoms of infection  Goal: Absence of fever/infection during anticipated neutropenic period  Outcome: Progressing  Flowsheets (Taken 7/15/2024 0800 by Mirlande Gilman, RN)  Absence of fever/infection during anticipated neutropenic period: Monitor white blood cell count     Problem: Physical Therapy - Adult  Goal: By Discharge: Performs mobility at highest level of function for planned discharge setting.  See evaluation for individualized goals.  7/15/2024 1144 by Michelle Gomez, PT  Outcome: Progressing

## 2024-07-16 NOTE — PROGRESS NOTES
PROGRESS NOTE    Patient Name: Mateo Marsh  Admit Date: 2024  8:13 PM  MR #: [id number]  : 1965    Attending Physician: Simon Pagan DO  Reason for consult: CP    History of Presenting Illness:      Mateo Marsh is a 58 y.o. male on hospital day 7 with a history of .   History Obtained From:  patient, electronic medical record    Admitted with confusion severe generalized pain to include CP and SOB. Daughter is in room.     Pt is confused and does not recognize me.     He was hypoxic in ER and placed on 6L O2. He presented w dehydration.   He is is Sickle cell crisis sand Parainfluenza infection.     HS Troponin 12 >25>24    ECG SR 85 NSST Changes and PVC    He has recent ABN Stress. Cath not done as insurance denied.  Currently in another precert for this,.     24 Tele SR 97 pt remains very confused. Not following directions. Moving around in bed. Restless. 1:1 sitter in room for pt safety.     24 Tele   restless.  Moving around in bed. Remains confused and lethargic.     24 Tele SR 64. Due to worse Encephalopathy he was transferred to ICU.  Pt was thrashing around. He is sedated. On Cardene gtt     24 Tele SR 73.  He is awake this am and does recognize me.  Mentation is still slow.     7/15/24 Tele SR 84.  Family in room. Again, more awake and alert. Feeding himself breakfast.     24 Tele SR 83 right wrist swollen and tender. ( Likely from iv site) US done- P.  Pt has had significant angina symptoms as pout pt and known abd stress w Mid Anterior ischemia.  We have been trying to arrange Cath but denied by insurance on multiple occasions.      History:      EKG:  Past Medical History:   Diagnosis Date    Bronchitis     CAD (coronary artery disease)     past angioplasty > 10 yrs ago    Chronic back pain     Diabetes (HCC)     dx > 2 yrs    Dyslipidemia     Hyperlipidemia     meds > 5 yrs    Hypertension     meds > 5 yrs    Neuropathy in diabetes (HCC)      of approximately the 60%.  There is approximately the 50% stenosis at level of the right carotid bulb.  No significant stenosis involving right internal carotid artery. Calcified and noncalcified atherosclerotic plaque is associated with the carotid bulbs and proximal cervical internal carotid arteries.  Common carotid arteries are patent without evidence of stenosis.  No evidence of stenosis involving the vertebral arteries.     1. Stenosis of approximately 60% involving proximal left internal carotid artery. 2. Stenosis of approximately 50% at level of the right carotid bulb. 3. No obvious stenosis involving the vertebral arteries.       Active Hospital Problems    Diagnosis Date Noted    Painful urination [R30.9] 07/11/2024     Priority: Low    Pneumonia of both lungs due to infectious organism [J18.9] 07/11/2024     Priority: Low    Hypoxemia [R09.02] 07/10/2024     Priority: Low    Parainfluenza infection [B34.8] 07/10/2024     Priority: Low    Encephalopathy [G93.40] 07/10/2024     Priority: Low    Sickle cell crisis (HCC) [D57.00] 07/09/2024     Priority: Low         Impression/Plan:   Sickle cell Trait per Hematology-   Pain control  MS Changes -LP done - cytology sent out. - Encephalopathy is now much improved.   HypoK - replaced    HTN - not to goal will advance ACE to bid and advance Coreg.   Demand ischemia and known abn stress - ASA BB Statin.  We will plan Cath 7/18/24  HPL - Statin (on hold due to Liver)  Moderate B/L Carotid DZ by recent CTA  LVEF 60-65%     Thank you for allowing us to participate in the care of this patient.     Will continue to follow.    Please call if questions or concerns arise.    Electronically signed by JOY JACK MD on 7/16/2024 at 9:31 AM

## 2024-07-16 NOTE — ACP (ADVANCE CARE PLANNING)
Advance Care Planning     Advance Care Planning Inpatient Note  [facility] Department    Today's Date: 7/16/2024  Unit: [facility] 2W ORTHO TELE    Received request from HealthCare Provider.  Upon review of chart and communication with care team, patient's decision making abilities are not in question.. Patient and significant other  was/were present in the room during visit.    Goals of ACP Conversation:  Discuss advance care planning documents    Health Care Decision Makers:       Primary Decision Maker: Latrice Levine - Girlfriend - [phone]    Secondary Decision Maker: Shandra Sims - Child - [phone]  Summary:  Completed New Documents    Advance Care Planning Documents (Patient Wishes):  Healthcare Power of /Advance Directive Appointment of Health Care Agent     Assessment:  Pt uncomfortable, nurse attentive. Pt's significant other at bedside. PT well supported and loved by family.     Interventions:  Assisted in the completion of documents according to patient's wishes at this time    Care Preferences Communicated:   No    Outcomes/Plan:  New advance directive completed.    Electronically signed by ERVIN Bains on 7/16/2024 at 11:00 AM

## 2024-07-16 NOTE — PROGRESS NOTES
Physical Therapy Med Surg Daily Treatment Note  Facility/Department: [address] ORTHO TELE  [facility]       NAME: Mateo Marsh  : 1965 (58 y.o.)  MRN: [id number]  CODE STATUS: Full Code    Date of Service: 2024    Patient Diagnosis(es): Hypoxemia [R09.02]  Sickle cell crisis (HCC) [D57.00]  Sickle cell disease with crisis (HCC) [D57.00]  Pneumonia of both lungs due to infectious organism, unspecified part of lung [J18.9]   Chief Complaint   Patient presents with    Sickle Cell Pain Crisis     Patient states pain all over from sickle cell .     Patient Active Problem List    Diagnosis Date Noted    Painful urination 2024    Pneumonia of both lungs due to infectious organism 2024    Hypoxemia 07/10/2024    Parainfluenza infection 07/10/2024    Encephalopathy 07/10/2024    Sickle cell crisis (HCC) 2024    Disease of larynx 2021    Hb-SS disease with crisis, unspecified (HCC) 2021    Status post total replacement of left hip 2020    Degenerative joint disease of left hip 2020    Tobacco use disorder 2020    REGINA (obstructive sleep apnea) 2020    S/P insertion of spinal cord stimulator 2020    Diabetic polyneuropathy associated with type 2 diabetes mellitus (HCC) 2019    Dermatophytosis of nail 2019    Hallux abducto valgus, bilateral 2019    Tailor's bunionette, bilateral 2019    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 (HCC) 2019    Chronic pain syndrome 2017    Postlaminectomy syndrome, lumbar region 2015    L4-5 HERNIATED DISC 2015        Past Medical History:   Diagnosis Date    Bronchitis     CAD (coronary artery disease)     past angioplasty > 10 yrs ago    Chronic back pain     Diabetes (HCC)     dx > 2 yrs    Dyslipidemia     Hyperlipidemia     meds > 5 yrs    Hypertension     meds > 5 yrs    Neuropathy in diabetes (HCC)     Osteoarthritis     Pneumonia     Sickle cell anemia (HCC)     Sleep  apnea, obstructive      Past Surgical History:   Procedure Laterality Date    BACK SURGERY  2008    Lumbar disc OR.    BLADDER SURGERY  07/10/2015    Patient states he was urininating blood and had a procedure done.    COLONOSCOPY  03/14/2014    [facility]    ENDOSCOPY, COLON, DIAGNOSTIC      LUMBAR FUSION  2008    LUMBAR PUNCTURE N/A 07/12/2024    Diagnostic, performed by Dr. Olson    OTHER SURGICAL HISTORY Right     tendon repair in (R) forearm because of previous injury as child    STIMULATOR SURGERY N/A 04/25/2023    SPINAL STIMULATOR REPLACEMENT performed by Navneet Lamb MD at [facility] OR    TOTAL HIP ARTHROPLASTY Left 01/23/2020    LEFT HIP TOTAL HIP ARTHROPLASTY, RICARDO performed by Andrew Mehta MD at [facility] OR    UPPER GASTROINTESTINAL ENDOSCOPY  03/14/2014    [facility]    VAGAL NERVE STIMULATION  2013    Dr. Antoine office       Chart Reviewed: Yes  Diagnosis: Sickle cell crisis (HCC)    Restrictions:  Restrictions/Precautions: Fall Risk;Up as Tolerated;Contact Precautions    SUBJECTIVE:   Subjective: I'm doing ok.    Pain  Pain: Denied pain pre and post session     OBJECTIVE:   Orientation  Overall Orientation Status: Within Functional Limits  Cognition  Following Commands: Follows one step commands with increased time  Initiation: Requires cues for some  Sequencing: Requires cues for some  Cognition Comment: Increased processing, decreased safety awareness.    Bed mobility  Rolling to Left: Minimal assistance  Rolling to Right: Minimal assistance  Supine to Sit: Minimal assistance;Moderate assistance  Sit to Supine: Minimal assistance;Moderate assistance  Bed Mobility Comments: Bed flat with minimal use of hand rails; increased time to complete. Lifting trunk assist. Increased time for thought processing    Transfers  Sit to Stand: Minimal Assistance  Stand to Sit: Minimal Assistance  Comment: vc's for hand placement and technique; R hand/wrist is swollen. Pt is guarding R hand. Slow to

## 2024-07-16 NOTE — PROGRESS NOTES
Hospitalist Daily Progress Note  Name: Mateo Marsh  Age: 58 y.o.  Gender: male  CodeStatus: Full Code  Allergies: No Known Allergies    Chief Complaint:Sickle Cell Pain Crisis (Patient states pain all over from sickle cell .)      Primary Care Provider: Rey Pang MD    InpatientTreatment Team: Treatment Team: Attending Provider: Simon Pagan DO; Consulting Physician: Satya Sanchez MD; Consulting Physician: Hussein Strong MD; Consulting Physician: Denver Montes MD; Consulting Physician: Miles Baxter MD; Physician Assistant: Rj Michelle PA; Consulting Physician: Ruslan Almodovar MD; Consulting Physician: Silva Bain MD; Patient Care Tech: Shannon Alexis; Registered Nurse: Alysa Alvarez, RN; Registered Nurse: Adwoa Roa RN; Utilization Reviewer: Joesph Voss RN    Admission Date: 7/9/2024      Subjective: Feels tired but otherwise no issues.    Physical Exam  Vitals and nursing note reviewed.   Constitutional:       Appearance: Normal appearance.   Cardiovascular:      Rate and Rhythm: Normal rate and regular rhythm.   Pulmonary:      Effort: Pulmonary effort is normal.      Breath sounds: Normal breath sounds.   Abdominal:      General: Bowel sounds are normal.      Palpations: Abdomen is soft.   Musculoskeletal:         General: Normal range of motion.   Skin:     General: Skin is warm and dry.   Neurological:      Mental Status: He is alert and oriented to person, place, and time. Mental status is at baseline.         Medications:  Reviewed    Infusion Medications:    sodium chloride      sodium chloride      dextrose       Scheduled Medications:    lisinopril  20 mg Oral BID    carvedilol  25 mg Oral BID    amLODIPine  10 mg Oral Daily    piperacillin-tazobactam  3,375 mg IntraVENous Q8H    sodium chloride flush  5-40 mL IntraVENous 2 times per day    sodium chloride flush  5-40 mL IntraVENous 2 times per day    [Held by provider] enoxaparin  40 mg

## 2024-07-16 NOTE — CARE COORDINATION
LSW MET WITH THE PT AND HIS POA (SIGNIFICANT OTHER LISA).  PT IS DOING MUCH BETTER TODAY AND HE WANTS TO GO HOME AT DC.  HE IS USING A WALKER FOR AMBULATION AND PER HIS POA HE WILL HAVE ACCESS TO ONE AT HOME.  PT IS CONSIDERING [facility] FOR DC.  LSW TO FOLLOW.  PT NEEDS HEART CATH ON THURSDAY BEFORE HE CAN BE DC.

## 2024-07-16 NOTE — PROGRESS NOTES
contrast and shows no abnormal findings.  There is no session of venous sinus thrombosis at least on the MRI.  Lumbar puncture does not reveal any infective process.  Patient was then transferred under my direction for Cardene drip and his blood pressures are much improved.  He is much calmer and not agitated anymore.  I have left him on Depakote as I was not able to complete a EEG and this is just to protect to see if there are seizures as well as for agitation.  Will keep this on until we are able to get a good EEG without sedation.    Findings still very consistent with significant hypertensive encephalopathy without notable residual MRI findings.    Patient presented actually with pain and his main issues appears to be his back pain is very uncomfortable and moves around to adjust his back.  He also had painful micturition and if this patient truly has sickle cell disease painful priapism is a consideration    Family request transfer to tertiary care center for other medical issues though from the neurology standpoint we have ruled out most of the pathologies except that his CSF still sent out for encephalitis.  Autoimmune encephalopathy is a consideration and repeat studies may be required for the same.    Findings discussed with the family in detail    7/14  Encephalopathy likely to be hypertensive encephalopathy related to underlying sickle cell as well.  Patient also had painful priapism which could be a part of the sickle cell.  This is all resolving.  Patient is much more awake and actually oriented today.  Blood pressure now better controlled and Cardene drip discontinued.  Patient maintaining blood pressures less than 160 which is our goal.  As noted MRI was negative and so was his LP.  Patient has considerable pain which likely is causing more issues and he becomes more agitated and this is mostly from his back.  Patient also recovering from parainfluenza.  Will arrange for an EEG and at some point in  DARLENE Houston - CNP on 7/16/2024 at 1:24 PM

## 2024-07-16 NOTE — PROGRESS NOTES
Spiritual Care Services     Summary of Visit:  Pt resting uncomfortably in bed. Notified his nurse of his discomfort. Pt's significant other at bedside. Pt completed AD forms, clearly articulating his wishes. Dr. Michael and pt's nurse both agree that he is alert and oriented. Please see ACP note.     Encounter Summary  Encounter Overview/Reason: Advance Care Planning  Service Provided For: Patient, Significant other  Referral/Consult From: Patient  Support System: Significant other, Children  Last Encounter : 07/15/24  Complexity of Encounter: Moderate  Begin Time: 1000  End Time : 1030  Total Time Calculated: 30 min        Spiritual/Emotional needs  Type: Spiritual Support                 Advance Care Planning  Type: Completed AD/ACP document(s)    Spiritual Assessment/Intervention/Outcomes:    Assessment: Compromised coping    Intervention: Sustaining Presence/Ministry of presence    Outcome: Engaged in conversation, Expressed feelings, needs, and concerns      Care Plan:    Plan and Referrals  Plan/Referrals: Continue Support (comment)      Spiritual Care Services   Electronically signed by ERVIN Bains on 7/16/2024 at 10:15 AM.    To reach a  for emotional and spiritual support, place an EPIC consult request.   If a  is needed immediately, dial “0” and ask to page the on-call .

## 2024-07-16 NOTE — PROGRESS NOTES
hypoxia, uncontrolled hypertension (urgency), rule out GA ES/ischemic event TECHNOLOGIST PROVIDED HISTORY: PLEASE ADD CONTRAST Reason for exam:->Persistent encephalopathy, hypoxia, uncontrolled hypertension (urgency), rule out GA ES/ischemic event What is the sedation requirement?->Anesthesia What reading provider will be dictating this exam?->CRC FINDINGS: INTRACRANIAL STRUCTURES/VENTRICLES:  The sellar and suprasellar structures, optic chiasm, corpus callosum, pineal gland, tectum, and midline brainstem structures are unremarkable.  The craniocervical junction is unremarkable. There is no acute hemorrhage, mass effect, or midline shift.  There is satisfactory overall gray-white matter differentiation.  There is chronic microvascular disease.  The ventricular structures are symmetric and unremarkable.  The infratentorial structures including the cerebellopontine angles and internal auditory canals are unremarkable.  There is no abnormal restricted diffusion.  There is no abnormal blooming artifact on susceptibility weighted imaging.  No abnormal postcontrast enhancement. ORBITS: The visualized portion of the orbits demonstrate no acute abnormality. SINUSES: The visualized paranasal sinuses and mastoid air cells demonstrate no acute abnormality. BONES/SOFT TISSUES: The bone marrow signal intensity appears normal. The soft tissues demonstrate no acute abnormality.     Chronic microvascular disease without acute intracranial abnormality.  No abnormal postcontrast enhancement.     IR LUMBAR PUNCTURE FOR DIAGNOSIS    1.  Technically successful diagnostic lumbar puncture.  HISTORY: PETER VALENTINE is a Male of 58 years age, with encephalopathy. Radiation dose: 10.46 mGy. DIAGNOSIS: Change in mental status COMMENTS: Reason for exam:->encephalopathy PROCEDURE: Following universal protocol, patient and site verification was performed with a \"timeout\" prior to the procedure. Following the discussion of the procedure, and this,  bilateral areas of ground-glass infiltrate. 5. Bibasilar infiltrates/atelectasis. 6. Small mediastinal and bilateral hilar nodes. 7. Nonspecific sclerosis within 2 lower thoracic vertebral bodies and what is felt to be L1.  Etiology of this is uncertain.  Metastatic disease is not excluded.  No evidence 8. Epidural catheter. 9. Nonspecific gallbladder distension.     CT Head W/O Contrast    Result Date: 7/9/2024  EXAMINATION: CT OF THE HEAD WITHOUT CONTRAST  7/9/2024 10:33 pm TECHNIQUE: CT of the head was performed without the administration of intravenous contrast. Automated exposure control, iterative reconstruction, and/or weight based adjustment of the mA/kV was utilized to reduce the radiation dose to as low as reasonably achievable. COMPARISON: None. HISTORY: ORDERING SYSTEM PROVIDED HISTORY: ALTERED TECHNOLOGIST PROVIDED HISTORY: Reason for exam:->ALTERED Has a \"code stroke\" or \"stroke alert\" been called?->No Decision Support Exception - unselect if not a suspected or confirmed emergency medical condition->Emergency Medical Condition (MA) What reading provider will be dictating this exam?->CRC FINDINGS: BRAIN/VENTRICLES: There is no acute intracranial hemorrhage, mass effect or midline shift.  No abnormal extra-axial fluid collection.  The gray-white differentiation is maintained without evidence of an acute infarct.  There is no evidence of hydrocephalus. ORBITS: The visualized portion of the orbits demonstrate no acute abnormality. SINUSES: The visualized paranasal sinuses and mastoid air cells demonstrate no acute abnormality.  There is a small retention cyst in the left maxillary sinus.  There is mucosal thickening in some of the sphenoid sinus.  There is some associated cortical thickening as well compatible with chronicity. SOFT TISSUES/SKULL:  No acute abnormality of the visualized skull or soft tissues.     No acute intracranial abnormality.     XR CHEST PORTABLE    Result Date: 7/7/2024  EXAMINATION:

## 2024-07-17 PROBLEM — R33.9 URINARY RETENTION: Status: ACTIVE | Noted: 2024-07-17

## 2024-07-17 LAB
ALBUMIN SERPL-MCNC: 3.2 G/DL (ref 3.5–4.6)
ALP SERPL-CCNC: 125 U/L (ref 35–104)
ALT SERPL-CCNC: 32 U/L (ref 0–41)
ANION GAP SERPL CALCULATED.3IONS-SCNC: 10 MEQ/L (ref 9–15)
ANISOCYTOSIS BLD QL SMEAR: ABNORMAL
AST SERPL-CCNC: 36 U/L (ref 0–40)
BASOPHILS # BLD: 0.1 K/UL (ref 0–0.2)
BASOPHILS NFR BLD: 1 %
BILIRUB SERPL-MCNC: 0.8 MG/DL (ref 0.2–0.7)
BUN SERPL-MCNC: 16 MG/DL (ref 6–20)
CALCIUM SERPL-MCNC: 9.1 MG/DL (ref 8.5–9.9)
CHLORIDE SERPL-SCNC: 109 MEQ/L (ref 95–107)
CO2 SERPL-SCNC: 22 MEQ/L (ref 20–31)
CREAT SERPL-MCNC: 1.05 MG/DL (ref 0.7–1.2)
EOSINOPHIL # BLD: 0.2 K/UL (ref 0–0.7)
EOSINOPHIL NFR BLD: 2 %
ERYTHROCYTE [DISTWIDTH] IN BLOOD BY AUTOMATED COUNT: 21.5 % (ref 11.5–14.5)
GLOBULIN SER CALC-MCNC: 3 G/DL (ref 2.3–3.5)
GLUCOSE BLD-MCNC: 121 MG/DL (ref 70–99)
GLUCOSE BLD-MCNC: 137 MG/DL (ref 70–99)
GLUCOSE BLD-MCNC: 222 MG/DL (ref 70–99)
GLUCOSE BLD-MCNC: 249 MG/DL (ref 70–99)
GLUCOSE SERPL-MCNC: 98 MG/DL (ref 70–99)
HCT VFR BLD AUTO: 27.4 % (ref 42–52)
HGB BLD-MCNC: 9.5 G/DL (ref 14–18)
HYPOCHROMIA BLD QL SMEAR: ABNORMAL
LYMPHOCYTES # BLD: 1.8 K/UL (ref 1–4.8)
LYMPHOCYTES NFR BLD: 21 %
MACROCYTES BLD QL SMEAR: ABNORMAL
MAGNESIUM SERPL-MCNC: 2 MG/DL (ref 1.7–2.4)
MCH RBC QN AUTO: 25.2 PG (ref 27–31.3)
MCHC RBC AUTO-ENTMCNC: 34.7 % (ref 33–37)
MCV RBC AUTO: 72.7 FL (ref 79–92.2)
METAMYELOCYTES NFR BLD MANUAL: 2 %
MICROCYTES BLD QL SMEAR: ABNORMAL
MONOCYTES # BLD: 0.8 K/UL (ref 0.2–0.8)
MONOCYTES NFR BLD: 9.6 %
MYELOCYTES NFR BLD MANUAL: 2 %
NEUTROPHILS # BLD: 5.1 K/UL (ref 1.4–6.5)
NEUTS BAND NFR BLD MANUAL: 2 %
NEUTS SEG NFR BLD: 59 %
NRBC BLD-RTO: 1 /100 WBC
PATH INTERP BLD-IMP: NO
PERFORMED ON: ABNORMAL
PLATELET # BLD AUTO: 114 K/UL (ref 130–400)
PLATELET BLD QL SMEAR: ABNORMAL
POIKILOCYTOSIS BLD QL SMEAR: ABNORMAL
POLYCHROMASIA BLD QL SMEAR: ABNORMAL
POTASSIUM SERPL-SCNC: 3.5 MEQ/L (ref 3.4–4.9)
PROT SERPL-MCNC: 6.2 G/DL (ref 6.3–8)
RBC # BLD AUTO: 3.77 M/UL (ref 4.7–6.1)
SCHISTOCYTES BLD QL SMEAR: ABNORMAL
SLIDE REVIEW: ABNORMAL
SMUDGE CELLS BLD QL SMEAR: 19.2
SODIUM SERPL-SCNC: 141 MEQ/L (ref 135–144)
STOMATOCYTES BLD QL SMEAR: ABNORMAL
TARGETS BLD QL SMEAR: ABNORMAL
VARIANT LYMPHS NFR BLD: 2 %
WBC # BLD AUTO: 7.9 K/UL (ref 4.8–10.8)

## 2024-07-17 PROCEDURE — 83735 ASSAY OF MAGNESIUM: CPT

## 2024-07-17 PROCEDURE — 99222 1ST HOSP IP/OBS MODERATE 55: CPT | Performed by: UROLOGY

## 2024-07-17 PROCEDURE — 36415 COLL VENOUS BLD VENIPUNCTURE: CPT

## 2024-07-17 PROCEDURE — 1210000000 HC MED SURG R&B

## 2024-07-17 PROCEDURE — 85025 COMPLETE CBC W/AUTO DIFF WBC: CPT

## 2024-07-17 PROCEDURE — 6370000000 HC RX 637 (ALT 250 FOR IP): Performed by: INTERNAL MEDICINE

## 2024-07-17 PROCEDURE — 80053 COMPREHEN METABOLIC PANEL: CPT

## 2024-07-17 PROCEDURE — 2580000003 HC RX 258: Performed by: FAMILY MEDICINE

## 2024-07-17 PROCEDURE — 99232 SBSQ HOSP IP/OBS MODERATE 35: CPT | Performed by: INTERNAL MEDICINE

## 2024-07-17 PROCEDURE — 6360000002 HC RX W HCPCS: Performed by: INTERNAL MEDICINE

## 2024-07-17 PROCEDURE — 2580000003 HC RX 258: Performed by: INTERNAL MEDICINE

## 2024-07-17 PROCEDURE — 6360000002 HC RX W HCPCS: Performed by: FAMILY MEDICINE

## 2024-07-17 PROCEDURE — 99233 SBSQ HOSP IP/OBS HIGH 50: CPT | Performed by: INTERNAL MEDICINE

## 2024-07-17 PROCEDURE — 6370000000 HC RX 637 (ALT 250 FOR IP): Performed by: FAMILY MEDICINE

## 2024-07-17 PROCEDURE — 99231 SBSQ HOSP IP/OBS SF/LOW 25: CPT | Performed by: NURSE PRACTITIONER

## 2024-07-17 PROCEDURE — 97116 GAIT TRAINING THERAPY: CPT

## 2024-07-17 RX ORDER — PREGABALIN 75 MG/1
75 CAPSULE ORAL 2 TIMES DAILY
Status: DISCONTINUED | OUTPATIENT
Start: 2024-07-17 | End: 2024-07-18 | Stop reason: HOSPADM

## 2024-07-17 RX ORDER — POTASSIUM CHLORIDE 20 MEQ/1
40 TABLET, EXTENDED RELEASE ORAL ONCE
Status: COMPLETED | OUTPATIENT
Start: 2024-07-17 | End: 2024-07-17

## 2024-07-17 RX ORDER — TRIAMTERENE AND HYDROCHLOROTHIAZIDE 37.5; 25 MG/1; MG/1
1 TABLET ORAL DAILY
Status: DISCONTINUED | OUTPATIENT
Start: 2024-07-17 | End: 2024-07-17

## 2024-07-17 RX ORDER — HYDRALAZINE HYDROCHLORIDE 100 MG/1
100 TABLET, FILM COATED ORAL EVERY 12 HOURS SCHEDULED
Status: DISCONTINUED | OUTPATIENT
Start: 2024-07-17 | End: 2024-07-18 | Stop reason: HOSPADM

## 2024-07-17 RX ADMIN — HYDRALAZINE HYDROCHLORIDE 100 MG: 100 TABLET ORAL at 10:24

## 2024-07-17 RX ADMIN — CARVEDILOL 25 MG: 25 TABLET, FILM COATED ORAL at 20:47

## 2024-07-17 RX ADMIN — HYDRALAZINE HYDROCHLORIDE 10 MG: 20 INJECTION INTRAMUSCULAR; INTRAVENOUS at 09:57

## 2024-07-17 RX ADMIN — PIPERACILLIN AND TAZOBACTAM 3375 MG: 3; .375 INJECTION, POWDER, LYOPHILIZED, FOR SOLUTION INTRAVENOUS at 20:57

## 2024-07-17 RX ADMIN — AMLODIPINE BESYLATE 10 MG: 10 TABLET ORAL at 08:30

## 2024-07-17 RX ADMIN — CARVEDILOL 25 MG: 25 TABLET, FILM COATED ORAL at 08:29

## 2024-07-17 RX ADMIN — Medication 3 MG: at 20:47

## 2024-07-17 RX ADMIN — SODIUM CHLORIDE, PRESERVATIVE FREE 10 ML: 5 INJECTION INTRAVENOUS at 20:47

## 2024-07-17 RX ADMIN — INSULIN LISPRO 2 UNITS: 100 INJECTION, SOLUTION INTRAVENOUS; SUBCUTANEOUS at 12:09

## 2024-07-17 RX ADMIN — PREGABALIN 75 MG: 75 CAPSULE ORAL at 20:47

## 2024-07-17 RX ADMIN — PIPERACILLIN AND TAZOBACTAM 3375 MG: 3; .375 INJECTION, POWDER, LYOPHILIZED, FOR SOLUTION INTRAVENOUS at 05:32

## 2024-07-17 RX ADMIN — HYDRALAZINE HYDROCHLORIDE 100 MG: 100 TABLET ORAL at 20:47

## 2024-07-17 RX ADMIN — POTASSIUM CHLORIDE 40 MEQ: 1500 TABLET, EXTENDED RELEASE ORAL at 08:30

## 2024-07-17 RX ADMIN — PREGABALIN 75 MG: 75 CAPSULE ORAL at 08:30

## 2024-07-17 RX ADMIN — LISINOPRIL 20 MG: 20 TABLET ORAL at 20:47

## 2024-07-17 RX ADMIN — TAMSULOSIN HYDROCHLORIDE 0.4 MG: 0.4 CAPSULE ORAL at 08:30

## 2024-07-17 RX ADMIN — PIPERACILLIN AND TAZOBACTAM 3375 MG: 3; .375 INJECTION, POWDER, LYOPHILIZED, FOR SOLUTION INTRAVENOUS at 13:23

## 2024-07-17 RX ADMIN — LISINOPRIL 20 MG: 20 TABLET ORAL at 08:30

## 2024-07-17 RX ADMIN — ASPIRIN 81 MG 81 MG: 81 TABLET ORAL at 08:30

## 2024-07-17 ASSESSMENT — ENCOUNTER SYMPTOMS
WHEEZING: 0
SHORTNESS OF BREATH: 0
ABDOMINAL PAIN: 0
COUGH: 0
NAUSEA: 0
ABDOMINAL DISTENTION: 0
VOMITING: 0
COLOR CHANGE: 0
TROUBLE SWALLOWING: 0
CHEST TIGHTNESS: 0

## 2024-07-17 ASSESSMENT — PAIN SCALES - GENERAL: PAINLEVEL_OUTOF10: 0

## 2024-07-17 NOTE — PROGRESS NOTES
Physical Therapy Med Surg Daily Treatment Note  Facility/Department: [address] ORTHO TELE  [facility]       NAME: Mateo Marsh  : 1965 (58 y.o.)  MRN: [id number]  CODE STATUS: Full Code    Date of Service: 2024    Patient Diagnosis(es): Hypoxemia [R09.02]  Sickle cell crisis (HCC) [D57.00]  Sickle cell disease with crisis (HCC) [D57.00]  Pneumonia of both lungs due to infectious organism, unspecified part of lung [J18.9]   Chief Complaint   Patient presents with    Sickle Cell Pain Crisis     Patient states pain all over from sickle cell .     Patient Active Problem List    Diagnosis Date Noted    Painful urination 2024    Pneumonia of both lungs due to infectious organism 2024    Hypoxemia 07/10/2024    Parainfluenza infection 07/10/2024    Encephalopathy 07/10/2024    Sickle cell crisis (HCC) 2024    Disease of larynx 2021    Hb-SS disease with crisis, unspecified (HCC) 2021    Status post total replacement of left hip 2020    Degenerative joint disease of left hip 2020    Tobacco use disorder 2020    REGINA (obstructive sleep apnea) 2020    S/P insertion of spinal cord stimulator 2020    Diabetic polyneuropathy associated with type 2 diabetes mellitus (HCC) 2019    Dermatophytosis of nail 2019    Hallux abducto valgus, bilateral 2019    Tailor's bunionette, bilateral 2019    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 (HCC) 2019    Chronic pain syndrome 2017    Postlaminectomy syndrome, lumbar region 2015    L4-5 HERNIATED DISC 2015        Past Medical History:   Diagnosis Date    Bronchitis     CAD (coronary artery disease)     past angioplasty > 10 yrs ago    Chronic back pain     Diabetes (HCC)     dx > 2 yrs    Dyslipidemia     Hyperlipidemia     meds > 5 yrs    Hypertension     meds > 5 yrs    Neuropathy in diabetes (HCC)     Osteoarthritis     Pneumonia     Sickle cell anemia (HCC)     Sleep

## 2024-07-17 NOTE — PROGRESS NOTES
PROGRESS NOTE    Patient Name: Mateo Marsh  Admit Date: 2024  8:13 PM  MR #: [id number]  : 1965    Attending Physician: Simon Pagan DO  Reason for consult: CP    History of Presenting Illness:      Mateo Marhs is a 58 y.o. male on hospital day 8 with a history of .   History Obtained From:  patient, electronic medical record    Admitted with confusion severe generalized pain to include CP and SOB. Daughter is in room.     Pt is confused and does not recognize me.     He was hypoxic in ER and placed on 6L O2. He presented w dehydration.   He is is Sickle cell crisis sand Parainfluenza infection.     HS Troponin 12 >25>24    ECG SR 85 NSST Changes and PVC    He has recent ABN Stress. Cath not done as insurance denied.  Currently in another precert for this,.     24 Tele SR 97 pt remains very confused. Not following directions. Moving around in bed. Restless. 1:1 sitter in room for pt safety.     24 Tele   restless.  Moving around in bed. Remains confused and lethargic.     24 Tele SR 64. Due to worse Encephalopathy he was transferred to ICU.  Pt was thrashing around. He is sedated. On Cardene gtt     24 Tele SR 73.  He is awake this am and does recognize me.  Mentation is still slow.     7/15/24 Tele SR 84.  Family in room. Again, more awake and alert. Feeding himself breakfast.     24 Tele SR 83 right wrist swollen and tender. ( Likely from iv site) US done- P.  Pt has had significant angina symptoms as pout pt and known abd stress w Mid Anterior ischemia.  We have been trying to arrange Cath but denied by insurance on multiple occasions.      24 Tele SR 85 no cp nor SOB at rest. Walks in room.  Feels better.     History:      EKG:  Past Medical History:   Diagnosis Date    Bronchitis     CAD (coronary artery disease)     past angioplasty > 10 yrs ago    Chronic back pain     Diabetes (HCC)     dx > 2 yrs    Dyslipidemia     Hyperlipidemia     meds > 5 yrs        Tobacco comments:     Getting closer to wanting to quit.   Vaping Use    Vaping Use: Former   Substance and Sexual Activity    Alcohol use: No     Alcohol/week: 0.0 standard drinks of alcohol    Drug use: No    Sexual activity: Not on file   Other Topics Concern    Not on file   Social History Narrative    Not on file     Social Determinants of Health     Financial Resource Strain: Not on file   Food Insecurity: No Food Insecurity (7/10/2024)    Hunger Vital Sign     Worried About Running Out of Food in the Last Year: Never true     Ran Out of Food in the Last Year: Never true   Transportation Needs: No Transportation Needs (7/10/2024)    PRAPARE - Transportation     Lack of Transportation (Medical): No     Lack of Transportation (Non-Medical): No   Physical Activity: Not on file   Stress: Not on file   Social Connections: Not on file   Intimate Partner Violence: Not on file   Housing Stability: Low Risk  (7/10/2024)    Housing Stability Vital Sign     Unable to Pay for Housing in the Last Year: No     Number of Places Lived in the Last Year: 1     Unstable Housing in the Last Year: No      [] Unable to obtain due to ventilated and/ or neurologic status      Home Medications:      Medications Prior to Admission: naloxone 4 MG/0.1ML LIQD nasal spray, 1 spray by Nasal route as needed for Opioid Reversal  pregabalin (LYRICA) 150 MG capsule, Take 1 capsule by mouth 3 times daily for 30 days.  [] HYDROcodone-acetaminophen (NORCO)  MG per tablet, Take 1 tablet by mouth every 8-12 hours as needed for Pain for up to 30 days. Do not fill until 6/15/24 - #80 tabs for 30 days Max Daily Amount: 3 tablets  metoprolol succinate (TOPROL XL) 50 MG extended release tablet, Take 1 tablet by mouth daily  DULoxetine (CYMBALTA) 60 MG extended release capsule, Take 1 capsule by mouth daily  baclofen (LIORESAL) 10 MG tablet, Take 1 tablet by mouth 2 times daily  aspirin 81 MG EC tablet, Take 1 tablet by mouth

## 2024-07-17 NOTE — PROGRESS NOTES
[facility] Neurology Daily Progress Note  Name: Mateo Marsh  Age: 58 y.o.  Gender: male  CodeStatus: Full Code  Allergies: No Known Allergies    Chief Complaint:Sickle Cell Pain Crisis (Patient states pain all over from sickle cell .)    Primary Care Provider: Rey Pang MD  InpatientTreatment Team: Treatment Team: Attending Provider: Simon Pagan DO; Consulting Physician: Satya Sanchez MD; Consulting Physician: Hussein Strong MD; Consulting Physician: Denver Montes MD; Consulting Physician: Miles Baxter MD; Physician Assistant: Rj Michelle PA; Consulting Physician: Ruslan Almodovar MD; Consulting Physician: Silva Bain MD; Patient Care Tech: Shannon Alexis; Utilization Reviewer: Joesph Voss RN; Registered Nurse: Lindy Wilkes RN; Registered Nurse: Adwoa Roa RN; Patient Care Tech: Lenora Crowley; Cardiologist: Mike Sousa MD  Admission Date: 7/9/2024      HPI   Pt seen and examined for neurology follow-up.    Alert and oriented x 3, no acute distress, cooperative.  significant other at bedside.  Denies headache.  No vision changes.  No dysarthria, dysphagia, aphasia.  No seizure activity reported.  Afebrile.  No tremors.  Pain well-controlled at the moment.      Vitals:    07/17/24 1151   BP: (!) 142/59   Pulse: 81   Resp:    Temp:    SpO2:       Review of Systems   Constitutional:  Negative for chills, fatigue and fever.   HENT:  Negative for hearing loss and trouble swallowing.    Eyes:  Negative for visual disturbance.   Respiratory:  Negative for cough, chest tightness, shortness of breath and wheezing.    Cardiovascular:  Negative for chest pain, palpitations and leg swelling.   Gastrointestinal:  Negative for nausea and vomiting.   Skin:  Negative for color change and rash.   Neurological:  Negative for dizziness, tremors, seizures, syncope, facial asymmetry, speech difficulty, weakness, light-headedness, numbness and headaches.   Psychiatric/Behavioral:   time he would like to discontinue his Depakote.    7/15  Complete nonfocal examination notable today patient reports no headache.  Encephalopathy resolved likely to be a pure hypertensive cystopathy with crisis without any residual PRES. patient much improved with Cardene drip lumbar puncture negative.  It is more than likely that this patient truly definitely has sickle cell disease given the presentation and his bone findings.  Patient initially actually had significant pain on micturition and could be a painful priapism reported in the sickle cell.  He has a Gorman catheter which may not be discontinued and will follow this up with an EEG to discontinue his Depakote as well.  This was used transiently as we were not able to perform an EEG given his agitation as he pulled out leads will follow-up with an EEG    7/16/2024:  Hypertensive encephalopathy, improved  MRI of the brain negative for acute findings  EEG completed with report pending  Lumbar puncture negative  Cardiology following with plans for cardiac catheterization on 7/18/2024    I have personally performed a face to face diagnostic evaluation on this patient, reviewed all data and investigations, and am the sole provider of all clinical decisions on the neurological status of this patient.  Complete nonfocal examination.  Hypertensive encephalopathy completely improved.  Lumbar puncture negative.  EEG was obtained which is negative discontinue his Depakote now.  Will continue to observe while he is here and then discharge him.  Patient's urinary function still needs to be evaluated as long-term catheter may not be the best option here.  60% time spent on evaluating patient      7/17/2024:  Hypertensive encephalopathy, now stable  Encephalopathy has resolved  MRI, EEG, lumbar puncture negative  Will follow at a distance          Collaborating physicians: Dr Strong    Electronically signed by DARLENE Dugan CNP on 7/17/2024 at 1:35 PM

## 2024-07-17 NOTE — PROGRESS NOTES
Progress Note    7/17/2024   2:20 PM    Name:  Mateo Marsh  MRN:    [id number]     Acct:     [id number]   Room:  W289/W289-01   Day: 8     Admit Date: 7/9/2024  8:13 PM  PCP: Rey Pang MD    Subjective:     C/C:   Chief Complaint   Patient presents with    Sickle Cell Pain Crisis     Patient states pain all over from sickle cell .       Interval History: Status: This is a 57 yo male with h/o CAD, HTN, REGINA, DM with neuropathy and SCA admitted for SS pain crisis and was last week for painful urination and had a Gorman placed while in ICU. He reports no significant baseline voiding complaints and has been on Flomax. The catheter was removed today for a voiding trial after my discussion with Dr Pagan and he reports voiding spontaneously. He has no other  complaints.     Past Medical History:   Diagnosis Date    Bronchitis     CAD (coronary artery disease)     past angioplasty > 10 yrs ago    Chronic back pain     Diabetes (HCC)     dx > 2 yrs    Dyslipidemia     Hyperlipidemia     meds > 5 yrs    Hypertension     meds > 5 yrs    Neuropathy in diabetes (HCC)     Osteoarthritis     Pneumonia     Sickle cell anemia (HCC)     Sleep apnea, obstructive        ROS:  Review of Systems   Gastrointestinal:  Negative for abdominal distention and abdominal pain.   Genitourinary:  Negative for flank pain and hematuria.       Medications:     Allergies: No Known Allergies    Current Meds: pregabalin (LYRICA) capsule 75 mg, BID  hydrALAZINE (APRESOLINE) tablet 100 mg, 2 times per day  labetalol (NORMODYNE;TRANDATE) injection 10 mg, Q4H PRN  lisinopril (PRINIVIL;ZESTRIL) tablet 20 mg, BID  carvedilol (COREG) tablet 25 mg, BID  amLODIPine (NORVASC) tablet 10 mg, Daily  HYDROmorphone HCl PF (DILAUDID) injection 1 mg, Q3H PRN  piperacillin-tazobactam (ZOSYN) 3,375 mg in sodium chloride 0.9 % 50 mL IVPB (mini-bag), Q8H  sodium chloride flush 0.9 % injection 5-40 mL, 2 times per day  sodium chloride flush 0.9 % injection 5-40

## 2024-07-17 NOTE — PROGRESS NOTES
[facility]   [place] [facility] [facility] [place]  Speech Language Pathology    Mateo Marsh  1965  W289/W289-01    Date: 7/17/2024      Speech Therapy attempted to see Mateo Marsh on this date for a/an:    Treatment    Pt was unable to be seen due to:   Other: Attempted therapeutic meal monitor this date. Pt care present in room. Will re-att at next opportunity.         Electronically signed by GILBERTO Kenney on 7/17/24 at 12:10 PM EDT

## 2024-07-17 NOTE — PROGRESS NOTES
Physician Progress Note    7/17/2024   11:13 AM    Name:  Mateo Marsh  MRN:    [id number]      Day: 8     Admit Date: 7/9/2024  8:13 PM  PCP: Rey Pang MD    Code Status:  Full Code    Subjective:     Denies complaints    Current Facility-Administered Medications   Medication Dose Route Frequency Provider Last Rate Last Admin    pregabalin (LYRICA) capsule 75 mg  75 mg Oral BID Simon Pagan DO   75 mg at 07/17/24 0830    hydrALAZINE (APRESOLINE) tablet 100 mg  100 mg Oral 2 times per day Denver Montes MD   100 mg at 07/17/24 1024    triamterene-hydroCHLOROthiazide (MAXZIDE-25) 37.5-25 MG per tablet 1 tablet  1 tablet Oral Daily Simon Pagan DO        labetalol (NORMODYNE;TRANDATE) injection 10 mg  10 mg IntraVENous Q4H PRN Sierra Farris APRN - CNP   10 mg at 07/16/24 0455    lisinopril (PRINIVIL;ZESTRIL) tablet 20 mg  20 mg Oral BID Denver Montes MD   20 mg at 07/17/24 0830    carvedilol (COREG) tablet 25 mg  25 mg Oral BID Denver Montes MD   25 mg at 07/17/24 0829    amLODIPine (NORVASC) tablet 10 mg  10 mg Oral Daily Denver Montes MD   10 mg at 07/17/24 0830    HYDROmorphone HCl PF (DILAUDID) injection 1 mg  1 mg IntraVENous Q3H PRN Camilo Michael MD   1 mg at 07/14/24 0100    piperacillin-tazobactam (ZOSYN) 3,375 mg in sodium chloride 0.9 % 50 mL IVPB (mini-bag)  3,375 mg IntraVENous Q8H Camilo Michael MD   Stopped at 07/17/24 0946    sodium chloride flush 0.9 % injection 5-40 mL  5-40 mL IntraVENous 2 times per day Denver Montes MD   10 mL at 07/14/24 0747    sodium chloride flush 0.9 % injection 5-40 mL  5-40 mL IntraVENous PRN Denver Montes MD        0.9 % sodium chloride infusion   IntraVENous PRN Denver Montes MD        ondansetron (ZOFRAN) injection 4 mg  4 mg IntraVENous Q6H PRN Denver Montes MD        nitroGLYCERIN (NITROSTAT) SL tablet 0.4 mg  0.4 mg SubLINGual Q5 Min PRN Denver Montes MD        hydrALAZINE (APRESOLINE) injection 10 mg  10 mg IntraVENous Q4H PRN Ira,

## 2024-07-17 NOTE — CARE COORDINATION
Met with pt and family at bedside. Discussed discharge planning and [facility]HC. Freedom of choice given and pt agreeable to [facility] as he had them prior. Referral called to Eugenia with [facility] who will review tomorrow.   Pt to have CATH tomorrow.

## 2024-07-17 NOTE — PROGRESS NOTES
INPATIENT PROGRESS NOTES    PATIENT NAME: Mateo Marsh  MRN: [id number]  SERVICE DATE:  July 17, 2024   SERVICE TIME:  4:40 PM      PRIMARY SERVICE: Pulmonary Disease    CHIEF COMPLAIN: Acute encephalopathy, hypertensive emergency      INTERVAL HPI: Patient seen and examined at bedside, Interval Notes, orders reviewed. Nursing notes noted  He is doing much better.  He has no fever.  No chest pain. No shortness of breath  Currently on RA Spo2 96%         OBJECTIVE   I/O:24HR INTAKE/OUTPUT:    Intake/Output Summary (Last 24 hours) at 7/17/2024 1640  Last data filed at 7/17/2024 1549  Gross per 24 hour   Intake 810.7 ml   Output 1900 ml   Net -1089.3 ml     07/16 0701 - 07/17 0700  In: 1010.7 [P.O.:600]  Out: 1250 [Urine:1250]  Body mass index is 24.87 kg/m².    PHYSICAL EXAM:  Vitals:  BP (!) 142/67   Pulse 82   Temp 97.9 °F (36.6 °C) (Oral)   Resp 18   Ht 1.88 m (6' 2.02\")   Wt 87.9 kg (193 lb 12.8 oz)   SpO2 96%   PF (!) 20 L/min   BMI 24.87 kg/m²     General: Alert, awake .comfortable in bed, No distress.  Head: Atraumatic , Normocephalic   Eyes: PERRL. No sclera icterus. No conjunctival injection. No discharge   ENT: No nasal  discharge. Pharynx clear.  Neck:  Trachea midline. No thyromegaly, no JVD, No cervical adenopathy.  Chest : Bilaterally symmetrical ,Normal effort,  No accessory muscle use  Lung : Diminished breath sound bilaterally No Rales. No wheezing. No rhonchi.   Heart:: Normal rate. Regular rhythm. No mumur ,  Rub or gallop  ABD: Non-tender. Non-distended. No masses. No organmegaly. Normal bowel sounds. No hernia.  Ext : No Pitting both leg , No Cyanosis No clubbing  Neuro: no focal weakness    Labs:  Recent Labs     07/15/24  0502 07/16/24  0520 07/17/24  0512   WBC 8.2 7.6 7.9   HGB 9.9* 10.2* 9.5*   PLT 86* 101* 114*     Recent Labs     07/15/24  0502 07/16/24  0520 07/17/24  0512   * 143 141   K 3.0* 3.2* 3.5   * 107 109*   CO2 24 23 22   BUN 13 17 16   CREATININE 0.98 1.13 1.05

## 2024-07-18 VITALS
OXYGEN SATURATION: 97 % | BODY MASS INDEX: 28.88 KG/M2 | SYSTOLIC BLOOD PRESSURE: 137 MMHG | HEART RATE: 78 BPM | WEIGHT: 225 LBS | RESPIRATION RATE: 20 BRPM | TEMPERATURE: 98.8 F | DIASTOLIC BLOOD PRESSURE: 58 MMHG | HEIGHT: 74 IN

## 2024-07-18 LAB
ALBUMIN SERPL-MCNC: 3.3 G/DL (ref 3.5–4.6)
ALP SERPL-CCNC: 113 U/L (ref 35–104)
ALT SERPL-CCNC: 30 U/L (ref 0–41)
ANION GAP SERPL CALCULATED.3IONS-SCNC: 12 MEQ/L (ref 9–15)
ANISOCYTOSIS BLD QL SMEAR: ABNORMAL
AST SERPL-CCNC: 28 U/L (ref 0–40)
BASOPHILS # BLD: 0.1 K/UL (ref 0–0.2)
BASOPHILS NFR BLD: 1 %
BILIRUB SERPL-MCNC: 0.7 MG/DL (ref 0.2–0.7)
BUN SERPL-MCNC: 17 MG/DL (ref 6–20)
CALCIUM SERPL-MCNC: 9.1 MG/DL (ref 8.5–9.9)
CHLORIDE SERPL-SCNC: 108 MEQ/L (ref 95–107)
CO2 SERPL-SCNC: 22 MEQ/L (ref 20–31)
CREAT SERPL-MCNC: 1.12 MG/DL (ref 0.7–1.2)
ECHO BSA: 2.23 M2
EOSINOPHIL # BLD: 0.5 K/UL (ref 0–0.7)
EOSINOPHIL NFR BLD: 5 %
ERYTHROCYTE [DISTWIDTH] IN BLOOD BY AUTOMATED COUNT: 22.4 % (ref 11.5–14.5)
GLOBULIN SER CALC-MCNC: 2.9 G/DL (ref 2.3–3.5)
GLUCOSE BLD-MCNC: 113 MG/DL (ref 70–99)
GLUCOSE BLD-MCNC: 147 MG/DL (ref 70–99)
GLUCOSE BLD-MCNC: 187 MG/DL (ref 70–99)
GLUCOSE SERPL-MCNC: 127 MG/DL (ref 70–99)
HCT VFR BLD AUTO: 25.6 % (ref 42–52)
HGB BLD-MCNC: 9.3 G/DL (ref 14–18)
LYMPHOCYTES # BLD: 2.3 K/UL (ref 1–4.8)
LYMPHOCYTES NFR BLD: 24 %
MACROCYTES BLD QL SMEAR: ABNORMAL
MAGNESIUM SERPL-MCNC: 1.9 MG/DL (ref 1.7–2.4)
MCH RBC QN AUTO: 26.3 PG (ref 27–31.3)
MCHC RBC AUTO-ENTMCNC: 36.3 % (ref 33–37)
MCV RBC AUTO: 72.5 FL (ref 79–92.2)
MICROCYTES BLD QL SMEAR: ABNORMAL
MONOCYTES # BLD: 0.6 K/UL (ref 0.2–0.8)
MONOCYTES NFR BLD: 5.8 %
MYELOCYTES NFR BLD MANUAL: 2 %
NEUTROPHILS # BLD: 5.9 K/UL (ref 1.4–6.5)
NEUTS SEG NFR BLD: 62 %
NEUTS VAC BLD QL SMEAR: ABNORMAL
NRBC BLD-RTO: 4 /100 WBC
PATH INTERP BLD-IMP: NORMAL
PATH INTERP BLD-IMP: YES
PERFORMED ON: ABNORMAL
PLATELET # BLD AUTO: 154 K/UL (ref 130–400)
PLATELET BLD QL SMEAR: ADEQUATE
POIKILOCYTOSIS BLD QL SMEAR: ABNORMAL
POLYCHROMASIA BLD QL SMEAR: ABNORMAL
POTASSIUM SERPL-SCNC: 3.5 MEQ/L (ref 3.4–4.9)
PROT SERPL-MCNC: 6.2 G/DL (ref 6.3–8)
RBC # BLD AUTO: 3.53 M/UL (ref 4.7–6.1)
SCHISTOCYTES BLD QL SMEAR: ABNORMAL
SMUDGE CELLS BLD QL SMEAR: 12.5
SODIUM SERPL-SCNC: 142 MEQ/L (ref 135–144)
TARGETS BLD QL SMEAR: ABNORMAL
VARIANT LYMPHS NFR BLD: 1 %
WBC # BLD AUTO: 9.2 K/UL (ref 4.8–10.8)

## 2024-07-18 PROCEDURE — 2580000003 HC RX 258: Performed by: INTERNAL MEDICINE

## 2024-07-18 PROCEDURE — 6360000004 HC RX CONTRAST MEDICATION: Performed by: INTERNAL MEDICINE

## 2024-07-18 PROCEDURE — 99232 SBSQ HOSP IP/OBS MODERATE 35: CPT | Performed by: INTERNAL MEDICINE

## 2024-07-18 PROCEDURE — 7100000011 HC PHASE II RECOVERY - ADDTL 15 MIN: Performed by: INTERNAL MEDICINE

## 2024-07-18 PROCEDURE — 6370000000 HC RX 637 (ALT 250 FOR IP): Performed by: FAMILY MEDICINE

## 2024-07-18 PROCEDURE — 6360000002 HC RX W HCPCS: Performed by: INTERNAL MEDICINE

## 2024-07-18 PROCEDURE — 99152 MOD SED SAME PHYS/QHP 5/>YRS: CPT | Performed by: INTERNAL MEDICINE

## 2024-07-18 PROCEDURE — 99231 SBSQ HOSP IP/OBS SF/LOW 25: CPT | Performed by: PHYSICIAN ASSISTANT

## 2024-07-18 PROCEDURE — 93458 L HRT ARTERY/VENTRICLE ANGIO: CPT | Performed by: INTERNAL MEDICINE

## 2024-07-18 PROCEDURE — 4A023N7 MEASUREMENT OF CARDIAC SAMPLING AND PRESSURE, LEFT HEART, PERCUTANEOUS APPROACH: ICD-10-PCS | Performed by: INTERNAL MEDICINE

## 2024-07-18 PROCEDURE — 2500000003 HC RX 250 WO HCPCS: Performed by: INTERNAL MEDICINE

## 2024-07-18 PROCEDURE — 6360000002 HC RX W HCPCS: Performed by: FAMILY MEDICINE

## 2024-07-18 PROCEDURE — 7100000010 HC PHASE II RECOVERY - FIRST 15 MIN: Performed by: INTERNAL MEDICINE

## 2024-07-18 PROCEDURE — 80053 COMPREHEN METABOLIC PANEL: CPT

## 2024-07-18 PROCEDURE — 2709999900 HC NON-CHARGEABLE SUPPLY: Performed by: INTERNAL MEDICINE

## 2024-07-18 PROCEDURE — 2580000003 HC RX 258: Performed by: FAMILY MEDICINE

## 2024-07-18 PROCEDURE — 83735 ASSAY OF MAGNESIUM: CPT

## 2024-07-18 PROCEDURE — 6370000000 HC RX 637 (ALT 250 FOR IP): Performed by: INTERNAL MEDICINE

## 2024-07-18 PROCEDURE — C1894 INTRO/SHEATH, NON-LASER: HCPCS | Performed by: INTERNAL MEDICINE

## 2024-07-18 PROCEDURE — B2151ZZ FLUOROSCOPY OF LEFT HEART USING LOW OSMOLAR CONTRAST: ICD-10-PCS | Performed by: INTERNAL MEDICINE

## 2024-07-18 PROCEDURE — B2111ZZ FLUOROSCOPY OF MULTIPLE CORONARY ARTERIES USING LOW OSMOLAR CONTRAST: ICD-10-PCS | Performed by: INTERNAL MEDICINE

## 2024-07-18 PROCEDURE — C1769 GUIDE WIRE: HCPCS | Performed by: INTERNAL MEDICINE

## 2024-07-18 PROCEDURE — 85025 COMPLETE CBC W/AUTO DIFF WBC: CPT

## 2024-07-18 RX ORDER — FENTANYL CITRATE 50 UG/ML
INJECTION, SOLUTION INTRAMUSCULAR; INTRAVENOUS PRN
Status: DISCONTINUED | OUTPATIENT
Start: 2024-07-18 | End: 2024-07-18 | Stop reason: HOSPADM

## 2024-07-18 RX ORDER — HEPARIN SODIUM 200 [USP'U]/100ML
INJECTION, SOLUTION INTRAVENOUS CONTINUOUS PRN
Status: COMPLETED | OUTPATIENT
Start: 2024-07-18 | End: 2024-07-18

## 2024-07-18 RX ORDER — HYDRALAZINE HYDROCHLORIDE 100 MG/1
100 TABLET, FILM COATED ORAL 2 TIMES DAILY
Qty: 60 TABLET | Refills: 1 | Status: SHIPPED | OUTPATIENT
Start: 2024-07-18

## 2024-07-18 RX ORDER — SODIUM CHLORIDE 9 MG/ML
INJECTION, SOLUTION INTRAVENOUS PRN
Status: DISCONTINUED | OUTPATIENT
Start: 2024-07-18 | End: 2024-07-18 | Stop reason: HOSPADM

## 2024-07-18 RX ORDER — MIDAZOLAM HYDROCHLORIDE 1 MG/ML
INJECTION INTRAMUSCULAR; INTRAVENOUS PRN
Status: DISCONTINUED | OUTPATIENT
Start: 2024-07-18 | End: 2024-07-18 | Stop reason: HOSPADM

## 2024-07-18 RX ORDER — SODIUM CHLORIDE 0.9 % (FLUSH) 0.9 %
5-40 SYRINGE (ML) INJECTION EVERY 12 HOURS SCHEDULED
Status: DISCONTINUED | OUTPATIENT
Start: 2024-07-18 | End: 2024-07-18 | Stop reason: HOSPADM

## 2024-07-18 RX ORDER — MIDAZOLAM HYDROCHLORIDE 2 MG/2ML
2 INJECTION, SOLUTION INTRAMUSCULAR; INTRAVENOUS
Status: DISCONTINUED | OUTPATIENT
Start: 2024-07-18 | End: 2024-07-18 | Stop reason: HOSPADM

## 2024-07-18 RX ORDER — AMLODIPINE BESYLATE AND BENAZEPRIL HYDROCHLORIDE 10; 20 MG/1; MG/1
1 CAPSULE ORAL DAILY
Qty: 30 CAPSULE | Refills: 1 | Status: SHIPPED | OUTPATIENT
Start: 2024-07-18

## 2024-07-18 RX ORDER — ONDANSETRON 2 MG/ML
4 INJECTION INTRAMUSCULAR; INTRAVENOUS EVERY 6 HOURS PRN
Status: DISCONTINUED | OUTPATIENT
Start: 2024-07-18 | End: 2024-07-18 | Stop reason: HOSPADM

## 2024-07-18 RX ORDER — SODIUM CHLORIDE 0.9 % (FLUSH) 0.9 %
5-40 SYRINGE (ML) INJECTION PRN
Status: DISCONTINUED | OUTPATIENT
Start: 2024-07-18 | End: 2024-07-18 | Stop reason: HOSPADM

## 2024-07-18 RX ORDER — ACETAMINOPHEN 325 MG/1
650 TABLET ORAL EVERY 4 HOURS PRN
Status: DISCONTINUED | OUTPATIENT
Start: 2024-07-18 | End: 2024-07-18 | Stop reason: HOSPADM

## 2024-07-18 RX ORDER — DIPHENHYDRAMINE HYDROCHLORIDE 50 MG/ML
50 INJECTION INTRAMUSCULAR; INTRAVENOUS ONCE
Status: DISCONTINUED | OUTPATIENT
Start: 2024-07-18 | End: 2024-07-18 | Stop reason: HOSPADM

## 2024-07-18 RX ORDER — SODIUM CHLORIDE 450 MG/100ML
75 INJECTION, SOLUTION INTRAVENOUS CONTINUOUS
Status: DISCONTINUED | OUTPATIENT
Start: 2024-07-18 | End: 2024-07-18 | Stop reason: HOSPADM

## 2024-07-18 RX ORDER — NITROGLYCERIN 0.4 MG/1
0.4 TABLET SUBLINGUAL EVERY 5 MIN PRN
Status: DISCONTINUED | OUTPATIENT
Start: 2024-07-18 | End: 2024-07-18 | Stop reason: HOSPADM

## 2024-07-18 RX ORDER — SODIUM CHLORIDE 9 MG/ML
INJECTION, SOLUTION INTRAVENOUS CONTINUOUS
Status: DISCONTINUED | OUTPATIENT
Start: 2024-07-18 | End: 2024-07-18

## 2024-07-18 RX ORDER — PREGABALIN 150 MG/1
150 CAPSULE ORAL 2 TIMES DAILY
Qty: 60 CAPSULE | Refills: 0 | COMMUNITY
Start: 2024-07-18 | End: 2024-07-25 | Stop reason: SDUPTHER

## 2024-07-18 RX ORDER — 0.9 % SODIUM CHLORIDE 0.9 %
INTRAVENOUS SOLUTION INTRAVENOUS CONTINUOUS PRN
Status: COMPLETED | OUTPATIENT
Start: 2024-07-18 | End: 2024-07-18

## 2024-07-18 RX ORDER — NITROGLYCERIN 20 MG/100ML
INJECTION INTRAVENOUS CONTINUOUS PRN
Status: COMPLETED | OUTPATIENT
Start: 2024-07-18 | End: 2024-07-18

## 2024-07-18 RX ORDER — CARVEDILOL 25 MG/1
25 TABLET ORAL 2 TIMES DAILY
Qty: 60 TABLET | Refills: 1 | Status: SHIPPED | OUTPATIENT
Start: 2024-07-18

## 2024-07-18 RX ORDER — HEPARIN SODIUM 1000 [USP'U]/ML
INJECTION, SOLUTION INTRAVENOUS; SUBCUTANEOUS PRN
Status: DISCONTINUED | OUTPATIENT
Start: 2024-07-18 | End: 2024-07-18 | Stop reason: HOSPADM

## 2024-07-18 RX ADMIN — PREGABALIN 75 MG: 75 CAPSULE ORAL at 07:39

## 2024-07-18 RX ADMIN — LISINOPRIL 20 MG: 20 TABLET ORAL at 07:39

## 2024-07-18 RX ADMIN — CARVEDILOL 25 MG: 25 TABLET, FILM COATED ORAL at 07:40

## 2024-07-18 RX ADMIN — PIPERACILLIN AND TAZOBACTAM 3375 MG: 3; .375 INJECTION, POWDER, LYOPHILIZED, FOR SOLUTION INTRAVENOUS at 05:17

## 2024-07-18 RX ADMIN — AMLODIPINE BESYLATE 10 MG: 10 TABLET ORAL at 07:39

## 2024-07-18 RX ADMIN — SODIUM CHLORIDE: 9 INJECTION, SOLUTION INTRAVENOUS at 12:04

## 2024-07-18 RX ADMIN — TAMSULOSIN HYDROCHLORIDE 0.4 MG: 0.4 CAPSULE ORAL at 07:39

## 2024-07-18 RX ADMIN — HYDRALAZINE HYDROCHLORIDE 100 MG: 100 TABLET ORAL at 12:02

## 2024-07-18 RX ADMIN — SODIUM CHLORIDE: 9 INJECTION, SOLUTION INTRAVENOUS at 09:40

## 2024-07-18 RX ADMIN — ASPIRIN 81 MG 81 MG: 81 TABLET ORAL at 07:39

## 2024-07-18 ASSESSMENT — PAIN SCALES - GENERAL
PAINLEVEL_OUTOF10: 0

## 2024-07-18 ASSESSMENT — ENCOUNTER SYMPTOMS
ABDOMINAL PAIN: 0
ABDOMINAL DISTENTION: 0

## 2024-07-18 NOTE — PLAN OF CARE
Problem: Discharge Planning  Goal: Discharge to home or other facility with appropriate resources  7/17/2024 2324 by Zeny James RN  Outcome: Progressing  7/17/2024 1126 by Adwoa Roa RN  Outcome: Progressing  Flowsheets (Taken 7/17/2024 0830)  Discharge to home or other facility with appropriate resources:   Identify barriers to discharge with patient and caregiver   Arrange for needed discharge resources and transportation as appropriate   Identify discharge learning needs (meds, wound care, etc)   Arrange for interpreters to assist at discharge as needed   Refer to discharge planning if patient needs post-hospital services based on physician order or complex needs related to functional status, cognitive ability or social support system     Problem: Pain  Goal: Verbalizes/displays adequate comfort level or baseline comfort level  7/17/2024 2324 by Zeny James RN  Outcome: Progressing  7/17/2024 1126 by Adwoa Roa RN  Outcome: Progressing     Problem: Safety - Adult  Goal: Free from fall injury  7/17/2024 2324 by Zeny James RN  Outcome: Progressing  7/17/2024 1126 by Adwoa Roa RN  Outcome: Progressing     Problem: Confusion  Goal: Confusion, delirium, dementia, or psychosis is improved or at baseline  Description: INTERVENTIONS:  1. Assess for possible contributors to thought disturbance, including medications, impaired vision or hearing, underlying metabolic abnormalities, dehydration, psychiatric diagnoses, and notify attending LIP  2. [place] high risk fall precautions, as indicated  3. Provide frequent short contacts to provide reality reorientation, refocusing and direction  4. Decrease environmental stimuli, including noise as appropriate  5. Monitor and intervene to maintain adequate nutrition, hydration, elimination, sleep and activity  6. If unable to ensure safety without constant attention obtain sitter and review sitter guidelines with assigned personnel  7. Initiate  RN  Outcome: Progressing  7/17/2024 1126 by Adwoa Roa RN  Outcome: Progressing  Flowsheets (Taken 7/17/2024 0830)  Absence of urinary retention:   Assess patient’s ability to void and empty bladder   Monitor intake/output and perform bladder scan as needed   Place urinary catheter per Licensed Independent Practitioner order if needed   Discuss with Licensed Independent Practitioner  medications to alleviate retention as needed   Discuss catheterization for long term situations as appropriate     Problem: Infection - Adult  Goal: Absence of infection at discharge  7/17/2024 2324 by Zeny James RN  Outcome: Progressing  7/17/2024 1126 by Adwoa Roa RN  Outcome: Progressing  Flowsheets (Taken 7/17/2024 0830)  Absence of infection at discharge:   Assess and monitor for signs and symptoms of infection   Monitor lab/diagnostic results   Monitor all insertion sites i.e., indwelling lines, tubes and drains   Monitor endotracheal (as able) and nasal secretions for changes in amount and color   [place] appropriate cooling/warming therapies per order   Administer medications as ordered   Instruct and encourage patient and family to use good hand hygiene technique   Identify and instruct in appropriate isolation precautions for identified infection/condition  Goal: Absence of infection during hospitalization  7/17/2024 2324 by Zeny James RN  Outcome: Progressing  7/17/2024 1126 by Adwoa Roa RN  Outcome: Progressing  Flowsheets (Taken 7/17/2024 0830)  Absence of infection during hospitalization:   Monitor lab/diagnostic results   Assess and monitor for signs and symptoms of infection   Monitor all insertion sites i.e., indwelling lines, tubes and drains   Monitor endotracheal (as able) and nasal secretions for changes in amount and color   [place] appropriate cooling/warming therapies per order   Administer medications as ordered   Instruct and encourage patient and family to use good hand hygiene

## 2024-07-18 NOTE — PROGRESS NOTES
INPATIENT PROGRESS NOTES    PATIENT NAME: Mateo Marsh  MRN: [id number]  SERVICE DATE:  July 18, 2024   SERVICE TIME:  4:35 PM      PRIMARY SERVICE: Pulmonary Disease    CHIEF COMPLAIN: Acute encephalopathy, hypertensive emergency      INTERVAL HPI: Patient seen and examined at bedside, Interval Notes, orders reviewed. Nursing notes noted  He is doing much better.  He has no fever.  No chest pain. No shortness of breath he said he has history of sleep apnea but not using CPAP therapy.  He is willing to have further evaluation in the future.  He said he will follow in office.  Currently on RA Spo2 97%         OBJECTIVE   I/O:24HR INTAKE/OUTPUT:    Intake/Output Summary (Last 24 hours) at 7/18/2024 1635  Last data filed at 7/18/2024 1549  Gross per 24 hour   Intake 1083.11 ml   Output 2650 ml   Net -1566.89 ml     07/17 0701 - 07/18 0700  In: 640 [P.O.:640]  Out: 2400 [Urine:2400]  Body mass index is 28.89 kg/m².    PHYSICAL EXAM:  Vitals:  BP (!) 137/58   Pulse 78   Temp 98.8 °F (37.1 °C) (Oral)   Resp 20   Ht 1.88 m (6' 2\")   Wt 102.1 kg (225 lb)   SpO2 97%   PF (!) 20 L/min   BMI 28.89 kg/m²     General: Alert, awake .comfortable in bed, No distress.  Head: Atraumatic , Normocephalic   Eyes: PERRL. No sclera icterus. No conjunctival injection. No discharge   ENT: No nasal  discharge. Pharynx clear.  Neck:  Trachea midline. No thyromegaly, no JVD, No cervical adenopathy.  Chest : Bilaterally symmetrical ,Normal effort,  No accessory muscle use  Lung : Diminished breath sound bilaterally No Rales. No wheezing. No rhonchi.   Heart:: Normal rate. Regular rhythm. No mumur ,  Rub or gallop  ABD: Non-tender. Non-distended. No masses. No organmegaly. Normal bowel sounds. No hernia.  Ext : No Pitting both leg , No Cyanosis No clubbing  Neuro: no focal weakness    Labs:  Recent Labs     07/16/24  0520 07/17/24  0512 07/18/24  0615   WBC 7.6 7.9 9.2   HGB 10.2* 9.5* 9.3*   * 114* 154     Recent Labs

## 2024-07-18 NOTE — PROGRESS NOTES
Physical Therapy Missed Treatment   Facility/Department: [facility] MED SURG MLOZ-CATHPRM/PL    NAME: Mateo Marsh    : 1965 (58 y.o.)  MRN: [id number]    Account: [id number]  Gender: male    Chart reviewed, attempted PT at 0845. Patient unavailable 2° to:        [x] Pt.. off floor for test/procedure.           Will attempt PT treatment again at earliest convenience.      Electronically signed by Sammie Trinidad PTA on 24 at 9:19 AM EDT

## 2024-07-18 NOTE — BRIEF OP NOTE
Section of Cardiology  Adult Brief Cardiac Cath Procedure Note        Procedure(s):    LHC, b/l coronary angio, LV gram    Pre-operative Diagnosis: Chest pain    H&P Status: Completed and reviewed.     Post-operative Diagnosis: Mild CAD    Findings:  See full report  Right dominant system  Left main: Big size vessel mild disease  LAD: Big size vessel mild disease  Circumflex: Big size vessel mild disease  RCA: Big size dominant vessel mild disease  LVEDP 6 mmHg  No aortic valve gradient on catheter pullback  EF 65% by LV gram    Complications:  none    Recommendations:  Transfer patient back to holding area for post diagnostic cath management  Maximize medical therapy   Continue aspirin and high intensity statin indefinitely for secondary prevention of CAD  Continue beta-blocker   Bedrest for 2 hours  IV hydration at 75 cc/h to complete 1 L then TKO  Follow-up morning labs including CBC BMP    Primary Proceduralist:   Mike Sousa MD    Full procedure note to follow

## 2024-07-18 NOTE — PROGRESS NOTES
Tobacco comments:     Getting closer to wanting to quit.   Vaping Use    Vaping Use: Former   Substance and Sexual Activity    Alcohol use: No     Alcohol/week: 0.0 standard drinks of alcohol    Drug use: No     Social Determinants of Health     Food Insecurity: No Food Insecurity (7/10/2024)    Hunger Vital Sign     Worried About Running Out of Food in the Last Year: Never true     Ran Out of Food in the Last Year: Never true   Transportation Needs: No Transportation Needs (7/10/2024)    PRAPARE - Transportation     Lack of Transportation (Medical): No     Lack of Transportation (Non-Medical): No   Housing Stability: Low Risk  (7/10/2024)    Housing Stability Vital Sign     Unable to Pay for Housing in the Last Year: No     Number of Places Lived in the Last Year: 1     Unstable Housing in the Last Year: No     Family History   Problem Relation Age of Onset    Cancer Mother         Throat & esophagus    Anemia Mother     Other Father         Natural causes    High Blood Pressure Sister     COPD Brother     Heart Disease Brother         Cardiac stent    Diabetes Sister     COPD Sister     Emphysema Sister     No Known Problems Daughter      Current Facility-Administered Medications   Medication Dose Route Frequency Provider Last Rate Last Admin    0.9 % sodium chloride infusion   IntraVENous Continuous Mike Sousa MD 75 mL/hr at 07/18/24 1204 New Bag at 07/18/24 1204    sodium chloride flush 0.9 % injection 5-40 mL  5-40 mL IntraVENous 2 times per day Mike Sousa MD        sodium chloride flush 0.9 % injection 5-40 mL  5-40 mL IntraVENous PRN Mike Sousa MD        0.9 % sodium chloride infusion   IntraVENous PRN Mike Sousa MD        acetaminophen (TYLENOL) tablet 650 mg  650 mg Oral Q4H PRN Mike Sousa MD        midazolam PF (VERSED) injection 2 mg  2 mg IntraVENous Once PRN Mike Sousa MD        pregabalin (LYRICA) capsule 75 mg  75 mg Oral BID Simon Pagan, DO   75 mg at 07/18/24  Nightly PRN Elizabet Roth MD   3 mg at 07/17/24 2047    polyethylene glycol (GLYCOLAX) packet 17 g  17 g Oral Daily PRN Elizabet Roth MD        acetaminophen (TYLENOL) tablet 650 mg  650 mg Oral Q6H PRN Elizabet Roth MD   650 mg at 07/16/24 1018    Or    acetaminophen (TYLENOL) suppository 650 mg  650 mg Rectal Q6H PRN Elizabet Roth MD        aspirin chewable tablet 81 mg  81 mg Oral Daily Elizabet Roth MD   81 mg at 07/18/24 0739    insulin lispro (HUMALOG,ADMELOG) injection vial 0-8 Units  0-8 Units SubCUTAneous TID WC Elizabet Roth MD   2 Units at 07/17/24 1209    insulin lispro (HUMALOG,ADMELOG) injection vial 0-4 Units  0-4 Units SubCUTAneous Nightly Elizabet Roth MD        glucose chewable tablet 16 g  4 tablet Oral PRN Elizabet Roth MD        dextrose bolus 10% 125 mL  125 mL IntraVENous PRN Elizabet Roth MD        Or    dextrose bolus 10% 250 mL  250 mL IntraVENous PRN Elizabet Roth MD        glucagon injection 1 mg  1 mg SubCUTAneous PRN Elizabet Roth MD        dextrose 10 % infusion   IntraVENous Continuous PRN Elizabet Roth MD        LORazepam (ATIVAN) injection 1 mg  1 mg IntraVENous Q4H PRN Camilo Michael MD   1 mg at 07/12/24 2004    tamsulosin (FLOMAX) capsule 0.4 mg  0.4 mg Oral Daily Camilo Michael MD   0.4 mg at 07/18/24 0739     Patient has no known allergies.  reviewed      Review of Systems   Constitutional:  Negative for fever.   Gastrointestinal:  Negative for abdominal distention and abdominal pain.   Genitourinary:  Negative for difficulty urinating.   Neurological:  Negative for speech difficulty.         Objective:   Physical Exam  HENT:      Mouth/Throat:      Mouth: Mucous membranes are moist.   Pulmonary:      Effort: Pulmonary effort is normal.   Neurological:      Mental Status: He is alert and oriented to person, place, and time.          Assessment:      58 year old male who is voiding independently with out any intervention. He voices no other

## 2024-07-18 NOTE — PROGRESS NOTES
Nutrition Assessment    Type and Reason for Visit:  RD Nutrition Re-Screen/LOS    Nutrition Recommendations/Plan:   Continue Carb Control 5 , low sodium, low fat/cholesterol high fiber diet     Malnutrition Assessment:  Malnutrition Status:  No malnutrition (07/18/24 1413)        Nutrition Assessment:    Nutritional status apears adequate at this time, based on available information, wide variety of EMR wts, pt denies any significant changes, feels much better than upon admission and reports appetite is back to baseline ( good)    Nutrition Related Findings:    admitted with acute respiratory failure with hypoxia, sickle cell crisis, altered mental status, chest pain, and dehydration; history includes :dabetes,, heart cath ( 7/18), glucose variable ( 127-249), meds noted, appetite good Wound Type: None       Current Nutrition Intake & Therapies:    Average Meal Intake: %  Average Supplements Intake: None Ordered  ADULT DIET; Regular; 5 carb choices (75 gm/meal); Low Fat/Low Chol/High Fiber/WILNER    Anthropometric Measures:  Height: 188 cm (6' 2\")  Ideal Body Weight (IBW): 190 lbs (86 kg)    Admission Body Weight: 95.3 kg (210 lb) (unknown method)  Current Body Weight: 102.1 kg (225 lb) (( 7/18); 193#( 7/11)), 118.4 % IBW. Weight Source: Bed Scale  Current BMI (kg/m2): 28.9  Usual Body Weight: 97.1 kg (214 lb) (( 4/2024), 218# ( 9/2023) \" 210-215# \" per pt)  % Weight Change (Calculated): 5.1                    BMI Categories: Normal Weight (BMI 18.5-24.9)         Nutrition Diagnosis:   No nutrition diagnosis at this time     Nutrition Interventions:   Food and/or Nutrient Delivery: Continue Current Diet  Nutrition Education/Counseling: Education declined  Coordination of Nutrition Care: Continue to monitor while inpatient       Goals:     Goals: PO intake 75% or greater, by next RD assessment       Nutrition Monitoring and Evaluation:   Behavioral-Environmental Outcomes: None Identified  Food/Nutrient Intake

## 2024-07-18 NOTE — PROGRESS NOTES
[facility]  Occupational Therapy        NAME:  Mateo Marsh  ROOM: W289/W289-01  :  1965  DATE: 2024    Attempted to see Mateo Marsh at 11:16 AM on this date for:   []  Initial Evaluation   [x]  Treatment       Patient was unable to be seen due to:   [] Off unit for testing/procedure    [] Patient refused, stating \"    [] Therapy on hold due to     [] Nursing deferred due to    [x] Other:  Pt on bed rest per chart review. Discussed with pt nurse if it was okay to see pt for OT. Nursing requested that therapy attempt later in a couple hours d/t pt just getting back from a procedure. Will attempt again at next availability.    Electronically signed by ELI Hanley on 24 at 11:16 AM EDT

## 2024-07-18 NOTE — PROGRESS NOTES
Received report from Adwoa REA. Pt arrived to pre/post cath. Alert and oriented. Consent signed with Dr. Sousa. Prepped for procedure and taken to cath lab.  Electronically signed by Tianna Samuel RN on 7/18/2024 at 8:47 AM      0935 Returns from procedure. Awake and alert. Received report from Cynthia CARLTON. R radial vasc band and outer pressure dressing in place. No signs of bleeding or hematoma.  Pt denies having any complaints at this time. Vital signs are stable on 2LNC. Taking in PO fluids.    1030 R wrist remains stable. Outer pressure dressing removed. Quikclot and tegaderm remain clean dry and intact.    1040 Report called to Adwoa CARLTON. Pt voiding using urinal, 300cc.    1050 Placed on portable tele monitor. Transferred back to room on 2WT by cart.  Electronically signed by Tianna Samuel RN on 7/18/2024 at 10:53 AM

## 2024-07-18 NOTE — DISCHARGE SUMMARY
[facility] Medicine Discharge Summary    Mateo Marsh  :  1965  MRN:  [id number]    Admit date:  2024  Discharge date:  2024    Admitting Physician:  Elizabet Roth MD  Primary Care Physician:  Rey Pang MD    Discharge Diagnoses:    Principal Problem:    Sickle cell crisis (HCC)  Active Problems:    Hypoxemia    Parainfluenza infection    Encephalopathy    Painful urination    Pneumonia of both lungs due to infectious organism    Urinary retention  Resolved Problems:    * No resolved hospital problems. *    Chief Complaint   Patient presents with    Sickle Cell Pain Crisis     Patient states pain all over from sickle cell .       Condition: improved  Activity: no restrictions   Diet: regular  Disposition: home  Functional Status: ambulatory    Significant Findings:     Recent Labs     24  0615 24  0512 24  0520   WBC 9.2 7.9 7.6   HGB 9.3* 9.5* 10.2*   HCT 25.6* 27.4* 28.1*   MCV 72.5* 72.7* 71.9*    114* 101*         Latest Ref Rng & Units 2024     6:15 AM 2024     5:12 AM 2024     5:20 AM 7/15/2024     5:02 AM 2024     4:45 AM   Labs Renal   BUN 6 - 20 mg/dL 17  16  17  13  15    Cr 0.70 - 1.20 mg/dL 1.12  1.05  1.13  0.98  0.97    K 3.4 - 4.9 mEq/L 3.5  3.5  3.2  3.0  3.3    Na 135 - 144 mEq/L 142  141  143  146  153      LHC:  Right dominant system  Left main: Big size vessel mild disease  LAD: Big size vessel mild disease  Circumflex: Big size vessel mild disease  RCA: Big size dominant vessel mild disease  LVEDP 6 mmHg  No aortic valve gradient on catheter pullback  EF 65% by LV gram      Hospital Course:   58-year-old male was hospitalized for acute respiratory failure with hypoxia secondary to parainfluenza bronchitis.  His course was complicated by sickle cell crisis, acute urinary retention, and metabolic encephalopathy.  He ultimately stabilized and returned to his baseline.  He had previously had angina and an

## 2024-07-18 NOTE — DISCHARGE INSTR - COC
Encounters:   07/18/24 102.1 kg (225 lb)     Mental Status:  oriented, alert, coherent, logical, thought processes intact, and able to concentrate and follow conversation    IV Access:  - None    Nursing Mobility/ADLs:  Walking   Independent  Transfer  Independent  Bathing  Independent  Dressing  Independent  Toileting  Independent  Feeding  Independent  Med Admin  Independent  Med Delivery   whole    Wound Care Documentation and Therapy:  Incision 04/25/23 Buttocks Left;Upper (Active)   Number of days: 450        Elimination:  Continence:   Bowel: Yes  Bladder: Yes  Urinary Catheter: Removal Date 7/17/2024    Colostomy/Ileostomy/Ileal Conduit: No       Date of Last BM: 7/17/2024    Intake/Output Summary (Last 24 hours) at 7/18/2024 1542  Last data filed at 7/18/2024 1441  Gross per 24 hour   Intake 600 ml   Output 2800 ml   Net -2200 ml     I/O last 3 completed shifts:  In: 640 [P.O.:640]  Out: 2400 [Urine:2400]    Safety Concerns:     None    Impairments/Disabilities:      None    Nutrition Therapy:  Current Nutrition Therapy:   - Oral Diet:  General    Routes of Feeding: Oral  Liquids: Thin Liquids  Daily Fluid Restriction: no  Last Modified Barium Swallow with Video (Video Swallowing Test): not done    Treatments at the Time of Hospital Discharge:   Respiratory Treatments: ***  Oxygen Therapy:  is not on home oxygen therapy.  Ventilator:    - No ventilator support    Rehab Therapies: Physical Therapy and Occupational Therapy  Weight Bearing Status/Restrictions: No weight bearing restrictions  Other Medical Equipment (for information only, NOT a DME order):  walker and ***  Other Treatments: ***    Patient's personal belongings (please select all that are sent with patient):  ***    RN SIGNATURE:  Electronically signed by Adwoa Roa RN on 7/18/24 at 3:44 PM EDT    CASE MANAGEMENT/SOCIAL WORK SECTION    Inpatient Status Date: ***    Readmission Risk Assessment Score:  Readmission Risk              Risk of  Unplanned Readmission:  15           Discharging to Facility/ Agency   Name:   Address:  Phone:  Fax:    Dialysis Facility (if applicable)   Name:  Address:  Dialysis Schedule:  Phone:  Fax:    / signature: {Esignature:[id number]}    PHYSICIAN SECTION    Prognosis: {Prognosis:2670483723}    Condition at Discharge: { Patient Condition:464905210}    Rehab Potential (if transferring to Rehab): {Prognosis:0695038972}    Recommended Labs or Other Treatments After Discharge: ***    Physician Certification: I certify the above information and transfer of Mateo Marsh  is necessary for the continuing treatment of the diagnosis listed and that he requires {Admit to Appropriate Level of Care:67944} for {GREATER/LESS:028413953} 30 days.     Update Admission H&P: {CHP DME Changes in HandP:719242312}    PHYSICIAN SIGNATURE:  {Esignature:[id number]}

## 2024-07-18 NOTE — CARE COORDINATION
MET WITH PT AND FAMILY AT BEDSIDE. DC PLAN REMAINS HOME W/[facility], PENDING ACCEPTANCE. SPOKE WITH NADINE AT [facility]- THEY ARE REVIEWING. WILL CONTINUE TO FOLLOW. PT DENIES FURTHER NEEDS.

## 2024-07-25 ENCOUNTER — OFFICE VISIT (OUTPATIENT)
Dept: PAIN MANAGEMENT | Age: 59
End: 2024-07-25
Payer: COMMERCIAL

## 2024-07-25 VITALS
BODY MASS INDEX: 25.67 KG/M2 | HEIGHT: 74 IN | TEMPERATURE: 97.1 F | SYSTOLIC BLOOD PRESSURE: 130 MMHG | WEIGHT: 200 LBS | DIASTOLIC BLOOD PRESSURE: 68 MMHG

## 2024-07-25 DIAGNOSIS — M51.26 DISPLACEMENT OF LUMBAR INTERVERTEBRAL DISC WITHOUT MYELOPATHY: ICD-10-CM

## 2024-07-25 DIAGNOSIS — F11.90 CHRONIC, CONTINUOUS USE OF OPIOIDS: ICD-10-CM

## 2024-07-25 DIAGNOSIS — M96.1 POSTLAMINECTOMY SYNDROME, LUMBAR REGION: Primary | ICD-10-CM

## 2024-07-25 PROCEDURE — 99214 OFFICE O/P EST MOD 30 MIN: CPT | Performed by: NURSE PRACTITIONER

## 2024-07-25 RX ORDER — BACLOFEN 10 MG/1
10 TABLET ORAL 2 TIMES DAILY
Qty: 45 TABLET | Refills: 2 | Status: CANCELLED | OUTPATIENT
Start: 2024-07-25 | End: 2024-10-23

## 2024-07-25 RX ORDER — PREGABALIN 150 MG/1
150 CAPSULE ORAL 2 TIMES DAILY
Qty: 60 CAPSULE | Refills: 0 | Status: SHIPPED | OUTPATIENT
Start: 2024-07-25 | End: 2024-08-24

## 2024-07-25 RX ORDER — DULOXETIN HYDROCHLORIDE 60 MG/1
60 CAPSULE, DELAYED RELEASE ORAL DAILY
Qty: 90 CAPSULE | Refills: 0 | Status: SHIPPED | OUTPATIENT
Start: 2024-07-25 | End: 2024-10-23

## 2024-07-25 RX ORDER — HYDROCODONE BITARTRATE AND ACETAMINOPHEN 10; 325 MG/1; MG/1
1 TABLET ORAL
Qty: 80 TABLET | Refills: 0 | Status: SHIPPED | OUTPATIENT
Start: 2024-07-25 | End: 2024-08-24

## 2024-07-25 ASSESSMENT — ENCOUNTER SYMPTOMS
RESPIRATORY NEGATIVE: 1
CONSTIPATION: 0
DIARRHEA: 0
BACK PAIN: 1

## 2024-07-25 NOTE — PROGRESS NOTES
Reflexes are normal and symmetric. Reflexes normal.   Psychiatric:         Mood and Affect: Mood normal.         Behavior: Behavior normal.         Thought Content: Thought content normal.         Judgment: Judgment normal.            Assessment:        Diagnosis Orders   1. Postlaminectomy syndrome, lumbar region  DULoxetine (CYMBALTA) 60 MG extended release capsule    HYDROcodone-acetaminophen (NORCO)  MG per tablet    pregabalin (LYRICA) 150 MG capsule      2. L4-5 HERNAITED DISC  DULoxetine (CYMBALTA) 60 MG extended release capsule    HYDROcodone-acetaminophen (NORCO)  MG per tablet    pregabalin (LYRICA) 150 MG capsule      3. Chronic, continuous use of opioids            Plan:     Periodic Controlled Substance Monitoring: Possible medication side effects, risk of tolerance/dependence & alternative treatments discussed., No signs of potential drug abuse or diversion identified., Assessed functional status (ability to engage in work or other purposeful activities, the pain intensity and its interference with activities of daily living, quality of family life and social activities, and the physical activity), Obtaining appropriate analgesic effect of treatment. (April Waters, APRN - CNP)    Orders Placed This Encounter   Medications    DULoxetine (CYMBALTA) 60 MG extended release capsule     Sig: Take 1 capsule by mouth daily     Dispense:  90 capsule     Refill:  0    HYDROcodone-acetaminophen (NORCO)  MG per tablet     Sig: Take 1 tablet by mouth every 8-12 hours as needed for Pain for up to 30 days. Max Daily Amount: 3 tablets     Dispense:  80 tablet     Refill:  0     Reduce doses taken as pain becomes manageable    pregabalin (LYRICA) 150 MG capsule     Sig: Take 1 capsule by mouth 2 times daily for 30 days. Max Daily Amount: 300 mg     Dispense:  60 capsule     Refill:  0       No orders of the defined types were placed in this encounter.    -Refill Norco 10 mg every 8-12 hours as

## 2024-08-15 ENCOUNTER — HOSPITAL ENCOUNTER (EMERGENCY)
Age: 59
Discharge: HOME | End: 2024-08-15
Payer: COMMERCIAL

## 2024-08-15 VITALS
DIASTOLIC BLOOD PRESSURE: 80 MMHG | BODY MASS INDEX: 26.51 KG/M2 | HEIGHT: 73 IN | OXYGEN SATURATION: 92 % | HEART RATE: 69 BPM | SYSTOLIC BLOOD PRESSURE: 164 MMHG | WEIGHT: 200 LBS | RESPIRATION RATE: 20 BRPM | TEMPERATURE: 97 F

## 2024-08-15 DIAGNOSIS — T78.3XXA ANGIOEDEMA, INITIAL ENCOUNTER: Primary | ICD-10-CM

## 2024-08-15 PROCEDURE — 96374 THER/PROPH/DIAG INJ IV PUSH: CPT

## 2024-08-15 PROCEDURE — 2580000003 HC RX 258

## 2024-08-15 PROCEDURE — 2580000003 HC RX 258: Performed by: PHYSICIAN ASSISTANT

## 2024-08-15 PROCEDURE — 6360000002 HC RX W HCPCS: Performed by: PHYSICIAN ASSISTANT

## 2024-08-15 PROCEDURE — 96375 TX/PRO/DX INJ NEW DRUG ADDON: CPT

## 2024-08-15 PROCEDURE — 2500000003 HC RX 250 WO HCPCS: Performed by: PHYSICIAN ASSISTANT

## 2024-08-15 PROCEDURE — 99284 EMERGENCY DEPT VISIT MOD MDM: CPT

## 2024-08-15 RX ORDER — METHYLPREDNISOLONE SODIUM SUCCINATE 125 MG/2ML
125 INJECTION, POWDER, LYOPHILIZED, FOR SOLUTION INTRAMUSCULAR; INTRAVENOUS ONCE
Status: COMPLETED | OUTPATIENT
Start: 2024-08-15 | End: 2024-08-15

## 2024-08-15 RX ORDER — WATER 10 ML/10ML
INJECTION INTRAMUSCULAR; INTRAVENOUS; SUBCUTANEOUS
Status: COMPLETED
Start: 2024-08-15 | End: 2024-08-15

## 2024-08-15 RX ORDER — DIPHENHYDRAMINE HYDROCHLORIDE 50 MG/ML
50 INJECTION INTRAMUSCULAR; INTRAVENOUS ONCE
Status: COMPLETED | OUTPATIENT
Start: 2024-08-15 | End: 2024-08-15

## 2024-08-15 RX ORDER — PREDNISONE 10 MG/1
40 TABLET ORAL DAILY
Qty: 20 TABLET | Refills: 0 | Status: SHIPPED | OUTPATIENT
Start: 2024-08-15 | End: 2024-08-20

## 2024-08-15 RX ADMIN — DIPHENHYDRAMINE HYDROCHLORIDE 50 MG: 50 INJECTION INTRAMUSCULAR; INTRAVENOUS at 16:12

## 2024-08-15 RX ADMIN — METHYLPREDNISOLONE SODIUM SUCCINATE 125 MG: 125 INJECTION INTRAMUSCULAR; INTRAVENOUS at 16:16

## 2024-08-15 RX ADMIN — WATER: 1 INJECTION INTRAMUSCULAR; INTRAVENOUS; SUBCUTANEOUS at 16:16

## 2024-08-15 RX ADMIN — FAMOTIDINE 20 MG: 10 INJECTION, SOLUTION INTRAVENOUS at 16:10

## 2024-08-15 ASSESSMENT — ENCOUNTER SYMPTOMS
SHORTNESS OF BREATH: 0
EYE DISCHARGE: 0
ANAL BLEEDING: 0
VOMITING: 0
ABDOMINAL DISTENTION: 0
APNEA: 0
COUGH: 0
NAUSEA: 0
VOICE CHANGE: 0
FACIAL SWELLING: 0

## 2024-08-15 ASSESSMENT — PAIN - FUNCTIONAL ASSESSMENT: PAIN_FUNCTIONAL_ASSESSMENT: NONE - DENIES PAIN

## 2024-08-15 NOTE — DISCHARGE INSTRUCTIONS
Discontinue Lotrel follow-up with primary care.  Return to if any symptoms worsen or new symptoms develop

## 2024-08-15 NOTE — ED NOTES
AVS reviewed with and given to patient  IV removed  Pt aware he is to stop Lotrel and follow up with pcp, return to ed if symptoms worsen/new symptoms develop  Pt aware prednisone sent to his preferred pharmacy  Pt ambulatory  Denies SOB

## 2024-08-15 NOTE — ED TRIAGE NOTES
Arrived with report of swollen tongue tat started today  Left side of tongue looks more swollen  Denies any new foods or new medications  Difficulty with swallowing

## 2024-08-20 ENCOUNTER — OFFICE VISIT (OUTPATIENT)
Dept: UROLOGY | Age: 59
End: 2024-08-20
Payer: COMMERCIAL

## 2024-08-20 VITALS
HEART RATE: 65 BPM | BODY MASS INDEX: 26.39 KG/M2 | SYSTOLIC BLOOD PRESSURE: 142 MMHG | OXYGEN SATURATION: 98 % | DIASTOLIC BLOOD PRESSURE: 80 MMHG | WEIGHT: 200 LBS

## 2024-08-20 DIAGNOSIS — N40.1 BENIGN PROSTATIC HYPERPLASIA WITH URINARY RETENTION: ICD-10-CM

## 2024-08-20 DIAGNOSIS — R33.8 BENIGN PROSTATIC HYPERPLASIA WITH URINARY RETENTION: ICD-10-CM

## 2024-08-20 DIAGNOSIS — N43.3 HYDROCELE, UNSPECIFIED HYDROCELE TYPE: Primary | ICD-10-CM

## 2024-08-20 PROCEDURE — 99213 OFFICE O/P EST LOW 20 MIN: CPT | Performed by: PHYSICIAN ASSISTANT

## 2024-08-20 PROCEDURE — 51798 US URINE CAPACITY MEASURE: CPT | Performed by: PHYSICIAN ASSISTANT

## 2024-08-20 RX ORDER — TAMSULOSIN HYDROCHLORIDE 0.4 MG/1
0.4 CAPSULE ORAL DAILY
Qty: 90 CAPSULE | Refills: 3 | Status: SHIPPED | OUTPATIENT
Start: 2024-08-20

## 2024-08-20 ASSESSMENT — ENCOUNTER SYMPTOMS: APNEA: 0

## 2024-08-20 NOTE — PROGRESS NOTES
Subjective:      Patient ID: Mateo Marsh is a 58 y.o. male    HPI 58 year old male who had a arcos catheter placed in the ICU. He passed a voiding trial prior to leaving on 7/18/24. He states that he is voiding independently without incidence. He states that he gets up 4 times a night to urinate. Rare urgency. Denies gross hematuria and dysuria. He had a scrotal ultrasound on 5/29/24 which showed a large right hydrocele, which does bother him. 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    BLADDER SURGERY  07/10/2015    Patient states he was urininating blood and had a procedure done.    CARDIAC PROCEDURE N/A 7/18/2024    [place] heart cath / [place] [facility]    [facility], COLON, DIAGNOST[facility]      LUMBAR FUSION  2008    LUMBAR PUNCTURE N/A 07/12/2024    Diagnostic, performed by Dr. Olson    OTHER SURGICAL HISTORY Right     tendon repair in (R) forearm because of previous injury as child    STIMULATOR SURGERY N/A 04/25/2023    SPINAL STIMULATOR REPLACEMENT [place] Navneet Lamb [place] [facility] [place]    TOTAL HIP ARTHROPLASTY Left 01/23/2020    LEFT HIP TOTAL HIP ARTHROPLASTY, RICARDO performed [place]hta [place] [facility] [place]    UPPER GASTROINTESTINAL ENDOSCOPY  03/14/2014    [facility]    VAGAL NERVE STIMULATION  2013    Dr. Antoine office     Social History     Socioeconomic History    Marital status: Single     Spouse name: None

## 2024-08-22 ENCOUNTER — OFFICE VISIT (OUTPATIENT)
Dept: PAIN MANAGEMENT | Age: 59
End: 2024-08-22

## 2024-08-22 VITALS
DIASTOLIC BLOOD PRESSURE: 74 MMHG | BODY MASS INDEX: 26.51 KG/M2 | TEMPERATURE: 97.6 F | WEIGHT: 200 LBS | SYSTOLIC BLOOD PRESSURE: 154 MMHG | HEIGHT: 73 IN

## 2024-08-22 DIAGNOSIS — M51.26 DISPLACEMENT OF LUMBAR INTERVERTEBRAL DISC WITHOUT MYELOPATHY: ICD-10-CM

## 2024-08-22 DIAGNOSIS — M96.1 POSTLAMINECTOMY SYNDROME, LUMBAR REGION: ICD-10-CM

## 2024-08-22 RX ORDER — HYDROCODONE BITARTRATE AND ACETAMINOPHEN 10; 325 MG/1; MG/1
1 TABLET ORAL
Qty: 80 TABLET | Refills: 0 | Status: SHIPPED | OUTPATIENT
Start: 2024-08-24 | End: 2024-09-23

## 2024-08-22 RX ORDER — PREGABALIN 150 MG/1
150 CAPSULE ORAL 2 TIMES DAILY
Qty: 60 CAPSULE | Refills: 0 | Status: SHIPPED | OUTPATIENT
Start: 2024-08-24 | End: 2024-09-23

## 2024-08-22 ASSESSMENT — ENCOUNTER SYMPTOMS
DIARRHEA: 0
GASTROINTESTINAL NEGATIVE: 1
SHORTNESS OF BREATH: 0
EYES NEGATIVE: 1
BACK PAIN: 1
CONSTIPATION: 0
COUGH: 0
NAUSEA: 0

## 2024-08-22 NOTE — PROGRESS NOTES
Mateo Marsh  (1965)    8/22/2024    Subjective:     Mateo Marsh is 58 y.o. male who complains today of:    Chief Complaint   Patient presents with    Back Pain         Allergies:  Patient has no known allergies.    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    BLADDER SURGERY  07/10/2015    Patient states he was urininating blood and had a procedure done.    CARDIAC PROCEDURE N/A 7/18/2024    Left heart cath / coronary angiography performed by Mike Sousa MD at [facility] [place]    COLONOSCOPY  03/14/2014    [facility]    ENDOSCOPY, COLON, DIAGNOSTIC      LUMBAR FUSION  2008    LUMBAR PUNCTURE N/A 07/12/2024    Diagnostic, performed by Dr. Olson    OTHER SURGICAL HISTORY Right     tendon repair in (R) forearm because of previous injury as child    STIMULATOR SURGERY N/A 04/25/2023    SPINAL STIMULATOR REPLACEMENT performed by Navneet Lamb MD at [facility] [place] OR    TOTAL HIP ARTHROPLASTY Left 01/23/2020    LEFT HIP TOTAL HIP ARTHROPLASTY, RICARDO performed by Andrew Mehta MD at [facility] [place]    UPPER GASTROINTESTINAL ENDOSCOPY  03/14/2014    [facility]    VAGAL NERVE STIMULATION  2013    Dr. Antoine office     Family History   Problem Relation Age of Onset    Cancer Mother         Throat & esophagus    Anemia Mother     Other Father         Natural causes    High Blood Pressure Sister     COPD Brother     Heart Disease Brother         Cardiac stent    Diabetes Sister     COPD Sister     Emphysema Sister     No Known Problems Daughter      Social

## 2024-08-26 ENCOUNTER — TELEPHONE (OUTPATIENT)
Dept: UROLOGY | Age: 59
End: 2024-08-26

## 2024-08-26 NOTE — TELEPHONE ENCOUNTER
Patient called, Dr. Morales is not covered under his insurance.  Who else would you like to refer him to?

## 2024-08-30 ENCOUNTER — TRANSCRIBE ORDERS (OUTPATIENT)
Dept: ADMINISTRATIVE | Age: 59
End: 2024-08-30

## 2024-08-30 ENCOUNTER — HOSPITAL ENCOUNTER (OUTPATIENT)
Dept: ULTRASOUND IMAGING | Age: 59
Discharge: HOME | End: 2024-08-30
Attending: GENERAL PRACTICE
Payer: COMMERCIAL

## 2024-08-30 DIAGNOSIS — I82.4Y2 DEEP VEIN THROMBOSIS (DVT) OF PROXIMAL VEIN OF LEFT LOWER EXTREMITY, UNSPECIFIED CHRONICITY (HCC): Primary | ICD-10-CM

## 2024-08-30 DIAGNOSIS — I82.4Y2 DEEP VEIN THROMBOSIS (DVT) OF PROXIMAL VEIN OF LEFT LOWER EXTREMITY, UNSPECIFIED CHRONICITY (HCC): ICD-10-CM

## 2024-08-30 PROCEDURE — 93971 EXTREMITY STUDY: CPT

## 2024-09-05 ENCOUNTER — OFFICE VISIT (OUTPATIENT)
Dept: CARDIOLOGY CLINIC | Age: 59
End: 2024-09-05
Payer: COMMERCIAL

## 2024-09-05 VITALS
OXYGEN SATURATION: 96 % | WEIGHT: 202.4 LBS | DIASTOLIC BLOOD PRESSURE: 90 MMHG | SYSTOLIC BLOOD PRESSURE: 198 MMHG | RESPIRATION RATE: 16 BRPM | BODY MASS INDEX: 26.7 KG/M2 | HEART RATE: 64 BPM

## 2024-09-05 DIAGNOSIS — M71.22 SYNOVIAL CYST OF LEFT POPLITEAL SPACE: ICD-10-CM

## 2024-09-05 DIAGNOSIS — D57.20 SICKLE-CELL/HB-C DISEASE WITHOUT CRISIS (HCC): ICD-10-CM

## 2024-09-05 DIAGNOSIS — I15.0 RENOVASCULAR HYPERTENSION: Primary | ICD-10-CM

## 2024-09-05 PROCEDURE — 99214 OFFICE O/P EST MOD 30 MIN: CPT | Performed by: INTERNAL MEDICINE

## 2024-09-05 RX ORDER — HYDRALAZINE HYDROCHLORIDE 100 MG/1
100 TABLET, FILM COATED ORAL 3 TIMES DAILY
Qty: 270 TABLET | Refills: 3 | Status: SHIPPED | OUTPATIENT
Start: 2024-09-05

## 2024-09-05 RX ORDER — HYDRALAZINE HYDROCHLORIDE 100 MG/1
100 TABLET, FILM COATED ORAL 2 TIMES DAILY
Qty: 270 TABLET | Refills: 3 | Status: SHIPPED | OUTPATIENT
Start: 2024-09-05 | End: 2024-09-05

## 2024-09-05 RX ORDER — TRIAMTERENE/HYDROCHLOROTHIAZID 37.5-25 MG
1 TABLET ORAL DAILY
COMMUNITY

## 2024-09-05 RX ORDER — AMLODIPINE BESYLATE 10 MG/1
10 TABLET ORAL DAILY
COMMUNITY

## 2024-09-05 ASSESSMENT — ENCOUNTER SYMPTOMS
CHEST TIGHTNESS: 1
EYES NEGATIVE: 1
NAUSEA: 0
GASTROINTESTINAL NEGATIVE: 1
SHORTNESS OF BREATH: 1
STRIDOR: 0
BLOOD IN STOOL: 0
WHEEZING: 0
COUGH: 0

## 2024-09-05 NOTE — PROGRESS NOTES
OFFICE VIST        Patient: Mateo Marsh  YOB: 1965  MRN: [id number]    Chief Complaint: ABN   Chief Complaint   Patient presents with    Follow-Up from [facility] d/c 8/15/2024    Angioedema       CV Data:  5/24 CUS B/L 70-79  5/24 Echo EF 60-65%  mild MR  5/24 SPECT  mid Anterior Ischemia EF 68  6/24 CTA neck LICA - 60   JONI 50  7/18/24 Cath Mild EF 65    Subjective/HPI referred for abn ecg. Pt has no prior CV hisotry. He is not very active. He does get CP and JENSEN. No bleed no falls.     7/2/24 still w exertional chest pressure tightness and Dyspnea. Pt backed off on physical activity as a result.   CTA Neck sjhows B/L Moderate stenosis.  Continued smoker    9/5/24 recent Left Moya Cyst rupture. It is tender. No cp no sob no falls no bleed started 2 new meds w PCP. Amlodipine and Maxzide     EKG:  SR Lateral and high lateral wall T wave inversion.     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    BLADDER SURGERY  07/10/2015    Patient states he was urininating blood and had a procedure done.    CARDIAC PROCEDURE N/A 7/18/2024    Left heart cath / coronary angiography performed by Mike Sousa MD [place] [facility] [place]AC C[place] [facility]    COLONOSCOPY  03/14/2014    [facility]    ENDOSCOPY, COLON, DIAGNOSTIC      LUMBAR FUSION  2008    LUMBAR PUNCTURE N/A 07/12/2024    Diagnostic, performed by Dr. Olson    OTHER SURGICAL HISTORY Right     tendon repair in (R) forearm because of previous injury as

## 2024-09-11 ENCOUNTER — HOSPITAL ENCOUNTER (OUTPATIENT)
Dept: ORTHOPEDIC SURGERY | Age: 59
Discharge: HOME | End: 2024-09-13
Payer: COMMERCIAL

## 2024-09-11 ENCOUNTER — OFFICE VISIT (OUTPATIENT)
Dept: ORTHOPEDIC SURGERY | Age: 59
End: 2024-09-11
Payer: COMMERCIAL

## 2024-09-11 VITALS
OXYGEN SATURATION: 97 % | HEART RATE: 66 BPM | TEMPERATURE: 98.5 F | HEIGHT: 73 IN | BODY MASS INDEX: 26.77 KG/M2 | WEIGHT: 202 LBS

## 2024-09-11 DIAGNOSIS — M17.12 LOCALIZED OSTEOARTHRITIS OF LEFT KNEE: ICD-10-CM

## 2024-09-11 DIAGNOSIS — M71.22 BAKER'S CYST OF KNEE, LEFT: Primary | ICD-10-CM

## 2024-09-11 PROCEDURE — 20610 DRAIN/INJ JOINT/BURSA W/O US: CPT | Performed by: STUDENT IN AN ORGANIZED HEALTH CARE EDUCATION/TRAINING PROGRAM

## 2024-09-11 PROCEDURE — 99204 OFFICE O/P NEW MOD 45 MIN: CPT | Performed by: STUDENT IN AN ORGANIZED HEALTH CARE EDUCATION/TRAINING PROGRAM

## 2024-09-11 PROCEDURE — 73564 X-RAY EXAM KNEE 4 OR MORE: CPT | Performed by: STUDENT IN AN ORGANIZED HEALTH CARE EDUCATION/TRAINING PROGRAM

## 2024-09-11 PROCEDURE — 73564 X-RAY EXAM KNEE 4 OR MORE: CPT

## 2024-09-11 RX ORDER — LIDOCAINE HYDROCHLORIDE 10 MG/ML
8 INJECTION, SOLUTION INFILTRATION; PERINEURAL ONCE
Status: COMPLETED | OUTPATIENT
Start: 2024-09-11 | End: 2024-09-11

## 2024-09-11 RX ORDER — TRIAMCINOLONE ACETONIDE 40 MG/ML
80 INJECTION, SUSPENSION INTRA-ARTICULAR; INTRAMUSCULAR ONCE
Status: COMPLETED | OUTPATIENT
Start: 2024-09-11 | End: 2024-09-11

## 2024-09-11 RX ADMIN — LIDOCAINE HYDROCHLORIDE 8 ML: 10 INJECTION, SOLUTION INFILTRATION; PERINEURAL at 14:15

## 2024-09-11 RX ADMIN — TRIAMCINOLONE ACETONIDE 80 MG: 40 INJECTION, SUSPENSION INTRA-ARTICULAR; INTRAMUSCULAR at 14:16

## 2024-09-13 ENCOUNTER — HOSPITAL ENCOUNTER (OUTPATIENT)
Dept: ULTRASOUND IMAGING | Age: 59
Discharge: HOME | End: 2024-09-15
Attending: INTERNAL MEDICINE
Payer: COMMERCIAL

## 2024-09-13 DIAGNOSIS — I15.0 RENOVASCULAR HYPERTENSION: ICD-10-CM

## 2024-09-13 LAB
VAS L RENAL ORIG RI: 0.85 NO UNITS
VAS LEFT RENAL DIST EDV: 36.9 CM/S
VAS LEFT RENAL DIST PSV: 188 CM/S
VAS LEFT RENAL DIST RI: 0.8 NO UNITS
VAS LEFT RENAL MID EDV: 23.4 CM/S
VAS LEFT RENAL MID PSV: 142 CM/S
VAS LEFT RENAL MID RI: 0.84 NO UNITS
VAS LEFT RENAL ORIGIN EDV: 21.4 CM/S
VAS LEFT RENAL ORIGIN PSV: 140 CM/S
VAS LEFT RENAL PROX EDV: 26.3 CM/S
VAS LEFT RENAL PROX PSV: 146 CM/S
VAS LEFT RENAL PROX RI: 0.82 NO UNITS
VAS RIGHT RENAL DIST EDV: 42.1 CM/S
VAS RIGHT RENAL DIST PSV: 213 CM/S
VAS RIGHT RENAL DIST RI: 0.8 NO UNITS
VAS RIGHT RENAL MID EDV: 31.8 CM/S
VAS RIGHT RENAL MID PSV: 141 CM/S
VAS RIGHT RENAL MID RI: 0.77 NO UNITS
VAS RIGHT RENAL ORIGIN EDV: 21.5 CM/S
VAS RIGHT RENAL ORIGIN PSV: 134 CM/S
VAS RIGHT RENAL PROX EDV: 22.4 CM/S
VAS RIGHT RENAL PROX PSV: 126 CM/S
VAS RIGHT RENAL PROX RI: 0.82 NO UNITS

## 2024-09-13 PROCEDURE — 93975 VASCULAR STUDY: CPT

## 2024-09-16 ENCOUNTER — OFFICE VISIT (OUTPATIENT)
Dept: NEUROLOGY | Age: 59
End: 2024-09-16
Payer: COMMERCIAL

## 2024-09-16 VITALS
DIASTOLIC BLOOD PRESSURE: 66 MMHG | SYSTOLIC BLOOD PRESSURE: 118 MMHG | BODY MASS INDEX: 25.6 KG/M2 | HEART RATE: 66 BPM | WEIGHT: 194 LBS

## 2024-09-16 DIAGNOSIS — G93.40 ENCEPHALOPATHY: Primary | ICD-10-CM

## 2024-09-16 DIAGNOSIS — D57.00 SICKLE CELL CRISIS (HCC): ICD-10-CM

## 2024-09-16 DIAGNOSIS — R41.3 MEMORY LOSS: ICD-10-CM

## 2024-09-16 PROBLEM — F17.200 TOBACCO USE DISORDER: Chronic | Status: RESOLVED | Noted: 2020-01-07 | Resolved: 2024-09-16

## 2024-09-16 PROCEDURE — 99214 OFFICE O/P EST MOD 30 MIN: CPT | Performed by: PSYCHIATRY & NEUROLOGY

## 2024-09-16 RX ORDER — BACLOFEN 10 MG/1
10 TABLET ORAL 2 TIMES DAILY
COMMUNITY

## 2024-09-19 ENCOUNTER — TELEPHONE (OUTPATIENT)
Dept: CARDIOLOGY CLINIC | Age: 59
End: 2024-09-19

## 2024-09-19 ENCOUNTER — OFFICE VISIT (OUTPATIENT)
Age: 59
End: 2024-09-19

## 2024-09-19 VITALS
TEMPERATURE: 97 F | WEIGHT: 192 LBS | SYSTOLIC BLOOD PRESSURE: 118 MMHG | BODY MASS INDEX: 25.45 KG/M2 | HEIGHT: 73 IN | DIASTOLIC BLOOD PRESSURE: 68 MMHG

## 2024-09-19 DIAGNOSIS — M51.26 DISPLACEMENT OF LUMBAR INTERVERTEBRAL DISC WITHOUT MYELOPATHY: ICD-10-CM

## 2024-09-19 DIAGNOSIS — M96.1 POSTLAMINECTOMY SYNDROME, LUMBAR REGION: ICD-10-CM

## 2024-09-19 LAB
VAS L RENAL ORIG RI: 0.85 NO UNITS
VAS LEFT RENAL DIST EDV: 36.9 CM/S
VAS LEFT RENAL DIST PSV: 188 CM/S
VAS LEFT RENAL DIST RI: 0.8 NO UNITS
VAS LEFT RENAL MID EDV: 23.4 CM/S
VAS LEFT RENAL MID PSV: 142 CM/S
VAS LEFT RENAL MID RI: 0.84 NO UNITS
VAS LEFT RENAL ORIGIN EDV: 21.4 CM/S
VAS LEFT RENAL ORIGIN PSV: 140 CM/S
VAS LEFT RENAL PROX EDV: 26.3 CM/S
VAS LEFT RENAL PROX PSV: 146 CM/S
VAS LEFT RENAL PROX RI: 0.82 NO UNITS
VAS RIGHT RENAL DIST EDV: 42.1 CM/S
VAS RIGHT RENAL DIST PSV: 213 CM/S
VAS RIGHT RENAL DIST RI: 0.8 NO UNITS
VAS RIGHT RENAL MID EDV: 31.8 CM/S
VAS RIGHT RENAL MID PSV: 141 CM/S
VAS RIGHT RENAL MID RI: 0.77 NO UNITS
VAS RIGHT RENAL ORIGIN EDV: 21.5 CM/S
VAS RIGHT RENAL ORIGIN PSV: 134 CM/S
VAS RIGHT RENAL ORIGIN RI: 0.84
VAS RIGHT RENAL PROX EDV: 22.4 CM/S
VAS RIGHT RENAL PROX PSV: 126 CM/S
VAS RIGHT RENAL PROX RI: 0.82 NO UNITS

## 2024-09-19 RX ORDER — PREGABALIN 150 MG/1
150 CAPSULE ORAL 2 TIMES DAILY
Qty: 60 CAPSULE | Refills: 0 | Status: SHIPPED | OUTPATIENT
Start: 2024-09-21 | End: 2024-10-21

## 2024-09-19 RX ORDER — HYDROCODONE BITARTRATE AND ACETAMINOPHEN 10; 325 MG/1; MG/1
1 TABLET ORAL
Qty: 80 TABLET | Refills: 0 | Status: SHIPPED | OUTPATIENT
Start: 2024-09-23 | End: 2024-10-23

## 2024-09-19 ASSESSMENT — ENCOUNTER SYMPTOMS
EYES NEGATIVE: 1
SHORTNESS OF BREATH: 0
CONSTIPATION: 0
NAUSEA: 0
BACK PAIN: 1
GASTROINTESTINAL NEGATIVE: 1
COUGH: 0
DIARRHEA: 0

## 2024-10-16 ENCOUNTER — OFFICE VISIT (OUTPATIENT)
Age: 59
End: 2024-10-16
Payer: COMMERCIAL

## 2024-10-16 VITALS
HEIGHT: 73 IN | DIASTOLIC BLOOD PRESSURE: 70 MMHG | BODY MASS INDEX: 26.51 KG/M2 | TEMPERATURE: 98.3 F | SYSTOLIC BLOOD PRESSURE: 140 MMHG | WEIGHT: 200 LBS

## 2024-10-16 DIAGNOSIS — M96.1 POSTLAMINECTOMY SYNDROME, LUMBAR REGION: ICD-10-CM

## 2024-10-16 DIAGNOSIS — M51.26 DISPLACEMENT OF LUMBAR INTERVERTEBRAL DISC WITHOUT MYELOPATHY: ICD-10-CM

## 2024-10-16 PROCEDURE — 99214 OFFICE O/P EST MOD 30 MIN: CPT

## 2024-10-16 RX ORDER — DULOXETIN HYDROCHLORIDE 60 MG/1
60 CAPSULE, DELAYED RELEASE ORAL DAILY
Qty: 90 CAPSULE | Refills: 0 | Status: SHIPPED | OUTPATIENT
Start: 2024-10-16 | End: 2025-01-14

## 2024-10-16 RX ORDER — HYDROCODONE BITARTRATE AND ACETAMINOPHEN 10; 325 MG/1; MG/1
1 TABLET ORAL
Qty: 80 TABLET | Refills: 0 | Status: SHIPPED | OUTPATIENT
Start: 2024-10-23 | End: 2024-11-22

## 2024-10-16 RX ORDER — PREGABALIN 150 MG/1
150 CAPSULE ORAL 2 TIMES DAILY
Qty: 60 CAPSULE | Refills: 0 | Status: SHIPPED | OUTPATIENT
Start: 2024-10-22 | End: 2024-11-21

## 2024-10-16 ASSESSMENT — ENCOUNTER SYMPTOMS
COUGH: 0
CONSTIPATION: 0
EYES NEGATIVE: 1
DIARRHEA: 0
BACK PAIN: 1
NAUSEA: 0
SHORTNESS OF BREATH: 0
GASTROINTESTINAL NEGATIVE: 1

## 2024-10-16 NOTE — PROGRESS NOTES
Negative for dizziness and weakness.   Hematological:         Sickle cell anemia   Psychiatric/Behavioral: Negative.           Objective:     Vitals:  BP (!) 140/70   Temp 98.3 °F (36.8 °C) (Temporal)   Ht 1.854 m (6' 1\")   Wt 90.7 kg (200 lb)   BMI 26.39 kg/m²        Physical Exam  Vitals and nursing note reviewed.     This is a pleasant male who answers questions appropriately and follows commands. Pt is alert and oriented x 3. Recent and remote memory is intact.  Mood and affect, judgement and insight are normal. No signs of distress, no dyspnea or SOB noted. HEENT: PERRL. Neck is supple, trachea midline. No lymphadenopathy noted. Decreased ROM with flexion and extension of low back. Mild TTP to lower mid to lower lumbar spine. SCS battery pack palpated on Lt low back. SLR increases low back pain.   Slow with position changes today. Tightness in both hamstrings noted. Cranial nerves II-XII are intact.            Assessment:      Diagnosis Orders   1. Postlaminectomy syndrome, lumbar region  HYDROcodone-acetaminophen (NORCO)  MG per tablet    pregabalin (LYRICA) 150 MG capsule    DULoxetine (CYMBALTA) 60 MG extended release capsule      2. L4-5 HERNAITED DISC  HYDROcodone-acetaminophen (NORCO)  MG per tablet    pregabalin (LYRICA) 150 MG capsule    DULoxetine (CYMBALTA) 60 MG extended release capsule          Plan:     Periodic Controlled Substance Monitoring: Possible medication side effects, risk of tolerance/dependence & alternative treatments discussed., No signs of potential drug abuse or diversion identified., Assessed functional status (ability to engage in work or other purposeful activities, the pain intensity and its interference with activities of daily living, quality of family life and social activities, and the physical activity), Obtaining appropriate analgesic effect of treatment. (Elam Merlos, APRN - CNP)    Orders Placed This Encounter   Medications

## 2024-10-24 ENCOUNTER — OFFICE VISIT (OUTPATIENT)
Dept: ORTHOPEDIC SURGERY | Age: 59
End: 2024-10-24
Payer: COMMERCIAL

## 2024-10-24 VITALS — BODY MASS INDEX: 25.33 KG/M2 | OXYGEN SATURATION: 97 % | WEIGHT: 192 LBS | HEART RATE: 65 BPM | TEMPERATURE: 98 F

## 2024-10-24 DIAGNOSIS — M17.12 LOCALIZED OSTEOARTHRITIS OF LEFT KNEE: Primary | ICD-10-CM

## 2024-10-24 PROCEDURE — 99213 OFFICE O/P EST LOW 20 MIN: CPT | Performed by: STUDENT IN AN ORGANIZED HEALTH CARE EDUCATION/TRAINING PROGRAM

## 2024-10-24 NOTE — PROGRESS NOTES
Subjective:      HPI:: Mateo Marsh is a 59 y.o. male who presents today for follow-up evaluation of his left knee.  At his last appointment we did a steroid injection which he reports was significantly helpful.  He has also noted that the edema that he was having in his legs has improved.  At this point time he denies any significant knee pain or limitations.    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    BLADDER SURGERY  07/10/2015    Patient states he was urininating blood and had a procedure done.    CARDIAC PROCEDURE N/A 7/18/2024    Left heart cath / coronary angiography performed by Mike Sousa MD at [facility] [place] CARDIAC CATH LAB    COLONOSCOPY  03/14/2014    [facility]    ENDOSCOPY, COLON, DIAGNOSTIC      LUMBAR FUSION  2008    LUMBAR PUNCTURE N/A 07/12/2024    Diagnostic, performed by Dr. Olson    OTHER SURGICAL HISTORY Right     tendon repair in (R) forearm because of previous injury as child    STIMULATOR SURGERY N/A 04/25/2023    SPINAL STIMULATOR REPLACEMENT performed by Navneet Lamb MD at [facility] [place] OR    TOTAL HIP ARTHROPLASTY Left 01/23/2020    LEFT HIP TOTAL HIP ARTHROPLASTY, RICARDO performed by Andrew Mehta MD at [facility] [place] OR    UPPER GASTROINTESTINAL ENDOSCOPY  03/14/2014    [facility]    VAGAL NERVE STIMULATION  2013    Dr. Antoine office     Social History     Socioeconomic History    Marital status: Single     Spouse name: Not on file    Number of children: Not on file    Years of education: Not on file    Highest education level: Not on file

## 2024-10-25 ENCOUNTER — HOSPITAL ENCOUNTER (OUTPATIENT)
Facility: HOSPITAL | Age: 59
Setting detail: OUTPATIENT SURGERY
End: 2024-10-25
Attending: UROLOGY | Admitting: UROLOGY
Payer: COMMERCIAL

## 2024-11-13 ENCOUNTER — OFFICE VISIT (OUTPATIENT)
Age: 59
End: 2024-11-13
Payer: COMMERCIAL

## 2024-11-13 VITALS
BODY MASS INDEX: 26.51 KG/M2 | WEIGHT: 200 LBS | SYSTOLIC BLOOD PRESSURE: 170 MMHG | HEIGHT: 73 IN | DIASTOLIC BLOOD PRESSURE: 80 MMHG

## 2024-11-13 DIAGNOSIS — M96.1 POSTLAMINECTOMY SYNDROME, LUMBAR REGION: ICD-10-CM

## 2024-11-13 DIAGNOSIS — M51.26 DISPLACEMENT OF LUMBAR INTERVERTEBRAL DISC WITHOUT MYELOPATHY: ICD-10-CM

## 2024-11-13 PROCEDURE — 99214 OFFICE O/P EST MOD 30 MIN: CPT | Performed by: NURSE PRACTITIONER

## 2024-11-13 RX ORDER — HYDROCODONE BITARTRATE AND ACETAMINOPHEN 10; 325 MG/1; MG/1
1 TABLET ORAL
Qty: 80 TABLET | Refills: 0 | Status: SHIPPED | OUTPATIENT
Start: 2024-11-22 | End: 2024-12-22

## 2024-11-13 RX ORDER — PREGABALIN 150 MG/1
150 CAPSULE ORAL 2 TIMES DAILY
Qty: 60 CAPSULE | Refills: 0 | Status: SHIPPED | OUTPATIENT
Start: 2024-11-21 | End: 2024-12-21

## 2024-11-13 ASSESSMENT — ENCOUNTER SYMPTOMS
CONSTIPATION: 0
ABDOMINAL PAIN: 1
SHORTNESS OF BREATH: 0
EYES NEGATIVE: 1
BACK PAIN: 1
DIARRHEA: 0
NAUSEA: 0
COUGH: 0

## 2024-11-13 NOTE — PROGRESS NOTES
locked up/in lock box.  Discussed possible risks of opiate medication with pt, including, but not limited to possible constipation, nausea or vomiting, sedation, urinary retention, dependence and possible addiction. Pt agrees to use medication as prescribed/as directed. Pt advised to not use opiates while driving or operating heavy equipment, or in situations where pt may harm him/herself or others.  Pt is aware that while on narcotics, pt needs to be seen to reassess pain and reassess need for continued medication at that time. The treatment plan will be reassessed each office visit to determine if continuing medications is appropriate and may be discontinued if no such improvement as noted in NDP.   NDP reviewed.  OARRS was reviewed.      Follow up:  Return in about 4 weeks (around 12/11/2024) for review meds and reassess pain.    Elma Ness, DARLENE - CNP

## 2024-11-25 ENCOUNTER — PRE-ADMISSION TESTING (OUTPATIENT)
Dept: PREADMISSION TESTING | Facility: HOSPITAL | Age: 59
End: 2024-11-25
Payer: COMMERCIAL

## 2024-12-11 ENCOUNTER — OFFICE VISIT (OUTPATIENT)
Age: 59
End: 2024-12-11
Payer: COMMERCIAL

## 2024-12-11 VITALS
BODY MASS INDEX: 25.45 KG/M2 | SYSTOLIC BLOOD PRESSURE: 124 MMHG | HEART RATE: 74 BPM | WEIGHT: 192 LBS | HEIGHT: 73 IN | DIASTOLIC BLOOD PRESSURE: 80 MMHG

## 2024-12-11 DIAGNOSIS — M51.26 DISPLACEMENT OF LUMBAR INTERVERTEBRAL DISC WITHOUT MYELOPATHY: ICD-10-CM

## 2024-12-11 DIAGNOSIS — M96.1 POSTLAMINECTOMY SYNDROME, LUMBAR REGION: ICD-10-CM

## 2024-12-11 PROCEDURE — 99214 OFFICE O/P EST MOD 30 MIN: CPT | Performed by: NURSE PRACTITIONER

## 2024-12-11 RX ORDER — PREGABALIN 150 MG/1
150 CAPSULE ORAL 2 TIMES DAILY
Qty: 60 CAPSULE | Refills: 0 | Status: SHIPPED | OUTPATIENT
Start: 2024-12-22 | End: 2025-01-21

## 2024-12-11 RX ORDER — HYDROCODONE BITARTRATE AND ACETAMINOPHEN 10; 325 MG/1; MG/1
1 TABLET ORAL
Qty: 80 TABLET | Refills: 0 | Status: SHIPPED | OUTPATIENT
Start: 2024-12-22 | End: 2025-01-21

## 2024-12-11 ASSESSMENT — ENCOUNTER SYMPTOMS
BACK PAIN: 1
SHORTNESS OF BREATH: 0
NAUSEA: 0
EYES NEGATIVE: 1
DIARRHEA: 0
COUGH: 0
CONSTIPATION: 0
GASTROINTESTINAL NEGATIVE: 1

## 2024-12-11 NOTE — PROGRESS NOTES
Mateo Marsh  (1965)    12/11/2024    Subjective:     Mateo Marsh is 59 y.o. male who complains today of:    Chief Complaint   Patient presents with    Follow-up    Back Pain     lower         Allergies:  Patient has no known allergies.    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    BLADDER SURGERY  07/10/2015    Patient states he was urininating blood and had a procedure done.    CARDIAC PROCEDURE N/A 7/18/2024    Left heart cath / coronary angiography performed by Mike Sousa MD at [facility] [place] CARDIAC CATH LAB    COLONOSCOPY  03/14/2014    [facility]    ENDOSCOPY, COLON, DIAGNOSTIC      LUMBAR FUSION  2008    LUMBAR PUNCTURE N/A 07/12/2024    Diagnostic, performed by Dr. Olson    OTHER SURGICAL HISTORY Right     tendon repair in (R) forearm because of previous injury as child    STIMULATOR SURGERY N/A 04/25/2023    SPINAL STIMULATOR REPLACEMENT performed by Navneet Lamb MD at [facility] [place] OR    TOTAL HIP ARTHROPLASTY Left 01/23/2020    LEFT HIP TOTAL HIP ARTHROPLASTY, RICARDO performed by Andrew Mehta MD at [facility] [place] OR    UPPER GASTROINTESTINAL ENDOSCOPY  03/14/2014    BI[facility]SY [facility]    VAGAL NERVE STIMULATION  2013    Dr. Antoine office     Family History   Problem Relation Age of Onset    Cancer Mother         Throat & esophagus    Anemia Mother     Other Father         Natural causes    High Blood Pressure Sister     COPD Brother     Heart Disease Brother         Cardiac stent    Diabetes Sister     COPD Sister     Emphysema Sister     No Known Problems

## 2025-01-03 ENCOUNTER — TELEPHONE (OUTPATIENT)
Dept: CARDIOLOGY CLINIC | Age: 60
End: 2025-01-03

## 2025-01-15 ENCOUNTER — OFFICE VISIT (OUTPATIENT)
Age: 60
End: 2025-01-15
Payer: COMMERCIAL

## 2025-01-15 VITALS
TEMPERATURE: 98 F | SYSTOLIC BLOOD PRESSURE: 138 MMHG | WEIGHT: 195 LBS | HEIGHT: 73 IN | DIASTOLIC BLOOD PRESSURE: 76 MMHG | BODY MASS INDEX: 25.84 KG/M2

## 2025-01-15 DIAGNOSIS — M96.1 POSTLAMINECTOMY SYNDROME, LUMBAR REGION: ICD-10-CM

## 2025-01-15 DIAGNOSIS — M51.26 DISPLACEMENT OF LUMBAR INTERVERTEBRAL DISC WITHOUT MYELOPATHY: ICD-10-CM

## 2025-01-15 PROCEDURE — 99214 OFFICE O/P EST MOD 30 MIN: CPT | Performed by: NURSE PRACTITIONER

## 2025-01-15 RX ORDER — FERROUS SULFATE 325(65) MG
325 TABLET ORAL
COMMUNITY

## 2025-01-15 RX ORDER — PREGABALIN 150 MG/1
150 CAPSULE ORAL 2 TIMES DAILY
Qty: 60 CAPSULE | Refills: 0 | Status: SHIPPED | OUTPATIENT
Start: 2025-01-20 | End: 2025-02-19

## 2025-01-15 RX ORDER — DULOXETIN HYDROCHLORIDE 60 MG/1
60 CAPSULE, DELAYED RELEASE ORAL DAILY
Qty: 90 CAPSULE | Refills: 0 | Status: SHIPPED | OUTPATIENT
Start: 2025-01-15 | End: 2025-04-15

## 2025-01-15 RX ORDER — HYDROCODONE BITARTRATE AND ACETAMINOPHEN 10; 325 MG/1; MG/1
1 TABLET ORAL
Qty: 80 TABLET | Refills: 0 | Status: SHIPPED | OUTPATIENT
Start: 2025-01-22 | End: 2025-02-21

## 2025-01-15 RX ORDER — ASPIRIN 81 MG/1
81 TABLET ORAL DAILY
COMMUNITY

## 2025-01-15 RX ORDER — LOSARTAN POTASSIUM 50 MG/1
50 TABLET ORAL DAILY
COMMUNITY

## 2025-01-15 ASSESSMENT — ENCOUNTER SYMPTOMS
CONSTIPATION: 0
COUGH: 1
EYES NEGATIVE: 1
GASTROINTESTINAL NEGATIVE: 1
NAUSEA: 0
SHORTNESS OF BREATH: 0
BACK PAIN: 1
DIARRHEA: 0

## 2025-01-15 NOTE — PROGRESS NOTES
arthralgias and back pain.   Skin: Negative.    Neurological:  Negative for dizziness and weakness.   Psychiatric/Behavioral: Negative.           Objective:     Vitals:  /76 (Site: Right Upper Arm, Position: Sitting)   Temp 98 °F (36.7 °C)   Ht 1.854 m (6' 1\")   Wt 88.5 kg (195 lb)   BMI 25.73 kg/m² Pain Score:   4      Physical Exam  Vitals and nursing note reviewed.     This is a pleasant male who answers questions appropriately and follows commands. Pt is alert and oriented x 3. Recent and remote memory is intact.  Mood and affect slightly flat, judgement and insight are normal. No signs of distress, no dyspnea or SOB noted. HEENT: PERRL. Neck is supple, trachea midline. No lymphadenopathy noted. Decreased ROM with flexion and extension of low back.  TTP to lower mid to lower lumbar spine. SCS battery pack palpated on Lt low back. Negative SLR. Tightness in both hamstrings noted. Cranial nerves II-XII are intact.           Assessment:      Diagnosis Orders   1. Postlaminectomy syndrome, lumbar region  HYDROcodone-acetaminophen (NORCO)  MG per tablet    pregabalin (LYRICA) 150 MG capsule    DULoxetine (CYMBALTA) 60 MG extended release capsule      2. L4-5 HERNAITED DISC  HYDROcodone-acetaminophen (NORCO)  MG per tablet    pregabalin (LYRICA) 150 MG capsule    DULoxetine (CYMBALTA) 60 MG extended release capsule          Plan:     Periodic Controlled Substance Monitoring: Possible medication side effects, risk of tolerance/dependence & alternative treatments discussed., No signs of potential drug abuse or diversion identified., Assessed functional status (ability to engage in work or other purposeful activities, the pain intensity and its interference with activities of daily living, quality of family life and social activities, and the physical activity), Obtaining appropriate analgesic effect of treatment. (Elma Merlos, APRN - CNP)    Orders Placed This Encounter   Medications

## 2025-01-23 ENCOUNTER — OFFICE VISIT (OUTPATIENT)
Dept: CARDIOLOGY CLINIC | Age: 60
End: 2025-01-23
Payer: COMMERCIAL

## 2025-01-23 VITALS
SYSTOLIC BLOOD PRESSURE: 170 MMHG | WEIGHT: 209 LBS | DIASTOLIC BLOOD PRESSURE: 90 MMHG | HEART RATE: 91 BPM | BODY MASS INDEX: 27.57 KG/M2 | OXYGEN SATURATION: 95 %

## 2025-01-23 DIAGNOSIS — D57.20 SICKLE-CELL/HB-C DISEASE WITHOUT CRISIS (HCC): ICD-10-CM

## 2025-01-23 DIAGNOSIS — I15.0 RENOVASCULAR HYPERTENSION: Primary | ICD-10-CM

## 2025-01-23 DIAGNOSIS — N28.9 KIDNEY FUNCTION ABNORMAL: ICD-10-CM

## 2025-01-23 DIAGNOSIS — I65.23 BILATERAL CAROTID ARTERY STENOSIS: ICD-10-CM

## 2025-01-23 DIAGNOSIS — M71.22 SYNOVIAL CYST OF LEFT POPLITEAL SPACE: ICD-10-CM

## 2025-01-23 DIAGNOSIS — E78.5 DYSLIPIDEMIA: ICD-10-CM

## 2025-01-23 PROCEDURE — 99214 OFFICE O/P EST MOD 30 MIN: CPT | Performed by: INTERNAL MEDICINE

## 2025-01-23 RX ORDER — ASPIRIN 81 MG/1
81 TABLET ORAL DAILY
Qty: 90 TABLET | Refills: 0 | Status: SHIPPED | OUTPATIENT
Start: 2025-01-23

## 2025-01-23 RX ORDER — CARVEDILOL 25 MG/1
25 TABLET ORAL 2 TIMES DAILY
Qty: 180 TABLET | Refills: 3
Start: 2025-01-23

## 2025-01-23 RX ORDER — HYDRALAZINE HYDROCHLORIDE 100 MG/1
100 TABLET, FILM COATED ORAL 2 TIMES DAILY
Qty: 270 TABLET | Refills: 3
Start: 2025-01-23

## 2025-01-23 RX ORDER — TRIAMTERENE AND HYDROCHLOROTHIAZIDE 37.5; 25 MG/1; MG/1
1 TABLET ORAL DAILY
Qty: 30 TABLET | Refills: 3
Start: 2025-01-23

## 2025-01-23 ASSESSMENT — ENCOUNTER SYMPTOMS
EYES NEGATIVE: 1
SHORTNESS OF BREATH: 1
STRIDOR: 0
NAUSEA: 0
GASTROINTESTINAL NEGATIVE: 1
BLOOD IN STOOL: 0
WHEEZING: 0
CHEST TIGHTNESS: 1
COUGH: 0

## 2025-01-23 NOTE — PROGRESS NOTES
OFFICE VIST        Patient: Mateo Marsh  YOB: 1965  MRN: [id number]    Chief Complaint: ABN   Chief Complaint   Patient presents with    Follow-up     4 month  BP has been running high since last visit  When he takes his meds his BP goes down   Gets dizzy at night        CV Data:  5/24 CUS B/L 70-79  5/24 Echo EF 60-65%  mild MR  5/24 SPECT  mid Anterior Ischemia EF 68  6/24 CTA neck LICA - 60   JONI 50  7/18/24 Cath Mild EF 65  9/24 Renal Duplex Negative.     Subjective/HPI referred for abn ecg. Pt has no prior CV hisotry. He is not very active. He does get CP and JENSEN. No bleed no falls.     7/2/24 still w exertional chest pressure tightness and Dyspnea. Pt backed off on physical activity as a result.   CTA Neck sjhows B/L Moderate stenosis.  Continued smoker    9/5/24 recent Left Moya Cyst rupture. It is tender. No cp no sob no falls no bleed started 2 new meds w PCP. Amlodipine and Maxzide     EKG:  SR Lateral and high lateral wall T wave inversion.     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    BLADDER SURGERY  07/10/2015    Patient states he was urininating blood and had a procedure done.    CARDIAC PROCEDURE N/A 7/18/2024    Left heart cath / coronary angiography performed by Mike Sousa MD at [facility] [place]IAC CATH LAB    COLONOSCOPY  03/14/2014    [facility]    ENDOSCOPY, COLON, DIAGNOSTIC      LUMBAR FUSION  2008    LUMBAR PUNCTURE N/A 07/12/2024    Diagnostic, performed by Dr. Olson

## 2025-02-17 ENCOUNTER — OFFICE VISIT (OUTPATIENT)
Age: 60
End: 2025-02-17
Payer: COMMERCIAL

## 2025-02-17 VITALS
WEIGHT: 200 LBS | DIASTOLIC BLOOD PRESSURE: 70 MMHG | TEMPERATURE: 97.1 F | HEIGHT: 73 IN | SYSTOLIC BLOOD PRESSURE: 144 MMHG | BODY MASS INDEX: 26.51 KG/M2

## 2025-02-17 DIAGNOSIS — M51.26 DISPLACEMENT OF LUMBAR INTERVERTEBRAL DISC WITHOUT MYELOPATHY: ICD-10-CM

## 2025-02-17 DIAGNOSIS — M96.1 POSTLAMINECTOMY SYNDROME, LUMBAR REGION: ICD-10-CM

## 2025-02-17 PROCEDURE — 99214 OFFICE O/P EST MOD 30 MIN: CPT | Performed by: NURSE PRACTITIONER

## 2025-02-17 RX ORDER — HYDROCODONE BITARTRATE AND ACETAMINOPHEN 10; 325 MG/1; MG/1
1 TABLET ORAL
Qty: 80 TABLET | Refills: 0 | Status: SHIPPED | OUTPATIENT
Start: 2025-02-21 | End: 2025-03-23

## 2025-02-17 RX ORDER — BACLOFEN 10 MG/1
10 TABLET ORAL NIGHTLY PRN
Qty: 30 TABLET | Refills: 0 | Status: SHIPPED | OUTPATIENT
Start: 2025-02-17 | End: 2025-03-19

## 2025-02-17 ASSESSMENT — ENCOUNTER SYMPTOMS
NAUSEA: 0
SHORTNESS OF BREATH: 0
COUGH: 0
BACK PAIN: 1
GASTROINTESTINAL NEGATIVE: 1
DIARRHEA: 0
CONSTIPATION: 0
EYES NEGATIVE: 1

## 2025-02-17 NOTE — PROGRESS NOTES
by mouth nightly as needed (spasms)     Dispense:  30 tablet     Refill:  0       No orders of the defined types were placed in this encounter.    Discussed options with the patient today. Anatomic model pathology was shown and reviewed with pt.  He has enough lyrica 150mg BID - he says he has extra from when he was in the hospital.  Continue norco 10mg 2-3 daily PRN pain #80 tabs for 30 days as it helps him function. Refilled baclofen 10mg daily PRN spasms. He will f/u with heme/onc. All questions were answered. Discussed home exercise program.  Relevant imaging and pain generators reviewed. Pt verbalized understanding and agrees with above plan. Pt has chronic pain.     Utox reviewed and appropriate from March 2024. ORT score 1 - low risk. Narcan Rx sent in June 2024.     Will continue medications for chronic pain that has been previously directed as they do help pt function with ADL and improve quality of life. Pt is aware goal is to use the least amount of medication possible to allow pt to function and help with quality of life as discussed in detail.  Ideal to keep MME below 50.  Have discussed proper disposal of narcotic medication. Advised to have medications in safe place and locked up/in lock box.  Discussed possible risks of opiate medication with pt, including, but not limited to possible constipation, nausea or vomiting, sedation, urinary retention, dependence and possible addiction. Pt agrees to use medication as prescribed/as directed. Pt advised to not use opiates while driving or operating heavy equipment, or in situations where pt may harm him/herself or others.  Pt is aware that while on narcotics, pt needs to be seen to reassess pain and reassess need for continued medication at that time. The treatment plan will be reassessed each office visit to determine if continuing medications is appropriate and may be discontinued if no such improvement as noted in NDP.   NDP reviewed.  OARRS was reviewed.

## 2025-03-18 ENCOUNTER — OFFICE VISIT (OUTPATIENT)
Age: 60
End: 2025-03-18
Payer: COMMERCIAL

## 2025-03-18 VITALS
DIASTOLIC BLOOD PRESSURE: 80 MMHG | SYSTOLIC BLOOD PRESSURE: 150 MMHG | HEIGHT: 73 IN | WEIGHT: 200 LBS | BODY MASS INDEX: 26.51 KG/M2 | TEMPERATURE: 97.5 F

## 2025-03-18 DIAGNOSIS — M51.26 DISPLACEMENT OF LUMBAR INTERVERTEBRAL DISC WITHOUT MYELOPATHY: ICD-10-CM

## 2025-03-18 DIAGNOSIS — M96.1 POSTLAMINECTOMY SYNDROME, LUMBAR REGION: ICD-10-CM

## 2025-03-18 PROCEDURE — 99214 OFFICE O/P EST MOD 30 MIN: CPT | Performed by: NURSE PRACTITIONER

## 2025-03-18 RX ORDER — HYDROCODONE BITARTRATE AND ACETAMINOPHEN 10; 325 MG/1; MG/1
1 TABLET ORAL
Qty: 80 TABLET | Refills: 0 | Status: SHIPPED | OUTPATIENT
Start: 2025-03-23 | End: 2025-04-22

## 2025-03-18 RX ORDER — PREGABALIN 150 MG/1
150 CAPSULE ORAL 2 TIMES DAILY
Qty: 60 CAPSULE | Refills: 0 | Status: SHIPPED | OUTPATIENT
Start: 2025-03-18 | End: 2025-04-17

## 2025-03-18 RX ORDER — BACLOFEN 10 MG/1
10 TABLET ORAL NIGHTLY PRN
Qty: 30 TABLET | Refills: 0 | Status: CANCELLED | OUTPATIENT
Start: 2025-03-18 | End: 2025-04-17

## 2025-03-18 ASSESSMENT — ENCOUNTER SYMPTOMS
SHORTNESS OF BREATH: 0
COUGH: 0
BACK PAIN: 1
NAUSEA: 0
GASTROINTESTINAL NEGATIVE: 1
EYES NEGATIVE: 1
DIARRHEA: 0
CONSTIPATION: 0

## 2025-03-18 NOTE — PROGRESS NOTES
Mateo Marsh  (1965)    3/18/2025    Subjective:     Mateo Marsh is 59 y.o. male who complains today of:    Chief Complaint   Patient presents with    Back Pain     Lower back pain         Allergies:  Patient has no known allergies.    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    BLADDER SURGERY  07/10/2015    Patient states he was urininating blood and had a procedure done.    CARDIAC PROCEDURE N/A 7/18/2024    Left heart cath / coronary angiography performed by Mike Sousa MD at [facility] [place]IAC CATH LAB    COLONOSCOPY  03/14/2014    [facility]    ENDOSCOPY, COLON, DIAGNOSTIC      LUMBAR FUSION  2008    LUMBAR PUNCTURE N/A 07/12/2024    Diagnostic, performed by Dr. Olson    OTHER SURGICAL HISTORY Right     tendon repair in (R) forearm because of previous injury as child    STIMULATOR SURGERY N/A 04/25/2023    SPINAL STIMULATOR REPLACEMENT performed by Navneet Lamb MD at [facility] [place]    TOTAL HIP ARTHROPLASTY Left 01/23/2020    LEFT HIP TOTAL HIP ARTHROPLASTY, RICARDO performed by Andrew Mehta MD at [facility] [place]    UPPER GASTROINTESTINAL ENDOSCOPY  03/14/2014    BI[facility]    VAGAL NERVE STIMULATION  2013    Dr. Antoine office     Family History   Problem Relation Age of Onset    Cancer Mother         Throat & esophagus    Anemia Mother     Other Father         Natural causes    High Blood Pressure Sister     COPD Brother     Heart Disease Brother         Cardiac stent    Diabetes Sister     COPD Sister     Emphysema Sister     No Known Problems

## 2025-03-24 ENCOUNTER — RESULTS FOLLOW-UP (OUTPATIENT)
Age: 60
End: 2025-03-24

## 2025-04-15 ENCOUNTER — OFFICE VISIT (OUTPATIENT)
Age: 60
End: 2025-04-15
Payer: COMMERCIAL

## 2025-04-15 VITALS
SYSTOLIC BLOOD PRESSURE: 170 MMHG | TEMPERATURE: 97.7 F | OXYGEN SATURATION: 94 % | DIASTOLIC BLOOD PRESSURE: 92 MMHG | WEIGHT: 200 LBS | HEART RATE: 74 BPM | BODY MASS INDEX: 26.51 KG/M2 | HEIGHT: 73 IN

## 2025-04-15 DIAGNOSIS — M96.1 POSTLAMINECTOMY SYNDROME, LUMBAR REGION: ICD-10-CM

## 2025-04-15 DIAGNOSIS — M51.26 DISPLACEMENT OF LUMBAR INTERVERTEBRAL DISC WITHOUT MYELOPATHY: ICD-10-CM

## 2025-04-15 PROCEDURE — 99214 OFFICE O/P EST MOD 30 MIN: CPT | Performed by: NURSE PRACTITIONER

## 2025-04-15 RX ORDER — PREGABALIN 150 MG/1
150 CAPSULE ORAL 2 TIMES DAILY
Qty: 60 CAPSULE | Refills: 0 | Status: SHIPPED | OUTPATIENT
Start: 2025-04-17 | End: 2025-05-17

## 2025-04-15 RX ORDER — BACLOFEN 10 MG/1
10 TABLET ORAL NIGHTLY PRN
Qty: 30 TABLET | Refills: 0 | Status: SHIPPED | OUTPATIENT
Start: 2025-04-15 | End: 2025-05-15

## 2025-04-15 RX ORDER — DULOXETIN HYDROCHLORIDE 60 MG/1
60 CAPSULE, DELAYED RELEASE ORAL DAILY
Qty: 90 CAPSULE | Refills: 0 | Status: SHIPPED | OUTPATIENT
Start: 2025-04-15 | End: 2025-07-14

## 2025-04-15 RX ORDER — HYDROCODONE BITARTRATE AND ACETAMINOPHEN 10; 325 MG/1; MG/1
1 TABLET ORAL
Qty: 80 TABLET | Refills: 0 | Status: SHIPPED | OUTPATIENT
Start: 2025-04-22 | End: 2025-05-22

## 2025-04-15 ASSESSMENT — ENCOUNTER SYMPTOMS
CONSTIPATION: 0
SHORTNESS OF BREATH: 0
COUGH: 0
NAUSEA: 0
BACK PAIN: 1
GASTROINTESTINAL NEGATIVE: 1
EYES NEGATIVE: 1
DIARRHEA: 0

## 2025-04-15 NOTE — PROGRESS NOTES
Mateo Marsh  (1965)    4/15/2025    Subjective:     Mateo Marsh is 59 y.o. male who complains today of:    Chief Complaint   Patient presents with    Follow-up         Allergies:  Patient has no known allergies.    Past Medical History:   Diagnosis Date    Bronchitis     CAD (coronary artery disease)     past angioplasty > 10 yrs ago    Chronic back pain     Diabetes (HCC)     dx > 2 yrs    Dyslipidemia     Hyperlipidemia     meds > 5 yrs    Hypertension     meds > 5 yrs    Neuropathy in diabetes (HCC)     Osteoarthritis     Pneumonia     Sickle cell anemia     Sleep apnea, obstructive     Urinary retention 7/17/2024     Past Surgical History:   Procedure Laterality Date    BACK SURGERY  2008    Lumbar disc OR.    BLADDER SURGERY  07/10/2015    Patient states he was urininating blood and had a procedure done.    CARDIAC PROCEDURE N/A 7/18/2024    Left heart cath / coronary angiography performed by Mike Sousa MD at [facility] [place] CARDIAC CATH LAB    COLONOSCOPY  03/14/2014    [facility]    ENDOSCOPY, COLON, DIAGNOSTIC      LUMBAR FUSION  2008    LUMBAR PUNCTURE N/A 07/12/2024    Diagnostic, performed by Dr. Olson    OTHER SURGICAL HISTORY Right     tendon repair in (R) forearm because of previous injury as child    STIMULATOR SURGERY N/A 04/25/2023    SPINAL STIMULATOR REPLACEMENT performed by Navneet Lamb MD at [facility] [place] OR    TOTAL HIP ARTHROPLASTY Left 01/23/2020    LEFT HIP TOTAL HIP ARTHROPLASTY, RICARDO performed by Andrew Mehta MD at [facility] [place] OR    UPPER GASTROINTESTINAL ENDOSCOPY  03/14/2014    BI[facility]SY [facility]    VAGAL NERVE STIMULATION  2013    Dr. Antoine office     Family History   Problem Relation Age of Onset    Cancer Mother         Throat & esophagus    Anemia Mother     Other Father         Natural causes    High Blood Pressure Sister     COPD Brother     Heart Disease Brother         Cardiac stent    Diabetes Sister     COPD Sister     Emphysema Sister     No Known Problems Daughter      Social History

## 2025-05-14 ENCOUNTER — OFFICE VISIT (OUTPATIENT)
Age: 60
End: 2025-05-14

## 2025-05-14 VITALS
TEMPERATURE: 97.1 F | HEIGHT: 73 IN | WEIGHT: 206 LBS | BODY MASS INDEX: 27.3 KG/M2 | SYSTOLIC BLOOD PRESSURE: 172 MMHG | HEART RATE: 57 BPM | OXYGEN SATURATION: 97 % | DIASTOLIC BLOOD PRESSURE: 90 MMHG

## 2025-05-14 DIAGNOSIS — M51.26 DISPLACEMENT OF LUMBAR INTERVERTEBRAL DISC WITHOUT MYELOPATHY: ICD-10-CM

## 2025-05-14 DIAGNOSIS — M96.1 POSTLAMINECTOMY SYNDROME, LUMBAR REGION: ICD-10-CM

## 2025-05-14 RX ORDER — BACLOFEN 10 MG/1
10 TABLET ORAL NIGHTLY PRN
Qty: 30 TABLET | Refills: 0 | Status: CANCELLED | OUTPATIENT
Start: 2025-05-15 | End: 2025-06-14

## 2025-05-14 RX ORDER — HYDROCODONE BITARTRATE AND ACETAMINOPHEN 10; 325 MG/1; MG/1
1 TABLET ORAL
Qty: 80 TABLET | Refills: 0 | Status: SHIPPED | OUTPATIENT
Start: 2025-05-22 | End: 2025-06-21

## 2025-05-14 RX ORDER — PREGABALIN 150 MG/1
150 CAPSULE ORAL 2 TIMES DAILY
Qty: 60 CAPSULE | Refills: 0 | Status: SHIPPED | OUTPATIENT
Start: 2025-05-17 | End: 2025-06-16

## 2025-05-14 ASSESSMENT — ENCOUNTER SYMPTOMS
CONSTIPATION: 0
BACK PAIN: 1
DIARRHEA: 0
NAUSEA: 0
GASTROINTESTINAL NEGATIVE: 1
EYES NEGATIVE: 1
COUGH: 1
SHORTNESS OF BREATH: 0

## 2025-05-14 NOTE — PROGRESS NOTES
Mateo Marsh  (1965)    5/14/2025    Subjective:     Mateo Marsh is 59 y.o. male who complains today of:    Chief Complaint   Patient presents with    Follow-up     Patient is here to reassess for pain and meds.         Allergies:  Patient has no known allergies.    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    BLADDER SURGERY  07/10/2015    Patient states he was urininating blood and had a procedure done.    CARDIAC PROCEDURE N/A 7/18/2024    Left heart cath / coronary angiography performed by Mike Sousa MD at [facility] [place]ARDIAC CATH LAB    COLONOSCOPY  03/14/2014    [facility]    ENDOSCOPY, COLON, DIAGNOSTIC      LUMBAR FUSION  2008    LUMBAR PUNCTURE N/A 07/12/2024    Diagnostic, performed by Dr. Olson    OTHER SURGICAL HISTORY Right     tendon repair in (R) forearm because of previous injury as child    STIMULATOR SURGERY N/A 04/25/2023    SPINAL STIMULATOR REPLACEMENT performed by Navneet Lamb MD at [facility] [place] OR    TOTAL HIP ARTHROPLASTY Left 01/23/2020    LEFT HIP TOTAL HIP ARTHROPLASTY, RICARDO performed by Andrew Mehta MD at [facility] [place] OR    UPPER GASTROINTESTINAL ENDOSCOPY  03/14/2014    [facility]    VAGAL NERVE STIMULATION  2013    Dr. Antoine office     Family History   Problem Relation Age of Onset    Cancer Mother         Throat & esophagus    Anemia Mother     Other Father         Natural causes    High Blood Pressure Sister     COPD Brother     Heart Disease Brother         Cardiac stent    Diabetes Sister     COPD Sister     Emphysema

## 2025-06-16 ENCOUNTER — OFFICE VISIT (OUTPATIENT)
Age: 60
End: 2025-06-16
Payer: COMMERCIAL

## 2025-06-16 VITALS
WEIGHT: 206 LBS | DIASTOLIC BLOOD PRESSURE: 70 MMHG | SYSTOLIC BLOOD PRESSURE: 150 MMHG | OXYGEN SATURATION: 98 % | BODY MASS INDEX: 27.3 KG/M2 | TEMPERATURE: 97.8 F | HEART RATE: 67 BPM | HEIGHT: 73 IN

## 2025-06-16 DIAGNOSIS — M51.26 DISPLACEMENT OF LUMBAR INTERVERTEBRAL DISC WITHOUT MYELOPATHY: ICD-10-CM

## 2025-06-16 DIAGNOSIS — M96.1 POSTLAMINECTOMY SYNDROME, LUMBAR REGION: ICD-10-CM

## 2025-06-16 PROCEDURE — 99214 OFFICE O/P EST MOD 30 MIN: CPT | Performed by: NURSE PRACTITIONER

## 2025-06-16 RX ORDER — PREGABALIN 150 MG/1
150 CAPSULE ORAL 2 TIMES DAILY
Qty: 60 CAPSULE | Refills: 0 | Status: SHIPPED | OUTPATIENT
Start: 2025-06-18 | End: 2025-07-18

## 2025-06-16 RX ORDER — HYDROCODONE BITARTRATE AND ACETAMINOPHEN 10; 325 MG/1; MG/1
1 TABLET ORAL
Qty: 80 TABLET | Refills: 0 | Status: SHIPPED | OUTPATIENT
Start: 2025-06-21 | End: 2025-07-21

## 2025-06-16 ASSESSMENT — ENCOUNTER SYMPTOMS
DIARRHEA: 0
SHORTNESS OF BREATH: 0
COUGH: 0
CONSTIPATION: 0
GASTROINTESTINAL NEGATIVE: 1
EYES NEGATIVE: 1
NAUSEA: 0
BACK PAIN: 1

## 2025-06-16 NOTE — PROGRESS NOTES
Mateo Marsh  (1965)    6/16/2025    Subjective:     Mateo Marsh is 59 y.o. male who complains today of:    Chief Complaint   Patient presents with    Follow-up    Back Pain         Allergies:  Patient has no known allergies.    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    BLADDER SURGERY  07/10/2015    Patient states he was urininating blood and had a procedure done.    CARDIAC PROCEDURE N/A 7/18/2024    Left heart cath / coronary angiography performed by Mike Sousa MD at [facility] [place]    COLONOSCOPY  03/14/2014    [facility]    ENDOSCOPY, COLON, DIAGNOSTIC      LUMBAR FUSION  2008    LUMBAR PUNCTURE N/A 07/12/2024    Diagnostic, performed by Dr. Olson    OTHER SURGICAL HISTORY Right     tendon repair in (R) forearm because of previous injury as child    STIMULATOR SURGERY N/A 04/25/2023    SPINAL STIMULATOR REPLACEMENT performed by Navneet Lamb MD at [facility] [place]    TOTAL HIP ARTHROPLASTY Left 01/23/2020    LEFT HIP TOTAL HIP ARTHROPLASTY, RICARDO performed by Andrew Mehta MD at [facility] [place]    UPPER GASTROINTESTINAL ENDOSCOPY  03/14/2014    BIOPSY [facility]    VAGAL NERVE STIMULATION  2013    Dr. Antoine office     Family History   Problem Relation Age of Onset    Cancer Mother         Throat & esophagus    Anemia Mother     Other Father         Natural causes    High Blood Pressure Sister     COPD Brother     Heart Disease Brother         Cardiac stent    Diabetes Sister     COPD Sister     Emphysema Sister     No Known Problems Daughter

## 2025-07-14 ENCOUNTER — OFFICE VISIT (OUTPATIENT)
Age: 60
End: 2025-07-14
Payer: COMMERCIAL

## 2025-07-14 VITALS
HEART RATE: 73 BPM | SYSTOLIC BLOOD PRESSURE: 130 MMHG | OXYGEN SATURATION: 90 % | HEIGHT: 73 IN | WEIGHT: 206 LBS | DIASTOLIC BLOOD PRESSURE: 60 MMHG | BODY MASS INDEX: 27.3 KG/M2 | TEMPERATURE: 97.3 F

## 2025-07-14 DIAGNOSIS — M96.1 POSTLAMINECTOMY SYNDROME, LUMBAR REGION: ICD-10-CM

## 2025-07-14 DIAGNOSIS — M51.26 DISPLACEMENT OF LUMBAR INTERVERTEBRAL DISC WITHOUT MYELOPATHY: ICD-10-CM

## 2025-07-14 PROCEDURE — 99214 OFFICE O/P EST MOD 30 MIN: CPT | Performed by: NURSE PRACTITIONER

## 2025-07-14 RX ORDER — PREGABALIN 150 MG/1
150 CAPSULE ORAL 2 TIMES DAILY
Qty: 60 CAPSULE | Refills: 0 | Status: SHIPPED | OUTPATIENT
Start: 2025-07-21 | End: 2025-08-20

## 2025-07-14 RX ORDER — HYDROCODONE BITARTRATE AND ACETAMINOPHEN 10; 325 MG/1; MG/1
1 TABLET ORAL
Qty: 80 TABLET | Refills: 0 | Status: SHIPPED | OUTPATIENT
Start: 2025-07-21 | End: 2025-08-20

## 2025-07-14 RX ORDER — DULOXETIN HYDROCHLORIDE 60 MG/1
60 CAPSULE, DELAYED RELEASE ORAL DAILY
Qty: 90 CAPSULE | Refills: 0 | Status: SHIPPED | OUTPATIENT
Start: 2025-07-14 | End: 2025-10-12

## 2025-07-14 ASSESSMENT — ENCOUNTER SYMPTOMS
BACK PAIN: 1
GASTROINTESTINAL NEGATIVE: 1
NAUSEA: 0
DIARRHEA: 0
COUGH: 0
CONSTIPATION: 0
EYES NEGATIVE: 1
SHORTNESS OF BREATH: 0

## 2025-07-14 NOTE — PROGRESS NOTES
Mateo Marsh  (1965)    7/14/2025    Subjective:     Mateo Marsh is 59 y.o. male who complains today of:    Chief Complaint   Patient presents with    Back Pain    Follow-up         Allergies:  Patient has no known allergies.    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    BLADDER SURGERY  07/10/2015    Patient states he was urininating blood and had a procedure done.    CARDIAC PROCEDURE N/A 7/18/2024    Left heart cath / coronary angiography performed by Mike Sousa MD at [facility] [place]    COLONOSCOPY  03/14/2014    [facility]    ENDOSCOPY, COLON, DIAGNOSTIC      LUMBAR FUSION  2008    LUMBAR PUNCTURE N/A 07/12/2024    Diagnostic, performed by Dr. Olson    OTHER SURGICAL HISTORY Right     tendon repair in (R) forearm because of previous injury as child    STIMULATOR SURGERY N/A 04/25/2023    SPINAL STIMULATOR REPLACEMENT performed by Navneet Lamb MD at [facility] [place]    TOTAL HIP ARTHROPLASTY Left 01/23/2020    LEFT HIP TOTAL HIP ARTHROPLASTY, RICARDO performed by Andrew Mehta MD at [facility] [place]    UPPER GASTROINTESTINAL ENDOSCOPY  03/14/2014    BIOPSY [facility]    VAGAL NERVE STIMULATION  2013    Dr. Antoine office     Family History   Problem Relation Age of Onset    Cancer Mother         Throat & esophagus    Anemia Mother     Other Father         Natural causes    High Blood Pressure Sister     COPD Brother     Heart Disease Brother         Cardiac stent    Diabetes Sister     COPD Sister     Emphysema Sister     No Known Problems Daughter

## 2025-08-21 ENCOUNTER — OFFICE VISIT (OUTPATIENT)
Age: 60
End: 2025-08-21

## 2025-08-21 VITALS
OXYGEN SATURATION: 97 % | BODY MASS INDEX: 27.3 KG/M2 | WEIGHT: 206 LBS | TEMPERATURE: 97.6 F | DIASTOLIC BLOOD PRESSURE: 76 MMHG | HEIGHT: 73 IN | SYSTOLIC BLOOD PRESSURE: 120 MMHG | HEART RATE: 57 BPM

## 2025-08-21 DIAGNOSIS — M51.26 DISPLACEMENT OF LUMBAR INTERVERTEBRAL DISC WITHOUT MYELOPATHY: ICD-10-CM

## 2025-08-21 DIAGNOSIS — M96.1 POSTLAMINECTOMY SYNDROME, LUMBAR REGION: ICD-10-CM

## 2025-08-21 RX ORDER — HYDROCODONE BITARTRATE AND ACETAMINOPHEN 10; 325 MG/1; MG/1
1 TABLET ORAL
Qty: 80 TABLET | Refills: 0 | Status: SHIPPED | OUTPATIENT
Start: 2025-08-21 | End: 2025-09-20

## 2025-08-21 RX ORDER — PREGABALIN 150 MG/1
150 CAPSULE ORAL 2 TIMES DAILY
Qty: 60 CAPSULE | Refills: 0 | Status: SHIPPED | OUTPATIENT
Start: 2025-08-21 | End: 2025-09-20

## 2025-08-21 RX ORDER — BACLOFEN 10 MG/1
10 TABLET ORAL NIGHTLY PRN
Qty: 30 TABLET | Refills: 0 | Status: SHIPPED | OUTPATIENT
Start: 2025-08-21 | End: 2025-09-20

## 2025-08-21 ASSESSMENT — ENCOUNTER SYMPTOMS
COUGH: 0
NAUSEA: 0
SHORTNESS OF BREATH: 0
DIARRHEA: 0
BACK PAIN: 1
CONSTIPATION: 0
GASTROINTESTINAL NEGATIVE: 1
EYES NEGATIVE: 1

## (undated) DEVICE — PACK PROCEDURE SURG TOT HIP DRP

## (undated) DEVICE — BLADE SAW W6.3XL60MM THK0.64MM CUT THK1.04MM REPL SHT RECIP

## (undated) DEVICE — PILLOW POS W15XH6XL22IN RASPBERRY FOAM ABD W/ STRP DISP FOR

## (undated) DEVICE — ELECTRODE PT RET AD L9FT HI MOIST COND ADH HYDRGEL CORDED

## (undated) DEVICE — ABSORBENT ROLL ECODRI-SAFE

## (undated) DEVICE — Z DISCONTINUED PER MEDLINE USE 2741942 DRESSING AQUACEL 6 IN ALG W9XL15CM SIL CVR WTRPRF VIR BACT BARR ANTIMIC

## (undated) DEVICE — SYRINGE MED 30ML STD CLR PLAS LUERLOCK TIP N CTRL DISP

## (undated) DEVICE — GLOVE ORANGE PI 7 1/2   MSG9075

## (undated) DEVICE — KIT ANGIO W/ AT P65 PREM HND CTRL FOR CNTRST DEL ANGIOTOUCH

## (undated) DEVICE — Z DISCONTINUED PER MEDLINE USE 2741943 DRESSING AQUACEL 10 IN ALG W9XL25CM SIL CVR WTRPRF VIR BACT BARR ANTIMIC

## (undated) DEVICE — LABEL MED MINI W/ MARKER

## (undated) DEVICE — SYRINGE MED 8ML PLAS LOSS OF RESISTANCE SYR LUERSLIP

## (undated) DEVICE — GLIDESHEATH SLENDER STAINLESS STEEL KIT: Brand: GLIDESHEATH SLENDER

## (undated) DEVICE — 4-PORT MANIFOLD: Brand: NEPTUNE 2

## (undated) DEVICE — KIT ARMOR C DRP COLLAPSIBLE AND SELF EXP TOP CVR FOR FLUOROSCOPIC

## (undated) DEVICE — COVER LT HNDL BLU PLAS

## (undated) DEVICE — SYSTEM RECHARGING NEUROSTIMULATOR RECHRG BTTRY PK PWR SUPL

## (undated) DEVICE — NEEDLE SPNL 18GA L3.5IN W/ QNCKE SHARPER BVL DURA CLICK

## (undated) DEVICE — 3M™ TEGADERM™ TRANSPARENT FILM DRESSING FRAME STYLE, 1626W, 4 IN X 4-3/4 IN (10 CM X 12 CM), 50/CT 4CT/CASE: Brand: 3M™ TEGADERM™

## (undated) DEVICE — SYRINGE MEDICAL 3ML CLEAR PLASTIC STANDARD NON CONTROL LUERLOCK TIP DISPOSABLE

## (undated) DEVICE — GUIDEWIRE VASC L260CM DIA0.035IN TIP L3MM STD EXCHG PTFE J

## (undated) DEVICE — 3M™ STERI-DRAPE™ U-DRAPE 1015: Brand: STERI-DRAPE™

## (undated) DEVICE — SUTURE RETRIEVER

## (undated) DEVICE — NEEDLE HYPO 25GA L1.5IN BLU POLYPR HUB S STL REG BVL STR

## (undated) DEVICE — BLADE,CARBON-STEEL,15,STRL,DISPOSABLE,TB: Brand: MEDLINE

## (undated) DEVICE — PACK PROCEDURE SURG SURG CARDIAC CATH

## (undated) DEVICE — SUTURE ETHBND EXCEL SZ 2 L30IN NONABSORBABLE GRN L40MM V-37 MX69G

## (undated) DEVICE — 1010 S-DRAPE TOWEL DRAPE 10/BX: Brand: STERI-DRAPE™

## (undated) DEVICE — SYRINGE MED 10ML LUERLOCK TIP W/O SFTY DISP

## (undated) DEVICE — 3M™ IOBAN™ 2 ANTIMICROBIAL INCISE DRAPE 6650EZ: Brand: IOBAN™ 2

## (undated) DEVICE — SYRINGE IRRIG 60ML SFT PLIABLE BLB EZ TO GRP 1 HND USE W/

## (undated) DEVICE — SUTURE MCRYL SZ 4-0 L27IN ABSRB UD L19MM PS-2 1/2 CIR PRIM Y426H

## (undated) DEVICE — CONTROLLER INTELLIS PTM NONTRAIL

## (undated) DEVICE — RADIFOCUS OPTITORQUE ANGIOGRAPHIC CATHETER: Brand: OPTITORQUE

## (undated) DEVICE — SUTURE VCRL SZ 3-0 L36IN ABSRB UD L36MM CT-1 1/2 CIR J944H

## (undated) DEVICE — GLOVE ORANGE PI 8   MSG9080

## (undated) DEVICE — CHLORAPREP 26ML ORANGE

## (undated) DEVICE — SIPS DUAL 2 MINUTE TIP

## (undated) DEVICE — SUTURE VCRL SZ 4-0 L18IN ABSRB UD L19MM PS-2 3/8 CIR PRIM J496H

## (undated) DEVICE — APPLICATOR MEDICATED 26 CC SOLUTION HI LT ORNG CHLORAPREP

## (undated) DEVICE — SUTURE MCRYL + SZ 3-0 L36IN ABSRB UD CT-1 L36MM 1/2 CIR MCP944H

## (undated) DEVICE — HOOD: Brand: FLYTE, SURGICOOL

## (undated) DEVICE — 5FR MEDTRONIC PIG  110CM

## (undated) DEVICE — SUTURE VCRL SZ 1 L36IN ABSRB UD L36MM CT-1 1/2 CIR J947H

## (undated) DEVICE — GOWN,AURORA,NONREINFORCED,LARGE: Brand: MEDLINE

## (undated) DEVICE — DRAPE EQUIP TRNSPRT CONTAINMENT FOR BK TAB

## (undated) DEVICE — PACK,LAPAROTOMY,NO GOWNS: Brand: MEDLINE

## (undated) DEVICE — DRAPE SURG BRACH STRL

## (undated) DEVICE — ADHESIVE SKIN CLSR 0.7ML TOP DERMBND ADV

## (undated) DEVICE — MARKER SURG SKIN GENTIAN VLT REG TIP W/ 6IN RUL

## (undated) DEVICE — TOWEL,OR,DSP,ST,BLUE,STD,4/PK,20PK/CS: Brand: MEDLINE

## (undated) DEVICE — SPONGE,LAP,18"X18",DLX,XR,ST,5/PK,40/PK: Brand: MEDLINE

## (undated) DEVICE — ELECTRODE TRAIN EDGE SYS W/ QUIK-COMBO CONN

## (undated) DEVICE — DRESSING QUIKCLOT RADIAL 0.8X1.5 IN W/ADHESIVE

## (undated) DEVICE — COUNTER NDL 40 COUNT HLD 70 FOAM BLK ADH W/ MAG

## (undated) DEVICE — USAGE FEE F/OSTEOTOME

## (undated) DEVICE — NEPTUNE E-SEP SMOKE EVACUATION PENCIL, COATED, 70MM BLADE, PUSH BUTTON SWITCH: Brand: NEPTUNE E-SEP

## (undated) DEVICE — BLADE ES ELASTOMERIC COAT INSUL DURABLE BEND UPTO 90DEG

## (undated) DEVICE — SUTURE VCRL SZ 3-0 L27IN ABSRB UD L26MM SH 1/2 CIR J416H